# Patient Record
Sex: MALE | Race: WHITE | NOT HISPANIC OR LATINO | Employment: OTHER | URBAN - NONMETROPOLITAN AREA
[De-identification: names, ages, dates, MRNs, and addresses within clinical notes are randomized per-mention and may not be internally consistent; named-entity substitution may affect disease eponyms.]

---

## 2017-03-07 DIAGNOSIS — D72.820 LYMPHOCYTOSIS (SYMPTOMATIC): Primary | ICD-10-CM

## 2017-03-08 ENCOUNTER — OFFICE VISIT (OUTPATIENT)
Dept: ONCOLOGY | Facility: CLINIC | Age: 67
End: 2017-03-08

## 2017-03-08 ENCOUNTER — LAB (OUTPATIENT)
Dept: ONCOLOGY | Facility: HOSPITAL | Age: 67
End: 2017-03-08

## 2017-03-08 VITALS
HEART RATE: 84 BPM | TEMPERATURE: 98.3 F | WEIGHT: 171.6 LBS | HEIGHT: 67 IN | DIASTOLIC BLOOD PRESSURE: 86 MMHG | SYSTOLIC BLOOD PRESSURE: 148 MMHG | RESPIRATION RATE: 18 BRPM | BODY MASS INDEX: 26.93 KG/M2

## 2017-03-08 DIAGNOSIS — D72.820 LYMPHOCYTOSIS (SYMPTOMATIC): ICD-10-CM

## 2017-03-08 DIAGNOSIS — C91.10 CLL (CHRONIC LYMPHOCYTIC LEUKEMIA) (HCC): Primary | ICD-10-CM

## 2017-03-08 LAB
ALBUMIN SERPL-MCNC: 4.6 G/DL (ref 3.4–4.8)
ALBUMIN/GLOB SERPL: 1.5 G/DL (ref 1.1–1.8)
ALP SERPL-CCNC: 71 U/L (ref 38–126)
ALT SERPL W P-5'-P-CCNC: 33 U/L (ref 21–72)
ANION GAP SERPL CALCULATED.3IONS-SCNC: 15 MMOL/L (ref 5–15)
AST SERPL-CCNC: 40 U/L (ref 17–59)
BILIRUB SERPL-MCNC: 0.6 MG/DL (ref 0.2–1.3)
BUN BLD-MCNC: 20 MG/DL (ref 7–21)
BUN/CREAT SERPL: 16 (ref 7–25)
CALCIUM SPEC-SCNC: 9.1 MG/DL (ref 8.4–10.2)
CHLORIDE SERPL-SCNC: 104 MMOL/L (ref 95–110)
CO2 SERPL-SCNC: 21 MMOL/L (ref 22–31)
CREAT BLD-MCNC: 1.25 MG/DL (ref 0.7–1.3)
DEPRECATED RDW RBC AUTO: 41.1 FL (ref 35.1–43.9)
EOSINOPHIL # BLD MANUAL: 0.45 10*3/MM3 (ref 0–0.7)
EOSINOPHIL NFR BLD MANUAL: 2 % (ref 0–7)
ERYTHROCYTE [DISTWIDTH] IN BLOOD BY AUTOMATED COUNT: 13.7 % (ref 11.5–14.5)
GFR SERPL CREATININE-BSD FRML MDRD: 58 ML/MIN/1.73 (ref 49–113)
GLOBULIN UR ELPH-MCNC: 3 GM/DL (ref 2.3–3.5)
GLUCOSE BLD-MCNC: 86 MG/DL (ref 60–100)
HCT VFR BLD AUTO: 42.5 % (ref 39–49)
HGB BLD-MCNC: 14.6 G/DL (ref 13.7–17.3)
LDH SERPL-CCNC: 523 U/L (ref 313–618)
LYMPHOCYTES # BLD MANUAL: 12.48 10*3/MM3 (ref 0.6–4.2)
LYMPHOCYTES NFR BLD MANUAL: 56 % (ref 10–50)
MCH RBC QN AUTO: 28.2 PG (ref 26.5–34)
MCHC RBC AUTO-ENTMCNC: 34.4 G/DL (ref 31.5–36.3)
MCV RBC AUTO: 82 FL (ref 80–98)
NEUTROPHILS # BLD AUTO: 4.46 10*3/MM3 (ref 2–8.6)
NEUTROPHILS NFR BLD MANUAL: 20 % (ref 37–80)
PLAT MORPH BLD: NORMAL
PLATELET # BLD AUTO: 195 10*3/MM3 (ref 150–450)
PMV BLD AUTO: 10 FL (ref 8–12)
POTASSIUM BLD-SCNC: 4.2 MMOL/L (ref 3.5–5.1)
PROT SERPL-MCNC: 7.6 G/DL (ref 6.3–8.6)
RBC # BLD AUTO: 5.18 10*6/MM3 (ref 4.37–5.74)
RBC MORPH BLD: NORMAL
SODIUM BLD-SCNC: 140 MMOL/L (ref 137–145)
VARIANT LYMPHS NFR BLD MANUAL: 22 % (ref 0–5)
WBC MORPH BLD: NORMAL
WBC NRBC COR # BLD: 22.29 10*3/MM3 (ref 3.2–9.8)

## 2017-03-08 PROCEDURE — 85025 COMPLETE CBC W/AUTO DIFF WBC: CPT | Performed by: INTERNAL MEDICINE

## 2017-03-08 PROCEDURE — 83615 LACTATE (LD) (LDH) ENZYME: CPT | Performed by: INTERNAL MEDICINE

## 2017-03-08 PROCEDURE — 85007 BL SMEAR W/DIFF WBC COUNT: CPT | Performed by: INTERNAL MEDICINE

## 2017-03-08 PROCEDURE — 80053 COMPREHEN METABOLIC PANEL: CPT | Performed by: INTERNAL MEDICINE

## 2017-03-08 PROCEDURE — 99213 OFFICE O/P EST LOW 20 MIN: CPT | Performed by: INTERNAL MEDICINE

## 2017-03-08 NOTE — PROGRESS NOTES
DATE OF VISIT: 3/8/2017    REASON FOR VISIT: History of CLL    HISTORY OF PRESENT ILLNESS:    66-year-old male with a past medical history significant for history of CVA and dyslipidemia, he was diagnosed with CLL on peripheral blood flow cytometry in September 2016.  He is here for a 3 monthly follow-up visit today.  Denies any abnormal swollen and anywhere in the body. denies any recent weight loss.  Denies any fever or chills .  Wife in the room states patient is having increasing night sweats.    PAST MEDICAL HISTORY:    Past Medical History   Diagnosis Date   • CLL (chronic lymphocytic leukemia)    • CVA (cerebral vascular accident)    • Dyslipidemia    • Gastritis    • Night sweats        SOCIAL HISTORY:    Social History   Substance Use Topics   • Smoking status: Former Smoker     Types: Cigars     Quit date: 1992   • Smokeless tobacco: None   • Alcohol use No       Surgical History :  Past Surgical History   Procedure Laterality Date   • Appendectomy     • Prostate surgery     • Knee surgery     • Hernia repair     • Leg surgery     • Mandible fracture surgery         ALLERGIES:    No Known Allergies    REVIEW OF SYSTEMS:      CONSTITUTIONAL: Positive for fatigue.  No fever, chills, or night sweats.     HEENT: Positive for night sweats.  No epistaxis, mouth sores, or difficulty swallowing.    RESPIRATORY:  Positive for intermittent shortness of breath.  No cough or hemoptysis.    CARDIOVASCULAR:  No chest pain or palpitations.    GASTROINTESTINAL: Positive for left upper quadrant discomfort.  No  nausea, vomiting, or blood in the stool.    GENITOURINARY:  No dysuria or hematuria.    MUSCULOSKELETAL:  Positive for chronic back pain.    NEUROLOGICAL:  Positive for occasional tingling and numbness in the lower extremities. No new headache or dizziness.     LYMPHATICS:  Denies any abnormal swollen and anywhere in the body.    SKIN:  Denies any new skin rash.    PHYSICAL EXAMINATION:      VITAL SIGNS:    Visit  "Vitals   • /86   • Pulse 84   • Temp 98.3 °F (36.8 °C)   • Resp 18   • Ht 67\" (170.2 cm)   • Wt 171 lb 9.6 oz (77.8 kg)   • BMI 26.88 kg/m2       GENERAL:  Not in any distress.    HEENT:  Normocephalic, Atraumatic.Mild Conjunctival pallor. No icterus. Extraocular Movements Intact. No Fascial Asymmetry noted.    NECK:  No adenopathy. No JVD.    RESPIRATORY:  Fair air entry bilateral. No rhonchi or wheezing.    CARDIOVASCULAR:  S1, S2. Regular rate and rhythm. No murmur or gallop appreciated.    ABDOMEN:  Soft, obese, nontender. Bowel sounds present in all four quadrants.  No organomegaly appreciated.    EXTREMITIES:  No edema.No Calf Tenderness.    NEUROLOGIC:  Alert, awake and oriented ×3.  No  Motor or sensory deficit appreciated. Cranial Nerves 2-12 grossly intact.          DIAGNOSTIC DATA:    GLUCOSE   Date Value Ref Range Status   12/01/2016 78 60 - 100 mg/dl Final     SODIUM   Date Value Ref Range Status   12/01/2016 140 137 - 145 mmol/L Final     POTASSIUM   Date Value Ref Range Status   12/01/2016 4.3 3.5 - 5.1 mmol/L Final     CO2   Date Value Ref Range Status   12/01/2016 24 22 - 31 mmol/L Final     CHLORIDE   Date Value Ref Range Status   12/01/2016 102 95 - 110 mmol/L Final     ANION GAP   Date Value Ref Range Status   12/01/2016 14.0 5.0 - 15.0 mmol/L Final     CREATININE   Date Value Ref Range Status   12/01/2016 1.1 0.7 - 1.3 mg/dl Final     BUN   Date Value Ref Range Status   12/01/2016 17 7 - 21 mg/dl Final     CALCIUM   Date Value Ref Range Status   12/01/2016 9.0 8.4 - 10.2 mg/dl Final     ALKALINE PHOSPHATASE   Date Value Ref Range Status   12/01/2016 68 38 - 126 U/L Final     TOTAL PROTEIN   Date Value Ref Range Status   12/01/2016 7.8 6.3 - 8.6 gm/dl Final     ALT (SGPT)   Date Value Ref Range Status   12/01/2016 31 21 - 72 U/L Final     AST (SGOT)   Date Value Ref Range Status   12/01/2016 21 17 - 59 U/L Final     TOTAL BILIRUBIN   Date Value Ref Range Status   12/01/2016 0.3 0.2 - 1.3 " mg/dl Final     ALBUMIN   Date Value Ref Range Status   12/01/2016 4.5 3.4 - 4.8 gm/dl Final     Lab Results   Component Value Date    WBC 22.29 (H) 03/08/2017    HGB 14.6 03/08/2017    HCT 42.5 03/08/2017    MCV 82.0 03/08/2017     03/08/2017     Lab Results   Component Value Date    NEUTROABS 4.79 12/01/2016     Lab Results   Component Value Date    REFLABREPO SEE NOTE: 10/19/2016   ]    PATHOLOGY:  FISH testing for CLL done on October 19, 2016 shows:  SPECIFIC FISH RESULTS:     CCND1/IGH: NORMAL        nuc samantha 11q13(FVRK0w3),14q32(IGHx2)[100]     THOMAS: NORMAL        nuc samantha 11q22.3(ATMx2)[100]     12cen: NORMAL        nuc samantha 12cen(P80A4j1)[100]     13q: NORMAL        nuc samantha 13q14.3(DLEUx2),13q34(XZAJ2x4)[100]     TP53: NORMAL        nuc samantha 17p13.1(TP53x2)[100]     Peripheral blood flow cytometry done on September 30, 2016 shows:  Interpretation    Peripheral Blood:     CD5+ monotypic B-cell population (40% of leukocytes) compatible with   B-cell chronic lymphocytic leukemia/ small lymphocytic lymphoma (B-CLL/SLL)         RADIOLOGY DATA :  CT of abdomen and pelvis with contrast done on October 5, 2016 showed:  FINDINGS- Comparison study dated July 14, 2014. Axial computer  tomography sequential imaging was performed from the diaphragms  through the symphysis pubis after administration of IV contrast  .Sagittal and coronal reformates was performed.      Imaging through the lung bases reveals stable right lung base lateral  basilar small calcified lung parenchymal granuloma consistent with  stable old granulomatous disease. Otherwise lung bases unremarkable..      The liver is normal. The gallbladder is normal. The pancreas is  normal. The spleen has multiple small calcified granulomas involving  it consistent with old granulomatous disease. Spleen appears upper  limits of normal size but is otherwise unremarkable. Bilateral adrenal  glands are normal. Right kidney and ureter are normal. Left kidney  and  ureter are normal. The bladder is normal. The prostate gland appears  diffusely enlarged with a volume of 45 and is impinging upon the base  of bladder. Questionable defect suspicious for prior TURP procedure.  Recommend clinical correlation. The hollow viscera appears normal. No  lymphadenopathy in the abdomen or pelvis. No acute osseous  abnormality.      IMPRESSION-   1.Enlarged prostate gland may represent changes from benign prostatic  hypertrophy versus prostate malignancy. There also appears to be  evidence of prior probable TURP procedure. Recommend clinical  correlation..  2.Otherwise unremarkable CT abdomen pelvis study..        ASSESSMENT AND PLAN:      1.  CLL, stage II with questionable splenomegaly.  Patient's white blood cell count is 22,000 today with normal hemoglobin and platelets.  In view of having increasing night sweats, it was discussed with patient and his family it could be or could not be related to the CLL.  Recommended to him to start intravenous chemotherapy with bendamustine and rituximab and see if it helps with the night sweats.  At this point is not sure whether he wants to go with chemotherapy.  We will see him back in about 3 months with a repeat CBC, CMP and LDH at that point.  Patient was encouraged to call us if he starts having fevers or drenching night sweats or weight loss or if he starts noticing any swollen glands anywhere in the body.  He was encouraged to call us next week if he wants to start chemotherapy.  In that case he'll need a port placement as well as a staging CT of chest abdomen and pelvis with contrast prior to starting chemotherapy.    2.  History of CVA    3.  Health maintenance: Patient does not smoke.  Had a colonoscopy done in 2013.  He remains full code.        Andres Lubin MD  3/8/2017  2:49 PM

## 2017-03-08 NOTE — PROGRESS NOTES
"SULAIMANW met with patient and his wife privately  after his medical oncology follow up visit.  SW advised by RN of pt responses to risk assessment and history of suicidal ideations.  SW introduced self and explained role and scope of assessment. Pt. presents with flat affect and depressed mood, he is quiet and withdrawn and often looks to wife to respond and was encouraged to share his own thoughts and feelings. He is described as having difficulty in sharing of emotions.  SW explained support and limits of confidentiality and pt evidenced some increase in comfort prior to departure. Pt. Shared feedback that 7 years prior he experienced a time that he had suicidal ideations with a plan. He states he did not act on the plan, did not seek intervention and has never been on antidepressant medication. He described the previous time as a time of situational stress and when resolved his coping improved.  Pt wife is present and serves a collateral historian.  She often answers on behalf of pt and was encouraged to allow pt to share.  Pt. Denies any current suicidal or homicidal ideations, no plan or intent.  He states over the past month he has thought about death and describes it as an acceptance that if he went to sleep and did not wake that he would be accepting of that. He describes the stress and worry associated with his illness as a major stressor, treatment is not indicated at this time and he describes his condition as in a \"hold\" pattern at present. Pt. Fear and worry about future is stressful. He describes symptoms of anhedonia, fatigue, times of feeling helpless, poor concentration and poor sleep. He was provided education related to the symptoms of depression and treatment modalities to include medication and psychotherapy> SW offered pt the ability to speak further in counseling. He is not receptive at this time.   Pt. Denies any prior suicide attempts, no prior psychiatric hospitalizations. He is scheduled to " follow up with his PCP on 3-23 and was encouraged to ask about her evaluation for possible need for antidepressants. Pt. Describes a history of loss and complex grief and stress as he and his wife are raising their grandchildren after the unexpected death of daughter.   SW offered emotional support, validation of feelings, encouragement and education.  Total face to face 30 minutes.   Ongoing support reinforced and pt was given SW contact number to call in the event needs arise or symptoms of depression/ anxiety exacerbate.  He was provided [address] [employer] Number for emergency contact.

## 2017-06-08 ENCOUNTER — LAB (OUTPATIENT)
Dept: ONCOLOGY | Facility: HOSPITAL | Age: 67
End: 2017-06-08

## 2017-06-08 ENCOUNTER — OFFICE VISIT (OUTPATIENT)
Dept: ONCOLOGY | Facility: CLINIC | Age: 67
End: 2017-06-08

## 2017-06-08 VITALS
HEART RATE: 72 BPM | WEIGHT: 177.8 LBS | TEMPERATURE: 97.9 F | SYSTOLIC BLOOD PRESSURE: 168 MMHG | BODY MASS INDEX: 27.85 KG/M2 | DIASTOLIC BLOOD PRESSURE: 88 MMHG

## 2017-06-08 DIAGNOSIS — C91.10 CLL (CHRONIC LYMPHOCYTIC LEUKEMIA) (HCC): Primary | ICD-10-CM

## 2017-06-08 DIAGNOSIS — C91.10 CLL (CHRONIC LYMPHOCYTIC LEUKEMIA) (HCC): ICD-10-CM

## 2017-06-08 LAB
ALBUMIN SERPL-MCNC: 4.5 G/DL (ref 3.4–4.8)
ALBUMIN/GLOB SERPL: 1.3 G/DL (ref 1.1–1.8)
ALP SERPL-CCNC: 93 U/L (ref 38–126)
ALT SERPL W P-5'-P-CCNC: 42 U/L (ref 21–72)
ANION GAP SERPL CALCULATED.3IONS-SCNC: 14 MMOL/L (ref 5–15)
AST SERPL-CCNC: 28 U/L (ref 17–59)
BILIRUB SERPL-MCNC: 0.6 MG/DL (ref 0.2–1.3)
BUN BLD-MCNC: 15 MG/DL (ref 7–21)
BUN/CREAT SERPL: 12.1 (ref 7–25)
CALCIUM SPEC-SCNC: 9.2 MG/DL (ref 8.4–10.2)
CHLORIDE SERPL-SCNC: 102 MMOL/L (ref 95–110)
CO2 SERPL-SCNC: 23 MMOL/L (ref 22–31)
CREAT BLD-MCNC: 1.24 MG/DL (ref 0.7–1.3)
DEPRECATED RDW RBC AUTO: 41.4 FL (ref 35.1–43.9)
EOSINOPHIL # BLD MANUAL: 0.45 10*3/MM3 (ref 0–0.7)
EOSINOPHIL NFR BLD MANUAL: 2 % (ref 0–7)
ERYTHROCYTE [DISTWIDTH] IN BLOOD BY AUTOMATED COUNT: 13.9 % (ref 11.5–14.5)
GFR SERPL CREATININE-BSD FRML MDRD: 58 ML/MIN/1.73 (ref 49–113)
GLOBULIN UR ELPH-MCNC: 3.5 GM/DL (ref 2.3–3.5)
GLUCOSE BLD-MCNC: 89 MG/DL (ref 60–100)
HCT VFR BLD AUTO: 43.4 % (ref 39–49)
HGB BLD-MCNC: 15.2 G/DL (ref 13.7–17.3)
LDH SERPL-CCNC: 517 U/L (ref 313–618)
LYMPHOCYTES # BLD MANUAL: 12.62 10*3/MM3 (ref 0.6–4.2)
LYMPHOCYTES NFR BLD MANUAL: 3 % (ref 0–12)
LYMPHOCYTES NFR BLD MANUAL: 56 % (ref 10–50)
MCH RBC QN AUTO: 28.5 PG (ref 26.5–34)
MCHC RBC AUTO-ENTMCNC: 35 G/DL (ref 31.5–36.3)
MCV RBC AUTO: 81.4 FL (ref 80–98)
MONOCYTES # BLD AUTO: 0.68 10*3/MM3 (ref 0–0.9)
NEUTROPHILS # BLD AUTO: 5.86 10*3/MM3 (ref 2–8.6)
NEUTROPHILS NFR BLD MANUAL: 26 % (ref 37–80)
PLAT MORPH BLD: NORMAL
PLATELET # BLD AUTO: 178 10*3/MM3 (ref 150–450)
PMV BLD AUTO: 10.9 FL (ref 8–12)
POTASSIUM BLD-SCNC: 4.5 MMOL/L (ref 3.5–5.1)
PROT SERPL-MCNC: 8 G/DL (ref 6.3–8.6)
RBC # BLD AUTO: 5.33 10*6/MM3 (ref 4.37–5.74)
RBC MORPH BLD: NORMAL
SODIUM BLD-SCNC: 139 MMOL/L (ref 137–145)
VARIANT LYMPHS NFR BLD MANUAL: 13 % (ref 0–5)
WBC MORPH BLD: NORMAL
WBC NRBC COR # BLD: 22.54 10*3/MM3 (ref 3.2–9.8)

## 2017-06-08 PROCEDURE — G0463 HOSPITAL OUTPT CLINIC VISIT: HCPCS | Performed by: INTERNAL MEDICINE

## 2017-06-08 PROCEDURE — 80053 COMPREHEN METABOLIC PANEL: CPT

## 2017-06-08 PROCEDURE — 85025 COMPLETE CBC W/AUTO DIFF WBC: CPT

## 2017-06-08 PROCEDURE — 36415 COLL VENOUS BLD VENIPUNCTURE: CPT | Performed by: INTERNAL MEDICINE

## 2017-06-08 PROCEDURE — 83615 LACTATE (LD) (LDH) ENZYME: CPT

## 2017-06-08 PROCEDURE — 99213 OFFICE O/P EST LOW 20 MIN: CPT | Performed by: INTERNAL MEDICINE

## 2017-06-08 PROCEDURE — 85007 BL SMEAR W/DIFF WBC COUNT: CPT

## 2017-06-08 RX ORDER — TAMSULOSIN HYDROCHLORIDE 0.4 MG/1
1 CAPSULE ORAL NIGHTLY
COMMUNITY
End: 2019-11-21 | Stop reason: ALTCHOICE

## 2017-06-08 RX ORDER — PAROXETINE HYDROCHLORIDE 20 MG/1
20 TABLET, FILM COATED ORAL NIGHTLY
COMMUNITY
End: 2019-11-21 | Stop reason: ALTCHOICE

## 2017-06-08 NOTE — PROGRESS NOTES
DATE OF VISIT: 6/8/2017    REASON FOR VISIT: History of CLL    HISTORY OF PRESENT ILLNESS:    66-year-old male with a past medical history significant for history of CVA and dyslipidemia, he was diagnosed with CLL on peripheral blood flow cytometry in September 2016.  He is here for a 3 monthly follow-up visit today.  Denies any abnormal swollen and anywhere in the body. denies any recent weight loss.  Denies any fever or chills .  Complains of significant night sweats.  He also complains of continuous pain in the left upper quadrant.    PAST MEDICAL HISTORY:    Past Medical History:   Diagnosis Date   • CLL (chronic lymphocytic leukemia)    • CVA (cerebral vascular accident)    • Dyslipidemia    • Gastritis    • Night sweats        SOCIAL HISTORY:    Social History   Substance Use Topics   • Smoking status: Former Smoker     Types: Cigars     Quit date: 1992   • Smokeless tobacco: None   • Alcohol use No       Surgical History :  Past Surgical History:   Procedure Laterality Date   • APPENDECTOMY     • HERNIA REPAIR     • KNEE SURGERY     • LEG SURGERY     • MANDIBLE FRACTURE SURGERY     • PROSTATE SURGERY         ALLERGIES:    No Known Allergies    REVIEW OF SYSTEMS:      CONSTITUTIONAL: Positive for fatigue.Positive for night sweats.  No fever, chills.    HEENT:   No epistaxis, mouth sores, or difficulty swallowing.    RESPIRATORY:  Positive for intermittent shortness of breath.  No cough or hemoptysis.    CARDIOVASCULAR:  No chest pain or palpitations.    GASTROINTESTINAL: Positive for left upper quadrant discomfort.  No  nausea, vomiting, or blood in the stool.    GENITOURINARY:  No dysuria or hematuria.    MUSCULOSKELETAL:  Positive for chronic back pain.    NEUROLOGICAL:  Positive for occasional tingling and numbness in the lower extremities. No new headache or dizziness.     LYMPHATICS:  Denies any abnormal swollen and anywhere in the body.    SKIN:  Denies any new skin rash.          PHYSICAL EXAMINATION:       VITAL SIGNS:    /88  Pulse 72  Temp 97.9 °F (36.6 °C)  Wt 177 lb 12.8 oz (80.6 kg)  BMI 27.85 kg/m2    GENERAL:  Not in any distress.    HEENT:  Normocephalic, Atraumatic.Mild Conjunctival pallor. No icterus. Extraocular Movements Intact. No Facial Asymmetry noted.    NECK:  No adenopathy. No JVD.    RESPIRATORY:  Fair air entry bilateral. No rhonchi or wheezing.    CARDIOVASCULAR:  S1, S2. Regular rate and rhythm. No murmur or gallop appreciated.    ABDOMEN:  Soft, obese, nontender. Bowel sounds present in all four quadrants.  No organomegaly appreciated.    EXTREMITIES:  No edema.No Calf Tenderness.    NEUROLOGIC:  Alert, awake and oriented ×3.  No  Motor or sensory deficit appreciated. Cranial Nerves 2-12 grossly intact.          DIAGNOSTIC DATA:    Glucose   Date Value Ref Range Status   06/08/2017 89 60 - 100 mg/dL Final     Sodium   Date Value Ref Range Status   06/08/2017 139 137 - 145 mmol/L Final     Potassium   Date Value Ref Range Status   06/08/2017 4.5 3.5 - 5.1 mmol/L Final     CO2   Date Value Ref Range Status   06/08/2017 23.0 22.0 - 31.0 mmol/L Final     Chloride   Date Value Ref Range Status   06/08/2017 102 95 - 110 mmol/L Final     Anion Gap   Date Value Ref Range Status   06/08/2017 14.0 5.0 - 15.0 mmol/L Final     Creatinine   Date Value Ref Range Status   06/08/2017 1.24 0.70 - 1.30 mg/dL Final     BUN   Date Value Ref Range Status   06/08/2017 15 7 - 21 mg/dL Final     BUN/Creatinine Ratio   Date Value Ref Range Status   06/08/2017 12.1 7.0 - 25.0 Final     Calcium   Date Value Ref Range Status   06/08/2017 9.2 8.4 - 10.2 mg/dL Final     eGFR Non  Amer   Date Value Ref Range Status   06/08/2017 58 49 - 113 mL/min/1.73 Final     Alkaline Phosphatase   Date Value Ref Range Status   06/08/2017 93 38 - 126 U/L Final     Total Protein   Date Value Ref Range Status   06/08/2017 8.0 6.3 - 8.6 g/dL Final     ALT (SGPT)   Date Value Ref Range Status   06/08/2017 42 21 - 72 U/L  Final     AST (SGOT)   Date Value Ref Range Status   06/08/2017 28 17 - 59 U/L Final     Total Bilirubin   Date Value Ref Range Status   06/08/2017 0.6 0.2 - 1.3 mg/dL Final     Albumin   Date Value Ref Range Status   06/08/2017 4.50 3.40 - 4.80 g/dL Final     Globulin   Date Value Ref Range Status   06/08/2017 3.5 2.3 - 3.5 gm/dL Final     A/G Ratio   Date Value Ref Range Status   06/08/2017 1.3 1.1 - 1.8 g/dL Final     Lab Results   Component Value Date    WBC 22.54 (H) 06/08/2017    HGB 15.2 06/08/2017    HCT 43.4 06/08/2017    MCV 81.4 06/08/2017     06/08/2017     Lab Results   Component Value Date    NEUTROABS 5.86 06/08/2017     Lab Results   Component Value Date    REFLABREPO SEE NOTE: 10/19/2016   ]    PATHOLOGY:  FISH testing for CLL done on October 19, 2016 shows:  SPECIFIC FISH RESULTS:     CCND1/IGH: NORMAL        nuc samantha 11q13(USTR2n6),14q32(IGHx2)[100]     THOMAS: NORMAL        nuc samantha 11q22.3(ATMx2)[100]     12cen: NORMAL        nuc samantha 12cen(O30L4z9)[100]     13q: NORMAL        nuc samantha 13q14.3(DLEUx2),13q34(MHYF3z1)[100]     TP53: NORMAL        nuc samantha 17p13.1(TP53x2)[100]     Peripheral blood flow cytometry done on September 30, 2016 shows:  Interpretation    Peripheral Blood:     CD5+ monotypic B-cell population (40% of leukocytes) compatible with   B-cell chronic lymphocytic leukemia/ small lymphocytic lymphoma (B-CLL/SLL)         RADIOLOGY DATA :  CT of abdomen and pelvis with contrast done on October 5, 2016 showed:  FINDINGS- Comparison study dated July 14, 2014. Axial computer  tomography sequential imaging was performed from the diaphragms  through the symphysis pubis after administration of IV contrast  .Sagittal and coronal reformates was performed.      Imaging through the lung bases reveals stable right lung base lateral  basilar small calcified lung parenchymal granuloma consistent with  stable old granulomatous disease. Otherwise lung bases unremarkable..      The liver is normal.  The gallbladder is normal. The pancreas is  normal. The spleen has multiple small calcified granulomas involving  it consistent with old granulomatous disease. Spleen appears upper  limits of normal size but is otherwise unremarkable. Bilateral adrenal  glands are normal. Right kidney and ureter are normal. Left kidney and  ureter are normal. The bladder is normal. The prostate gland appears  diffusely enlarged with a volume of 45 and is impinging upon the base  of bladder. Questionable defect suspicious for prior TURP procedure.  Recommend clinical correlation. The hollow viscera appears normal. No  lymphadenopathy in the abdomen or pelvis. No acute osseous  abnormality.      IMPRESSION-   1.Enlarged prostate gland may represent changes from benign prostatic  hypertrophy versus prostate malignancy. There also appears to be  evidence of prior probable TURP procedure. Recommend clinical  correlation..  2.Otherwise unremarkable CT abdomen pelvis study..        ASSESSMENT AND PLAN:      1.  CLL, stage II with questionable splenomegaly.  Patient's white blood cell count is 22,000 today with normal hemoglobin and platelets.  She has persistent night sweats since last clinic visit.  Still reluctant to start chemotherapy.  Again patient and his family were educated about CLL and indications for treatment in CLL.  At this point he wants to think about whether he wants to start chemotherapy or not.  Patient was encouraged to call us if he wants to start chemotherapy was started in near future with bendamustine and rituximab.  We will give him 3 month appointment with repeat CBC on that date.    2.  History of CVA    3.  Health maintenance: Patient does not smoke.  Had a colonoscopy done in 2013.  He remains full code.        Andres Lubin MD  6/8/2017  1:48 PM

## 2017-06-19 ENCOUNTER — TELEPHONE (OUTPATIENT)
Dept: ONCOLOGY | Facility: CLINIC | Age: 67
End: 2017-06-19

## 2017-06-20 NOTE — TELEPHONE ENCOUNTER
Needs to get port placement.Will need to see him next week to explain side effect of chemotherapy prior to starting it.

## 2017-06-20 NOTE — TELEPHONE ENCOUNTER
Called and spoke with pt, pt is to be expecting a call back regarding appt next week to discuss chemo.

## 2017-06-28 ENCOUNTER — PREP FOR SURGERY (OUTPATIENT)
Dept: OTHER | Facility: HOSPITAL | Age: 67
End: 2017-06-28

## 2017-06-28 ENCOUNTER — CONSULT (OUTPATIENT)
Dept: SURGERY | Facility: CLINIC | Age: 67
End: 2017-06-28

## 2017-06-28 ENCOUNTER — OFFICE VISIT (OUTPATIENT)
Dept: ONCOLOGY | Facility: CLINIC | Age: 67
End: 2017-06-28

## 2017-06-28 VITALS
DIASTOLIC BLOOD PRESSURE: 80 MMHG | RESPIRATION RATE: 16 BRPM | WEIGHT: 178.1 LBS | BODY MASS INDEX: 27.89 KG/M2 | SYSTOLIC BLOOD PRESSURE: 147 MMHG | TEMPERATURE: 96.7 F | HEART RATE: 96 BPM

## 2017-06-28 VITALS
WEIGHT: 179 LBS | SYSTOLIC BLOOD PRESSURE: 144 MMHG | DIASTOLIC BLOOD PRESSURE: 76 MMHG | BODY MASS INDEX: 28.77 KG/M2 | HEIGHT: 66 IN

## 2017-06-28 DIAGNOSIS — C91.10 CLL (CHRONIC LYMPHOCYTIC LEUKEMIA) (HCC): Primary | ICD-10-CM

## 2017-06-28 PROCEDURE — G0463 HOSPITAL OUTPT CLINIC VISIT: HCPCS | Performed by: INTERNAL MEDICINE

## 2017-06-28 PROCEDURE — 99214 OFFICE O/P EST MOD 30 MIN: CPT | Performed by: INTERNAL MEDICINE

## 2017-06-28 PROCEDURE — 99204 OFFICE O/P NEW MOD 45 MIN: CPT | Performed by: SURGERY

## 2017-06-28 RX ORDER — ONDANSETRON 4 MG/1
4 TABLET, FILM COATED ORAL 4 TIMES DAILY PRN
Qty: 40 TABLET | Refills: 3 | Status: SHIPPED | OUTPATIENT
Start: 2017-06-28 | End: 2021-11-10 | Stop reason: SDUPTHER

## 2017-06-28 NOTE — PROGRESS NOTES
Subjective   Roe Aniceto Gu is a 66 y.o. male.   referal from [facility] needs mediport  History of Present Illness   Mr. Gu is 66-year-old gentleman who is diagnosed with CLL several months ago.  He is unable complaint is abdominal pain from his flow megaly.  He's been being monitored and he doesn't have any B symptoms are such as night sweats or weight loss or fevers and chills.  They have decided to start chemotherapy for his treatments.    Past medical history of CLL, stroke, dyslipidemic, gastritis    Past surgical history of epinephrine, hernia repair, knee surgery, the mandible surgery, prostate surgery.      Current Outpatient Prescriptions:   •  aspirin 81 MG tablet, Take 81 mg by mouth Daily., Disp: , Rfl:   •  ondansetron (ZOFRAN) 4 MG tablet, Take 1 tablet by mouth 4 (Four) Times a Day As Needed for Nausea or Vomiting., Disp: 40 tablet, Rfl: 3  •  PARoxetine (PAXIL) 20 MG tablet, Take 20 mg by mouth Every Morning., Disp: , Rfl:   •  pravastatin (PRAVACHOL) 20 MG tablet, Take 20 mg by mouth Daily., Disp: , Rfl:   •  raNITIdine (ZANTAC) 300 MG tablet, Take 300 mg by mouth Daily., Disp: , Rfl:   •  tamsulosin (FLOMAX) 0.4 MG capsule 24 hr capsule, Take 1 capsule by mouth Every Night., Disp: , Rfl:     No known drug allergies.    Family history of diabetes, heart disease, kidney disease, cancer.    Social history patient is retired and .  He has 7 children doesn't smoke and doesn't drink.  The following portions of the patient's history were reviewed and updated as appropriate: allergies, current medications, past family history, past medical history, past social history, past surgical history and problem list.    Review of Systems   Constitutional: Positive for appetite change.   HENT: Negative.    Eyes: Negative.    Respiratory: Negative.    Cardiovascular: Negative.    Gastrointestinal: Positive for abdominal pain.   Endocrine: Negative.    Genitourinary: Positive for difficulty urinating.    Musculoskeletal: Positive for arthralgias.   Skin: Negative.    Allergic/Immunologic: Negative.    Neurological: Negative.    Hematological: Negative.    Psychiatric/Behavioral: Positive for dysphoric mood.       Objective   Physical Exam   Constitutional: He is oriented to person, place, and time. He appears well-developed.   HENT:   Head: Normocephalic and atraumatic.   Eyes: Pupils are equal, round, and reactive to light.   Neck: Normal range of motion. Neck supple.   Cardiovascular: Normal rate, regular rhythm, normal heart sounds and intact distal pulses.    Pulmonary/Chest: Effort normal and breath sounds normal.   Abdominal: Soft. Bowel sounds are normal.   Musculoskeletal: Normal range of motion.   Neurological: He is alert and oriented to person, place, and time.   Skin: Skin is warm and dry.   Psychiatric: He has a normal mood and affect. His behavior is normal.       Assessment/Plan   Roe was seen today for leukemia.    Diagnoses and all orders for this visit:    CLL (chronic lymphocytic leukemia)   Mr. Gu is a 66-year-old gentleman with CLL who is restart his chemotherapy.  He is here for Mediport placement.  The procedure and risks, benefits, and alternatives to plan discussed patient and he understands and agrees.  He is scheduled to start chemotherapy on July 10.  Schedule him for port placement on Friday, July 7.  Thank you for the consult.

## 2017-06-28 NOTE — PROGRESS NOTES
DATE OF VISIT: 6/28/2017    REASON FOR VISIT: History of CLL    HISTORY OF PRESENT ILLNESS:    66-year-old male with a past medical history significant for history of CVA and dyslipidemia, he was diagnosed with CLL on peripheral blood flow cytometry in September 2016.  He is here for a 3 monthly follow-up visit today.  Denies any abnormal swollen and anywhere in the body. denies any recent weight loss.  Denies any fever or chills .  Complains of significant night sweats.  He also complains of continuous pain in the left upper quadrant.Patient is here to discuss chemotherapy option for CLL.    PAST MEDICAL HISTORY:    Past Medical History:   Diagnosis Date   • CLL (chronic lymphocytic leukemia)    • CVA (cerebral vascular accident)    • Dyslipidemia    • Gastritis    • Night sweats        SOCIAL HISTORY:    Social History   Substance Use Topics   • Smoking status: Former Smoker     Types: Cigars     Quit date: 1992   • Smokeless tobacco: Not on file   • Alcohol use No       Surgical History :  Past Surgical History:   Procedure Laterality Date   • APPENDECTOMY     • HERNIA REPAIR     • KNEE SURGERY     • LEG SURGERY     • MANDIBLE FRACTURE SURGERY     • PROSTATE SURGERY         ALLERGIES:    No Known Allergies    REVIEW OF SYSTEMS:      CONSTITUTIONAL: Positive for fatigue.Positive for drenching night sweats.  No fever, chills.    HEENT:   No epistaxis, mouth sores, or difficulty swallowing.    RESPIRATORY:  Positive for intermittent shortness of breath.  No cough or hemoptysis.    CARDIOVASCULAR:  No chest pain or palpitations.    GASTROINTESTINAL: Positive for left upper quadrant discomfort.  No  nausea, vomiting, or blood in the stool.    GENITOURINARY:  No dysuria or hematuria.    MUSCULOSKELETAL:  Positive for chronic back pain.    NEUROLOGICAL:  Positive for occasional tingling and numbness in the lower extremities. No new headache or dizziness.     LYMPHATICS:  Denies any abnormal swollen and anywhere in the  body.    SKIN:  Denies any new skin rash.          PHYSICAL EXAMINATION:      VITAL SIGNS:    /80  Pulse 96  Temp 96.7 °F (35.9 °C)  Resp 16  Wt 178 lb 1.6 oz (80.8 kg)  BMI 27.89 kg/m2    GENERAL:  Not in any distress.    HEENT:  Normocephalic, Atraumatic.Mild Conjunctival pallor. No icterus. Extraocular Movements Intact. No Facial Asymmetry noted.    NECK:  No adenopathy. No JVD.    RESPIRATORY:  Fair air entry bilateral. No rhonchi or wheezing.    CARDIOVASCULAR:  S1, S2. Regular rate and rhythm. No murmur or gallop appreciated.    ABDOMEN:  Soft, obese, nontender. Bowel sounds present in all four quadrants.  No organomegaly appreciated.    EXTREMITIES:  No edema.No Calf Tenderness.    NEUROLOGIC:  Alert, awake and oriented ×3.  No  Motor or sensory deficit appreciated. Cranial Nerves 2-12 grossly intact.          DIAGNOSTIC DATA:    Glucose   Date Value Ref Range Status   06/08/2017 89 60 - 100 mg/dL Final     Sodium   Date Value Ref Range Status   06/08/2017 139 137 - 145 mmol/L Final     Potassium   Date Value Ref Range Status   06/08/2017 4.5 3.5 - 5.1 mmol/L Final     CO2   Date Value Ref Range Status   06/08/2017 23.0 22.0 - 31.0 mmol/L Final     Chloride   Date Value Ref Range Status   06/08/2017 102 95 - 110 mmol/L Final     Anion Gap   Date Value Ref Range Status   06/08/2017 14.0 5.0 - 15.0 mmol/L Final     Creatinine   Date Value Ref Range Status   06/08/2017 1.24 0.70 - 1.30 mg/dL Final     BUN   Date Value Ref Range Status   06/08/2017 15 7 - 21 mg/dL Final     BUN/Creatinine Ratio   Date Value Ref Range Status   06/08/2017 12.1 7.0 - 25.0 Final     Calcium   Date Value Ref Range Status   06/08/2017 9.2 8.4 - 10.2 mg/dL Final     eGFR Non  Amer   Date Value Ref Range Status   06/08/2017 58 49 - 113 mL/min/1.73 Final     Alkaline Phosphatase   Date Value Ref Range Status   06/08/2017 93 38 - 126 U/L Final     Total Protein   Date Value Ref Range Status   06/08/2017 8.0 6.3 - 8.6 g/dL  Final     ALT (SGPT)   Date Value Ref Range Status   06/08/2017 42 21 - 72 U/L Final     AST (SGOT)   Date Value Ref Range Status   06/08/2017 28 17 - 59 U/L Final     Total Bilirubin   Date Value Ref Range Status   06/08/2017 0.6 0.2 - 1.3 mg/dL Final     Albumin   Date Value Ref Range Status   06/08/2017 4.50 3.40 - 4.80 g/dL Final     Globulin   Date Value Ref Range Status   06/08/2017 3.5 2.3 - 3.5 gm/dL Final     A/G Ratio   Date Value Ref Range Status   06/08/2017 1.3 1.1 - 1.8 g/dL Final     Lab Results   Component Value Date    WBC 22.54 (H) 06/08/2017    HGB 15.2 06/08/2017    HCT 43.4 06/08/2017    MCV 81.4 06/08/2017     06/08/2017     Lab Results   Component Value Date    NEUTROABS 5.86 06/08/2017     Lab Results   Component Value Date    REFLABREPO SEE NOTE: 10/19/2016   ]    PATHOLOGY:  FISH testing for CLL done on October 19, 2016 shows:  SPECIFIC FISH RESULTS:     CCND1/IGH: NORMAL        nuc samantha 11q13(SUYO0u9),14q32(IGHx2)[100]     THOMAS: NORMAL        nuc samantha 11q22.3(ATMx2)[100]     12cen: NORMAL        nuc samantha 12cen([id number])[100]     13q: NORMAL        nuc samantha 13q14.3(DLEUx2),13q34(GGBH1n7)[100]     TP53: NORMAL        nuc samantha 17p13.1(TP53x2)[100]     Peripheral blood flow cytometry done on September 30, 2016 shows:  Interpretation    Peripheral Blood:     CD5+ monotypic B-cell population (40% of leukocytes) compatible with   B-cell chronic lymphocytic leukemia/ small lymphocytic lymphoma (B-CLL/SLL)         RADIOLOGY DATA :  CT of abdomen and pelvis with contrast done on October 5, 2016 showed:  FINDINGS- Comparison study dated July 14, 2014. Axial computer  tomography sequential imaging was performed from the diaphragms  through the symphysis pubis after administration of IV contrast  .Sagittal and coronal reformates was performed.      Imaging through the lung bases reveals stable right lung base lateral  basilar small calcified lung parenchymal granuloma consistent with  stable old  granulomatous disease. Otherwise lung bases unremarkable..      The liver is normal. The gallbladder is normal. The pancreas is  normal. The spleen has multiple small calcified granulomas involving  it consistent with old granulomatous disease. Spleen appears upper  limits of normal size but is otherwise unremarkable. Bilateral adrenal  glands are normal. Right kidney and ureter are normal. Left kidney and  ureter are normal. The bladder is normal. The prostate gland appears  diffusely enlarged with a volume of 45 and is impinging upon the base  of bladder. Questionable defect suspicious for prior TURP procedure.  Recommend clinical correlation. The hollow viscera appears normal. No  lymphadenopathy in the abdomen or pelvis. No acute osseous  abnormality.      IMPRESSION-   1.Enlarged prostate gland may represent changes from benign prostatic  hypertrophy versus prostate malignancy. There also appears to be  evidence of prior probable TURP procedure. Recommend clinical  correlation..  2.Otherwise unremarkable CT abdomen pelvis study..        ASSESSMENT AND PLAN:      1.  CLL, stage II with questionable splenomegaly.  Patient's white blood cell count is 22,000 today with normal hemoglobin and platelets. In view of worsening persistent  night sweats since last clinic visit patient has decided to try chemotherapy and see if it helps him with night sweats and abdominal pain.  Side effects of chemotherapy with rituximab and bendamustine were discussed with patient and his wife.  He was explained about possible side effect including infusion reaction with rituximab, nausea vomiting, diarrhea, risk of kidney failure, lowering of blood count, risk of infection, need for blood transfusion and possibility of allergic reaction which could be severe and fatal intake case.  We will also send a prescription for Zofran to his pharmacy today.  We'll provide them information to read about bendamustine and rituximab today.  We'll  refer him to the surgery team to get a port placed.  We'll start him tentatively on chemotherapy on week of July 10, 2017.    2.  History of CVA    3.  Health maintenance: Patient does not smoke.  Had a colonoscopy done in 2013.  He remains full code.        Andres Lubin MD  6/28/2017  6:39 PM

## 2017-06-28 NOTE — PATIENT INSTRUCTIONS
Bendamustine Injection  What is this medicine?  BENDAMUSTINE (EDILIA da MUS teen) is a chemotherapy drug. It is used to treat chronic lymphocytic leukemia and non-Hodgkin lymphoma.  This medicine may be used for other purposes; ask your health care provider or pharmacist if you have questions.  COMMON BRAND NAME(S): Moshe DUMONT  What should I tell my health care provider before I take this medicine?  They need to know if you have any of these conditions:  -infection (especially a virus infection such as chickenpox, cold sores, or herpes)  -kidney disease  -liver disease  -an unusual or allergic reaction to bendamustine, mannitol, other medicines, foods, dyes, or preservatives  -pregnant or trying to get pregnant  -breast-feeding  How should I use this medicine?  This medicine is for infusion into a vein. It is given by a health care professional in a hospital or clinic setting.  Talk to your pediatrician regarding the use of this medicine in children. Special care may be needed.  Overdosage: If you think you have taken too much of this medicine contact a poison control center or emergency room at once.  NOTE: This medicine is only for you. Do not share this medicine with others.  What if I miss a dose?  It is important not to miss your dose. Call your doctor or health care professional if you are unable to keep an appointment.  What may interact with this medicine?  Do not take this medicine with any of the following medications:  -clozapine  This medicine may also interact with the following medications:  -atazanavir  -cimetidine  -ciprofloxacin  -enoxacin  -fluvoxamine  -medicines for seizures like carbamazepine and phenobarbital  -mexiletine  -rifampin  -tacrine  -thiabendazole  -zileuton  This list may not describe all possible interactions. Give your health care provider a list of all the medicines, herbs, non-prescription drugs, or dietary supplements you use. Also tell them if you smoke, drink alcohol, or  use illegal drugs. Some items may interact with your medicine.  What should I watch for while using this medicine?  This drug may make you feel generally unwell. This is not uncommon, as chemotherapy can affect healthy cells as well as cancer cells. Report any side effects. Continue your course of treatment even though you feel ill unless your doctor tells you to stop.  You may need blood work done while you are taking this medicine.  Call your doctor or health care professional for advice if you get a fever, chills or sore throat, or other symptoms of a cold or flu. Do not treat yourself. This drug decreases your body's ability to fight infections. Try to avoid being around people who are sick.  This medicine may increase your risk to bruise or bleed. Call your doctor or health care professional if you notice any unusual bleeding.  Talk to your doctor about your risk of cancer. You may be more at risk for certain types of cancers if you take this medicine.  Do not become pregnant while taking this medicine or for 3 months after stopping it. Women should inform their doctor if they wish to become pregnant or think they might be pregnant. Men should not father a child while taking this medicine and for 3 months after stopping it.There is a potential for serious side effects to an unborn child. Talk to your health care professional or pharmacist for more information. Do not breast-feed an infant while taking this medicine.  This medicine may interfere with the ability to have a child. You should talk with your doctor or health care professional if you are concerned about your fertility.  What side effects may I notice from receiving this medicine?  Side effects that you should report to your doctor or health care professional as soon as possible:  -allergic reactions like skin rash, itching or hives, swelling of the face, lips, or tongue  -low blood counts - this medicine may decrease the number of white blood cells,  red blood cells and platelets. You may be at increased risk for infections and bleeding.  -redness, blistering, peeling or loosening of the skin, including inside the mouth  -signs of infection - fever or chills, cough, sore throat, pain or difficulty passing urine  -signs of decreased platelets or bleeding - bruising, pinpoint red spots on the skin, black, tarry stools, blood in the urine  -signs of decreased red blood cells - unusually weak or tired, fainting spells, lightheadedness  -signs and symptoms of kidney injury like trouble passing urine or change in the amount of urine  -signs and symptoms of liver injury like dark yellow or brown urine; general ill feeling or flu-like symptoms; light-colored stools; loss of appetite; nausea; right upper belly pain; unusually weak or tired; yellowing of the eyes or skin  Side effects that usually do not require medical attention (report to your doctor or health care professional if they continue or are bothersome):  -constipation  -decreased appetite  -diarrhea  -headache  -mouth sores  -nausea/vomiting  -tiredness  This list may not describe all possible side effects. Call your doctor for medical advice about side effects. You may report side effects to FDA at 4-139-FDA-5127.  Where should I keep my medicine?  This drug is given in a hospital or clinic and will not be stored at home.  NOTE: This sheet is a summary. It may not cover all possible information. If you have questions about this medicine, talk to your doctor, pharmacist, or health care provider.     © 2017, [employer]. (2016-10-20 08:45:41)  Rituximab injection  What is this medicine?  RITUXIMAB (ri TUX i mab) is a monoclonal antibody. It is used commonly to treat non-Hodgkin lymphoma and other conditions. It is also used to treat rheumatoid arthritis (RA). In RA, this medicine slows the inflammatory process and help reduce joint pain and swelling. This medicine is often used with other cancer or  arthritis medications.  This medicine may be used for other purposes; ask your health care provider or pharmacist if you have questions.  COMMON BRAND NAME(S): Rituxan  What should I tell my health care provider before I take this medicine?  They need to know if you have any of these conditions:  -blood disorders  -heart disease  -history of hepatitis B  -infection (especially a virus infection such as chickenpox, cold sores, or herpes)  -irregular heartbeat  -kidney disease  -lung or breathing disease, like asthma  -lupus  -an unusual or allergic reaction to rituximab, mouse proteins, other medicines, foods, dyes, or preservatives  -pregnant or trying to get pregnant  -breast-feeding  How should I use this medicine?  This medicine is for infusion into a vein. It is administered in a hospital or clinic by a specially trained health care professional.  A special MedGuide will be given to you by the pharmacist with each prescription and refill. Be sure to read this information carefully each time.  Talk to your pediatrician regarding the use of this medicine in children. This medicine is not approved for use in children.  Overdosage: If you think you have taken too much of this medicine contact a poison control center or emergency room at once.  NOTE: This medicine is only for you. Do not share this medicine with others.  What if I miss a dose?  It is important not to miss a dose. Call your doctor or health care professional if you are unable to keep an appointment.  What may interact with this medicine?  -cisplatin  -medicines for blood pressure  -some other medicines for arthritis  -vaccines  This list may not describe all possible interactions. Give your health care provider a list of all the medicines, herbs, non-prescription drugs, or dietary supplements you use. Also tell them if you smoke, drink alcohol, or use illegal drugs. Some items may interact with your medicine.  What should I watch for while using this  medicine?  Report any side effects that you notice during your treatment right away, such as changes in your breathing, fever, chills, dizziness or lightheadedness. These effects are more common with the first dose.  Visit your prescriber or health care professional for checks on your progress. You will need to have regular blood work. Report any other side effects. The side effects of this medicine can continue after you finish your treatment. Continue your course of treatment even though you feel ill unless your doctor tells you to stop.  Call your doctor or health care professional for advice if you get a fever, chills or sore throat, or other symptoms of a cold or flu. Do not treat yourself. This drug decreases your body's ability to fight infections. Try to avoid being around people who are sick.  This medicine may increase your risk to bruise or bleed. Call your doctor or health care professional if you notice any unusual bleeding.  Be careful brushing and flossing your teeth or using a toothpick because you may get an infection or bleed more easily. If you have any dental work done, tell your dentist you are receiving this medicine.  Avoid taking products that contain aspirin, acetaminophen, ibuprofen, naproxen, or ketoprofen unless instructed by your doctor. These medicines may hide a fever.  Do not become pregnant while taking this medicine. Women should inform their doctor if they wish to become pregnant or think they might be pregnant. There is a potential for serious side effects to an unborn child. Talk to your health care professional or pharmacist for more information. Do not breast-feed an infant while taking this medicine.  What side effects may I notice from receiving this medicine?  Side effects that you should report to your doctor or health care professional as soon as possible:  -allergic reactions like skin rash, itching or hives, swelling of the face, lips, or tongue  -low blood counts - this  medicine may decrease the number of white blood cells, red blood cells and platelets. You may be at increased risk for infections and bleeding.  -signs of infection - fever or chills, cough, sore throat, pain or difficulty passing urine  -signs of decreased platelets or bleeding - bruising, pinpoint red spots on the skin, black, tarry stools, blood in the urine  -signs of decreased red blood cells - unusually weak or tired, fainting spells, lightheadedness  -breathing problems  -confused, not responsive  -chest pain  -fast, irregular heartbeat  -feeling faint or lightheaded, falls  -mouth sores  -redness, blistering, peeling or loosening of the skin, including inside the mouth  -stomach pain  -swelling of the ankles, feet, or hands  -trouble passing urine or change in the amount of urine  Side effects that usually do not require medical attention (report to your doctor or health care professional if they continue or are bothersome):  -anxiety  -headache  -loss of appetite  -muscle aches  -nausea  -night sweats  This list may not describe all possible side effects. Call your doctor for medical advice about side effects. You may report side effects to FDA at 3-603-FDA-7923.  Where should I keep my medicine?  This drug is given in a hospital or clinic and will not be stored at home.  NOTE: This sheet is a summary. It may not cover all possible information. If you have questions about this medicine, talk to your doctor, pharmacist, or health care provider.     © 2017, [employer] Standard. (2017-01-19 12:31:08)

## 2017-07-03 ENCOUNTER — APPOINTMENT (OUTPATIENT)
Dept: PREADMISSION TESTING | Facility: HOSPITAL | Age: 67
End: 2017-07-03

## 2017-07-03 VITALS
HEIGHT: 67 IN | RESPIRATION RATE: 18 BRPM | DIASTOLIC BLOOD PRESSURE: 80 MMHG | SYSTOLIC BLOOD PRESSURE: 134 MMHG | BODY MASS INDEX: 27.94 KG/M2 | WEIGHT: 178 LBS | OXYGEN SATURATION: 97 % | HEART RATE: 75 BPM

## 2017-07-03 RX ORDER — SODIUM CHLORIDE, SODIUM GLUCONATE, SODIUM ACETATE, POTASSIUM CHLORIDE, AND MAGNESIUM CHLORIDE 526; 502; 368; 37; 30 MG/100ML; MG/100ML; MG/100ML; MG/100ML; MG/100ML
1000 INJECTION, SOLUTION INTRAVENOUS CONTINUOUS PRN
Status: CANCELLED | OUTPATIENT
Start: 2017-07-03

## 2017-07-06 ENCOUNTER — ANESTHESIA EVENT (OUTPATIENT)
Dept: PERIOP | Facility: HOSPITAL | Age: 67
End: 2017-07-06

## 2017-07-07 ENCOUNTER — HOSPITAL ENCOUNTER (OUTPATIENT)
Facility: HOSPITAL | Age: 67
Setting detail: HOSPITAL OUTPATIENT SURGERY
Discharge: HOME | End: 2017-07-07
Attending: SURGERY | Admitting: SURGERY

## 2017-07-07 ENCOUNTER — APPOINTMENT (OUTPATIENT)
Dept: GENERAL RADIOLOGY | Facility: HOSPITAL | Age: 67
End: 2017-07-07

## 2017-07-07 ENCOUNTER — ANESTHESIA (OUTPATIENT)
Dept: PERIOP | Facility: HOSPITAL | Age: 67
End: 2017-07-07

## 2017-07-07 VITALS
HEIGHT: 67 IN | TEMPERATURE: 97.1 F | RESPIRATION RATE: 18 BRPM | HEART RATE: 74 BPM | DIASTOLIC BLOOD PRESSURE: 79 MMHG | SYSTOLIC BLOOD PRESSURE: 148 MMHG | OXYGEN SATURATION: 95 % | BODY MASS INDEX: 27.72 KG/M2 | WEIGHT: 176.59 LBS

## 2017-07-07 DIAGNOSIS — C91.10 CLL (CHRONIC LYMPHOCYTIC LEUKEMIA) (HCC): ICD-10-CM

## 2017-07-07 PROCEDURE — 25010000002 PROPOFOL 10 MG/ML EMULSION: Performed by: ANESTHESIOLOGY

## 2017-07-07 PROCEDURE — 76000 FLUOROSCOPY <1 HR PHYS/QHP: CPT

## 2017-07-07 PROCEDURE — 36561 INSERT TUNNELED CV CATH: CPT | Performed by: SURGERY

## 2017-07-07 PROCEDURE — 25010000002 MIDAZOLAM PER 1 MG: Performed by: ANESTHESIOLOGY

## 2017-07-07 PROCEDURE — 76937 US GUIDE VASCULAR ACCESS: CPT | Performed by: SURGERY

## 2017-07-07 PROCEDURE — 25010000002 PROPOFOL 1000 MG/ML EMULSION: Performed by: ANESTHESIOLOGY

## 2017-07-07 PROCEDURE — 25010000002 HEPARIN FLUSH (PORCINE) 100 UNIT/ML SOLUTION: Performed by: SURGERY

## 2017-07-07 PROCEDURE — 25010000003 CEFAZOLIN PER 500 MG: Performed by: SURGERY

## 2017-07-07 PROCEDURE — 71010 HC CHEST PA OR AP: CPT

## 2017-07-07 PROCEDURE — 25010000002 FENTANYL CITRATE (PF) 100 MCG/2ML SOLUTION: Performed by: ANESTHESIOLOGY

## 2017-07-07 PROCEDURE — C1788 PORT, INDWELLING, IMP: HCPCS | Performed by: SURGERY

## 2017-07-07 DEVICE — POWERPORT CLEARVUE IMPLANTABLE PORT WITH ATTACHABLE 8F POLYURETHANE OPEN-ENDED SINGLE-LUMEN VENOUS CATHETER INTERMEDIATE KIT
Type: IMPLANTABLE DEVICE | Site: CHEST | Status: FUNCTIONAL
Brand: POWERPORT CLEARVUE

## 2017-07-07 RX ORDER — LABETALOL HYDROCHLORIDE 5 MG/ML
5 INJECTION, SOLUTION INTRAVENOUS
Status: CANCELLED | OUTPATIENT
Start: 2017-07-07

## 2017-07-07 RX ORDER — SODIUM CHLORIDE, SODIUM GLUCONATE, SODIUM ACETATE, POTASSIUM CHLORIDE, AND MAGNESIUM CHLORIDE 526; 502; 368; 37; 30 MG/100ML; MG/100ML; MG/100ML; MG/100ML; MG/100ML
1000 INJECTION, SOLUTION INTRAVENOUS CONTINUOUS PRN
Status: DISCONTINUED | OUTPATIENT
Start: 2017-07-07 | End: 2017-07-07 | Stop reason: HOSPADM

## 2017-07-07 RX ORDER — EPHEDRINE SULFATE 50 MG/ML
5 INJECTION, SOLUTION INTRAVENOUS ONCE AS NEEDED
Status: CANCELLED | OUTPATIENT
Start: 2017-07-07

## 2017-07-07 RX ORDER — NALOXONE HCL 0.4 MG/ML
0.2 VIAL (ML) INJECTION AS NEEDED
Status: CANCELLED | OUTPATIENT
Start: 2017-07-07

## 2017-07-07 RX ORDER — FLUMAZENIL 0.1 MG/ML
0.2 INJECTION INTRAVENOUS AS NEEDED
Status: CANCELLED | OUTPATIENT
Start: 2017-07-07

## 2017-07-07 RX ORDER — ONDANSETRON 2 MG/ML
4 INJECTION INTRAMUSCULAR; INTRAVENOUS ONCE AS NEEDED
Status: CANCELLED | OUTPATIENT
Start: 2017-07-07

## 2017-07-07 RX ORDER — PROPOFOL 10 MG/ML
VIAL (ML) INTRAVENOUS AS NEEDED
Status: DISCONTINUED | OUTPATIENT
Start: 2017-07-07 | End: 2017-07-07 | Stop reason: SURG

## 2017-07-07 RX ORDER — OXYCODONE HYDROCHLORIDE AND ACETAMINOPHEN 5; 325 MG/1; MG/1
1 TABLET ORAL
Status: CANCELLED | OUTPATIENT
Start: 2017-07-07

## 2017-07-07 RX ORDER — ACETAMINOPHEN 650 MG/1
650 SUPPOSITORY RECTAL ONCE AS NEEDED
Status: CANCELLED | OUTPATIENT
Start: 2017-07-07

## 2017-07-07 RX ORDER — MIDAZOLAM HYDROCHLORIDE 1 MG/ML
INJECTION INTRAMUSCULAR; INTRAVENOUS AS NEEDED
Status: DISCONTINUED | OUTPATIENT
Start: 2017-07-07 | End: 2017-07-07 | Stop reason: SURG

## 2017-07-07 RX ORDER — DIPHENHYDRAMINE HYDROCHLORIDE 50 MG/ML
12.5 INJECTION INTRAMUSCULAR; INTRAVENOUS
Status: CANCELLED | OUTPATIENT
Start: 2017-07-07

## 2017-07-07 RX ORDER — FENTANYL CITRATE 50 UG/ML
INJECTION, SOLUTION INTRAMUSCULAR; INTRAVENOUS AS NEEDED
Status: DISCONTINUED | OUTPATIENT
Start: 2017-07-07 | End: 2017-07-07 | Stop reason: SURG

## 2017-07-07 RX ORDER — ACETAMINOPHEN 325 MG/1
650 TABLET ORAL ONCE AS NEEDED
Status: CANCELLED | OUTPATIENT
Start: 2017-07-07

## 2017-07-07 RX ADMIN — FENTANYL CITRATE 100 MCG: 50 INJECTION, SOLUTION INTRAMUSCULAR; INTRAVENOUS at 10:04

## 2017-07-07 RX ADMIN — CEFAZOLIN SODIUM 2 G: 1 INJECTION, POWDER, FOR SOLUTION INTRAMUSCULAR; INTRAVENOUS at 10:07

## 2017-07-07 RX ADMIN — PROPOFOL 35 MCG/KG/MIN: 10 INJECTION, EMULSION INTRAVENOUS at 10:09

## 2017-07-07 RX ADMIN — MIDAZOLAM 2 MG: 1 INJECTION INTRAMUSCULAR; INTRAVENOUS at 10:00

## 2017-07-07 RX ADMIN — PROPOFOL 10 MG: 10 INJECTION, EMULSION INTRAVENOUS at 10:10

## 2017-07-07 RX ADMIN — SODIUM CHLORIDE, SODIUM GLUCONATE, SODIUM ACETATE, POTASSIUM CHLORIDE, AND MAGNESIUM CHLORIDE 1000 ML: 526; 502; 368; 37; 30 INJECTION, SOLUTION INTRAVENOUS at 07:49

## 2017-07-07 NOTE — ANESTHESIA PREPROCEDURE EVALUATION
Anesthesia Evaluation     no history of anesthetic complications:  NPO Solid Status: > 8 hours       Airway   Mallampati: II  no difficulty expected  Dental    (+) edentulous    Pulmonary - normal exam   (-) COPD, asthma, sleep apnea, not a smoker  Cardiovascular - normal exam  Exercise tolerance: good (4-7 METS)    (-) hypertension, past MI, CAD, CAREY      Neuro/Psych  (+) CVA,    GI/Hepatic/Renal/Endo    (+)  GERD well controlled, renal disease (gfr 58) CRI,     Musculoskeletal     Abdominal    Substance History      OB/GYN          Other   (+) blood dyscrasia (CLL)   history of cancer                                  Anesthesia Plan    ASA 3     MAC   (Fire safety precautions  )  Anesthetic plan and risks discussed with patient.

## 2017-07-07 NOTE — OP NOTE
INSERTION VENOUS ACCESS DEVICE  Procedure Note    Roe Gu  7/7/2017    Pre-op Diagnosis:   CLL (chronic lymphocytic leukemia) [C91.10]    Post-op Diagnosis:     Post-Op Diagnosis Codes:     * CLL (chronic lymphocytic leukemia) [C91.10]    Procedure/CPT® Codes:  IL INSJ TUNNELED CTR VAD W/SUBQ PORT AGE 5 YR/> [15433]    Procedure(s):  MEDIPORT PLACEMENT WITH ULTRASOUND GUIDANCE           Surgeon(s):  Lucien Mcneal MD    Anesthesia: Monitor Anesthesia Care    Staff:   Circulator: Eric Iyer RN  Scrub Person: Ann Sadler; Paige Melchor  Assistant: Natalie Carroll CSA    Estimated Blood Loss: *No blood loss documented*    Specimens:                * No specimens in log *      Drains:           Findings: right IJ vein accessed with first stick under US guidance, port flushes and draws well    Complications: none    Op note: After consent was obtained patient was taken to the operating room where Mac anesthesia was induced. Antibiotics were given. The right chest was prepped and draped in normal sterile fashion. Timeout was performed. The right internal jugular vein was accessed with the introducer needle with the aid of ultrasound guidance. Wire was passed. Chest x-ray was used to confirm placement. Ultrasound was also used to confirm placement of wire in the vein. The sheath dilator was introduced and then this was used to introduce the catheter into the vessel. We made a pocket on the right anterior chest underneath the clavicle. Adequate sized pocket was made and the 3 3-0 Vicryl stay sutures placed in the subcutaneous tissue. We then used tunneler to bring the catheter from the stick site to the port site. Fluoroscopy is with skin used to pull the catheter back to appropriate length at the atriocaval junction. The catheter was then cut to length was placed on and that the port was attached to the catheter with the cap. The port was sewn in to the chest wall subcutaneous tissue with 3-0 Vicryl sutures skin  was approximated with 3-0 Vicryl followed by running 4-0 subcuticular Vicryl. Final chest x-ray showed port was in good position without any kinking. The port was accessed well with a Mahmood needle and flushed and bart well and was heparinized. Skin affect was applied and the procedure terminated. Patient tolerated procedure well.    Lucien Mcneal MD     Date: 7/7/2017  Time: 11:11 AM

## 2017-07-07 NOTE — PLAN OF CARE
Problem: Patient Care Overview (Adult)  Goal: Plan of Care Review  Outcome: Outcome(s) achieved Date Met:  07/07/17  Goal: Discharge Needs Assessment  Outcome: Outcome(s) achieved Date Met:  07/07/17    Problem: Perioperative Period (Adult)  Goal: Signs and Symptoms of Listed Potential Problems Will be Absent or Manageable (Perioperative Period)  Outcome: Outcome(s) achieved Date Met:  07/07/17

## 2017-07-07 NOTE — ANESTHESIA POSTPROCEDURE EVALUATION
Patient: Roe Gu    Procedure Summary     Date Anesthesia Start Anesthesia Stop Room / Location    07/07/17 1001 1101 [facility] 09 / [facility]       Procedure Diagnosis Surgeon Provider    MEDIPORT PLACEMENT WITH ULTRASOUND GUIDANCE        (Right Chest) CLL (chronic lymphocytic leukemia)  (CLL (chronic lymphocytic leukemia) [C91.10]) MD Aniceto Yanez MD          Anesthesia Type: MAC  Last vitals  /66 (07/07/17 1107)    Temp 97 °F (36.1 °C) (07/07/17 1107)    Pulse 69 (07/07/17 1107)   Resp 18 (07/07/17 1107)    SpO2 96 % (07/07/17 1107)      Post Anesthesia Care and Evaluation    Patient location during evaluation: bedside  Patient participation: complete - patient participated  Level of consciousness: awake  Pain management: adequate  Airway patency: patent  Anesthetic complications: No anesthetic complications    Cardiovascular status: acceptable  Respiratory status: acceptable  Hydration status: acceptable    Comments:

## 2017-07-07 NOTE — H&P (VIEW-ONLY)
Subjective   Roe Aniceto Gu is a 66 y.o. male.   referal from [facility] needs mediport  History of Present Illness   Mr. Gu is 66-year-old gentleman who is diagnosed with CLL several months ago.  He is unable complaint is abdominal pain from his flow megaly.  He's been being monitored and he doesn't have any B symptoms are such as night sweats or weight loss or fevers and chills.  They have decided to start chemotherapy for his treatments.    Past medical history of CLL, stroke, dyslipidemic, gastritis    Past surgical history of epinephrine, hernia repair, knee surgery, the mandible surgery, prostate surgery.      Current Outpatient Prescriptions:   •  aspirin 81 MG tablet, Take 81 mg by mouth Daily., Disp: , Rfl:   •  ondansetron (ZOFRAN) 4 MG tablet, Take 1 tablet by mouth 4 (Four) Times a Day As Needed for Nausea or Vomiting., Disp: 40 tablet, Rfl: 3  •  PARoxetine (PAXIL) 20 MG tablet, Take 20 mg by mouth Every Morning., Disp: , Rfl:   •  pravastatin (PRAVACHOL) 20 MG tablet, Take 20 mg by mouth Daily., Disp: , Rfl:   •  raNITIdine (ZANTAC) 300 MG tablet, Take 300 mg by mouth Daily., Disp: , Rfl:   •  tamsulosin (FLOMAX) 0.4 MG capsule 24 hr capsule, Take 1 capsule by mouth Every Night., Disp: , Rfl:     No known drug allergies.    Family history of diabetes, heart disease, kidney disease, cancer.    Social history patient is retired and .  He has 7 children doesn't smoke and doesn't drink.  The following portions of the patient's history were reviewed and updated as appropriate: allergies, current medications, past family history, past medical history, past social history, past surgical history and problem list.    Review of Systems   Constitutional: Positive for appetite change.   HENT: Negative.    Eyes: Negative.    Respiratory: Negative.    Cardiovascular: Negative.    Gastrointestinal: Positive for abdominal pain.   Endocrine: Negative.    Genitourinary: Positive for difficulty urinating.    Musculoskeletal: Positive for arthralgias.   Skin: Negative.    Allergic/Immunologic: Negative.    Neurological: Negative.    Hematological: Negative.    Psychiatric/Behavioral: Positive for dysphoric mood.       Objective   Physical Exam   Constitutional: He is oriented to person, place, and time. He appears well-developed.   HENT:   Head: Normocephalic and atraumatic.   Eyes: Pupils are equal, round, and reactive to light.   Neck: Normal range of motion. Neck supple.   Cardiovascular: Normal rate, regular rhythm, normal heart sounds and intact distal pulses.    Pulmonary/Chest: Effort normal and breath sounds normal.   Abdominal: Soft. Bowel sounds are normal.   Musculoskeletal: Normal range of motion.   Neurological: He is alert and oriented to person, place, and time.   Skin: Skin is warm and dry.   Psychiatric: He has a normal mood and affect. His behavior is normal.       Assessment/Plan   Roe was seen today for leukemia.    Diagnoses and all orders for this visit:    CLL (chronic lymphocytic leukemia)   Mr. Gu is a 66-year-old gentleman with CLL who is restart his chemotherapy.  He is here for Mediport placement.  The procedure and risks, benefits, and alternatives to plan discussed patient and he understands and agrees.  He is scheduled to start chemotherapy on July 10.  Schedule him for port placement on Friday, July 7.  Thank you for the consult.

## 2017-07-10 ENCOUNTER — INFUSION (OUTPATIENT)
Dept: ONCOLOGY | Facility: HOSPITAL | Age: 67
End: 2017-07-10

## 2017-07-10 ENCOUNTER — DOCUMENTATION (OUTPATIENT)
Dept: NUTRITION | Facility: HOSPITAL | Age: 67
End: 2017-07-10

## 2017-07-10 VITALS
RESPIRATION RATE: 18 BRPM | DIASTOLIC BLOOD PRESSURE: 70 MMHG | HEART RATE: 102 BPM | SYSTOLIC BLOOD PRESSURE: 115 MMHG | TEMPERATURE: 98.2 F

## 2017-07-10 DIAGNOSIS — Z45.2 ENCOUNTER FOR VENOUS ACCESS DEVICE CARE: ICD-10-CM

## 2017-07-10 DIAGNOSIS — C91.10 CLL (CHRONIC LYMPHOCYTIC LEUKEMIA) (HCC): ICD-10-CM

## 2017-07-10 DIAGNOSIS — C91.10 CLL (CHRONIC LYMPHOCYTIC LEUKEMIA) (HCC): Primary | ICD-10-CM

## 2017-07-10 LAB
ALBUMIN SERPL-MCNC: 4.5 G/DL (ref 3.4–4.8)
ALBUMIN/GLOB SERPL: 1.4 G/DL (ref 1.1–1.8)
ALP SERPL-CCNC: 84 U/L (ref 38–126)
ALT SERPL W P-5'-P-CCNC: 38 U/L (ref 21–72)
ANION GAP SERPL CALCULATED.3IONS-SCNC: 13 MMOL/L (ref 5–15)
AST SERPL-CCNC: 26 U/L (ref 17–59)
BASOPHILS # BLD AUTO: 0.02 10*3/MM3 (ref 0–0.2)
BASOPHILS NFR BLD AUTO: 0.1 % (ref 0–2)
BILIRUB SERPL-MCNC: 0.5 MG/DL (ref 0.2–1.3)
BUN BLD-MCNC: 20 MG/DL (ref 7–21)
BUN/CREAT SERPL: 16.7 (ref 7–25)
CALCIUM SPEC-SCNC: 9 MG/DL (ref 8.4–10.2)
CHLORIDE SERPL-SCNC: 101 MMOL/L (ref 95–110)
CO2 SERPL-SCNC: 24 MMOL/L (ref 22–31)
CREAT BLD-MCNC: 1.2 MG/DL (ref 0.7–1.3)
DEPRECATED RDW RBC AUTO: 41.3 FL (ref 35.1–43.9)
EOSINOPHIL # BLD AUTO: 0.56 10*3/MM3 (ref 0–0.7)
EOSINOPHIL NFR BLD AUTO: 2.8 % (ref 0–7)
ERYTHROCYTE [DISTWIDTH] IN BLOOD BY AUTOMATED COUNT: 13.6 % (ref 11.5–14.5)
GFR SERPL CREATININE-BSD FRML MDRD: 61 ML/MIN/1.73 (ref 49–113)
GLOBULIN UR ELPH-MCNC: 3.2 GM/DL (ref 2.3–3.5)
GLUCOSE BLD-MCNC: 140 MG/DL (ref 60–100)
HCT VFR BLD AUTO: 43.4 % (ref 39–49)
HGB BLD-MCNC: 14.6 G/DL (ref 13.7–17.3)
IMM GRANULOCYTES # BLD: 0.05 10*3/MM3 (ref 0–0.02)
IMM GRANULOCYTES NFR BLD: 0.3 % (ref 0–0.5)
LYMPHOCYTES # BLD AUTO: 14.58 10*3/MM3 (ref 0.6–4.2)
LYMPHOCYTES NFR BLD AUTO: 73.5 % (ref 10–50)
MCH RBC QN AUTO: 28.1 PG (ref 26.5–34)
MCHC RBC AUTO-ENTMCNC: 33.6 G/DL (ref 31.5–36.3)
MCV RBC AUTO: 83.5 FL (ref 80–98)
MONOCYTES # BLD AUTO: 0.9 10*3/MM3 (ref 0–0.9)
MONOCYTES NFR BLD AUTO: 4.5 % (ref 0–12)
NEUTROPHILS # BLD AUTO: 3.72 10*3/MM3 (ref 2–8.6)
NEUTROPHILS NFR BLD AUTO: 18.8 % (ref 37–80)
PLATELET # BLD AUTO: 158 10*3/MM3 (ref 150–450)
PMV BLD AUTO: 10.4 FL (ref 8–12)
POTASSIUM BLD-SCNC: 4 MMOL/L (ref 3.5–5.1)
PROT SERPL-MCNC: 7.7 G/DL (ref 6.3–8.6)
RBC # BLD AUTO: 5.2 10*6/MM3 (ref 4.37–5.74)
SODIUM BLD-SCNC: 138 MMOL/L (ref 137–145)
WBC NRBC COR # BLD: 19.83 10*3/MM3 (ref 3.2–9.8)

## 2017-07-10 PROCEDURE — 25010000002 HEPARIN FLUSH (PORCINE) 100 UNIT/ML SOLUTION

## 2017-07-10 PROCEDURE — 25010000002 RITUXIMAB 100 MG/10ML SOLUTION 10 ML VIAL: Performed by: INTERNAL MEDICINE

## 2017-07-10 PROCEDURE — 25010000002 HEPARIN FLUSH (PORCINE) 100 UNIT/ML SOLUTION: Performed by: INTERNAL MEDICINE

## 2017-07-10 PROCEDURE — 25010000002 RITUXIMAB 500 MG/50ML SOLUTION 50 ML VIAL: Performed by: INTERNAL MEDICINE

## 2017-07-10 PROCEDURE — 25010000002 BENDAMUSTINE HCL 100 MG/4ML SOLUTION 4 ML VIAL: Performed by: INTERNAL MEDICINE

## 2017-07-10 PROCEDURE — 80053 COMPREHEN METABOLIC PANEL: CPT | Performed by: INTERNAL MEDICINE

## 2017-07-10 PROCEDURE — 96415 CHEMO IV INFUSION ADDL HR: CPT | Performed by: INTERNAL MEDICINE

## 2017-07-10 PROCEDURE — 85025 COMPLETE CBC W/AUTO DIFF WBC: CPT | Performed by: INTERNAL MEDICINE

## 2017-07-10 PROCEDURE — 96375 TX/PRO/DX INJ NEW DRUG ADDON: CPT | Performed by: INTERNAL MEDICINE

## 2017-07-10 PROCEDURE — 25010000002 PALONOSETRON PER 25 MCG: Performed by: INTERNAL MEDICINE

## 2017-07-10 PROCEDURE — 25010000002 DIPHENHYDRAMINE PER 50 MG: Performed by: INTERNAL MEDICINE

## 2017-07-10 PROCEDURE — 96411 CHEMO IV PUSH ADDL DRUG: CPT | Performed by: INTERNAL MEDICINE

## 2017-07-10 PROCEDURE — 25010000003 DEXAMETHASONE SODIUM PHOSPHATE 100 MG/10ML SOLUTION 10 ML VIAL: Performed by: INTERNAL MEDICINE

## 2017-07-10 PROCEDURE — 96413 CHEMO IV INFUSION 1 HR: CPT | Performed by: INTERNAL MEDICINE

## 2017-07-10 RX ORDER — SODIUM CHLORIDE 9 MG/ML
250 INJECTION, SOLUTION INTRAVENOUS ONCE
Status: CANCELLED | OUTPATIENT
Start: 2017-07-10

## 2017-07-10 RX ORDER — ACETAMINOPHEN 325 MG/1
650 TABLET ORAL ONCE
Status: COMPLETED | OUTPATIENT
Start: 2017-07-10 | End: 2017-07-10

## 2017-07-10 RX ORDER — MEPERIDINE HYDROCHLORIDE 50 MG/ML
25 INJECTION INTRAMUSCULAR; INTRAVENOUS; SUBCUTANEOUS
Status: CANCELLED | OUTPATIENT
Start: 2017-07-10

## 2017-07-10 RX ORDER — SODIUM CHLORIDE 0.9 % (FLUSH) 0.9 %
SYRINGE (ML) INJECTION
Status: COMPLETED
Start: 2017-07-10 | End: 2017-07-10

## 2017-07-10 RX ORDER — SODIUM CHLORIDE 9 MG/ML
250 INJECTION, SOLUTION INTRAVENOUS ONCE
Status: COMPLETED | OUTPATIENT
Start: 2017-07-10 | End: 2017-07-10

## 2017-07-10 RX ORDER — ACETAMINOPHEN 325 MG/1
650 TABLET ORAL ONCE
Status: CANCELLED | OUTPATIENT
Start: 2017-07-10

## 2017-07-10 RX ORDER — FAMOTIDINE 10 MG/ML
20 INJECTION, SOLUTION INTRAVENOUS AS NEEDED
Status: DISCONTINUED | OUTPATIENT
Start: 2017-07-10 | End: 2017-07-10 | Stop reason: HOSPADM

## 2017-07-10 RX ORDER — SODIUM CHLORIDE 0.9 % (FLUSH) 0.9 %
10 SYRINGE (ML) INJECTION AS NEEDED
Status: DISCONTINUED | OUTPATIENT
Start: 2017-07-10 | End: 2017-07-10 | Stop reason: HOSPADM

## 2017-07-10 RX ORDER — DIPHENHYDRAMINE HYDROCHLORIDE 50 MG/ML
50 INJECTION INTRAMUSCULAR; INTRAVENOUS AS NEEDED
Status: DISCONTINUED | OUTPATIENT
Start: 2017-07-10 | End: 2017-07-10 | Stop reason: HOSPADM

## 2017-07-10 RX ORDER — SODIUM CHLORIDE 0.9 % (FLUSH) 0.9 %
10 SYRINGE (ML) INJECTION AS NEEDED
Status: CANCELLED | OUTPATIENT
Start: 2017-07-10

## 2017-07-10 RX ORDER — DIPHENHYDRAMINE HYDROCHLORIDE 50 MG/ML
50 INJECTION INTRAMUSCULAR; INTRAVENOUS AS NEEDED
Status: CANCELLED | OUTPATIENT
Start: 2017-07-10

## 2017-07-10 RX ORDER — MEPERIDINE HYDROCHLORIDE 50 MG/ML
25 INJECTION INTRAMUSCULAR; INTRAVENOUS; SUBCUTANEOUS
Status: DISCONTINUED | OUTPATIENT
Start: 2017-07-10 | End: 2017-07-10 | Stop reason: HOSPADM

## 2017-07-10 RX ORDER — FAMOTIDINE 10 MG/ML
20 INJECTION, SOLUTION INTRAVENOUS AS NEEDED
Status: CANCELLED | OUTPATIENT
Start: 2017-07-10

## 2017-07-10 RX ORDER — SODIUM CHLORIDE 9 MG/ML
250 INJECTION, SOLUTION INTRAVENOUS ONCE
Status: CANCELLED | OUTPATIENT
Start: 2017-07-11

## 2017-07-10 RX ORDER — PALONOSETRON 0.05 MG/ML
0.25 INJECTION, SOLUTION INTRAVENOUS ONCE
Status: CANCELLED | OUTPATIENT
Start: 2017-07-10

## 2017-07-10 RX ORDER — PALONOSETRON 0.05 MG/ML
0.25 INJECTION, SOLUTION INTRAVENOUS ONCE
Status: COMPLETED | OUTPATIENT
Start: 2017-07-10 | End: 2017-07-10

## 2017-07-10 RX ADMIN — PALONOSETRON HYDROCHLORIDE 0.25 MG: 0.25 INJECTION INTRAVENOUS at 08:52

## 2017-07-10 RX ADMIN — DEXAMETHASONE SODIUM PHOSPHATE 12 MG: 10 INJECTION, SOLUTION INTRAMUSCULAR; INTRAVENOUS at 09:54

## 2017-07-10 RX ADMIN — SODIUM CHLORIDE, PRESERVATIVE FREE 500 UNITS: 5 INJECTION INTRAVENOUS at 14:34

## 2017-07-10 RX ADMIN — Medication 10 ML: at 07:50

## 2017-07-10 RX ADMIN — RITUXIMAB 700 MG: 10 INJECTION, SOLUTION INTRAVENOUS at 10:43

## 2017-07-10 RX ADMIN — SODIUM CHLORIDE 250 ML: 9 INJECTION, SOLUTION INTRAVENOUS at 08:52

## 2017-07-10 RX ADMIN — DIPHENHYDRAMINE HYDROCHLORIDE 50 MG: 50 INJECTION INTRAMUSCULAR; INTRAVENOUS at 09:20

## 2017-07-10 RX ADMIN — SODIUM CHLORIDE, PRESERVATIVE FREE 500 UNITS: 5 INJECTION INTRAVENOUS at 07:50

## 2017-07-10 RX ADMIN — Medication 10 ML: at 14:34

## 2017-07-10 RX ADMIN — BENDAMUSTINE HYDROCHLORIDE 175 MG: 25 INJECTION, SOLUTION INTRAVENOUS at 10:17

## 2017-07-10 RX ADMIN — ACETAMINOPHEN 650 MG: 325 TABLET ORAL at 08:52

## 2017-07-10 NOTE — PROGRESS NOTES
Adult Outpatient Nutrition  Assessment    Patient Name:  Roe Gu  YOB: 1950  MRN: [id number]        Assessment Date:  7/10/2017      CHART REVIEW  Roe Gu   1950  66 y.o.  Wt:176.9 lb  Ht: 67 in  Dx: CLL  Tx: chemo (starting today)    PATIENT/FAMILY COMMENTS  Usual Body Weight: 176 lb  Weight Change: stable  Diet: regular  Food Allergies: nkfa   Number of Feedings Daily: 3  Appetite/Intake: Varies from day to day.  Supplements: none  Meal Preparation: wife  Nutrition Comments:  No problems with chewing/N/V/constipation/diarrhea. Does have a history of acid reflux and  problems swallowing (throat dilation required once).          GOAL(s)  -to optimize nutrition in attempt to prevent loss of LBM          EDUCATION  Discussed  -high protein diet  -importance of staying hydrated  -food safety    To reinforce education, written information with RD's name & number provided.  Commercial supplement samples/recipes/discount coupons provided.  Patient and family receptive to suggestions--agreed to call on dietitian as needed.              Roxy Carroll RD          Electronically signed by:  Roxy Carroll RD  07/10/17 11:05 AM

## 2017-07-11 ENCOUNTER — INFUSION (OUTPATIENT)
Dept: ONCOLOGY | Facility: HOSPITAL | Age: 67
End: 2017-07-11

## 2017-07-11 VITALS
DIASTOLIC BLOOD PRESSURE: 70 MMHG | HEART RATE: 64 BPM | RESPIRATION RATE: 18 BRPM | SYSTOLIC BLOOD PRESSURE: 131 MMHG | TEMPERATURE: 97.7 F

## 2017-07-11 DIAGNOSIS — C91.10 CLL (CHRONIC LYMPHOCYTIC LEUKEMIA) (HCC): Primary | ICD-10-CM

## 2017-07-11 DIAGNOSIS — Z45.2 ENCOUNTER FOR VENOUS ACCESS DEVICE CARE: ICD-10-CM

## 2017-07-11 PROCEDURE — 96375 TX/PRO/DX INJ NEW DRUG ADDON: CPT | Performed by: INTERNAL MEDICINE

## 2017-07-11 PROCEDURE — 96409 CHEMO IV PUSH SNGL DRUG: CPT | Performed by: INTERNAL MEDICINE

## 2017-07-11 PROCEDURE — 25010000002 BENDAMUSTINE HCL 100 MG/4ML SOLUTION 4 ML VIAL: Performed by: INTERNAL MEDICINE

## 2017-07-11 PROCEDURE — 25010000003 DEXAMETHASONE SODIUM PHOSPHATE 100 MG/10ML SOLUTION 10 ML VIAL: Performed by: INTERNAL MEDICINE

## 2017-07-11 RX ORDER — SODIUM CHLORIDE 0.9 % (FLUSH) 0.9 %
10 SYRINGE (ML) INJECTION AS NEEDED
Status: CANCELLED | OUTPATIENT
Start: 2017-07-11

## 2017-07-11 RX ORDER — SODIUM CHLORIDE 9 MG/ML
250 INJECTION, SOLUTION INTRAVENOUS ONCE
Status: COMPLETED | OUTPATIENT
Start: 2017-07-11 | End: 2017-07-11

## 2017-07-11 RX ORDER — SODIUM CHLORIDE 0.9 % (FLUSH) 0.9 %
10 SYRINGE (ML) INJECTION AS NEEDED
Status: DISCONTINUED | OUTPATIENT
Start: 2017-07-11 | End: 2017-07-11 | Stop reason: HOSPADM

## 2017-07-11 RX ADMIN — BENDAMUSTINE HYDROCHLORIDE 175 MG: 25 INJECTION, SOLUTION INTRAVENOUS at 09:47

## 2017-07-11 RX ADMIN — Medication 10 ML: at 10:05

## 2017-07-11 RX ADMIN — SODIUM CHLORIDE 250 ML: 9 INJECTION, SOLUTION INTRAVENOUS at 08:55

## 2017-07-11 RX ADMIN — DEXAMETHASONE SODIUM PHOSPHATE 12 MG: 10 INJECTION, SOLUTION INTRAMUSCULAR; INTRAVENOUS at 09:16

## 2017-08-07 ENCOUNTER — DOCUMENTATION (OUTPATIENT)
Dept: NUTRITION | Facility: HOSPITAL | Age: 67
End: 2017-08-07

## 2017-08-07 ENCOUNTER — INFUSION (OUTPATIENT)
Dept: ONCOLOGY | Facility: HOSPITAL | Age: 67
End: 2017-08-07

## 2017-08-07 ENCOUNTER — OFFICE VISIT (OUTPATIENT)
Dept: ONCOLOGY | Facility: CLINIC | Age: 67
End: 2017-08-07

## 2017-08-07 ENCOUNTER — HOSPITAL ENCOUNTER (OUTPATIENT)
Dept: GENERAL RADIOLOGY | Facility: HOSPITAL | Age: 67
Discharge: HOME | End: 2017-08-07
Admitting: INTERNAL MEDICINE

## 2017-08-07 VITALS
WEIGHT: 176.3 LBS | SYSTOLIC BLOOD PRESSURE: 128 MMHG | BODY MASS INDEX: 27.67 KG/M2 | TEMPERATURE: 98.1 F | HEIGHT: 67 IN | DIASTOLIC BLOOD PRESSURE: 81 MMHG | RESPIRATION RATE: 16 BRPM | HEART RATE: 75 BPM

## 2017-08-07 DIAGNOSIS — Z45.2 ENCOUNTER FOR VENOUS ACCESS DEVICE CARE: ICD-10-CM

## 2017-08-07 DIAGNOSIS — D69.6 THROMBOCYTOPENIA (HCC): Primary | ICD-10-CM

## 2017-08-07 DIAGNOSIS — C91.10 CLL (CHRONIC LYMPHOCYTIC LEUKEMIA) (HCC): ICD-10-CM

## 2017-08-07 DIAGNOSIS — C91.10 CLL (CHRONIC LYMPHOCYTIC LEUKEMIA) (HCC): Primary | ICD-10-CM

## 2017-08-07 LAB
ALBUMIN SERPL-MCNC: 4.3 G/DL (ref 3.4–4.8)
ALBUMIN/GLOB SERPL: 1.5 G/DL (ref 1.1–1.8)
ALP SERPL-CCNC: 74 U/L (ref 38–126)
ALT SERPL W P-5'-P-CCNC: 37 U/L (ref 21–72)
ANION GAP SERPL CALCULATED.3IONS-SCNC: 10 MMOL/L (ref 5–15)
AST SERPL-CCNC: 25 U/L (ref 17–59)
BASOPHILS # BLD AUTO: 0.03 10*3/MM3 (ref 0–0.2)
BASOPHILS NFR BLD AUTO: 0.5 % (ref 0–2)
BILIRUB SERPL-MCNC: 0.5 MG/DL (ref 0.2–1.3)
BUN BLD-MCNC: 17 MG/DL (ref 7–21)
BUN/CREAT SERPL: 13.1 (ref 7–25)
CALCIUM SPEC-SCNC: 9 MG/DL (ref 8.4–10.2)
CHLORIDE SERPL-SCNC: 101 MMOL/L (ref 95–110)
CO2 SERPL-SCNC: 26 MMOL/L (ref 22–31)
CREAT BLD-MCNC: 1.3 MG/DL (ref 0.7–1.3)
DEPRECATED RDW RBC AUTO: 41.6 FL (ref 35.1–43.9)
EOSINOPHIL # BLD AUTO: 0.56 10*3/MM3 (ref 0–0.7)
EOSINOPHIL NFR BLD AUTO: 9.2 % (ref 0–7)
ERYTHROCYTE [DISTWIDTH] IN BLOOD BY AUTOMATED COUNT: 13.9 % (ref 11.5–14.5)
GFR SERPL CREATININE-BSD FRML MDRD: 55 ML/MIN/1.73 (ref 60–113)
GLOBULIN UR ELPH-MCNC: 2.9 GM/DL (ref 2.3–3.5)
GLUCOSE BLD-MCNC: 107 MG/DL (ref 60–100)
HCT VFR BLD AUTO: 41.8 % (ref 39–49)
HGB BLD-MCNC: 14.8 G/DL (ref 13.7–17.3)
IMM GRANULOCYTES # BLD: 0.06 10*3/MM3 (ref 0–0.02)
IMM GRANULOCYTES NFR BLD: 1 % (ref 0–0.5)
LYMPHOCYTES # BLD AUTO: 1.54 10*3/MM3 (ref 0.6–4.2)
LYMPHOCYTES NFR BLD AUTO: 25.2 % (ref 10–50)
MCH RBC QN AUTO: 28.6 PG (ref 26.5–34)
MCHC RBC AUTO-ENTMCNC: 35.4 G/DL (ref 31.5–36.3)
MCV RBC AUTO: 80.9 FL (ref 80–98)
MONOCYTES # BLD AUTO: 0.81 10*3/MM3 (ref 0–0.9)
MONOCYTES NFR BLD AUTO: 13.2 % (ref 0–12)
NEUTROPHILS # BLD AUTO: 3.12 10*3/MM3 (ref 2–8.6)
NEUTROPHILS NFR BLD AUTO: 50.9 % (ref 37–80)
PLATELET # BLD AUTO: 126 10*3/MM3 (ref 150–450)
PMV BLD AUTO: 9.1 FL (ref 8–12)
POTASSIUM BLD-SCNC: 4.2 MMOL/L (ref 3.5–5.1)
PROT SERPL-MCNC: 7.2 G/DL (ref 6.3–8.6)
RBC # BLD AUTO: 5.17 10*6/MM3 (ref 4.37–5.74)
SODIUM BLD-SCNC: 137 MMOL/L (ref 137–145)
WBC NRBC COR # BLD: 6.12 10*3/MM3 (ref 3.2–9.8)

## 2017-08-07 PROCEDURE — 96411 CHEMO IV PUSH ADDL DRUG: CPT | Performed by: INTERNAL MEDICINE

## 2017-08-07 PROCEDURE — 25010000002 RITUXIMAB 500 MG/50ML SOLUTION 50 ML VIAL: Performed by: INTERNAL MEDICINE

## 2017-08-07 PROCEDURE — 36598 INJ W/FLUOR EVAL CV DEVICE: CPT

## 2017-08-07 PROCEDURE — 96375 TX/PRO/DX INJ NEW DRUG ADDON: CPT | Performed by: INTERNAL MEDICINE

## 2017-08-07 PROCEDURE — 25010000002 BENDAMUSTINE HCL 100 MG/4ML SOLUTION 4 ML VIAL: Performed by: INTERNAL MEDICINE

## 2017-08-07 PROCEDURE — 99214 OFFICE O/P EST MOD 30 MIN: CPT | Performed by: INTERNAL MEDICINE

## 2017-08-07 PROCEDURE — 25010000002 PALONOSETRON PER 25 MCG: Performed by: INTERNAL MEDICINE

## 2017-08-07 PROCEDURE — 80053 COMPREHEN METABOLIC PANEL: CPT

## 2017-08-07 PROCEDURE — 36591 DRAW BLOOD OFF VENOUS DEVICE: CPT | Performed by: INTERNAL MEDICINE

## 2017-08-07 PROCEDURE — 85025 COMPLETE CBC W/AUTO DIFF WBC: CPT

## 2017-08-07 PROCEDURE — 0 IOPAMIDOL 61 % SOLUTION: Performed by: RADIOLOGY

## 2017-08-07 PROCEDURE — 96413 CHEMO IV INFUSION 1 HR: CPT | Performed by: INTERNAL MEDICINE

## 2017-08-07 PROCEDURE — 25010000003 DEXAMETHASONE SODIUM PHOSPHATE 100 MG/10ML SOLUTION 10 ML VIAL: Performed by: INTERNAL MEDICINE

## 2017-08-07 PROCEDURE — 25010000002 RITUXIMAB 100 MG/10ML SOLUTION 10 ML VIAL: Performed by: INTERNAL MEDICINE

## 2017-08-07 PROCEDURE — 25010000002 DIPHENHYDRAMINE PER 50 MG: Performed by: INTERNAL MEDICINE

## 2017-08-07 PROCEDURE — 96415 CHEMO IV INFUSION ADDL HR: CPT | Performed by: INTERNAL MEDICINE

## 2017-08-07 PROCEDURE — 25010000002 HEPARIN FLUSH (PORCINE) 100 UNIT/ML SOLUTION: Performed by: INTERNAL MEDICINE

## 2017-08-07 RX ORDER — SODIUM CHLORIDE 0.9 % (FLUSH) 0.9 %
10 SYRINGE (ML) INJECTION AS NEEDED
Status: CANCELLED | OUTPATIENT
Start: 2017-08-07

## 2017-08-07 RX ORDER — PALONOSETRON 0.05 MG/ML
0.25 INJECTION, SOLUTION INTRAVENOUS ONCE
Status: COMPLETED | OUTPATIENT
Start: 2017-08-07 | End: 2017-08-07

## 2017-08-07 RX ORDER — ACETAMINOPHEN 325 MG/1
650 TABLET ORAL ONCE
Status: CANCELLED | OUTPATIENT
Start: 2017-08-07

## 2017-08-07 RX ORDER — FAMOTIDINE 10 MG/ML
20 INJECTION, SOLUTION INTRAVENOUS AS NEEDED
Status: CANCELLED | OUTPATIENT
Start: 2017-08-07

## 2017-08-07 RX ORDER — SODIUM CHLORIDE 9 MG/ML
250 INJECTION, SOLUTION INTRAVENOUS ONCE
Status: CANCELLED | OUTPATIENT
Start: 2017-08-07

## 2017-08-07 RX ORDER — DIPHENHYDRAMINE HYDROCHLORIDE 50 MG/ML
50 INJECTION INTRAMUSCULAR; INTRAVENOUS AS NEEDED
Status: CANCELLED | OUTPATIENT
Start: 2017-08-07

## 2017-08-07 RX ORDER — MEPERIDINE HYDROCHLORIDE 50 MG/ML
25 INJECTION INTRAMUSCULAR; INTRAVENOUS; SUBCUTANEOUS
Status: CANCELLED | OUTPATIENT
Start: 2017-08-07

## 2017-08-07 RX ORDER — ACETAMINOPHEN 325 MG/1
650 TABLET ORAL ONCE
Status: COMPLETED | OUTPATIENT
Start: 2017-08-07 | End: 2017-08-07

## 2017-08-07 RX ORDER — PALONOSETRON 0.05 MG/ML
0.25 INJECTION, SOLUTION INTRAVENOUS ONCE
Status: CANCELLED | OUTPATIENT
Start: 2017-08-07

## 2017-08-07 RX ORDER — SODIUM CHLORIDE 0.9 % (FLUSH) 0.9 %
10 SYRINGE (ML) INJECTION AS NEEDED
Status: DISCONTINUED | OUTPATIENT
Start: 2017-08-07 | End: 2017-08-07 | Stop reason: HOSPADM

## 2017-08-07 RX ORDER — SODIUM CHLORIDE 9 MG/ML
250 INJECTION, SOLUTION INTRAVENOUS ONCE
Status: CANCELLED | OUTPATIENT
Start: 2017-08-08

## 2017-08-07 RX ORDER — SODIUM CHLORIDE 9 MG/ML
250 INJECTION, SOLUTION INTRAVENOUS ONCE
Status: COMPLETED | OUTPATIENT
Start: 2017-08-07 | End: 2017-08-07

## 2017-08-07 RX ADMIN — IOPAMIDOL 15 ML: 612 INJECTION, SOLUTION INTRAVENOUS at 15:15

## 2017-08-07 RX ADMIN — ACETAMINOPHEN 650 MG: 325 TABLET ORAL at 09:13

## 2017-08-07 RX ADMIN — DIPHENHYDRAMINE HYDROCHLORIDE 50 MG: 50 INJECTION INTRAMUSCULAR; INTRAVENOUS at 09:36

## 2017-08-07 RX ADMIN — RITUXIMAB 700 MG: 10 INJECTION, SOLUTION INTRAVENOUS at 11:18

## 2017-08-07 RX ADMIN — Medication 10 ML: at 13:09

## 2017-08-07 RX ADMIN — PALONOSETRON HYDROCHLORIDE 0.25 MG: 0.25 INJECTION INTRAVENOUS at 10:32

## 2017-08-07 RX ADMIN — DEXAMETHASONE SODIUM PHOSPHATE 12 MG: 10 INJECTION, SOLUTION INTRAMUSCULAR; INTRAVENOUS at 09:57

## 2017-08-07 RX ADMIN — SODIUM CHLORIDE, PRESERVATIVE FREE 500 UNITS: 5 INJECTION INTRAVENOUS at 14:42

## 2017-08-07 RX ADMIN — SODIUM CHLORIDE, PRESERVATIVE FREE 500 UNITS: 5 INJECTION INTRAVENOUS at 13:09

## 2017-08-07 RX ADMIN — BENDAMUSTINE HYDROCHLORIDE 175 MG: 25 INJECTION, SOLUTION INTRAVENOUS at 10:54

## 2017-08-07 RX ADMIN — Medication 10 ML: at 08:24

## 2017-08-07 RX ADMIN — SODIUM CHLORIDE 250 ML: 9 INJECTION, SOLUTION INTRAVENOUS at 09:12

## 2017-08-07 NOTE — PROGRESS NOTES
Pt back from radiology from port study.  Pt states that radiology states that port is working fine and could be used for future infusions.  Flushed with heparin 500 units with excellent blood return.  Will re-evaluate in the morning at pt's appt.

## 2017-08-07 NOTE — PROGRESS NOTES
DATE OF VISIT: 8/7/2017    REASON FOR VISIT: History of CLL    HISTORY OF PRESENT ILLNESS:    66-year-old male with a past medical history significant for history of CVA and dyslipidemia, he was diagnosed with CLL on peripheral blood flow cytometry in September 2016.  You have drenching night sweats and fatigue patient was started on chemotherapy with bendamustine and rituximab on July 10, 2017.  Patient is here to get cycle 2 of bendamustine and rituximab today.  States he had some nausea for a few days after last chemotherapy denies any excessive vomiting or diarrhea.  States after chemotherapy is night sweats is significantly improved.  States pain in left upper quadrant is completely resolved.  Denies any abnormal swollen and anywhere in the body.  Denies any blood in the stool or urine      PAST MEDICAL HISTORY:    Past Medical History:   Diagnosis Date   • Arthritis    • CLL (chronic lymphocytic leukemia)    • CVA (cerebral vascular accident) 2011   • Dyslipidemia    • Gastritis    • GERD (gastroesophageal reflux disease)    • Night sweats        SOCIAL HISTORY:    Social History   Substance Use Topics   • Smoking status: Former Smoker     Types: Cigars     Quit date: 1992   • Smokeless tobacco: Never Used      Comment: never smoked cigarettes   • Alcohol use No       Surgical History :  Past Surgical History:   Procedure Laterality Date   • APPENDECTOMY     • HERNIA REPAIR     • KNEE SURGERY     • LEG SURGERY     • MANDIBLE FRACTURE SURGERY     • [place] INSJ TUNNELED CVC W/O SUBQ PORT/ AGE 5 YR/> Right 7/7/2017    Procedure: MEDIPORT PLACEMENT WITH ULTRASOUND GUIDANCE       ;  Surgeon: Lucien Mcneal MD;  Location: [facility];  Service: General   • PROSTATE SURGERY         ALLERGIES:    No Known Allergies    REVIEW OF SYSTEMS:      CONSTITUTIONAL: Positive for fatigue.  States night sweats are improved.  No fever, chills.    HEENT:   No epistaxis, mouth sores, or difficulty swallowing.    RESPIRATORY:  Positive  "for intermittent shortness of breath.  No cough or hemoptysis.    CARDIOVASCULAR:  No chest pain or palpitations.    GASTROINTESTINAL: States pain in left upper quadrant is resolved.  No  nausea, vomiting, or blood in the stool.    GENITOURINARY:  No dysuria or hematuria.    MUSCULOSKELETAL:  Positive for chronic back pain.    NEUROLOGICAL:  Positive for occasional tingling and numbness in the lower extremities. No new headache or dizziness.     LYMPHATICS:  Denies any abnormal swollen and anywhere in the body.    SKIN:  Denies any new skin rash.          PHYSICAL EXAMINATION:      VITAL SIGNS:    /81  Pulse 75  Temp 98.1 °F (36.7 °C) (Temporal Artery )   Resp 16  Ht 67\" (170.2 cm)  Wt 176 lb 4.8 oz (80 kg)  BMI 27.61 kg/m2    GENERAL:  Not in any distress.    HEENT:  Normocephalic, Atraumatic.Mild Conjunctival pallor. No icterus. Extraocular Movements Intact. No Facial Asymmetry noted.    NECK:  No adenopathy. No JVD.    RESPIRATORY:  Fair air entry bilateral. No rhonchi or wheezing.    CARDIOVASCULAR:  S1, S2. Regular rate and rhythm. No murmur or gallop appreciated.    ABDOMEN:  Soft, obese, nontender. Bowel sounds present in all four quadrants.  No organomegaly appreciated.    EXTREMITIES:  No edema.No Calf Tenderness.    NEUROLOGIC:  Alert, awake and oriented ×3.  No  Motor or sensory deficit appreciated. Cranial Nerves 2-12 grossly intact.          DIAGNOSTIC DATA:    Glucose   Date Value Ref Range Status   08/07/2017 107 (H) 60 - 100 mg/dL Final     Sodium   Date Value Ref Range Status   08/07/2017 137 137 - 145 mmol/L Final     Potassium   Date Value Ref Range Status   08/07/2017 4.2 3.5 - 5.1 mmol/L Final     CO2   Date Value Ref Range Status   08/07/2017 26.0 22.0 - 31.0 mmol/L Final     Chloride   Date Value Ref Range Status   08/07/2017 101 95 - 110 mmol/L Final     Anion Gap   Date Value Ref Range Status   08/07/2017 10.0 5.0 - 15.0 mmol/L Final     Creatinine   Date Value Ref Range Status "   08/07/2017 1.30 0.70 - 1.30 mg/dL Final     BUN   Date Value Ref Range Status   08/07/2017 17 7 - 21 mg/dL Final     BUN/Creatinine Ratio   Date Value Ref Range Status   08/07/2017 13.1 7.0 - 25.0 Final     Calcium   Date Value Ref Range Status   08/07/2017 9.0 8.4 - 10.2 mg/dL Final     eGFR Non  Amer   Date Value Ref Range Status   08/07/2017 55 (L) >60 mL/min/1.73 Final     Alkaline Phosphatase   Date Value Ref Range Status   08/07/2017 74 38 - 126 U/L Final     Total Protein   Date Value Ref Range Status   08/07/2017 7.2 6.3 - 8.6 g/dL Final     ALT (SGPT)   Date Value Ref Range Status   08/07/2017 37 21 - 72 U/L Final     AST (SGOT)   Date Value Ref Range Status   08/07/2017 25 17 - 59 U/L Final     Total Bilirubin   Date Value Ref Range Status   08/07/2017 0.5 0.2 - 1.3 mg/dL Final     Albumin   Date Value Ref Range Status   08/07/2017 4.30 3.40 - 4.80 g/dL Final     Globulin   Date Value Ref Range Status   08/07/2017 2.9 2.3 - 3.5 gm/dL Final     A/G Ratio   Date Value Ref Range Status   08/07/2017 1.5 1.1 - 1.8 g/dL Final     Lab Results   Component Value Date    WBC 6.12 08/07/2017    HGB 14.8 08/07/2017    HCT 41.8 08/07/2017    MCV 80.9 08/07/2017     (L) 08/07/2017     Lab Results   Component Value Date    NEUTROABS 3.12 08/07/2017     Lab Results   Component Value Date    REFLABREPO SEE NOTE: 10/19/2016   ]    PATHOLOGY:  FISH testing for CLL done on October 19, 2016 shows:  SPECIFIC FISH RESULTS:     CCND1/IGH: NORMAL        nuc samantha 11q13(KECJ7z5),14q32(IGHx2)[100]     THOMAS: NORMAL        nuc samantha 11q22.3(ATMx2)[100]     12cen: NORMAL        nuc samantha 12cen([id number])[100]     13q: NORMAL        nuc samantha 13q14.3(DLEUx2),13q34(TIMK0y0)[100]     TP53: NORMAL        nuc samantha 17p13.1(TP53x2)[100]     Peripheral blood flow cytometry done on September 30, 2016 shows:  Interpretation    Peripheral Blood:     CD5+ monotypic B-cell population (40% of leukocytes) compatible with   B-cell chronic  lymphocytic leukemia/ small lymphocytic lymphoma (B-CLL/SLL)         RADIOLOGY DATA :  CT of abdomen and pelvis with contrast done on October 5, 2016 showed:  FINDINGS- Comparison study dated July 14, 2014. Axial computer  tomography sequential imaging was performed from the diaphragms  through the symphysis pubis after administration of IV contrast  .Sagittal and coronal reformates was performed.      Imaging through the lung bases reveals stable right lung base lateral  basilar small calcified lung parenchymal granuloma consistent with  stable old granulomatous disease. Otherwise lung bases unremarkable..      The liver is normal. The gallbladder is normal. The pancreas is  normal. The spleen has multiple small calcified granulomas involving  it consistent with old granulomatous disease. Spleen appears upper  limits of normal size but is otherwise unremarkable. Bilateral adrenal  glands are normal. Right kidney and ureter are normal. Left kidney and  ureter are normal. The bladder is normal. The prostate gland appears  diffusely enlarged with a volume of 45 and is impinging upon the base  of bladder. Questionable defect suspicious for prior TURP procedure.  Recommend clinical correlation. The hollow viscera appears normal. No  lymphadenopathy in the abdomen or pelvis. No acute osseous  abnormality.      IMPRESSION-   1.Enlarged prostate gland may represent changes from benign prostatic  hypertrophy versus prostate malignancy. There also appears to be  evidence of prior probable TURP procedure. Recommend clinical  correlation..  2.Otherwise unremarkable CT abdomen pelvis study..        ASSESSMENT AND PLAN:      1.  CLL, stage II with questionable splenomegaly.  In view of persistent drenching night sweats and fatigue patient was started on chemotherapy with bendamustine and rituximab on July 10, 2017.  Patient tolerated first round of chemotherapy well.  States his night sweats but is improved as well as pain in  the left upper quadrant.  We will go head with a cycle 2 of bendamustine and rituximab today.  We will see him back in about 4 weeks with cycle 3.    2.  Thrombocytopenia: Patient's platelet count is 126,000 today.  Most likely secondary to chemotherapy.  We will monitored with CBC for now.  Patient was instructed to come to the emergency room if he starts having any bleeding complication.    3. History of CVA    4.  Health maintenance: Patient does not smoke.  Had a colonoscopy done in 2013.  He remains full code.        Andres Lubin MD  8/7/2017  9:05 AM

## 2017-08-08 ENCOUNTER — DOCUMENTATION (OUTPATIENT)
Dept: ONCOLOGY | Facility: CLINIC | Age: 67
End: 2017-08-08

## 2017-08-08 ENCOUNTER — INFUSION (OUTPATIENT)
Dept: ONCOLOGY | Facility: HOSPITAL | Age: 67
End: 2017-08-08

## 2017-08-08 VITALS
TEMPERATURE: 98.5 F | RESPIRATION RATE: 20 BRPM | SYSTOLIC BLOOD PRESSURE: 131 MMHG | HEART RATE: 83 BPM | DIASTOLIC BLOOD PRESSURE: 74 MMHG

## 2017-08-08 DIAGNOSIS — Z45.2 ENCOUNTER FOR VENOUS ACCESS DEVICE CARE: ICD-10-CM

## 2017-08-08 DIAGNOSIS — C91.10 CLL (CHRONIC LYMPHOCYTIC LEUKEMIA) (HCC): Primary | ICD-10-CM

## 2017-08-08 PROCEDURE — 96523 IRRIG DRUG DELIVERY DEVICE: CPT | Performed by: INTERNAL MEDICINE

## 2017-08-08 PROCEDURE — 25010000002 HEPARIN FLUSH (PORCINE) 100 UNIT/ML SOLUTION

## 2017-08-08 RX ORDER — SODIUM CHLORIDE 9 MG/ML
250 INJECTION, SOLUTION INTRAVENOUS ONCE
Status: COMPLETED | OUTPATIENT
Start: 2017-08-08 | End: 2017-08-08

## 2017-08-08 RX ORDER — SODIUM CHLORIDE 0.9 % (FLUSH) 0.9 %
10 SYRINGE (ML) INJECTION AS NEEDED
Status: CANCELLED | OUTPATIENT
Start: 2017-08-08

## 2017-08-08 RX ORDER — PREDNISONE 10 MG/1
20 TABLET ORAL DAILY
Qty: 10 TABLET | Refills: 0 | Status: SHIPPED | OUTPATIENT
Start: 2017-08-08 | End: 2017-08-13

## 2017-08-08 RX ORDER — SODIUM CHLORIDE 0.9 % (FLUSH) 0.9 %
10 SYRINGE (ML) INJECTION AS NEEDED
Status: DISCONTINUED | OUTPATIENT
Start: 2017-08-08 | End: 2017-08-08 | Stop reason: HOSPADM

## 2017-08-08 RX ADMIN — SODIUM CHLORIDE, PRESERVATIVE FREE 500 UNITS: 5 INJECTION INTRAVENOUS at 09:04

## 2017-08-08 RX ADMIN — SODIUM CHLORIDE 250 ML: 9 INJECTION, SOLUTION INTRAVENOUS at 08:50

## 2017-08-08 RX ADMIN — Medication 10 ML: at 09:03

## 2017-08-08 NOTE — PROGRESS NOTES
After infusion with bendamustine and rituximab patient was found to have flushing of face as well as swelling in the neck.  Patient was sent for flow study to make sure port was in place.  Patient came for day 2 of bendamustine today.  Patient and family noticed a rash on his face neck as well as chest and back.  Her rash is erythematous and macular.  Patient denies any shortness of breath or difficulty swallowing.  Most likely this is a reaction to chemotherapy we will hold day 2 of bendamustine today.  We will send prescription for prednisone 20 mg by mouth daily for next 5 days to his pharmacy.

## 2017-08-08 NOTE — PROGRESS NOTES
Pt here for chemo.  Pt having redness on face, arms, and chest with spots on abdomen.  Dr. Lubin notified and pt assessed at bedside.  Chemo tx held today and oral steroid script to pharmacy.  Pt to keep regular sched. Appt.  Pt gives v/u that if symptoms persist, they are to follow up sooner or report to ER.

## 2017-09-05 ENCOUNTER — INFUSION (OUTPATIENT)
Dept: ONCOLOGY | Facility: HOSPITAL | Age: 67
End: 2017-09-05

## 2017-09-05 ENCOUNTER — OFFICE VISIT (OUTPATIENT)
Dept: ONCOLOGY | Facility: CLINIC | Age: 67
End: 2017-09-05

## 2017-09-05 VITALS
HEART RATE: 70 BPM | DIASTOLIC BLOOD PRESSURE: 83 MMHG | SYSTOLIC BLOOD PRESSURE: 143 MMHG | WEIGHT: 178.8 LBS | RESPIRATION RATE: 18 BRPM | TEMPERATURE: 98.3 F | BODY MASS INDEX: 28 KG/M2

## 2017-09-05 DIAGNOSIS — C91.10 CLL (CHRONIC LYMPHOCYTIC LEUKEMIA) (HCC): Primary | ICD-10-CM

## 2017-09-05 DIAGNOSIS — Z45.2 ENCOUNTER FOR VENOUS ACCESS DEVICE CARE: ICD-10-CM

## 2017-09-05 DIAGNOSIS — D69.6 THROMBOCYTOPENIA (HCC): ICD-10-CM

## 2017-09-05 LAB
ALBUMIN SERPL-MCNC: 4.3 G/DL (ref 3.4–4.8)
ALBUMIN/GLOB SERPL: 1.6 G/DL (ref 1.1–1.8)
ALP SERPL-CCNC: 65 U/L (ref 38–126)
ALT SERPL W P-5'-P-CCNC: 38 U/L (ref 21–72)
ANION GAP SERPL CALCULATED.3IONS-SCNC: 10 MMOL/L (ref 5–15)
AST SERPL-CCNC: 27 U/L (ref 17–59)
BASOPHILS # BLD AUTO: 0.03 10*3/MM3 (ref 0–0.2)
BASOPHILS NFR BLD AUTO: 0.4 % (ref 0–2)
BILIRUB SERPL-MCNC: 0.6 MG/DL (ref 0.2–1.3)
BUN BLD-MCNC: 15 MG/DL (ref 7–21)
BUN/CREAT SERPL: 12.5 (ref 7–25)
CALCIUM SPEC-SCNC: 9.3 MG/DL (ref 8.4–10.2)
CHLORIDE SERPL-SCNC: 101 MMOL/L (ref 95–110)
CO2 SERPL-SCNC: 27 MMOL/L (ref 22–31)
CREAT BLD-MCNC: 1.2 MG/DL (ref 0.7–1.3)
DEPRECATED RDW RBC AUTO: 41.1 FL (ref 35.1–43.9)
EOSINOPHIL # BLD AUTO: 0.42 10*3/MM3 (ref 0–0.7)
EOSINOPHIL NFR BLD AUTO: 5.8 % (ref 0–7)
ERYTHROCYTE [DISTWIDTH] IN BLOOD BY AUTOMATED COUNT: 13.9 % (ref 11.5–14.5)
GFR SERPL CREATININE-BSD FRML MDRD: 61 ML/MIN/1.73 (ref 49–113)
GLOBULIN UR ELPH-MCNC: 2.7 GM/DL (ref 2.3–3.5)
GLUCOSE BLD-MCNC: 94 MG/DL (ref 60–100)
HCT VFR BLD AUTO: 41.3 % (ref 39–49)
HGB BLD-MCNC: 14.2 G/DL (ref 13.7–17.3)
IMM GRANULOCYTES # BLD: 0.05 10*3/MM3 (ref 0–0.02)
IMM GRANULOCYTES NFR BLD: 0.7 % (ref 0–0.5)
LYMPHOCYTES # BLD AUTO: 3.02 10*3/MM3 (ref 0.6–4.2)
LYMPHOCYTES NFR BLD AUTO: 41.5 % (ref 10–50)
MCH RBC QN AUTO: 28 PG (ref 26.5–34)
MCHC RBC AUTO-ENTMCNC: 34.4 G/DL (ref 31.5–36.3)
MCV RBC AUTO: 81.5 FL (ref 80–98)
MONOCYTES # BLD AUTO: 1.15 10*3/MM3 (ref 0–0.9)
MONOCYTES NFR BLD AUTO: 15.8 % (ref 0–12)
NEUTROPHILS # BLD AUTO: 2.6 10*3/MM3 (ref 2–8.6)
NEUTROPHILS NFR BLD AUTO: 35.8 % (ref 37–80)
PLATELET # BLD AUTO: 138 10*3/MM3 (ref 150–450)
PMV BLD AUTO: 9.4 FL (ref 8–12)
POTASSIUM BLD-SCNC: 4 MMOL/L (ref 3.5–5.1)
PROT SERPL-MCNC: 7 G/DL (ref 6.3–8.6)
RBC # BLD AUTO: 5.07 10*6/MM3 (ref 4.37–5.74)
SODIUM BLD-SCNC: 138 MMOL/L (ref 137–145)
WBC NRBC COR # BLD: 7.27 10*3/MM3 (ref 3.2–9.8)

## 2017-09-05 PROCEDURE — G0463 HOSPITAL OUTPT CLINIC VISIT: HCPCS | Performed by: INTERNAL MEDICINE

## 2017-09-05 PROCEDURE — 25010000002 HEPARIN FLUSH (PORCINE) 100 UNIT/ML SOLUTION: Performed by: INTERNAL MEDICINE

## 2017-09-05 PROCEDURE — 36415 COLL VENOUS BLD VENIPUNCTURE: CPT

## 2017-09-05 PROCEDURE — 99214 OFFICE O/P EST MOD 30 MIN: CPT | Performed by: INTERNAL MEDICINE

## 2017-09-05 PROCEDURE — 85025 COMPLETE CBC W/AUTO DIFF WBC: CPT

## 2017-09-05 PROCEDURE — 80053 COMPREHEN METABOLIC PANEL: CPT

## 2017-09-05 RX ORDER — SODIUM CHLORIDE 0.9 % (FLUSH) 0.9 %
10 SYRINGE (ML) INJECTION AS NEEDED
Status: DISCONTINUED | OUTPATIENT
Start: 2017-09-05 | End: 2017-09-05 | Stop reason: HOSPADM

## 2017-09-05 RX ORDER — SODIUM CHLORIDE 0.9 % (FLUSH) 0.9 %
10 SYRINGE (ML) INJECTION AS NEEDED
Status: CANCELLED | OUTPATIENT
Start: 2017-09-05

## 2017-09-05 RX ADMIN — Medication 10 ML: at 08:53

## 2017-09-05 RX ADMIN — SODIUM CHLORIDE, PRESERVATIVE FREE 500 UNITS: 5 INJECTION INTRAVENOUS at 09:41

## 2017-09-05 NOTE — PROGRESS NOTES
DATE OF VISIT: 9/5/2017    REASON FOR VISIT: History of CLL    HISTORY OF PRESENT ILLNESS:    66-year-old male with a past medical history significant for history of CVA and dyslipidemia, he was diagnosed with CLL on peripheral blood flow cytometry in September 2016.  You have drenching night sweats and fatigue patient was started on chemotherapy with bendamustine and rituximab on July 10, 2017.  After finishing day 1 of cycle 2 chemotherapy with bendamustine and rituximab started having swelling in the neck as well as flushing of the face.  When patient came for day 2 of cycle 2 is still erythema of the face and rash.  His chemotherapy was held on that day and  prescription for prednisone was given.  Patient states his rash is improved completely complains of postnasal drainage and cough.  Denies any fever or chills.  Denies any difficulty swallowing.  States his night sweats are improved.    PAST MEDICAL HISTORY:    Past Medical History:   Diagnosis Date   • Arthritis    • CLL (chronic lymphocytic leukemia)    • CVA (cerebral vascular accident) 2011   • Dyslipidemia    • Gastritis    • GERD (gastroesophageal reflux disease)    • Night sweats        SOCIAL HISTORY:    Social History   Substance Use Topics   • Smoking status: Former Smoker   • Smokeless tobacco: Never Used      Comment: never smoked cigarettes   • Alcohol use No       Surgical History :  Past Surgical History:   Procedure Laterality Date   • APPENDECTOMY     • HERNIA REPAIR     • KNEE SURGERY     • LEG SURGERY     • MANDIBLE FRACTURE SURGERY     • [place] INSJ TUNNELED CVC W/O SUBQ PORT/ AGE 5 YR/> Right 7/7/2017    Procedure: MEDIPORT PLACEMENT WITH ULTRASOUND GUIDANCE       ;  Surgeon: Lucien Mcneal MD;  Location: [facility];  Service: General   • PROSTATE SURGERY         ALLERGIES:    No Known Allergies    REVIEW OF SYSTEMS:      CONSTITUTIONAL: Positive for fatigue.  States night sweats are improved.  No fever, chills.    HEENT:   No epistaxis,  mouth sores, or difficulty swallowing.    RESPIRATORY:  Positive for intermittent shortness of breath.  No cough or hemoptysis.    CARDIOVASCULAR:  No chest pain or palpitations.    GASTROINTESTINAL: States pain in left upper quadrant is resolved.  No  nausea, vomiting, or blood in the stool.    GENITOURINARY:  No dysuria or hematuria.    MUSCULOSKELETAL:  Positive for chronic back pain.    NEUROLOGICAL:  Positive for occasional tingling and numbness in the lower extremities. No new headache or dizziness.     LYMPHATICS:  Denies any abnormal swollen and anywhere in the body.    SKIN:  Erythematous rash on the face and neck is completely resolved at this point.          PHYSICAL EXAMINATION:      VITAL SIGNS:    /83  Pulse 70  Temp 98.3 °F (36.8 °C)  Resp 18  Wt 178 lb 12.8 oz (81.1 kg)  BMI 28 kg/m2    GENERAL:  Not in any distress.    HEENT:  Normocephalic, Atraumatic.Mild Conjunctival pallor. No icterus. Extraocular Movements Intact. No Facial Asymmetry noted.    NECK:  No adenopathy. No JVD.    RESPIRATORY:  Fair air entry bilateral. No rhonchi or wheezing.    CARDIOVASCULAR:  S1, S2. Regular rate and rhythm. No murmur or gallop appreciated.    ABDOMEN:  Soft, obese, nontender. Bowel sounds present in all four quadrants.  No organomegaly appreciated.    EXTREMITIES:  No edema.No Calf Tenderness.    NEUROLOGIC:  Alert, awake and oriented ×3.  No  Motor or sensory deficit appreciated. Cranial Nerves 2-12 grossly intact.          DIAGNOSTIC DATA:    Glucose   Date Value Ref Range Status   09/05/2017 94 60 - 100 mg/dL Final     Sodium   Date Value Ref Range Status   09/05/2017 138 137 - 145 mmol/L Final     Potassium   Date Value Ref Range Status   09/05/2017 4.0 3.5 - 5.1 mmol/L Final     CO2   Date Value Ref Range Status   09/05/2017 27.0 22.0 - 31.0 mmol/L Final     Chloride   Date Value Ref Range Status   09/05/2017 101 95 - 110 mmol/L Final     Anion Gap   Date Value Ref Range Status   09/05/2017 10.0  5.0 - 15.0 mmol/L Final     Creatinine   Date Value Ref Range Status   09/05/2017 1.20 0.70 - 1.30 mg/dL Final     BUN   Date Value Ref Range Status   09/05/2017 15 7 - 21 mg/dL Final     BUN/Creatinine Ratio   Date Value Ref Range Status   09/05/2017 12.5 7.0 - 25.0 Final     Calcium   Date Value Ref Range Status   09/05/2017 9.3 8.4 - 10.2 mg/dL Final     eGFR Non  Amer   Date Value Ref Range Status   09/05/2017 61 49 - 113 mL/min/1.73 Final     Alkaline Phosphatase   Date Value Ref Range Status   09/05/2017 65 38 - 126 U/L Final     Total Protein   Date Value Ref Range Status   09/05/2017 7.0 6.3 - 8.6 g/dL Final     ALT (SGPT)   Date Value Ref Range Status   09/05/2017 38 21 - 72 U/L Final     AST (SGOT)   Date Value Ref Range Status   09/05/2017 27 17 - 59 U/L Final     Total Bilirubin   Date Value Ref Range Status   09/05/2017 0.6 0.2 - 1.3 mg/dL Final     Albumin   Date Value Ref Range Status   09/05/2017 4.30 3.40 - 4.80 g/dL Final     Globulin   Date Value Ref Range Status   09/05/2017 2.7 2.3 - 3.5 gm/dL Final     A/G Ratio   Date Value Ref Range Status   09/05/2017 1.6 1.1 - 1.8 g/dL Final     Lab Results   Component Value Date    WBC 7.27 09/05/2017    HGB 14.2 09/05/2017    HCT 41.3 09/05/2017    MCV 81.5 09/05/2017     (L) 09/05/2017     Lab Results   Component Value Date    NEUTROABS 2.60 09/05/2017     Lab Results   Component Value Date    REFLABREPO SEE NOTE: 10/19/2016   ]    PATHOLOGY:  FISH testing for CLL done on October 19, 2016 shows:  SPECIFIC FISH RESULTS:     CCND1/IGH: NORMAL        nuc samantha 11q13(TVMC5n3),14q32(IGHx2)[100]     THOMAS: NORMAL        nuc samantha 11q22.3(ATMx2)[100]     12cen: NORMAL        nuc samantha 12cen(H00B5d3)[100]     13q: NORMAL        nuc samantha 13q14.3(DLEUx2),13q34(PXRH9x7)[100]     TP53: NORMAL        nuc samantha 17p13.1(TP53x2)[100]     Peripheral blood flow cytometry done on September 30, 2016 shows:  Interpretation    Peripheral Blood:     CD5+ monotypic B-cell  population (40% of leukocytes) compatible with   B-cell chronic lymphocytic leukemia/ small lymphocytic lymphoma (B-CLL/SLL)         RADIOLOGY DATA :  CT of abdomen and pelvis with contrast done on October 5, 2016 showed:  FINDINGS- Comparison study dated July 14, 2014. Axial computer  tomography sequential imaging was performed from the diaphragms  through the symphysis pubis after administration of IV contrast  .Sagittal and coronal reformates was performed.      Imaging through the lung bases reveals stable right lung base lateral  basilar small calcified lung parenchymal granuloma consistent with  stable old granulomatous disease. Otherwise lung bases unremarkable..      The liver is normal. The gallbladder is normal. The pancreas is  normal. The spleen has multiple small calcified granulomas involving  it consistent with old granulomatous disease. Spleen appears upper  limits of normal size but is otherwise unremarkable. Bilateral adrenal  glands are normal. Right kidney and ureter are normal. Left kidney and  ureter are normal. The bladder is normal. The prostate gland appears  diffusely enlarged with a volume of 45 and is impinging upon the base  of bladder. Questionable defect suspicious for prior TURP procedure.  Recommend clinical correlation. The hollow viscera appears normal. No  lymphadenopathy in the abdomen or pelvis. No acute osseous  abnormality.      IMPRESSION-   1.Enlarged prostate gland may represent changes from benign prostatic  hypertrophy versus prostate malignancy. There also appears to be  evidence of prior probable TURP procedure. Recommend clinical  correlation..  2.Otherwise unremarkable CT abdomen pelvis study..        ASSESSMENT AND PLAN:      1.  CLL, stage II with questionable splenomegaly.  In view of persistent drenching night sweats and fatigue patient was started on chemotherapy with bendamustine and rituximab on July 10, 2017.  Patient tolerated first round of chemotherapy  well.  On August 7, 2017 when he started day 1 of cycle 2 of bendamustine and rituximab he started having swelling in the neck region along with daily denies and rash in the face.  Port study done on that date showed intact port.  When patient came for day 2 of bendamustine he still had erythema and rash on his face and neck for which chemotherapy was discontinued and patient was given prednisone for 7 days.  At this point his rash is completely resolved.  Most likely patient had a allergic reaction to either rituximab and bendamustine.  Since his night sweats are improved we will not rechallenge him with rituximab or bendamustine at this point.  We will see him back in about 6 weeks with a repeat CBC and CMP on that day.  Patient again starts having any B symptoms like drenching night sweats or weight loss, we will start him on a protein of which was discussed with patient and his family.    2.  Thrombocytopenia: Patient's platelet count is 138,000 today.  Most likely secondary to chemotherapy.  We will monitored with CBC for now.  Patient was instructed to come to the emergency room if he starts having any bleeding complication.    3. History of CVA    4.  Health maintenance: Patient does not smoke.  Had a colonoscopy done in 2013.  He remains full code.        Andres Lubin MD  9/5/2017  6:08 PM

## 2017-10-17 ENCOUNTER — LAB (OUTPATIENT)
Dept: ONCOLOGY | Facility: HOSPITAL | Age: 67
End: 2017-10-17

## 2017-10-17 ENCOUNTER — OFFICE VISIT (OUTPATIENT)
Dept: ONCOLOGY | Facility: CLINIC | Age: 67
End: 2017-10-17

## 2017-10-17 VITALS
HEIGHT: 69 IN | TEMPERATURE: 98.4 F | RESPIRATION RATE: 16 BRPM | BODY MASS INDEX: 26.82 KG/M2 | HEART RATE: 74 BPM | DIASTOLIC BLOOD PRESSURE: 88 MMHG | SYSTOLIC BLOOD PRESSURE: 138 MMHG | WEIGHT: 181.1 LBS

## 2017-10-17 DIAGNOSIS — C91.10 CLL (CHRONIC LYMPHOCYTIC LEUKEMIA) (HCC): Primary | ICD-10-CM

## 2017-10-17 DIAGNOSIS — Z45.2 ENCOUNTER FOR VENOUS ACCESS DEVICE CARE: Primary | ICD-10-CM

## 2017-10-17 DIAGNOSIS — C91.10 CLL (CHRONIC LYMPHOCYTIC LEUKEMIA) (HCC): ICD-10-CM

## 2017-10-17 LAB
ALBUMIN SERPL-MCNC: 4.5 G/DL (ref 3.4–4.8)
ALBUMIN/GLOB SERPL: 1.6 G/DL (ref 1.1–1.8)
ALP SERPL-CCNC: 66 U/L (ref 38–126)
ALT SERPL W P-5'-P-CCNC: 37 U/L (ref 21–72)
ANION GAP SERPL CALCULATED.3IONS-SCNC: 12 MMOL/L (ref 5–15)
AST SERPL-CCNC: 26 U/L (ref 17–59)
BASOPHILS # BLD AUTO: 0.04 10*3/MM3 (ref 0–0.2)
BASOPHILS NFR BLD AUTO: 0.5 % (ref 0–2)
BILIRUB SERPL-MCNC: 0.6 MG/DL (ref 0.2–1.3)
BUN BLD-MCNC: 16 MG/DL (ref 7–21)
BUN/CREAT SERPL: 12.8 (ref 7–25)
CALCIUM SPEC-SCNC: 9 MG/DL (ref 8.4–10.2)
CHLORIDE SERPL-SCNC: 102 MMOL/L (ref 95–110)
CO2 SERPL-SCNC: 25 MMOL/L (ref 22–31)
CREAT BLD-MCNC: 1.25 MG/DL (ref 0.7–1.3)
DEPRECATED RDW RBC AUTO: 41 FL (ref 35.1–43.9)
EOSINOPHIL # BLD AUTO: 0.47 10*3/MM3 (ref 0–0.7)
EOSINOPHIL NFR BLD AUTO: 6.1 % (ref 0–7)
ERYTHROCYTE [DISTWIDTH] IN BLOOD BY AUTOMATED COUNT: 13.8 % (ref 11.5–14.5)
GFR SERPL CREATININE-BSD FRML MDRD: 58 ML/MIN/1.73 (ref 49–113)
GLOBULIN UR ELPH-MCNC: 2.8 GM/DL (ref 2.3–3.5)
GLUCOSE BLD-MCNC: 93 MG/DL (ref 60–100)
HCT VFR BLD AUTO: 42.2 % (ref 39–49)
HGB BLD-MCNC: 14.6 G/DL (ref 13.7–17.3)
IMM GRANULOCYTES # BLD: 0.04 10*3/MM3 (ref 0–0.02)
IMM GRANULOCYTES NFR BLD: 0.5 % (ref 0–0.5)
LDH SERPL-CCNC: 391 U/L (ref 313–618)
LYMPHOCYTES # BLD AUTO: 2.67 10*3/MM3 (ref 0.6–4.2)
LYMPHOCYTES NFR BLD AUTO: 34.7 % (ref 10–50)
MCH RBC QN AUTO: 28.1 PG (ref 26.5–34)
MCHC RBC AUTO-ENTMCNC: 34.6 G/DL (ref 31.5–36.3)
MCV RBC AUTO: 81.3 FL (ref 80–98)
MONOCYTES # BLD AUTO: 0.95 10*3/MM3 (ref 0–0.9)
MONOCYTES NFR BLD AUTO: 12.4 % (ref 0–12)
NEUTROPHILS # BLD AUTO: 3.52 10*3/MM3 (ref 2–8.6)
NEUTROPHILS NFR BLD AUTO: 45.8 % (ref 37–80)
PLATELET # BLD AUTO: 165 10*3/MM3 (ref 150–450)
PMV BLD AUTO: 9.6 FL (ref 8–12)
POTASSIUM BLD-SCNC: 4.3 MMOL/L (ref 3.5–5.1)
PROT SERPL-MCNC: 7.3 G/DL (ref 6.3–8.6)
RBC # BLD AUTO: 5.19 10*6/MM3 (ref 4.37–5.74)
SODIUM BLD-SCNC: 139 MMOL/L (ref 137–145)
WBC NRBC COR # BLD: 7.69 10*3/MM3 (ref 3.2–9.8)

## 2017-10-17 PROCEDURE — 99213 OFFICE O/P EST LOW 20 MIN: CPT | Performed by: INTERNAL MEDICINE

## 2017-10-17 PROCEDURE — 85025 COMPLETE CBC W/AUTO DIFF WBC: CPT

## 2017-10-17 PROCEDURE — 36591 DRAW BLOOD OFF VENOUS DEVICE: CPT | Performed by: INTERNAL MEDICINE

## 2017-10-17 PROCEDURE — 80053 COMPREHEN METABOLIC PANEL: CPT

## 2017-10-17 PROCEDURE — 25010000002 HEPARIN FLUSH (PORCINE) 100 UNIT/ML SOLUTION: Performed by: INTERNAL MEDICINE

## 2017-10-17 PROCEDURE — G0463 HOSPITAL OUTPT CLINIC VISIT: HCPCS | Performed by: INTERNAL MEDICINE

## 2017-10-17 PROCEDURE — 83615 LACTATE (LD) (LDH) ENZYME: CPT

## 2017-10-17 RX ORDER — SODIUM CHLORIDE 0.9 % (FLUSH) 0.9 %
10 SYRINGE (ML) INJECTION AS NEEDED
Status: DISCONTINUED | OUTPATIENT
Start: 2017-10-17 | End: 2017-10-17 | Stop reason: HOSPADM

## 2017-10-17 RX ORDER — SODIUM CHLORIDE 0.9 % (FLUSH) 0.9 %
10 SYRINGE (ML) INJECTION AS NEEDED
Status: CANCELLED | OUTPATIENT
Start: 2017-10-17

## 2017-10-17 RX ADMIN — Medication 10 ML: at 09:43

## 2017-10-17 RX ADMIN — SODIUM CHLORIDE, PRESERVATIVE FREE 500 UNITS: 5 INJECTION INTRAVENOUS at 10:31

## 2017-10-17 NOTE — PROGRESS NOTES
DATE OF VISIT: 10/17/2017    REASON FOR VISIT: History of CLL    HISTORY OF PRESENT ILLNESS:    67-year-old male with a past medical history significant for history of CVA and dyslipidemia, he was diagnosed with CLL on peripheral blood flow cytometry in September 2016.  Patient was having drenching night sweats and fatigue patient was started on chemotherapy with bendamustine and rituximab on July 10, 2017.  After finishing day 1 of cycle 2 chemotherapy with bendamustine and rituximab started having swelling in the neck as well as flushing of the face.  When patient came for day 2 of cycle 2 is still erythema of the face and rash.  His chemotherapy was held on that day and  prescription for prednisone was given.  Patient is here for follow up visit today.  Denies any fever or chills.  Denies any difficulty swallowing.  Still complains of intermittent night sweats.        PAST MEDICAL HISTORY:    Past Medical History:   Diagnosis Date   • Arthritis    • CLL (chronic lymphocytic leukemia)    • CVA (cerebral vascular accident) 2011   • Dyslipidemia    • Gastritis    • GERD (gastroesophageal reflux disease)    • Night sweats        SOCIAL HISTORY:    Social History   Substance Use Topics   • Smoking status: Former Smoker   • Smokeless tobacco: Never Used      Comment: never smoked cigarettes   • Alcohol use No       Surgical History :  Past Surgical History:   Procedure Laterality Date   • APPENDECTOMY     • HERNIA REPAIR     • KNEE SURGERY     • LEG SURGERY     • MANDIBLE FRACTURE SURGERY     • [place] INSJ TUNNELED CVC W/O SUBQ PORT/ AGE 5 YR/> Right 7/7/2017    Procedure: MEDIPORT PLACEMENT WITH ULTRASOUND GUIDANCE       ;  Surgeon: Lucien Mcneal MD;  Location: [facility];  Service: General   • PROSTATE SURGERY         ALLERGIES:    No Known Allergies    REVIEW OF SYSTEMS:      CONSTITUTIONAL: Positive for fatigue.  Still complains of intermittent night sweats.  No fever, chills.    HEENT:   No epistaxis, mouth sores, or  "difficulty swallowing.    RESPIRATORY:  Positive for intermittent shortness of breath.  No cough or hemoptysis.    CARDIOVASCULAR:  No chest pain or palpitations.    GASTROINTESTINAL: States pain in left upper quadrant is resolved.  No  nausea, vomiting, or blood in the stool.    GENITOURINARY:  No dysuria or hematuria.    MUSCULOSKELETAL:  Positive for chronic back pain.    NEUROLOGICAL:  Positive for occasional tingling and numbness in the lower extremities. No new headache or dizziness.     LYMPHATICS:  Denies any abnormal swollen and anywhere in the body.    SKIN:  Erythematous rash on the face and neck is completely resolved at this point.          PHYSICAL EXAMINATION:      VITAL SIGNS:    /88  Pulse 74  Temp 98.4 °F (36.9 °C) (Temporal Artery )   Resp 16  Ht 69\" (175.3 cm)  Wt 181 lb 1.6 oz (82.1 kg)  BMI 26.74 kg/m2    GENERAL:  Not in any distress.    HEENT:  Normocephalic, Atraumatic.Mild Conjunctival pallor. No icterus. Extraocular Movements Intact. No Facial Asymmetry noted.    NECK:  No adenopathy. No JVD.    RESPIRATORY:  Fair air entry bilateral. No rhonchi or wheezing.    CARDIOVASCULAR:  S1, S2. Regular rate and rhythm. No murmur or gallop appreciated.    ABDOMEN:  Soft, obese, nontender. Bowel sounds present in all four quadrants.  No organomegaly appreciated.    EXTREMITIES:  No edema.No Calf Tenderness.    NEUROLOGIC:  Alert, awake and oriented ×3.  No  Motor or sensory deficit appreciated. Cranial Nerves 2-12 grossly intact.          DIAGNOSTIC DATA:    Glucose   Date Value Ref Range Status   10/17/2017 93 60 - 100 mg/dL Final     Sodium   Date Value Ref Range Status   10/17/2017 139 137 - 145 mmol/L Final     Potassium   Date Value Ref Range Status   10/17/2017 4.3 3.5 - 5.1 mmol/L Final     CO2   Date Value Ref Range Status   10/17/2017 25.0 22.0 - 31.0 mmol/L Final     Chloride   Date Value Ref Range Status   10/17/2017 102 95 - 110 mmol/L Final     Anion Gap   Date Value Ref " Range Status   10/17/2017 12.0 5.0 - 15.0 mmol/L Final     Creatinine   Date Value Ref Range Status   10/17/2017 1.25 0.70 - 1.30 mg/dL Final     BUN   Date Value Ref Range Status   10/17/2017 16 7 - 21 mg/dL Final     BUN/Creatinine Ratio   Date Value Ref Range Status   10/17/2017 12.8 7.0 - 25.0 Final     Calcium   Date Value Ref Range Status   10/17/2017 9.0 8.4 - 10.2 mg/dL Final     eGFR Non  Amer   Date Value Ref Range Status   10/17/2017 58 49 - 113 mL/min/1.73 Final     Alkaline Phosphatase   Date Value Ref Range Status   10/17/2017 66 38 - 126 U/L Final     Total Protein   Date Value Ref Range Status   10/17/2017 7.3 6.3 - 8.6 g/dL Final     ALT (SGPT)   Date Value Ref Range Status   10/17/2017 37 21 - 72 U/L Final     AST (SGOT)   Date Value Ref Range Status   10/17/2017 26 17 - 59 U/L Final     Total Bilirubin   Date Value Ref Range Status   10/17/2017 0.6 0.2 - 1.3 mg/dL Final     Albumin   Date Value Ref Range Status   10/17/2017 4.50 3.40 - 4.80 g/dL Final     Globulin   Date Value Ref Range Status   10/17/2017 2.8 2.3 - 3.5 gm/dL Final     A/G Ratio   Date Value Ref Range Status   10/17/2017 1.6 1.1 - 1.8 g/dL Final     Lab Results   Component Value Date    WBC 7.69 10/17/2017    HGB 14.6 10/17/2017    HCT 42.2 10/17/2017    MCV 81.3 10/17/2017     10/17/2017     Lab Results   Component Value Date    NEUTROABS 3.52 10/17/2017     Lab Results   Component Value Date    REFLABREPO SEE NOTE: 10/19/2016       PATHOLOGY:  FISH testing for CLL done on October 19, 2016 shows:  SPECIFIC FISH RESULTS:     CCND1/IGH: NORMAL        nuc samantha 11q13(PDDH1j8),14q32(IGHx2)[100]     THOMAS: NORMAL        nuc samantha 11q22.3(ATMx2)[100]     12cen: NORMAL        nuc samantha 12cen(B88H1v7)[100]     13q: NORMAL        nuc samantha 13q14.3(DLEUx2),13q34(KYHY8m7)[100]     TP53: NORMAL        nuc samantha 17p13.1(TP53x2)[100]     Peripheral blood flow cytometry done on September 30, 2016 shows:  Interpretation    Peripheral Blood:      CD5+ monotypic B-cell population (40% of leukocytes) compatible with   B-cell chronic lymphocytic leukemia/ small lymphocytic lymphoma (B-CLL/SLL)         RADIOLOGY DATA :  CT of abdomen and pelvis with contrast done on October 5, 2016 showed:  FINDINGS- Comparison study dated July 14, 2014. Axial computer  tomography sequential imaging was performed from the diaphragms  through the symphysis pubis after administration of IV contrast  .Sagittal and coronal reformates was performed.      Imaging through the lung bases reveals stable right lung base lateral  basilar small calcified lung parenchymal granuloma consistent with  stable old granulomatous disease. Otherwise lung bases unremarkable..      The liver is normal. The gallbladder is normal. The pancreas is  normal. The spleen has multiple small calcified granulomas involving  it consistent with old granulomatous disease. Spleen appears upper  limits of normal size but is otherwise unremarkable. Bilateral adrenal  glands are normal. Right kidney and ureter are normal. Left kidney and  ureter are normal. The bladder is normal. The prostate gland appears  diffusely enlarged with a volume of 45 and is impinging upon the base  of bladder. Questionable defect suspicious for prior TURP procedure.  Recommend clinical correlation. The hollow viscera appears normal. No  lymphadenopathy in the abdomen or pelvis. No acute osseous  abnormality.      IMPRESSION-   1.Enlarged prostate gland may represent changes from benign prostatic  hypertrophy versus prostate malignancy. There also appears to be  evidence of prior probable TURP procedure. Recommend clinical  correlation..  2.Otherwise unremarkable CT abdomen pelvis study..        ASSESSMENT AND PLAN:      1.  CLL, stage II with questionable splenomegaly.  In view of persistent drenching night sweats and fatigue patient was started on chemotherapy with bendamustine and rituximab on July 10, 2017.  Patient tolerated first  round of chemotherapy well.  On August 7, 2017 when he started day 1 of cycle 2 of bendamustine and rituximab he started having swelling in the neck region along with daily denies and rash in the face.  Port study done on that date showed intact port.  When patient came for day 2 of bendamustine he still had erythema and rash on his face and neck for which chemotherapy was discontinued and patient was given prednisone for 7 days.  At this point his rash is completely resolved.  Most likely patient had a allergic reaction to either rituximab and bendamustine.  Still complains of intermittent night sweats, denies any drenching night sweats.  He has gained 3 pounds since last clinic visit.  His CBC today is within normal limit .  There is no evidence of lymphocytosis and thrombocytopenia.  Result of blood work were reviewed with patient and his family.  We'll see him back in about 4 months with a repeat CBC and CMP and LDH on that day.  Patient was encouraged to call us if he starts having any drenching night sweats or any abnormal swollen lymph node or any major weight loss prior to next clinic visit.  Patient never requires chemotherapy again his options would be either Ibrutinib or Obinutuzumab with chlorambucil.    2.  Thrombocytopenia: Completely resolved at this point. Platelet count is 165,000.    3. History of CVA    4.  Health maintenance: Patient does not smoke.  Had a colonoscopy done in 2013.  He remains full code.        Andres Lubin MD  10/17/2017  12:42 PM

## 2017-11-28 ENCOUNTER — INFUSION (OUTPATIENT)
Dept: ONCOLOGY | Facility: HOSPITAL | Age: 67
End: 2017-11-28

## 2017-11-28 DIAGNOSIS — Z45.2 ENCOUNTER FOR VENOUS ACCESS DEVICE CARE: Primary | ICD-10-CM

## 2017-11-28 PROCEDURE — 25010000002 HEPARIN FLUSH (PORCINE) 100 UNIT/ML SOLUTION: Performed by: INTERNAL MEDICINE

## 2017-11-28 PROCEDURE — 96523 IRRIG DRUG DELIVERY DEVICE: CPT | Performed by: INTERNAL MEDICINE

## 2017-11-28 RX ORDER — SODIUM CHLORIDE 0.9 % (FLUSH) 0.9 %
10 SYRINGE (ML) INJECTION AS NEEDED
Status: CANCELLED | OUTPATIENT
Start: 2017-11-28

## 2017-11-28 RX ORDER — SODIUM CHLORIDE 0.9 % (FLUSH) 0.9 %
10 SYRINGE (ML) INJECTION AS NEEDED
Status: DISCONTINUED | OUTPATIENT
Start: 2017-11-28 | End: 2017-11-28 | Stop reason: HOSPADM

## 2017-11-28 RX ADMIN — SODIUM CHLORIDE, PRESERVATIVE FREE 500 UNITS: 5 INJECTION INTRAVENOUS at 09:49

## 2017-11-28 RX ADMIN — Medication 10 ML: at 09:48

## 2018-01-09 ENCOUNTER — INFUSION (OUTPATIENT)
Dept: ONCOLOGY | Facility: HOSPITAL | Age: 68
End: 2018-01-09

## 2018-01-09 DIAGNOSIS — Z45.2 ENCOUNTER FOR VENOUS ACCESS DEVICE CARE: Primary | ICD-10-CM

## 2018-01-09 PROCEDURE — 96523 IRRIG DRUG DELIVERY DEVICE: CPT | Performed by: INTERNAL MEDICINE

## 2018-01-09 PROCEDURE — 25010000002 HEPARIN FLUSH (PORCINE) 100 UNIT/ML SOLUTION: Performed by: INTERNAL MEDICINE

## 2018-01-09 RX ORDER — SODIUM CHLORIDE 0.9 % (FLUSH) 0.9 %
10 SYRINGE (ML) INJECTION AS NEEDED
Status: DISCONTINUED | OUTPATIENT
Start: 2018-01-09 | End: 2018-01-09 | Stop reason: HOSPADM

## 2018-01-09 RX ORDER — SODIUM CHLORIDE 0.9 % (FLUSH) 0.9 %
10 SYRINGE (ML) INJECTION AS NEEDED
Status: CANCELLED | OUTPATIENT
Start: 2018-02-16

## 2018-01-09 RX ADMIN — Medication 10 ML: at 10:28

## 2018-01-09 RX ADMIN — SODIUM CHLORIDE, PRESERVATIVE FREE 500 UNITS: 5 INJECTION INTRAVENOUS at 10:28

## 2018-02-16 ENCOUNTER — LAB (OUTPATIENT)
Dept: ONCOLOGY | Facility: HOSPITAL | Age: 68
End: 2018-02-16

## 2018-02-16 ENCOUNTER — OFFICE VISIT (OUTPATIENT)
Dept: ONCOLOGY | Facility: CLINIC | Age: 68
End: 2018-02-16

## 2018-02-16 VITALS
SYSTOLIC BLOOD PRESSURE: 143 MMHG | WEIGHT: 181 LBS | RESPIRATION RATE: 16 BRPM | DIASTOLIC BLOOD PRESSURE: 82 MMHG | HEIGHT: 67 IN | TEMPERATURE: 98.8 F | HEART RATE: 85 BPM | BODY MASS INDEX: 28.41 KG/M2

## 2018-02-16 DIAGNOSIS — C91.10 CLL (CHRONIC LYMPHOCYTIC LEUKEMIA) (HCC): Primary | ICD-10-CM

## 2018-02-16 DIAGNOSIS — Z45.2 ENCOUNTER FOR VENOUS ACCESS DEVICE CARE: ICD-10-CM

## 2018-02-16 LAB
ALBUMIN SERPL-MCNC: 4.4 G/DL (ref 3.4–4.8)
ALBUMIN/GLOB SERPL: 1.4 G/DL (ref 1.1–1.8)
ALP SERPL-CCNC: 82 U/L (ref 38–126)
ALT SERPL W P-5'-P-CCNC: 37 U/L (ref 21–72)
ANION GAP SERPL CALCULATED.3IONS-SCNC: 12 MMOL/L (ref 5–15)
AST SERPL-CCNC: 30 U/L (ref 17–59)
BASOPHILS # BLD AUTO: 0.05 10*3/MM3 (ref 0–0.2)
BASOPHILS NFR BLD AUTO: 0.5 % (ref 0–2)
BILIRUB SERPL-MCNC: 0.4 MG/DL (ref 0.2–1.3)
BUN BLD-MCNC: 18 MG/DL (ref 7–21)
BUN/CREAT SERPL: 14.8 (ref 7–25)
CALCIUM SPEC-SCNC: 9.2 MG/DL (ref 8.4–10.2)
CHLORIDE SERPL-SCNC: 102 MMOL/L (ref 95–110)
CO2 SERPL-SCNC: 25 MMOL/L (ref 22–31)
CREAT BLD-MCNC: 1.22 MG/DL (ref 0.7–1.3)
DEPRECATED RDW RBC AUTO: 40.5 FL (ref 35.1–43.9)
EOSINOPHIL # BLD AUTO: 0.44 10*3/MM3 (ref 0–0.7)
EOSINOPHIL NFR BLD AUTO: 4.8 % (ref 0–7)
ERYTHROCYTE [DISTWIDTH] IN BLOOD BY AUTOMATED COUNT: 13.8 % (ref 11.5–14.5)
GFR SERPL CREATININE-BSD FRML MDRD: 59 ML/MIN/1.73 (ref 49–113)
GLOBULIN UR ELPH-MCNC: 3.2 GM/DL (ref 2.3–3.5)
GLUCOSE BLD-MCNC: 78 MG/DL (ref 60–100)
HCT VFR BLD AUTO: 43 % (ref 39–49)
HGB BLD-MCNC: 14.8 G/DL (ref 13.7–17.3)
IMM GRANULOCYTES # BLD: 0.04 10*3/MM3 (ref 0–0.02)
IMM GRANULOCYTES NFR BLD: 0.4 % (ref 0–0.5)
LDH SERPL-CCNC: 438 U/L (ref 313–618)
LYMPHOCYTES # BLD AUTO: 3.49 10*3/MM3 (ref 0.6–4.2)
LYMPHOCYTES NFR BLD AUTO: 38 % (ref 10–50)
MCH RBC QN AUTO: 27.8 PG (ref 26.5–34)
MCHC RBC AUTO-ENTMCNC: 34.4 G/DL (ref 31.5–36.3)
MCV RBC AUTO: 80.7 FL (ref 80–98)
MONOCYTES # BLD AUTO: 1.05 10*3/MM3 (ref 0–0.9)
MONOCYTES NFR BLD AUTO: 11.4 % (ref 0–12)
NEUTROPHILS # BLD AUTO: 4.11 10*3/MM3 (ref 2–8.6)
NEUTROPHILS NFR BLD AUTO: 44.9 % (ref 37–80)
NRBC BLD MANUAL-RTO: 0 /100 WBC (ref 0–0)
PLATELET # BLD AUTO: 182 10*3/MM3 (ref 150–450)
PMV BLD AUTO: 10.1 FL (ref 8–12)
POTASSIUM BLD-SCNC: 4.1 MMOL/L (ref 3.5–5.1)
PROT SERPL-MCNC: 7.6 G/DL (ref 6.3–8.6)
RBC # BLD AUTO: 5.33 10*6/MM3 (ref 4.37–5.74)
SODIUM BLD-SCNC: 139 MMOL/L (ref 137–145)
WBC NRBC COR # BLD: 9.18 10*3/MM3 (ref 3.2–9.8)

## 2018-02-16 PROCEDURE — 83615 LACTATE (LD) (LDH) ENZYME: CPT | Performed by: INTERNAL MEDICINE

## 2018-02-16 PROCEDURE — G0463 HOSPITAL OUTPT CLINIC VISIT: HCPCS | Performed by: INTERNAL MEDICINE

## 2018-02-16 PROCEDURE — 80053 COMPREHEN METABOLIC PANEL: CPT | Performed by: INTERNAL MEDICINE

## 2018-02-16 PROCEDURE — 25010000002 HEPARIN FLUSH (PORCINE) 100 UNIT/ML SOLUTION: Performed by: INTERNAL MEDICINE

## 2018-02-16 PROCEDURE — 99213 OFFICE O/P EST LOW 20 MIN: CPT | Performed by: INTERNAL MEDICINE

## 2018-02-16 PROCEDURE — 85025 COMPLETE CBC W/AUTO DIFF WBC: CPT | Performed by: INTERNAL MEDICINE

## 2018-02-16 PROCEDURE — 36591 DRAW BLOOD OFF VENOUS DEVICE: CPT | Performed by: INTERNAL MEDICINE

## 2018-02-16 RX ORDER — SODIUM CHLORIDE 0.9 % (FLUSH) 0.9 %
10 SYRINGE (ML) INJECTION AS NEEDED
Status: DISCONTINUED | OUTPATIENT
Start: 2018-02-16 | End: 2018-02-16 | Stop reason: HOSPADM

## 2018-02-16 RX ORDER — SODIUM CHLORIDE 0.9 % (FLUSH) 0.9 %
10 SYRINGE (ML) INJECTION AS NEEDED
Status: CANCELLED | OUTPATIENT
Start: 2018-03-30

## 2018-02-16 RX ADMIN — Medication 10 ML: at 09:32

## 2018-02-16 RX ADMIN — SODIUM CHLORIDE, PRESERVATIVE FREE 500 UNITS: 5 INJECTION INTRAVENOUS at 10:20

## 2018-02-16 NOTE — PROGRESS NOTES
DATE OF VISIT: 2/16/2018    REASON FOR VISIT: History of CLL    HISTORY OF PRESENT ILLNESS:    67-year-old male with a past medical history significant for history of CVA and dyslipidemia, he was diagnosed with CLL on peripheral blood flow cytometry in September 2016.  Patient was having drenching night sweats and fatigue patient was started on chemotherapy with bendamustine and rituximab on July 10, 2017.  After finishing day 1 of cycle 2 chemotherapy with bendamustine and rituximab started having swelling in the neck as well as flushing of the face. In view of reaction,chemotherapy was held. Patient is here for follow up visit today.  Denies any fever or chills.  Denies any difficulty swallowing.  Still complains of intermittent night sweats.        PAST MEDICAL HISTORY:    Past Medical History:   Diagnosis Date   • Arthritis    • CLL (chronic lymphocytic leukemia)    • CVA (cerebral vascular accident) 2011   • Dyslipidemia    • Gastritis    • GERD (gastroesophageal reflux disease)    • Night sweats        SOCIAL HISTORY:    Social History   Substance Use Topics   • Smoking status: Former Smoker   • Smokeless tobacco: Never Used      Comment: never smoked cigarettes   • Alcohol use No       Surgical History :  Past Surgical History:   Procedure Laterality Date   • APPENDECTOMY     • HERNIA REPAIR     • KNEE SURGERY     • LEG SURGERY     • MANDIBLE FRACTURE SURGERY     • TX INSJ TUNNELED CVC W/O SUBQ PORT/ AGE 5 YR/> Right 7/7/2017    Procedure: MEDIPORT PLACEMENT WITH ULTRASOUND GUIDANCE       ;  Surgeon: Lucien Mcneal MD;  Location: [facility];  Service: General   • PROSTATE SURGERY         ALLERGIES:    No Known Allergies    REVIEW OF SYSTEMS:      CONSTITUTIONAL: Positive for fatigue.  Still complains of intermittent night sweats.  No fever, chills.    HEENT:   No epistaxis, mouth sores, or difficulty swallowing.    RESPIRATORY:  Positive for intermittent shortness of breath.  No cough or  "hemoptysis.    CARDIOVASCULAR:  No chest pain or palpitations.    GASTROINTESTINAL: Complains of pain in left upper quadrant is resolved.  No  nausea, vomiting, or blood in the stool.    GENITOURINARY:  No dysuria or hematuria.    MUSCULOSKELETAL:  Positive for chronic back pain.    NEUROLOGICAL:  Positive for occasional tingling and numbness in the lower extremities. No new headache or dizziness.     LYMPHATICS:  Denies any abnormal swollen and anywhere in the body.    SKIN:  No new skin lesion.          PHYSICAL EXAMINATION:      VITAL SIGNS:    /82  Pulse 85  Temp 98.8 °F (37.1 °C) (Temporal Artery )   Resp 16  Ht 170.2 cm (67\")  Wt 82.1 kg (181 lb)  BMI 28.35 kg/m2    GENERAL:  Not in any distress.    HEENT:  Normocephalic, Atraumatic.Mild Conjunctival pallor. No icterus. Extraocular Movements Intact. No Facial Asymmetry noted.    NECK:  No adenopathy. No JVD.    RESPIRATORY:  Fair air entry bilateral. No rhonchi or wheezing.    CARDIOVASCULAR:  S1, S2. Regular rate and rhythm. No murmur or gallop appreciated.  Port-A-Cath present on right chest wall.    ABDOMEN:  Soft, obese, nontender. Bowel sounds present in all four quadrants.  No organomegaly appreciated.    EXTREMITIES:  No edema.No Calf Tenderness.    NEUROLOGIC:  Alert, awake and oriented ×3.  No  Motor or sensory deficit appreciated. Cranial Nerves 2-12 grossly intact.          DIAGNOSTIC DATA:    Glucose   Date Value Ref Range Status   02/16/2018 78 60 - 100 mg/dL Final     Sodium   Date Value Ref Range Status   02/16/2018 139 137 - 145 mmol/L Final     Potassium   Date Value Ref Range Status   02/16/2018 4.1 3.5 - 5.1 mmol/L Final     CO2   Date Value Ref Range Status   02/16/2018 25.0 22.0 - 31.0 mmol/L Final     Chloride   Date Value Ref Range Status   02/16/2018 102 95 - 110 mmol/L Final     Anion Gap   Date Value Ref Range Status   02/16/2018 12.0 5.0 - 15.0 mmol/L Final     Creatinine   Date Value Ref Range Status   02/16/2018 1.22 " 0.70 - 1.30 mg/dL Final     BUN   Date Value Ref Range Status   02/16/2018 18 7 - 21 mg/dL Final     BUN/Creatinine Ratio   Date Value Ref Range Status   02/16/2018 14.8 7.0 - 25.0 Final     Calcium   Date Value Ref Range Status   02/16/2018 9.2 8.4 - 10.2 mg/dL Final     eGFR Non  Amer   Date Value Ref Range Status   02/16/2018 59 49 - 113 mL/min/1.73 Final     Alkaline Phosphatase   Date Value Ref Range Status   02/16/2018 82 38 - 126 U/L Final     Total Protein   Date Value Ref Range Status   02/16/2018 7.6 6.3 - 8.6 g/dL Final     ALT (SGPT)   Date Value Ref Range Status   02/16/2018 37 21 - 72 U/L Final     AST (SGOT)   Date Value Ref Range Status   02/16/2018 30 17 - 59 U/L Final     Total Bilirubin   Date Value Ref Range Status   02/16/2018 0.4 0.2 - 1.3 mg/dL Final     Albumin   Date Value Ref Range Status   02/16/2018 4.40 3.40 - 4.80 g/dL Final     Globulin   Date Value Ref Range Status   02/16/2018 3.2 2.3 - 3.5 gm/dL Final     A/G Ratio   Date Value Ref Range Status   02/16/2018 1.4 1.1 - 1.8 g/dL Final     Lab Results   Component Value Date    WBC 9.18 02/16/2018    HGB 14.8 02/16/2018    HCT 43.0 02/16/2018    MCV 80.7 02/16/2018     02/16/2018     Lab Results   Component Value Date    NEUTROABS 4.11 02/16/2018     Lab Results   Component Value Date    REFLABREPO SEE NOTE: 10/19/2016       PATHOLOGY:  FISH testing for CLL done on October 19, 2016 shows:  SPECIFIC FISH RESULTS:     CCND1/IGH: NORMAL        nuc samantha 11q13(ODAE4n8),14q32(IGHx2)[100]     THOMAS: NORMAL        nuc samantha 11q22.3(ATMx2)[100]     12cen: NORMAL        nuc samantha 12cen([id number])[100]     13q: NORMAL        nuc samantha 13q14.3(DLEUx2),13q34(MEFZ1q3)[100]     TP53: NORMAL        nuc samantha 17p13.1(TP53x2)[100]     Peripheral blood flow cytometry done on September 30, 2016 shows:  Interpretation    Peripheral Blood:     CD5+ monotypic B-cell population (40% of leukocytes) compatible with   B-cell chronic lymphocytic leukemia/ small  lymphocytic lymphoma (B-CLL/SLL)         RADIOLOGY DATA :  CT of abdomen and pelvis with contrast done on October 5, 2016 showed:  FINDINGS- Comparison study dated July 14, 2014. Axial computer  tomography sequential imaging was performed from the diaphragms  through the symphysis pubis after administration of IV contrast  .Sagittal and coronal reformates was performed.      Imaging through the lung bases reveals stable right lung base lateral  basilar small calcified lung parenchymal granuloma consistent with  stable old granulomatous disease. Otherwise lung bases unremarkable..      The liver is normal. The gallbladder is normal. The pancreas is  normal. The spleen has multiple small calcified granulomas involving  it consistent with old granulomatous disease. Spleen appears upper  limits of normal size but is otherwise unremarkable. Bilateral adrenal  glands are normal. Right kidney and ureter are normal. Left kidney and  ureter are normal. The bladder is normal. The prostate gland appears  diffusely enlarged with a volume of 45 and is impinging upon the base  of bladder. Questionable defect suspicious for prior TURP procedure.  Recommend clinical correlation. The hollow viscera appears normal. No  lymphadenopathy in the abdomen or pelvis. No acute osseous  abnormality.      IMPRESSION-   1.Enlarged prostate gland may represent changes from benign prostatic  hypertrophy versus prostate malignancy. There also appears to be  evidence of prior probable TURP procedure. Recommend clinical  correlation..  2.Otherwise unremarkable CT abdomen pelvis study..        ASSESSMENT AND PLAN:      1.  CLL, stage II with questionable splenomegaly.  In view of persistent drenching night sweats and fatigue patient was started on chemotherapy with bendamustine and rituximab on July 10, 2017.  Patient tolerated first round of chemotherapy well.  On August 7, 2017 when he started day 1 of cycle 2 of bendamustine and rituximab he  started having swelling in the neck region along with daily denies and rash in the face.  Port study done on that date showed intact port.  When patient came for day 2 of bendamustine he still had erythema and rash on his face and neck for which chemotherapy was discontinued and patient was given prednisone for 7 days.  At this point his rash is completely resolved.  Most likely patient had a allergic reaction to either rituximab and bendamustine.  Still complains of intermittent night sweats, denies any drenching night sweats. Denies any weight loss since last clinic visit.  His CBC today is within normal limit .  There is no evidence of lymphocytosis and thrombocytopenia.  Result of blood work were reviewed with patient and his family.  We'll see him back in about 4 months with a repeat CBC and CMP and LDH on that day.  Patient was encouraged to call us if he starts having any worsening night sweats or any abnormal swollen lymph node or any major weight loss prior to next clinic visit.  Patient ever requires chemotherapy again his options would be either Ibrutinib or Obinutuzumab with chlorambucil.    2.  Thrombocytopenia: Completely resolved at this point. Platelet count is 182,000.    3. History of CVA    4.  Health maintenance: Patient does not smoke.  Had a colonoscopy done in 2013.  He remains full code.        Andres Lubin MD  2/16/2018  12:45 PM

## 2018-03-30 ENCOUNTER — INFUSION (OUTPATIENT)
Dept: ONCOLOGY | Facility: HOSPITAL | Age: 68
End: 2018-03-30

## 2018-03-30 DIAGNOSIS — Z45.2 ENCOUNTER FOR VENOUS ACCESS DEVICE CARE: Primary | ICD-10-CM

## 2018-03-30 PROCEDURE — 25010000002 HEPARIN FLUSH (PORCINE) 100 UNIT/ML SOLUTION: Performed by: INTERNAL MEDICINE

## 2018-03-30 PROCEDURE — 96523 IRRIG DRUG DELIVERY DEVICE: CPT | Performed by: INTERNAL MEDICINE

## 2018-03-30 RX ORDER — SODIUM CHLORIDE 0.9 % (FLUSH) 0.9 %
10 SYRINGE (ML) INJECTION AS NEEDED
Status: DISCONTINUED | OUTPATIENT
Start: 2018-03-30 | End: 2018-03-30 | Stop reason: HOSPADM

## 2018-03-30 RX ORDER — SODIUM CHLORIDE 0.9 % (FLUSH) 0.9 %
10 SYRINGE (ML) INJECTION AS NEEDED
Status: CANCELLED | OUTPATIENT
Start: 2018-05-11

## 2018-03-30 RX ADMIN — SODIUM CHLORIDE, PRESERVATIVE FREE 500 UNITS: 5 INJECTION INTRAVENOUS at 09:43

## 2018-03-30 RX ADMIN — Medication 10 ML: at 09:43

## 2018-05-11 ENCOUNTER — INFUSION (OUTPATIENT)
Dept: ONCOLOGY | Facility: HOSPITAL | Age: 68
End: 2018-05-11

## 2018-05-11 DIAGNOSIS — Z45.2 ENCOUNTER FOR VENOUS ACCESS DEVICE CARE: Primary | ICD-10-CM

## 2018-05-11 PROCEDURE — 96523 IRRIG DRUG DELIVERY DEVICE: CPT | Performed by: INTERNAL MEDICINE

## 2018-05-11 PROCEDURE — 25010000002 HEPARIN FLUSH (PORCINE) 100 UNIT/ML SOLUTION: Performed by: INTERNAL MEDICINE

## 2018-05-11 RX ORDER — SODIUM CHLORIDE 0.9 % (FLUSH) 0.9 %
10 SYRINGE (ML) INJECTION AS NEEDED
Status: DISCONTINUED | OUTPATIENT
Start: 2018-05-11 | End: 2018-05-11 | Stop reason: HOSPADM

## 2018-05-11 RX ORDER — SODIUM CHLORIDE 0.9 % (FLUSH) 0.9 %
10 SYRINGE (ML) INJECTION AS NEEDED
Status: CANCELLED | OUTPATIENT
Start: 2018-06-15

## 2018-05-11 RX ADMIN — SODIUM CHLORIDE, PRESERVATIVE FREE 500 UNITS: 5 INJECTION INTRAVENOUS at 08:24

## 2018-05-11 RX ADMIN — Medication 10 ML: at 08:24

## 2018-06-15 ENCOUNTER — OFFICE VISIT (OUTPATIENT)
Dept: ONCOLOGY | Facility: CLINIC | Age: 68
End: 2018-06-15

## 2018-06-15 ENCOUNTER — INFUSION (OUTPATIENT)
Dept: ONCOLOGY | Facility: HOSPITAL | Age: 68
End: 2018-06-15

## 2018-06-15 VITALS
DIASTOLIC BLOOD PRESSURE: 80 MMHG | SYSTOLIC BLOOD PRESSURE: 128 MMHG | BODY MASS INDEX: 29.1 KG/M2 | RESPIRATION RATE: 18 BRPM | HEART RATE: 62 BPM | WEIGHT: 185.4 LBS | HEIGHT: 67 IN | TEMPERATURE: 98.7 F

## 2018-06-15 DIAGNOSIS — D69.6 THROMBOCYTOPENIA (HCC): ICD-10-CM

## 2018-06-15 DIAGNOSIS — C91.10 CLL (CHRONIC LYMPHOCYTIC LEUKEMIA) (HCC): Primary | ICD-10-CM

## 2018-06-15 DIAGNOSIS — Z45.2 ENCOUNTER FOR VENOUS ACCESS DEVICE CARE: Primary | ICD-10-CM

## 2018-06-15 DIAGNOSIS — C91.10 CLL (CHRONIC LYMPHOCYTIC LEUKEMIA) (HCC): ICD-10-CM

## 2018-06-15 LAB
ALBUMIN SERPL-MCNC: 4.3 G/DL (ref 3.4–4.8)
ALBUMIN/GLOB SERPL: 1.5 G/DL (ref 1.1–1.8)
ALP SERPL-CCNC: 67 U/L (ref 38–126)
ALT SERPL W P-5'-P-CCNC: 43 U/L (ref 21–72)
ANION GAP SERPL CALCULATED.3IONS-SCNC: 9 MMOL/L (ref 5–15)
AST SERPL-CCNC: 32 U/L (ref 17–59)
BASOPHILS # BLD MANUAL: 0.18 10*3/MM3 (ref 0–0.2)
BASOPHILS NFR BLD AUTO: 2 % (ref 0–2)
BILIRUB SERPL-MCNC: 0.3 MG/DL (ref 0.2–1.3)
BUN BLD-MCNC: 17 MG/DL (ref 7–21)
BUN/CREAT SERPL: 15.3 (ref 7–25)
CALCIUM SPEC-SCNC: 8.9 MG/DL (ref 8.4–10.2)
CHLORIDE SERPL-SCNC: 104 MMOL/L (ref 95–110)
CO2 SERPL-SCNC: 26 MMOL/L (ref 22–31)
CREAT BLD-MCNC: 1.11 MG/DL (ref 0.7–1.3)
DEPRECATED RDW RBC AUTO: 41.7 FL (ref 35.1–43.9)
EOSINOPHIL # BLD MANUAL: 0.09 10*3/MM3 (ref 0–0.7)
EOSINOPHIL NFR BLD MANUAL: 1 % (ref 0–7)
ERYTHROCYTE [DISTWIDTH] IN BLOOD BY AUTOMATED COUNT: 13.8 % (ref 11.5–14.5)
GFR SERPL CREATININE-BSD FRML MDRD: 66 ML/MIN/1.73 (ref 49–113)
GLOBULIN UR ELPH-MCNC: 2.8 GM/DL (ref 2.3–3.5)
GLUCOSE BLD-MCNC: 78 MG/DL (ref 60–100)
HCT VFR BLD AUTO: 42.7 % (ref 39–49)
HGB BLD-MCNC: 14.7 G/DL (ref 13.7–17.3)
LDH SERPL-CCNC: 416 U/L (ref 313–618)
LYMPHOCYTES # BLD MANUAL: 4.43 10*3/MM3 (ref 0.6–4.2)
LYMPHOCYTES NFR BLD MANUAL: 10 % (ref 0–12)
LYMPHOCYTES NFR BLD MANUAL: 49 % (ref 10–50)
MCH RBC QN AUTO: 28.3 PG (ref 26.5–34)
MCHC RBC AUTO-ENTMCNC: 34.4 G/DL (ref 31.5–36.3)
MCV RBC AUTO: 82.3 FL (ref 80–98)
MONOCYTES # BLD AUTO: 0.91 10*3/MM3 (ref 0–0.9)
NEUTROPHILS # BLD AUTO: 3.08 10*3/MM3 (ref 2–8.6)
NEUTROPHILS NFR BLD MANUAL: 34 % (ref 37–80)
PLAT MORPH BLD: NORMAL
PLATELET # BLD AUTO: 182 10*3/MM3 (ref 150–450)
PMV BLD AUTO: 10.3 FL (ref 8–12)
POTASSIUM BLD-SCNC: 4.3 MMOL/L (ref 3.5–5.1)
PROT SERPL-MCNC: 7.1 G/DL (ref 6.3–8.6)
RBC # BLD AUTO: 5.19 10*6/MM3 (ref 4.37–5.74)
RBC MORPH BLD: NORMAL
SODIUM BLD-SCNC: 139 MMOL/L (ref 137–145)
VARIANT LYMPHS NFR BLD MANUAL: 4 % (ref 0–5)
WBC MORPH BLD: NORMAL
WBC NRBC COR # BLD: 9.05 10*3/MM3 (ref 3.2–9.8)

## 2018-06-15 PROCEDURE — 83615 LACTATE (LD) (LDH) ENZYME: CPT | Performed by: INTERNAL MEDICINE

## 2018-06-15 PROCEDURE — 99213 OFFICE O/P EST LOW 20 MIN: CPT | Performed by: INTERNAL MEDICINE

## 2018-06-15 PROCEDURE — 80053 COMPREHEN METABOLIC PANEL: CPT | Performed by: INTERNAL MEDICINE

## 2018-06-15 PROCEDURE — 1123F ACP DISCUSS/DSCN MKR DOCD: CPT | Performed by: INTERNAL MEDICINE

## 2018-06-15 PROCEDURE — G0463 HOSPITAL OUTPT CLINIC VISIT: HCPCS | Performed by: INTERNAL MEDICINE

## 2018-06-15 PROCEDURE — 25010000002 HEPARIN FLUSH (PORCINE) 100 UNIT/ML SOLUTION: Performed by: INTERNAL MEDICINE

## 2018-06-15 PROCEDURE — 85007 BL SMEAR W/DIFF WBC COUNT: CPT | Performed by: INTERNAL MEDICINE

## 2018-06-15 PROCEDURE — 36591 DRAW BLOOD OFF VENOUS DEVICE: CPT | Performed by: INTERNAL MEDICINE

## 2018-06-15 PROCEDURE — G8417 CALC BMI ABV UP PARAM F/U: HCPCS | Performed by: INTERNAL MEDICINE

## 2018-06-15 PROCEDURE — 85025 COMPLETE CBC W/AUTO DIFF WBC: CPT | Performed by: INTERNAL MEDICINE

## 2018-06-15 RX ORDER — SODIUM CHLORIDE 0.9 % (FLUSH) 0.9 %
10 SYRINGE (ML) INJECTION AS NEEDED
Status: DISCONTINUED | OUTPATIENT
Start: 2018-06-15 | End: 2018-06-15 | Stop reason: HOSPADM

## 2018-06-15 RX ORDER — SODIUM CHLORIDE 0.9 % (FLUSH) 0.9 %
10 SYRINGE (ML) INJECTION AS NEEDED
Status: CANCELLED | OUTPATIENT
Start: 2018-07-27

## 2018-06-15 RX ADMIN — Medication 10 ML: at 10:43

## 2018-06-15 RX ADMIN — SODIUM CHLORIDE, PRESERVATIVE FREE 500 UNITS: 5 INJECTION INTRAVENOUS at 11:29

## 2018-06-15 NOTE — PROGRESS NOTES
DATE OF VISIT: 6/15/2018    REASON FOR VISIT: History of CLL    HISTORY OF PRESENT ILLNESS:    67-year-old male with a past medical history significant for history of CVA and dyslipidemia, he was diagnosed with CLL on peripheral blood flow cytometry in September 2016.  Patient was having drenching night sweats and fatigue patient was started on chemotherapy with bendamustine and rituximab on July 10, 2017.  After finishing day 1 of cycle 2 chemotherapy with bendamustine and rituximab started having swelling in the neck as well as flushing of the face. In view of reaction,chemotherapy was held. Patient is here for follow up visit today. Complains of fatigue. Denies any fever or chills.  Denies any difficulty swallowing.  Still complains of intermittent night sweats.        PAST MEDICAL HISTORY:    Past Medical History:   Diagnosis Date   • Arthritis    • CLL (chronic lymphocytic leukemia)    • CVA (cerebral vascular accident) 2011   • Dyslipidemia    • Gastritis    • GERD (gastroesophageal reflux disease)    • Night sweats        SOCIAL HISTORY:    Social History   Substance Use Topics   • Smoking status: Former Smoker   • Smokeless tobacco: Never Used      Comment: never smoked cigarettes   • Alcohol use No       Surgical History :  Past Surgical History:   Procedure Laterality Date   • APPENDECTOMY     • HERNIA REPAIR     • KNEE SURGERY     • LEG SURGERY     • MANDIBLE FRACTURE SURGERY     • [place] INSJ TUNNELED CVC W/O SUBQ PORT/ AGE 5 YR/> Right 7/7/2017    Procedure: MEDIPORT PLACEMENT WITH ULTRASOUND GUIDANCE       ;  Surgeon: Lucien Mcneal MD;  Location: [facility];  Service: General   • PROSTATE SURGERY         ALLERGIES:    No Known Allergies    REVIEW OF SYSTEMS:      CONSTITUTIONAL: Positive for fatigue.  Still complains of intermittent night sweats.  Positive for 4 pound weight gain since last clinic visit. No fever, chills.    HEENT:   No epistaxis, mouth sores, or difficulty swallowing.    RESPIRATORY:   "Positive for intermittent shortness of breath.  No cough or hemoptysis.    CARDIOVASCULAR:  No chest pain or palpitations.    GASTROINTESTINAL: Complains of intermittent pain in left upper quadrant .  No  nausea, vomiting, or blood in the stool.    GENITOURINARY:  No dysuria or hematuria.    MUSCULOSKELETAL:  Positive for chronic back pain.    NEUROLOGICAL:  Positive for occasional tingling and numbness in the lower extremities. No new headache or dizziness.     LYMPHATICS:  Denies any abnormal swollen and anywhere in the body.    SKIN:  No new skin lesion.          PHYSICAL EXAMINATION:      VITAL SIGNS:    /80   Pulse 62   Temp 98.7 °F (37.1 °C) (Temporal Artery )   Resp 18   Ht 170.2 cm (67.01\")   Wt 84.1 kg (185 lb 6.4 oz)   BMI 29.03 kg/m²      ECOG performance status: 1    GENERAL:  Not in any distress.    HEENT:  Normocephalic, Atraumatic.Mild Conjunctival pallor. No icterus. Extraocular Movements Intact. No Facial Asymmetry noted.    NECK:  No adenopathy. No JVD.    RESPIRATORY:  Fair air entry bilateral. No rhonchi or wheezing.    CARDIOVASCULAR:  S1, S2. Regular rate and rhythm. No murmur or gallop appreciated.  Port-A-Cath present on right chest wall.    ABDOMEN:  Soft, obese, nontender. Bowel sounds present in all four quadrants.  No organomegaly appreciated.    MUSCULOSKELTAL:  No edema.No Calf Tenderness.Decreased range of motion.    NEUROLOGIC:  Alert, awake and oriented ×3.  No  Motor  deficit appreciated. Cranial Nerves 2-12 grossly intact.    SKIN: No new skin lesions    LYMPHATICS: No new enlarged lymph node in neck or supraclavicular area.          DIAGNOSTIC DATA:    Glucose   Date Value Ref Range Status   06/15/2018 78 60 - 100 mg/dL Final     Sodium   Date Value Ref Range Status   06/15/2018 139 137 - 145 mmol/L Final     Potassium   Date Value Ref Range Status   06/15/2018 4.3 3.5 - 5.1 mmol/L Final     CO2   Date Value Ref Range Status   06/15/2018 26.0 22.0 - 31.0 mmol/L Final "     Chloride   Date Value Ref Range Status   06/15/2018 104 95 - 110 mmol/L Final     Anion Gap   Date Value Ref Range Status   06/15/2018 9.0 5.0 - 15.0 mmol/L Final     Creatinine   Date Value Ref Range Status   06/15/2018 1.11 0.70 - 1.30 mg/dL Final     BUN   Date Value Ref Range Status   06/15/2018 17 7 - 21 mg/dL Final     BUN/Creatinine Ratio   Date Value Ref Range Status   06/15/2018 15.3 7.0 - 25.0 Final     Calcium   Date Value Ref Range Status   06/15/2018 8.9 8.4 - 10.2 mg/dL Final     eGFR Non  Amer   Date Value Ref Range Status   06/15/2018 66 49 - 113 mL/min/1.73 Final     Alkaline Phosphatase   Date Value Ref Range Status   06/15/2018 67 38 - 126 U/L Final     Total Protein   Date Value Ref Range Status   06/15/2018 7.1 6.3 - 8.6 g/dL Final     ALT (SGPT)   Date Value Ref Range Status   06/15/2018 43 21 - 72 U/L Final     AST (SGOT)   Date Value Ref Range Status   06/15/2018 32 17 - 59 U/L Final     Total Bilirubin   Date Value Ref Range Status   06/15/2018 0.3 0.2 - 1.3 mg/dL Final     Albumin   Date Value Ref Range Status   06/15/2018 4.30 3.40 - 4.80 g/dL Final     Globulin   Date Value Ref Range Status   06/15/2018 2.8 2.3 - 3.5 gm/dL Final     A/G Ratio   Date Value Ref Range Status   06/15/2018 1.5 1.1 - 1.8 g/dL Final     Lab Results   Component Value Date    WBC 9.05 06/15/2018    HGB 14.7 06/15/2018    HCT 42.7 06/15/2018    MCV 82.3 06/15/2018     06/15/2018     Lab Results   Component Value Date    NEUTROABS 3.08 06/15/2018     Lab Results   Component Value Date    REFLABREPO SEE NOTE: 10/19/2016       PATHOLOGY:  FISH testing for CLL done on October 19, 2016 shows:  SPECIFIC FISH RESULTS:     CCND1/IGH: NORMAL        nuc samantha 11q13([id number]),14q32(IGHx2)[100]     THOMAS: NORMAL        nuc samantha 11q22.3(ATMx2)[100]     12cen: NORMAL        nuc samantha 12cen([id number])[100]     13q: NORMAL        nuc samantha 13q14.3(DLEUx2),13q34(SOWO2p3)[100]     TP53: NORMAL        nuc samantha  17p13.1(TP53x2)[100]     Peripheral blood flow cytometry done on September 30, 2016 shows:  Interpretation    Peripheral Blood:     CD5+ monotypic B-cell population (40% of leukocytes) compatible with   B-cell chronic lymphocytic leukemia/ small lymphocytic lymphoma (B-CLL/SLL)         RADIOLOGY DATA :  CT of abdomen and pelvis with contrast done on October 5, 2016 showed:  FINDINGS- Comparison study dated July 14, 2014. Axial computer  tomography sequential imaging was performed from the diaphragms  through the symphysis pubis after administration of IV contrast  .Sagittal and coronal reformates was performed.      Imaging through the lung bases reveals stable right lung base lateral  basilar small calcified lung parenchymal granuloma consistent with  stable old granulomatous disease. Otherwise lung bases unremarkable..      The liver is normal. The gallbladder is normal. The pancreas is  normal. The spleen has multiple small calcified granulomas involving  it consistent with old granulomatous disease. Spleen appears upper  limits of normal size but is otherwise unremarkable. Bilateral adrenal  glands are normal. Right kidney and ureter are normal. Left kidney and  ureter are normal. The bladder is normal. The prostate gland appears  diffusely enlarged with a volume of 45 and is impinging upon the base  of bladder. Questionable defect suspicious for prior TURP procedure.  Recommend clinical correlation. The hollow viscera appears normal. No  lymphadenopathy in the abdomen or pelvis. No acute osseous  abnormality.      IMPRESSION-   1.Enlarged prostate gland may represent changes from benign prostatic  hypertrophy versus prostate malignancy. There also appears to be  evidence of prior probable TURP procedure. Recommend clinical  correlation..  2.Otherwise unremarkable CT abdomen pelvis study..        ASSESSMENT AND PLAN:      1.  CLL, stage II with questionable splenomegaly.  In view of persistent drenching night  sweats and fatigue patient was started on chemotherapy with bendamustine and rituximab on July 10, 2017.  Patient tolerated first round of chemotherapy well.  On August 7, 2017 when he started day 1 of cycle 2 of bendamustine and rituximab he started having swelling in the neck region along with daily denies and rash in the face.  Port study done on that date showed intact port.  When patient came for day 2 of bendamustine he still had erythema and rash on his face and neck for which chemotherapy was discontinued and patient was given prednisone for 7 days.  At this point his rash is completely resolved.  Most likely patient had a allergic reaction to either rituximab and bendamustine.  Still complains of intermittent night sweats, denies any drenching night sweats. Denies any weight loss since last clinic visit.  His CBC today is within normal limit .  There is no evidence of lymphocytosis and thrombocytopenia.  Result of blood work were reviewed with patient and his family.  We'll see him back in about 4 months with a repeat CBC and CMP and LDH on that day.  Patient was encouraged to call us if he starts having any worsening night sweats or any abnormal swollen lymph node or any major weight loss prior to next clinic visit.  Patient ever requires chemotherapy again his options would be either Ibrutinib or Obinutuzumab with chlorambucil.    2.  Thrombocytopenia: Completely resolved at this point. Platelet count is 182,000.    3. History of CVA    4.  Health maintenance: Patient does not smoke.  Had a colonoscopy done in 2013.      5. BMI: Patient's Body mass index is 29.03 kg/m². BMI is higher than reference range.  Patient was notified about her elevated BMI and was counseled about diet and exercise for weight loss.    6. Advance Care Planning: For now patient remains full code and is able to make  His decisions.  Patient has health care surrogate mentioned on chart.      Andres Lubin MD  6/15/2018  12:31 PM

## 2018-07-27 ENCOUNTER — INFUSION (OUTPATIENT)
Dept: ONCOLOGY | Facility: HOSPITAL | Age: 68
End: 2018-07-27

## 2018-07-27 DIAGNOSIS — D69.6 THROMBOCYTOPENIA (HCC): Primary | ICD-10-CM

## 2018-07-27 DIAGNOSIS — Z45.2 ENCOUNTER FOR VENOUS ACCESS DEVICE CARE: ICD-10-CM

## 2018-07-27 PROCEDURE — 25010000002 HEPARIN FLUSH (PORCINE) 100 UNIT/ML SOLUTION: Performed by: INTERNAL MEDICINE

## 2018-07-27 PROCEDURE — 96523 IRRIG DRUG DELIVERY DEVICE: CPT | Performed by: INTERNAL MEDICINE

## 2018-07-27 RX ORDER — SODIUM CHLORIDE 0.9 % (FLUSH) 0.9 %
10 SYRINGE (ML) INJECTION AS NEEDED
Status: CANCELLED | OUTPATIENT
Start: 2018-09-07

## 2018-07-27 RX ORDER — SODIUM CHLORIDE 0.9 % (FLUSH) 0.9 %
10 SYRINGE (ML) INJECTION AS NEEDED
Status: DISCONTINUED | OUTPATIENT
Start: 2018-07-27 | End: 2018-07-27 | Stop reason: HOSPADM

## 2018-07-27 RX ADMIN — Medication 10 ML: at 09:52

## 2018-07-27 RX ADMIN — HEPARIN SODIUM (PORCINE) LOCK FLUSH IV SOLN 100 UNIT/ML 500 UNITS: 100 SOLUTION at 09:52

## 2018-09-07 ENCOUNTER — INFUSION (OUTPATIENT)
Dept: ONCOLOGY | Facility: HOSPITAL | Age: 68
End: 2018-09-07

## 2018-09-07 DIAGNOSIS — D69.6 THROMBOCYTOPENIA (HCC): Primary | ICD-10-CM

## 2018-09-07 DIAGNOSIS — Z45.2 ENCOUNTER FOR VENOUS ACCESS DEVICE CARE: ICD-10-CM

## 2018-09-07 PROCEDURE — 96523 IRRIG DRUG DELIVERY DEVICE: CPT | Performed by: INTERNAL MEDICINE

## 2018-09-07 PROCEDURE — 25010000002 HEPARIN FLUSH (PORCINE) 100 UNIT/ML SOLUTION: Performed by: INTERNAL MEDICINE

## 2018-09-07 RX ORDER — SODIUM CHLORIDE 0.9 % (FLUSH) 0.9 %
10 SYRINGE (ML) INJECTION AS NEEDED
Status: CANCELLED | OUTPATIENT
Start: 2018-10-19

## 2018-09-07 RX ORDER — SODIUM CHLORIDE 0.9 % (FLUSH) 0.9 %
10 SYRINGE (ML) INJECTION AS NEEDED
Status: DISCONTINUED | OUTPATIENT
Start: 2018-09-07 | End: 2018-09-07 | Stop reason: HOSPADM

## 2018-09-07 RX ADMIN — Medication 10 ML: at 09:48

## 2018-09-07 RX ADMIN — HEPARIN SODIUM (PORCINE) LOCK FLUSH IV SOLN 100 UNIT/ML 500 UNITS: 100 SOLUTION at 09:48

## 2018-10-19 ENCOUNTER — OFFICE VISIT (OUTPATIENT)
Dept: ONCOLOGY | Facility: CLINIC | Age: 68
End: 2018-10-19

## 2018-10-19 ENCOUNTER — INFUSION (OUTPATIENT)
Dept: ONCOLOGY | Facility: HOSPITAL | Age: 68
End: 2018-10-19

## 2018-10-19 VITALS
BODY MASS INDEX: 27.47 KG/M2 | RESPIRATION RATE: 16 BRPM | OXYGEN SATURATION: 98 % | WEIGHT: 175 LBS | HEIGHT: 67 IN | SYSTOLIC BLOOD PRESSURE: 140 MMHG | TEMPERATURE: 98.6 F | HEART RATE: 64 BPM | DIASTOLIC BLOOD PRESSURE: 73 MMHG

## 2018-10-19 DIAGNOSIS — D69.6 THROMBOCYTOPENIA (HCC): ICD-10-CM

## 2018-10-19 DIAGNOSIS — C91.10 CLL (CHRONIC LYMPHOCYTIC LEUKEMIA) (HCC): Primary | ICD-10-CM

## 2018-10-19 DIAGNOSIS — Z45.2 ENCOUNTER FOR VENOUS ACCESS DEVICE CARE: ICD-10-CM

## 2018-10-19 LAB
ALBUMIN SERPL-MCNC: 4.3 G/DL (ref 3.4–4.8)
ALBUMIN/GLOB SERPL: 1.4 G/DL (ref 1.1–1.8)
ALP SERPL-CCNC: 69 U/L (ref 38–126)
ALT SERPL W P-5'-P-CCNC: 38 U/L (ref 21–72)
ANION GAP SERPL CALCULATED.3IONS-SCNC: 11 MMOL/L (ref 5–15)
ANISOCYTOSIS BLD QL: ABNORMAL
AST SERPL-CCNC: 37 U/L (ref 17–59)
BILIRUB SERPL-MCNC: 0.6 MG/DL (ref 0.2–1.3)
BUN BLD-MCNC: 18 MG/DL (ref 7–21)
BUN/CREAT SERPL: 17 (ref 7–25)
CALCIUM SPEC-SCNC: 8.9 MG/DL (ref 8.4–10.2)
CHLORIDE SERPL-SCNC: 99 MMOL/L (ref 95–110)
CO2 SERPL-SCNC: 27 MMOL/L (ref 22–31)
CREAT BLD-MCNC: 1.06 MG/DL (ref 0.7–1.3)
DEPRECATED RDW RBC AUTO: 42 FL (ref 35.1–43.9)
EOSINOPHIL # BLD MANUAL: 0.08 10*3/MM3 (ref 0–0.7)
EOSINOPHIL NFR BLD MANUAL: 1 % (ref 0–7)
ERYTHROCYTE [DISTWIDTH] IN BLOOD BY AUTOMATED COUNT: 13.8 % (ref 11.5–14.5)
GFR SERPL CREATININE-BSD FRML MDRD: 69 ML/MIN/1.73 (ref 49–113)
GLOBULIN UR ELPH-MCNC: 3 GM/DL (ref 2.3–3.5)
GLUCOSE BLD-MCNC: 111 MG/DL (ref 60–100)
HCT VFR BLD AUTO: 41.2 % (ref 39–49)
HGB BLD-MCNC: 14.2 G/DL (ref 13.7–17.3)
LDH SERPL-CCNC: 444 U/L (ref 313–618)
LYMPHOCYTES # BLD MANUAL: 4.27 10*3/MM3 (ref 0.6–4.2)
LYMPHOCYTES NFR BLD MANUAL: 3 % (ref 0–12)
LYMPHOCYTES NFR BLD MANUAL: 51 % (ref 10–50)
MCH RBC QN AUTO: 28.7 PG (ref 26.5–34)
MCHC RBC AUTO-ENTMCNC: 34.5 G/DL (ref 31.5–36.3)
MCV RBC AUTO: 83.4 FL (ref 80–98)
MONOCYTES # BLD AUTO: 0.25 10*3/MM3 (ref 0–0.9)
NEUTROPHILS # BLD AUTO: 3.77 10*3/MM3 (ref 2–8.6)
NEUTROPHILS NFR BLD MANUAL: 45 % (ref 37–80)
NEUTS VAC BLD QL SMEAR: ABNORMAL
PLATELET # BLD AUTO: 183 10*3/MM3 (ref 150–450)
PMV BLD AUTO: 10.4 FL (ref 8–12)
POTASSIUM BLD-SCNC: 3.8 MMOL/L (ref 3.5–5.1)
PROT SERPL-MCNC: 7.3 G/DL (ref 6.3–8.6)
RBC # BLD AUTO: 4.94 10*6/MM3 (ref 4.37–5.74)
SMALL PLATELETS BLD QL SMEAR: ADEQUATE
SODIUM BLD-SCNC: 137 MMOL/L (ref 137–145)
WBC NRBC COR # BLD: 8.37 10*3/MM3 (ref 3.2–9.8)

## 2018-10-19 PROCEDURE — 83615 LACTATE (LD) (LDH) ENZYME: CPT | Performed by: INTERNAL MEDICINE

## 2018-10-19 PROCEDURE — G8417 CALC BMI ABV UP PARAM F/U: HCPCS | Performed by: INTERNAL MEDICINE

## 2018-10-19 PROCEDURE — 99214 OFFICE O/P EST MOD 30 MIN: CPT | Performed by: INTERNAL MEDICINE

## 2018-10-19 PROCEDURE — 36591 DRAW BLOOD OFF VENOUS DEVICE: CPT | Performed by: INTERNAL MEDICINE

## 2018-10-19 PROCEDURE — 85025 COMPLETE CBC W/AUTO DIFF WBC: CPT | Performed by: INTERNAL MEDICINE

## 2018-10-19 PROCEDURE — G0463 HOSPITAL OUTPT CLINIC VISIT: HCPCS | Performed by: INTERNAL MEDICINE

## 2018-10-19 PROCEDURE — 85007 BL SMEAR W/DIFF WBC COUNT: CPT | Performed by: INTERNAL MEDICINE

## 2018-10-19 PROCEDURE — 80053 COMPREHEN METABOLIC PANEL: CPT | Performed by: INTERNAL MEDICINE

## 2018-10-19 PROCEDURE — 1123F ACP DISCUSS/DSCN MKR DOCD: CPT | Performed by: INTERNAL MEDICINE

## 2018-10-19 RX ORDER — SODIUM CHLORIDE 0.9 % (FLUSH) 0.9 %
10 SYRINGE (ML) INJECTION AS NEEDED
Status: CANCELLED | OUTPATIENT
Start: 2019-03-22

## 2018-10-19 RX ORDER — SODIUM CHLORIDE 0.9 % (FLUSH) 0.9 %
10 SYRINGE (ML) INJECTION AS NEEDED
Status: DISCONTINUED | OUTPATIENT
Start: 2018-10-19 | End: 2018-10-19 | Stop reason: HOSPADM

## 2018-10-19 RX ADMIN — SODIUM CHLORIDE, PRESERVATIVE FREE 500 UNITS: 5 INJECTION INTRAVENOUS at 11:46

## 2018-10-19 RX ADMIN — Medication 10 ML: at 11:01

## 2018-10-19 RX ADMIN — Medication 10 ML: at 11:46

## 2018-10-19 NOTE — PROGRESS NOTES
DATE OF VISIT: 10/19/2018    REASON FOR VISIT: History of CLL    HISTORY OF PRESENT ILLNESS:    68-year-old male with a past medical history significant for history of CVA and dyslipidemia, he was diagnosed with CLL on peripheral blood flow cytometry in September 2016.  Patient was having drenching night sweats and fatigue patient was started on chemotherapy with bendamustine and rituximab on July 10, 2017.  After finishing day 1 of cycle 2 chemotherapy with bendamustine and rituximab started having swelling in the neck as well as flushing of the face. In view of reaction,chemotherapy was held. Patient is here for follow up visit today. Complains of fatigue. Denies any fever or chills.  Denies any difficulty swallowing.  Still complains of intermittent night sweats,denies any recent worsening.        PAST MEDICAL HISTORY:    Past Medical History:   Diagnosis Date   • Arthritis    • CLL (chronic lymphocytic leukemia) (CMS/HCC)    • CVA (cerebral vascular accident) (CMS/HCC) 2011   • Dyslipidemia    • Gastritis    • GERD (gastroesophageal reflux disease)    • Night sweats        SOCIAL HISTORY:    Social History   Substance Use Topics   • Smoking status: Former Smoker   • Smokeless tobacco: Never Used      Comment: never smoked cigarettes   • Alcohol use No       Surgical History :  Past Surgical History:   Procedure Laterality Date   • APPENDECTOMY     • HERNIA REPAIR     • KNEE SURGERY     • LEG SURGERY     • MANDIBLE FRACTURE SURGERY     • SD INSJ TUNNELED CVC W/O SUBQ PORT/ AGE 5 YR/> Right 7/7/2017    Procedure: MEDIPORT PLACEMENT WITH ULTRASOUND GUIDANCE       ;  Surgeon: Lucien Mcneal MD;  Location: [facility];  Service: General   • PROSTATE SURGERY         ALLERGIES:    No Known Allergies    REVIEW OF SYSTEMS:      CONSTITUTIONAL: Positive for fatigue.  Still complains of intermittent night sweats.  Positive for 10 pound  Of unintentional weight loss since last clinic visit despite of good appetite. No fever,  "chills.    HEENT:   No epistaxis, mouth sores, or difficulty swallowing.    RESPIRATORY:  Positive for intermittent shortness of breath.  No cough or hemoptysis.    CARDIOVASCULAR:  No chest pain or palpitations.    GASTROINTESTINAL: Complains of intermittent pain in left upper quadrant .  No  nausea, vomiting, or blood in the stool.    GENITOURINARY:  No dysuria or hematuria.    MUSCULOSKELETAL:  Positive for chronic back pain.    NEUROLOGICAL:  Positive for occasional tingling and numbness in the lower extremities. No new headache or dizziness.     LYMPHATICS:  Denies any abnormal swollen and anywhere in the body.    SKIN:  No new skin lesion.          PHYSICAL EXAMINATION:      VITAL SIGNS:    /73   Pulse 64   Temp 98.6 °F (37 °C) (Temporal Artery )   Resp 16   Ht 170.2 cm (67.01\")   Wt 79.4 kg (175 lb)   SpO2 98%   BMI 27.40 kg/m²      ECOG performance status: 1    GENERAL:  Not in any distress.    HEENT:  Normocephalic, Atraumatic.Mild Conjunctival pallor. No icterus. Extraocular Movements Intact. No Facial Asymmetry noted.    NECK:  No adenopathy. No JVD.    RESPIRATORY:  Fair air entry bilateral. No rhonchi or wheezing.    CARDIOVASCULAR:  S1, S2. Regular rate and rhythm. No murmur or gallop appreciated.  Port-A-Cath present on right chest wall.    ABDOMEN:  Soft, obese, nontender. Bowel sounds present in all four quadrants.  No hepatosplenomegaly appreciated.    MUSCULOSKELTAL:  No edema.No Calf Tenderness.Decreased range of motion.    NEUROLOGIC:  Alert, awake and oriented ×3.  No  Motor  deficit appreciated. Cranial Nerves 2-12 grossly intact.    SKIN: No new skin lesions    LYMPHATICS: No new enlarged lymph node in neck or supraclavicular area.          DIAGNOSTIC DATA:    Glucose   Date Value Ref Range Status   10/19/2018 111 (H) 60 - 100 mg/dL Final     Sodium   Date Value Ref Range Status   10/19/2018 137 137 - 145 mmol/L Final     Potassium   Date Value Ref Range Status   10/19/2018 3.8 " 3.5 - 5.1 mmol/L Final     CO2   Date Value Ref Range Status   10/19/2018 27.0 22.0 - 31.0 mmol/L Final     Chloride   Date Value Ref Range Status   10/19/2018 99 95 - 110 mmol/L Final     Anion Gap   Date Value Ref Range Status   10/19/2018 11.0 5.0 - 15.0 mmol/L Final     Creatinine   Date Value Ref Range Status   10/19/2018 1.06 0.70 - 1.30 mg/dL Final     BUN   Date Value Ref Range Status   10/19/2018 18 7 - 21 mg/dL Final     BUN/Creatinine Ratio   Date Value Ref Range Status   10/19/2018 17.0 7.0 - 25.0 Final     Calcium   Date Value Ref Range Status   10/19/2018 8.9 8.4 - 10.2 mg/dL Final     eGFR Non  Amer   Date Value Ref Range Status   10/19/2018 69 49 - 113 mL/min/1.73 Final     Alkaline Phosphatase   Date Value Ref Range Status   10/19/2018 69 38 - 126 U/L Final     Total Protein   Date Value Ref Range Status   10/19/2018 7.3 6.3 - 8.6 g/dL Final     ALT (SGPT)   Date Value Ref Range Status   10/19/2018 38 21 - 72 U/L Final     AST (SGOT)   Date Value Ref Range Status   10/19/2018 37 17 - 59 U/L Final     Total Bilirubin   Date Value Ref Range Status   10/19/2018 0.6 0.2 - 1.3 mg/dL Final     Albumin   Date Value Ref Range Status   10/19/2018 4.30 3.40 - 4.80 g/dL Final     Globulin   Date Value Ref Range Status   10/19/2018 3.0 2.3 - 3.5 gm/dL Final     Lab Results   Component Value Date    WBC 8.37 10/19/2018    HGB 14.2 10/19/2018    HCT 41.2 10/19/2018    MCV 83.4 10/19/2018     10/19/2018     Lab Results   Component Value Date    NEUTROABS 3.08 06/15/2018     Lab Results   Component Value Date    REFLABREPO SEE NOTE: 10/19/2016       PATHOLOGY:  FISH testing for CLL done on October 19, 2016 shows:  SPECIFIC FISH RESULTS:     CCND1/IGH: NORMAL        nuc samantha 11q13(HEWR9p1),14q32(IGHx2)[100]     THOMAS: NORMAL        nuc samantha 11q22.3(ATMx2)[100]     12cen: NORMAL        nuc samantha 12cen(V79W7g1)[100]     13q: NORMAL        nuc samantha 13q14.3(DLEUx2),13q34(BKFI4q6)[100]     TP53: NORMAL        nuc  samantha 17p13.1(TP53x2)[100]     Peripheral blood flow cytometry done on September 30, 2016 shows:  Interpretation    Peripheral Blood:     CD5+ monotypic B-cell population (40% of leukocytes) compatible with   B-cell chronic lymphocytic leukemia/ small lymphocytic lymphoma (B-CLL/SLL)         RADIOLOGY DATA :  CT of abdomen and pelvis with contrast done on October 5, 2016 showed:  FINDINGS- Comparison study dated July 14, 2014. Axial computer  tomography sequential imaging was performed from the diaphragms  through the symphysis pubis after administration of IV contrast  .Sagittal and coronal reformates was performed.      Imaging through the lung bases reveals stable right lung base lateral  basilar small calcified lung parenchymal granuloma consistent with  stable old granulomatous disease. Otherwise lung bases unremarkable..      The liver is normal. The gallbladder is normal. The pancreas is  normal. The spleen has multiple small calcified granulomas involving  it consistent with old granulomatous disease. Spleen appears upper  limits of normal size but is otherwise unremarkable. Bilateral adrenal  glands are normal. Right kidney and ureter are normal. Left kidney and  ureter are normal. The bladder is normal. The prostate gland appears  diffusely enlarged with a volume of 45 and is impinging upon the base  of bladder. Questionable defect suspicious for prior TURP procedure.  Recommend clinical correlation. The hollow viscera appears normal. No  lymphadenopathy in the abdomen or pelvis. No acute osseous  abnormality.      IMPRESSION-   1.Enlarged prostate gland may represent changes from benign prostatic  hypertrophy versus prostate malignancy. There also appears to be  evidence of prior probable TURP procedure. Recommend clinical  correlation..  2.Otherwise unremarkable CT abdomen pelvis study..        ASSESSMENT AND PLAN:      1.  CLL, stage II with questionable splenomegaly.  In view of persistent drenching  night sweats and fatigue patient was started on chemotherapy with bendamustine and rituximab on July 10, 2017.  Patient tolerated first round of chemotherapy well.  On August 7, 2017 when he started day 1 of cycle 2 of bendamustine and rituximab he started having swelling in the neck region along with daily denies and rash in the face.  Port study done on that date showed intact port.  When patient came for day 2 of bendamustine he still had erythema and rash on his face and neck for which chemotherapy was discontinued and patient was given prednisone for 7 days.  At this point his rash is completely resolved.  Most likely patient had a allergic reaction to either rituximab and bendamustine.  Still complains of intermittent night sweats, denies any drenching night sweats. Denies any weight loss since last clinic visit.  His CBC today is within normal limit .    -In view of recent unintentional weight loss of 10 pounds despite of good appetite, will get restaging Ct of chest,abdomen and pelvis with contrast prior to next clinic visit in 2 mnts.  -  Patient ever requires chemotherapy again his options would be either Ibrutinib or Obinutuzumab with chlorambucil.    2.  Thrombocytopenia: Completely resolved at this point. Platelet count is 183,000.    3. History of CVA    4.  Health maintenance: Patient does not smoke.  Had a colonoscopy done in 2013.      5. BMI: Patient's Body mass index is 27.4 kg/m². BMI is higher than reference range.  Patient was notified about her elevated BMI and was counseled about diet and exercise for weight loss.    6. Advance Care Planning: For now patient remains full code and is able to make  His decisions.  Patient has health care surrogate mentioned on chart.      Andres Lubin MD  10/19/2018  11:39 AM

## 2018-10-19 NOTE — PATIENT INSTRUCTIONS
Patient Instructions for CT Scan    · Your CT scan is being done without any oral contrast.  Your CT scan may be done with IV contrast, if you have an allergy to iodine--please tell your nurse.    · Do not eat or drink 4 hours prior to scan.     · You may take your medications with sips of water, except DO NOT take your diabetic pill the morning of the test.    Arrive at the Outpatient Surgery entrance at [facility] 20 minutes prior to appointment time.    You will receive a phone call with an appointment for your CT scan.  Please call our office, if someone does not contact you with 3 days.    Kenia Barboza RN  October 19, 2018  11:42 AM

## 2018-12-10 ENCOUNTER — HOSPITAL ENCOUNTER (OUTPATIENT)
Dept: CT IMAGING | Facility: HOSPITAL | Age: 68
Discharge: HOME | End: 2018-12-10
Attending: INTERNAL MEDICINE | Admitting: INTERNAL MEDICINE

## 2018-12-10 ENCOUNTER — APPOINTMENT (OUTPATIENT)
Dept: CT IMAGING | Facility: HOSPITAL | Age: 68
End: 2018-12-10
Attending: INTERNAL MEDICINE

## 2018-12-10 DIAGNOSIS — C91.10 CLL (CHRONIC LYMPHOCYTIC LEUKEMIA) (HCC): ICD-10-CM

## 2018-12-10 DIAGNOSIS — D69.6 THROMBOCYTOPENIA (HCC): ICD-10-CM

## 2018-12-10 PROCEDURE — 25010000002 IOPAMIDOL 61 % SOLUTION: Performed by: INTERNAL MEDICINE

## 2018-12-10 PROCEDURE — 74177 CT ABD & PELVIS W/CONTRAST: CPT

## 2018-12-10 PROCEDURE — 71260 CT THORAX DX C+: CPT

## 2018-12-10 RX ORDER — 0.9 % SODIUM CHLORIDE 0.9 %
10 VIAL (ML) INJECTION ONCE
Status: COMPLETED | OUTPATIENT
Start: 2018-12-10 | End: 2018-12-10

## 2018-12-10 RX ADMIN — SODIUM CHLORIDE 10 ML: 9 INJECTION, SOLUTION INTRAMUSCULAR; INTRAVENOUS; SUBCUTANEOUS at 08:02

## 2018-12-10 RX ADMIN — SODIUM CHLORIDE, PRESERVATIVE FREE 500 UNITS: 5 INJECTION INTRAVENOUS at 08:02

## 2018-12-10 RX ADMIN — IOPAMIDOL 90 ML: 612 INJECTION, SOLUTION INTRAVENOUS at 08:07

## 2018-12-11 ENCOUNTER — LAB (OUTPATIENT)
Dept: LAB | Facility: OTHER | Age: 68
End: 2018-12-11

## 2018-12-11 DIAGNOSIS — D69.6 THROMBOCYTOPENIA (HCC): ICD-10-CM

## 2018-12-11 DIAGNOSIS — C91.10 CLL (CHRONIC LYMPHOCYTIC LEUKEMIA) (HCC): ICD-10-CM

## 2018-12-11 LAB
ALBUMIN SERPL-MCNC: 4.9 G/DL (ref 3.5–5)
ALBUMIN/GLOB SERPL: 1.8 G/DL (ref 1.1–1.8)
ALP SERPL-CCNC: 67 U/L (ref 38–126)
ALT SERPL W P-5'-P-CCNC: 24 U/L
ANION GAP SERPL CALCULATED.3IONS-SCNC: 7 MMOL/L (ref 5–15)
AST SERPL-CCNC: 23 U/L (ref 17–59)
BILIRUB SERPL-MCNC: 0.5 MG/DL (ref 0.2–1.3)
BUN BLD-MCNC: 22 MG/DL (ref 9–20)
BUN/CREAT SERPL: 17.5 (ref 7–25)
CALCIUM SPEC-SCNC: 9.3 MG/DL (ref 8.4–10.2)
CHLORIDE SERPL-SCNC: 106 MMOL/L (ref 98–107)
CO2 SERPL-SCNC: 28 MMOL/L (ref 22–30)
CREAT BLD-MCNC: 1.26 MG/DL (ref 0.66–1.25)
DEPRECATED RDW RBC AUTO: 42.3 FL (ref 35.1–43.9)
EOSINOPHIL # BLD MANUAL: 0.23 10*3/MM3 (ref 0–0.7)
EOSINOPHIL NFR BLD MANUAL: 2 % (ref 0–7)
ERYTHROCYTE [DISTWIDTH] IN BLOOD BY AUTOMATED COUNT: 13.8 % (ref 11.5–14.5)
GFR SERPL CREATININE-BSD FRML MDRD: 57 ML/MIN/1.73 (ref 49–113)
GLOBULIN UR ELPH-MCNC: 2.7 GM/DL (ref 2.3–3.5)
GLUCOSE BLD-MCNC: 91 MG/DL (ref 74–99)
HCT VFR BLD AUTO: 44.3 % (ref 39–49)
HGB BLD-MCNC: 14.6 G/DL (ref 13.7–17.3)
LYMPHOCYTES # BLD MANUAL: 6.17 10*3/MM3 (ref 0.6–4.2)
LYMPHOCYTES NFR BLD MANUAL: 53 % (ref 10–50)
LYMPHOCYTES NFR BLD MANUAL: 9 % (ref 0–12)
MCH RBC QN AUTO: 27.7 PG (ref 26.5–34)
MCHC RBC AUTO-ENTMCNC: 33 G/DL (ref 31.5–36.3)
MCV RBC AUTO: 84.1 FL (ref 80–98)
MONOCYTES # BLD AUTO: 1.05 10*3/MM3 (ref 0–0.9)
NEUTROPHILS # BLD AUTO: 4.19 10*3/MM3 (ref 2–8.6)
NEUTROPHILS NFR BLD MANUAL: 36 % (ref 37–80)
PLATELET # BLD AUTO: 209 10*3/MM3 (ref 150–450)
PMV BLD AUTO: 10.3 FL (ref 8–12)
POTASSIUM BLD-SCNC: 4.7 MMOL/L (ref 3.4–5)
PROT SERPL-MCNC: 7.6 G/DL (ref 6.3–8.2)
RBC # BLD AUTO: 5.27 10*6/MM3 (ref 4.37–5.74)
RBC MORPH BLD: NORMAL
SMALL PLATELETS BLD QL SMEAR: ADEQUATE
SODIUM BLD-SCNC: 141 MMOL/L (ref 137–145)
WBC MORPH BLD: NORMAL
WBC NRBC COR # BLD: 11.65 10*3/MM3 (ref 3.2–9.8)

## 2018-12-11 PROCEDURE — 85025 COMPLETE CBC W/AUTO DIFF WBC: CPT | Performed by: INTERNAL MEDICINE

## 2018-12-11 PROCEDURE — 36415 COLL VENOUS BLD VENIPUNCTURE: CPT | Performed by: INTERNAL MEDICINE

## 2018-12-11 PROCEDURE — 80053 COMPREHEN METABOLIC PANEL: CPT | Performed by: INTERNAL MEDICINE

## 2018-12-14 ENCOUNTER — OFFICE VISIT (OUTPATIENT)
Dept: ONCOLOGY | Facility: CLINIC | Age: 68
End: 2018-12-14

## 2018-12-14 VITALS
SYSTOLIC BLOOD PRESSURE: 154 MMHG | HEART RATE: 77 BPM | DIASTOLIC BLOOD PRESSURE: 69 MMHG | OXYGEN SATURATION: 98 % | WEIGHT: 177.4 LBS | TEMPERATURE: 99.2 F | RESPIRATION RATE: 18 BRPM | BODY MASS INDEX: 27.84 KG/M2 | HEIGHT: 67 IN

## 2018-12-14 DIAGNOSIS — D69.6 THROMBOCYTOPENIA (HCC): ICD-10-CM

## 2018-12-14 DIAGNOSIS — C91.10 CLL (CHRONIC LYMPHOCYTIC LEUKEMIA) (HCC): Primary | ICD-10-CM

## 2018-12-14 DIAGNOSIS — N17.9 ACUTE KIDNEY INJURY (HCC): ICD-10-CM

## 2018-12-14 PROCEDURE — 1123F ACP DISCUSS/DSCN MKR DOCD: CPT | Performed by: INTERNAL MEDICINE

## 2018-12-14 PROCEDURE — G8417 CALC BMI ABV UP PARAM F/U: HCPCS | Performed by: INTERNAL MEDICINE

## 2018-12-14 PROCEDURE — 99214 OFFICE O/P EST MOD 30 MIN: CPT | Performed by: INTERNAL MEDICINE

## 2018-12-14 PROCEDURE — G0463 HOSPITAL OUTPT CLINIC VISIT: HCPCS | Performed by: INTERNAL MEDICINE

## 2018-12-14 NOTE — PROGRESS NOTES
DATE OF VISIT: 12/14/2018    REASON FOR VISIT:  CLL    HISTORY OF PRESENT ILLNESS:    68-year-old male with a past medical history significant for history of CVA and dyslipidemia, he was diagnosed with CLL on peripheral blood flow cytometry in September 2016.  Patient was having drenching night sweats and fatigue patient was started on chemotherapy with bendamustine and rituximab on July 10, 2017.  After finishing day 1 of cycle 2 chemotherapy with bendamustine and rituximab started having swelling in the neck as well as flushing of the face. In view of reaction,chemotherapy was held. Patient is here for follow up visit today.  In view of recent weight loss, patient had a CT of chest, abdomen and pelvis with contrast on December 10, 2018.  He is here to discuss result of CT scan and further recommendation.  Complains of fatigue. Denies any fever or chills.  Denies any difficulty swallowing.  Still complains of intermittent night sweats,denies any recent worsening.        PAST MEDICAL HISTORY:    Past Medical History:   Diagnosis Date   • Arthritis    • CLL (chronic lymphocytic leukemia) (CMS/[facility])    • CVA (cerebral vascular accident) (CMS/[facility]) 2011   • Dyslipidemia    • Gastritis    • GERD (gastroesophageal reflux disease)    • Night sweats        SOCIAL HISTORY:    Social History     Tobacco Use   • Smoking status: Former Smoker   • Smokeless tobacco: Never Used   • Tobacco comment: never smoked cigarettes   Substance Use Topics   • Alcohol use: No   • Drug use: No       Surgical History :  Past Surgical History:   Procedure Laterality Date   • APPENDECTOMY     • HERNIA REPAIR     • KNEE SURGERY     • LEG SURGERY     • MANDIBLE FRACTURE SURGERY     • PROSTATE SURGERY         ALLERGIES:    No Known Allergies    REVIEW OF SYSTEMS:      CONSTITUTIONAL: Positive for fatigue.  Still complains of intermittent night sweats.  Has gained 2 pounds since last clinic visit. No fever, chills.    HEENT:   No epistaxis, mouth  "sores, or difficulty swallowing.    RESPIRATORY:  Positive for intermittent shortness of breath.  No cough or hemoptysis.    CARDIOVASCULAR:  No chest pain or palpitations.    GASTROINTESTINAL: Complains of intermittent pain in left upper quadrant .  No  nausea, vomiting, or blood in the stool.    GENITOURINARY:  No dysuria or hematuria.    MUSCULOSKELETAL:  Positive for chronic back pain.    NEUROLOGICAL:  Positive for occasional tingling and numbness in the lower extremities. No new headache or dizziness.     LYMPHATICS:  Denies any abnormal swollen and anywhere in the body.    SKIN:  No new skin lesion.          PHYSICAL EXAMINATION:      VITAL SIGNS:    /69   Pulse 74   Temp 99.2 °F (37.3 °C) (Temporal)   Resp 16   Ht 170.2 cm (67.01\")   Wt 80.5 kg (177 lb 6.4 oz)   SpO2 (!) 74%   BMI 27.78 kg/m²      ECOG performance status: 1    GENERAL:  Not in any distress.    HEENT:  Normocephalic, Atraumatic.Mild Conjunctival pallor. No icterus. Extraocular Movements Intact. No Facial Asymmetry noted.    NECK:  No adenopathy. No JVD.    RESPIRATORY:  Fair air entry bilateral. No rhonchi or wheezing.    CARDIOVASCULAR:  S1, S2. Regular rate and rhythm. No murmur or gallop appreciated.  Port-A-Cath present on right chest wall.    ABDOMEN:  Soft, obese, nontender. Bowel sounds present in all four quadrants.  No hepatosplenomegaly appreciated.    MUSCULOSKELTAL:  No edema.No Calf Tenderness.Decreased range of motion.    NEUROLOGIC:  Alert, awake and oriented ×3.  No  Motor  deficit appreciated. Cranial Nerves 2-12 grossly intact.    SKIN: No new skin lesions    LYMPHATICS: No new enlarged lymph node in neck or supraclavicular area.          DIAGNOSTIC DATA:    Glucose   Date Value Ref Range Status   12/11/2018 91 74 - 99 mg/dL Final     Sodium   Date Value Ref Range Status   12/11/2018 141 137 - 145 mmol/L Final     Potassium   Date Value Ref Range Status   12/11/2018 4.7 3.4 - 5.0 mmol/L Final     CO2   Date " Value Ref Range Status   12/11/2018 28.0 22.0 - 30.0 mmol/L Final     Chloride   Date Value Ref Range Status   12/11/2018 106 98 - 107 mmol/L Final     Anion Gap   Date Value Ref Range Status   12/11/2018 7.0 5.0 - 15.0 mmol/L Final     Creatinine   Date Value Ref Range Status   12/11/2018 1.26 (H) 0.66 - 1.25 mg/dL Final     BUN   Date Value Ref Range Status   12/11/2018 22 (H) 9 - 20 mg/dL Final     BUN/Creatinine Ratio   Date Value Ref Range Status   12/11/2018 17.5 7.0 - 25.0 Final     Calcium   Date Value Ref Range Status   12/11/2018 9.3 8.4 - 10.2 mg/dL Final     eGFR Non  Amer   Date Value Ref Range Status   12/11/2018 57 49 - 113 mL/min/1.73 Final     Alkaline Phosphatase   Date Value Ref Range Status   12/11/2018 67 38 - 126 U/L Final     Total Protein   Date Value Ref Range Status   12/11/2018 7.6 6.3 - 8.2 g/dL Final     ALT (SGPT)   Date Value Ref Range Status   12/11/2018 24 <=50 U/L Final     AST (SGOT)   Date Value Ref Range Status   12/11/2018 23 17 - 59 U/L Final     Total Bilirubin   Date Value Ref Range Status   12/11/2018 0.5 0.2 - 1.3 mg/dL Final     Albumin   Date Value Ref Range Status   12/11/2018 4.90 3.50 - 5.00 g/dL Final     Globulin   Date Value Ref Range Status   12/11/2018 2.7 2.3 - 3.5 gm/dL Final     Lab Results   Component Value Date    WBC 11.65 (H) 12/11/2018    HGB 14.6 12/11/2018    HCT 44.3 12/11/2018    MCV 84.1 12/11/2018     12/11/2018     Lab Results   Component Value Date    NEUTROABS 4.19 12/11/2018     Lab Results   Component Value Date    REFLABREPO SEE NOTE: 10/19/2016       PATHOLOGY:  FISH testing for CLL done on October 19, 2016 shows:  SPECIFIC FISH RESULTS:     CCND1/IGH: NORMAL        nuc samantha 11q13(ORIL4j2),14q32(IGHx2)[100]     THOMAS: NORMAL        nuc samantha 11q22.3(ATMx2)[100]     12cen: NORMAL        nuc samantha 12cen(P45P7i4)[100]     13q: NORMAL        nuc samantha 13q14.3(DLEUx2),13q34(PGGP4k7)[100]     TP53: NORMAL        nuc samantha 17p13.1(TP53x2)[100]      Peripheral blood flow cytometry done on September 30, 2016 shows:  Interpretation    Peripheral Blood:     CD5+ monotypic B-cell population (40% of leukocytes) compatible with   B-cell chronic lymphocytic leukemia/ small lymphocytic lymphoma (B-CLL/SLL)         RADIOLOGY DATA :  CT of chest, abdomen and pelvis with contrast done on December 10, 2018 was reviewed, discussed with patient, it showed:  IMPRESSION:  No acute pulmonary or pleural finding. There is evidence of old  granulomatous disease.     Couple of punctate micronodules as described above likely  representing sequela of postinflammatory change and/or benign  fissural nodes. No mass or suspicious noncalcified pulmonary  nodule identified at present.     There are numerous small axillary and mediastinal lymph nodes,  nonspecific. There is no enlarged, conglomerate or necrotic  adenopathy identified.     No CT evidence of acute abdominal or pelvic finding.     Redemonstration of mild borderline splenic enlargement at 13.3  cm.     Diverticulosis without acute diverticulitis.     Moderate enlargement of the prostate effacing the bladder base.  Please correlate to the clinical exam and the PSA.     Small hiatal hernia.        CT of abdomen and pelvis with contrast done on October 5, 2016 showed:  FINDINGS- Comparison study dated July 14, 2014. Axial computer  tomography sequential imaging was performed from the diaphragms  through the symphysis pubis after administration of IV contrast  .Sagittal and coronal reformates was performed.      Imaging through the lung bases reveals stable right lung base lateral  basilar small calcified lung parenchymal granuloma consistent with  stable old granulomatous disease. Otherwise lung bases unremarkable..      The liver is normal. The gallbladder is normal. The pancreas is  normal. The spleen has multiple small calcified granulomas involving  it consistent with old granulomatous disease. Spleen appears upper  limits  of normal size but is otherwise unremarkable. Bilateral adrenal  glands are normal. Right kidney and ureter are normal. Left kidney and  ureter are normal. The bladder is normal. The prostate gland appears  diffusely enlarged with a volume of 45 and is impinging upon the base  of bladder. Questionable defect suspicious for prior TURP procedure.  Recommend clinical correlation. The hollow viscera appears normal. No  lymphadenopathy in the abdomen or pelvis. No acute osseous  abnormality.      IMPRESSION-   1.Enlarged prostate gland may represent changes from benign prostatic  hypertrophy versus prostate malignancy. There also appears to be  evidence of prior probable TURP procedure. Recommend clinical  correlation..  2.Otherwise unremarkable CT abdomen pelvis study..        ASSESSMENT AND PLAN:      1.  CLL, stage II with questionable splenomegaly.  In view of persistent drenching night sweats and fatigue patient was started on chemotherapy with bendamustine and rituximab on July 10, 2017.  Patient tolerated first round of chemotherapy well.  On August 7, 2017 when he started day 1 of cycle 2 of bendamustine and rituximab he started having swelling in the neck region along with daily denies and rash in the face.  Port study done on that date showed intact port.  When patient came for day 2 of bendamustine he still had erythema and rash on his face and neck for which chemotherapy was discontinued and patient was given prednisone for 7 days.  At this point his rash is completely resolved.  Most likely patient had a allergic reaction to either rituximab and bendamustine.  Still complains of intermittent night sweats, denies any drenching night sweats. Denies any weight loss since last clinic visit.    -CBC done on December 11, 2018 shows mild lymphocytosis with white blood cell count of 11,000.  Hemoglobin and platelets are still normal.  -CT of chest, abdomen and pelvis with contrast done on December 10, 2018 does not  show any evidence of lymphadenopathy spleen size is mildly enlarged at 13.3 cm.  At this point we'll continue with the clinical surveillance.  Will ask patient to return to clinic in 3 months with a repeat CBC to be done on that day  -  Patient ever requires chemotherapy again his options would be either Ibrutinib or Obinutuzumab with chlorambucil.    2.  Thrombocytopenia: Completely resolved at this point. Platelet count is 209,000.    3. History of CVA    4. Acute kidney injury:  -Patient is found to have mild acute kidney injury with a creatinine of 1.26.  Patient was notified about elevated creatinine and was counseled about increasing hydration.    5.  Health maintenance: Patient does not smoke.  Had a colonoscopy done in 2013.      6. BMI: Patient's Body mass index is 27.78 kg/m². BMI is higher than reference range.  Patient was notified about her elevated BMI and was counseled about diet and exercise for weight loss.    7. Advance Care Planning: For now patient remains full code and is able to make  His decisions.  Patient has health care surrogate mentioned on chart.      Andres Lubin MD  12/14/2018  9:40 AM

## 2019-03-22 ENCOUNTER — OFFICE VISIT (OUTPATIENT)
Dept: ONCOLOGY | Facility: CLINIC | Age: 69
End: 2019-03-22

## 2019-03-22 ENCOUNTER — LAB (OUTPATIENT)
Dept: ONCOLOGY | Facility: HOSPITAL | Age: 69
End: 2019-03-22

## 2019-03-22 VITALS
RESPIRATION RATE: 18 BRPM | WEIGHT: 178.6 LBS | SYSTOLIC BLOOD PRESSURE: 152 MMHG | TEMPERATURE: 98.1 F | OXYGEN SATURATION: 96 % | HEIGHT: 67 IN | HEART RATE: 60 BPM | DIASTOLIC BLOOD PRESSURE: 72 MMHG | BODY MASS INDEX: 28.03 KG/M2

## 2019-03-22 DIAGNOSIS — C91.10 CLL (CHRONIC LYMPHOCYTIC LEUKEMIA) (HCC): Primary | ICD-10-CM

## 2019-03-22 DIAGNOSIS — D69.6 THROMBOCYTOPENIA (HCC): ICD-10-CM

## 2019-03-22 DIAGNOSIS — Z45.2 ENCOUNTER FOR VENOUS ACCESS DEVICE CARE: ICD-10-CM

## 2019-03-22 LAB
ALBUMIN SERPL-MCNC: 4.4 G/DL (ref 3.4–4.8)
ALBUMIN/GLOB SERPL: 1.4 G/DL (ref 1.1–1.8)
ALP SERPL-CCNC: 72 U/L (ref 38–126)
ALT SERPL W P-5'-P-CCNC: 24 U/L (ref 21–72)
ANION GAP SERPL CALCULATED.3IONS-SCNC: 8 MMOL/L (ref 5–15)
AST SERPL-CCNC: 69 U/L (ref 17–59)
BILIRUB SERPL-MCNC: 0.5 MG/DL (ref 0.2–1.3)
BUN BLD-MCNC: 18 MG/DL (ref 7–21)
BUN/CREAT SERPL: 15.4 (ref 7–25)
CALCIUM SPEC-SCNC: 9 MG/DL (ref 8.4–10.2)
CHLORIDE SERPL-SCNC: 99 MMOL/L (ref 95–110)
CO2 SERPL-SCNC: 26 MMOL/L (ref 22–31)
CREAT BLD-MCNC: 1.17 MG/DL (ref 0.7–1.3)
DEPRECATED RDW RBC AUTO: 39.9 FL (ref 37–54)
EOSINOPHIL # BLD MANUAL: 0.14 10*3/MM3 (ref 0–0.4)
EOSINOPHIL NFR BLD MANUAL: 1 % (ref 0.3–6.2)
ERYTHROCYTE [DISTWIDTH] IN BLOOD BY AUTOMATED COUNT: 13.6 % (ref 12.3–15.4)
GFR SERPL CREATININE-BSD FRML MDRD: 62 ML/MIN/1.73 (ref 49–113)
GLOBULIN UR ELPH-MCNC: 3.2 GM/DL (ref 2.3–3.5)
GLUCOSE BLD-MCNC: 82 MG/DL (ref 60–100)
HCT VFR BLD AUTO: 43.2 % (ref 37.5–51)
HGB BLD-MCNC: 14.4 G/DL (ref 13–17.7)
LDH SERPL-CCNC: 509 U/L (ref 313–618)
LYMPHOCYTES # BLD MANUAL: 7.69 10*3/MM3 (ref 0.7–3.1)
LYMPHOCYTES NFR BLD MANUAL: 4 % (ref 5–12)
LYMPHOCYTES NFR BLD MANUAL: 55 % (ref 19.6–45.3)
MCH RBC QN AUTO: 27.2 PG (ref 26.6–33)
MCHC RBC AUTO-ENTMCNC: 33.3 G/DL (ref 31.5–35.7)
MCV RBC AUTO: 81.5 FL (ref 79–97)
MONOCYTES # BLD AUTO: 0.56 10*3/MM3 (ref 0.1–0.9)
NEUTROPHILS # BLD AUTO: 4.33 10*3/MM3 (ref 1.4–7)
NEUTROPHILS NFR BLD MANUAL: 31 % (ref 42.7–76)
PLATELET # BLD AUTO: 216 10*3/MM3 (ref 140–450)
PMV BLD AUTO: 9.9 FL (ref 6–12)
POTASSIUM BLD-SCNC: 4.4 MMOL/L (ref 3.5–5.1)
PROT SERPL-MCNC: 7.6 G/DL (ref 6.3–8.6)
RBC # BLD AUTO: 5.3 10*6/MM3 (ref 4.14–5.8)
RBC MORPH BLD: NORMAL
SMALL PLATELETS BLD QL SMEAR: ADEQUATE
SMUDGE CELLS IN BLOOD BY LIGHT MICROSCOPY: 7 /100 WBC
SODIUM BLD-SCNC: 133 MMOL/L (ref 137–145)
VARIANT LYMPHS NFR BLD MANUAL: 9 % (ref 0–5)
WBC MORPH BLD: NORMAL
WBC NRBC COR # BLD: 13.98 10*3/MM3 (ref 3.4–10.8)

## 2019-03-22 PROCEDURE — 85025 COMPLETE CBC W/AUTO DIFF WBC: CPT | Performed by: INTERNAL MEDICINE

## 2019-03-22 PROCEDURE — 99213 OFFICE O/P EST LOW 20 MIN: CPT | Performed by: INTERNAL MEDICINE

## 2019-03-22 PROCEDURE — 36591 DRAW BLOOD OFF VENOUS DEVICE: CPT | Performed by: INTERNAL MEDICINE

## 2019-03-22 PROCEDURE — 80053 COMPREHEN METABOLIC PANEL: CPT | Performed by: INTERNAL MEDICINE

## 2019-03-22 PROCEDURE — 85007 BL SMEAR W/DIFF WBC COUNT: CPT | Performed by: INTERNAL MEDICINE

## 2019-03-22 PROCEDURE — 1123F ACP DISCUSS/DSCN MKR DOCD: CPT | Performed by: INTERNAL MEDICINE

## 2019-03-22 PROCEDURE — 83615 LACTATE (LD) (LDH) ENZYME: CPT | Performed by: INTERNAL MEDICINE

## 2019-03-22 PROCEDURE — G8417 CALC BMI ABV UP PARAM F/U: HCPCS | Performed by: INTERNAL MEDICINE

## 2019-03-22 RX ORDER — SODIUM CHLORIDE 0.9 % (FLUSH) 0.9 %
10 SYRINGE (ML) INJECTION AS NEEDED
Status: CANCELLED | OUTPATIENT
Start: 2019-05-03

## 2019-03-22 RX ORDER — SODIUM CHLORIDE 0.9 % (FLUSH) 0.9 %
10 SYRINGE (ML) INJECTION AS NEEDED
Status: DISCONTINUED | OUTPATIENT
Start: 2019-03-22 | End: 2019-03-22 | Stop reason: HOSPADM

## 2019-03-22 RX ADMIN — SODIUM CHLORIDE, PRESERVATIVE FREE 10 ML: 5 INJECTION INTRAVENOUS at 10:32

## 2019-03-22 RX ADMIN — SODIUM CHLORIDE, PRESERVATIVE FREE 10 ML: 5 INJECTION INTRAVENOUS at 10:55

## 2019-03-22 RX ADMIN — Medication 500 UNITS: at 10:56

## 2019-03-22 NOTE — PROGRESS NOTES
DATE OF VISIT: 3/22/2019    REASON FOR VISIT:  CLL,Lukocytosis    HISTORY OF PRESENT ILLNESS:    68-year-old male with a past medical history significant for history of CVA and dyslipidemia, he was diagnosed with CLL on peripheral blood flow cytometry in September 2016.  Patient was having drenching night sweats and fatigue patient was started on chemotherapy with bendamustine and rituximab on July 10, 2017.  After finishing day 1 of cycle 2 chemotherapy with bendamustine and rituximab started having swelling in the neck as well as flushing of the face. In view of reaction,chemotherapy was held. Patient is here for follow up visit today. Complains of fatigue. Denies any fever or chills.  Denies any difficulty swallowing.  Still complains of intermittent night sweats,denies any recent worsening.        PAST MEDICAL HISTORY:    Past Medical History:   Diagnosis Date   • Arthritis    • CLL (chronic lymphocytic leukemia) (CMS/HCC)    • CVA (cerebral vascular accident) (CMS/formerly [facility]) 2011   • Dyslipidemia    • Gastritis    • GERD (gastroesophageal reflux disease)    • Night sweats        SOCIAL HISTORY:    Social History     Tobacco Use   • Smoking status: Former Smoker   • Smokeless tobacco: Never Used   • Tobacco comment: never smoked cigarettes   Substance Use Topics   • Alcohol use: No   • Drug use: No       Surgical History :  Past Surgical History:   Procedure Laterality Date   • APPENDECTOMY     • HERNIA REPAIR     • KNEE SURGERY     • LEG SURGERY     • MANDIBLE FRACTURE SURGERY     • DC INSJ TUNNELED CVC W/O SUBQ PORT/ AGE 5 YR/> Right 7/7/2017    Procedure: MEDIPORT PLACEMENT WITH ULTRASOUND GUIDANCE       ;  Surgeon: Lucien Mcneal MD;  Location: [facility];  Service: General   • PROSTATE SURGERY         ALLERGIES:    No Known Allergies    REVIEW OF SYSTEMS:      CONSTITUTIONAL: Positive for fatigue.  Still complains of intermittent night sweats.  Has gained 2 pounds since last clinic visit. No fever,  "chills.    HEENT:   No epistaxis, mouth sores, or difficulty swallowing.    RESPIRATORY:  Positive for intermittent shortness of breath.  No cough or hemoptysis.    CARDIOVASCULAR:  No chest pain or palpitations.    GASTROINTESTINAL: Complains of intermittent pain in left upper quadrant .  No  nausea, vomiting, or blood in the stool.    GENITOURINARY:  No dysuria or hematuria.    MUSCULOSKELETAL:  Positive for chronic back pain.    NEUROLOGICAL:  Positive for occasional tingling and numbness in the lower extremities. No new headache or dizziness.     LYMPHATICS:  Denies any abnormal swollen and anywhere in the body.    SKIN:  No new skin lesion.          PHYSICAL EXAMINATION:      VITAL SIGNS:    /72   Pulse 60   Temp 98.1 °F (36.7 °C) (Temporal)   Resp 18   Ht 170.2 cm (67.01\")   Wt 81 kg (178 lb 9.6 oz)   SpO2 96%   BMI 27.97 kg/m²      ECOG performance status: 1    GENERAL:  Not in any distress.    HEENT:  Normocephalic, Atraumatic.Mild Conjunctival pallor. No icterus. Extraocular Movements Intact. No Facial Asymmetry noted.    NECK:  No adenopathy. No JVD.    RESPIRATORY:  Fair air entry bilateral. No rhonchi or wheezing.    CARDIOVASCULAR:  S1, S2. Regular rate and rhythm. No murmur or gallop appreciated.  Port-A-Cath present on right chest wall.    ABDOMEN:  Soft, obese, nontender. Bowel sounds present in all four quadrants.  No hepatosplenomegaly appreciated.    MUSCULOSKELTAL:  No edema.No Calf Tenderness.Decreased range of motion.    NEUROLOGIC:  Alert, awake and oriented ×3.  No  Motor  deficit appreciated. Cranial Nerves 2-12 grossly intact.    SKIN: No new skin lesions    LYMPHATICS: No new enlarged lymph node in neck or supraclavicular area.          DIAGNOSTIC DATA:    Glucose   Date Value Ref Range Status   03/22/2019 82 60 - 100 mg/dL Final     Sodium   Date Value Ref Range Status   03/22/2019 133 (L) 137 - 145 mmol/L Final     Potassium   Date Value Ref Range Status   03/22/2019 4.4 3.5 " - 5.1 mmol/L Final     CO2   Date Value Ref Range Status   03/22/2019 26.0 22.0 - 31.0 mmol/L Final     Chloride   Date Value Ref Range Status   03/22/2019 99 95 - 110 mmol/L Final     Anion Gap   Date Value Ref Range Status   03/22/2019 8.0 5.0 - 15.0 mmol/L Final     Creatinine   Date Value Ref Range Status   03/22/2019 1.17 0.70 - 1.30 mg/dL Final     BUN   Date Value Ref Range Status   03/22/2019 18 7 - 21 mg/dL Final     BUN/Creatinine Ratio   Date Value Ref Range Status   03/22/2019 15.4 7.0 - 25.0 Final     Calcium   Date Value Ref Range Status   03/22/2019 9.0 8.4 - 10.2 mg/dL Final     eGFR Non  Amer   Date Value Ref Range Status   03/22/2019 62 49 - 113 mL/min/1.73 Final     Alkaline Phosphatase   Date Value Ref Range Status   03/22/2019 72 38 - 126 U/L Final     Total Protein   Date Value Ref Range Status   03/22/2019 7.6 6.3 - 8.6 g/dL Final     ALT (SGPT)   Date Value Ref Range Status   03/22/2019 24 21 - 72 U/L Final     AST (SGOT)   Date Value Ref Range Status   03/22/2019 69 (H) 17 - 59 U/L Final     Total Bilirubin   Date Value Ref Range Status   03/22/2019 0.5 0.2 - 1.3 mg/dL Final     Albumin   Date Value Ref Range Status   03/22/2019 4.40 3.40 - 4.80 g/dL Final     Globulin   Date Value Ref Range Status   03/22/2019 3.2 2.3 - 3.5 gm/dL Final     Lab Results   Component Value Date    WBC 13.98 (H) 03/22/2019    HGB 14.4 03/22/2019    HCT 43.2 03/22/2019    MCV 81.5 03/22/2019     03/22/2019     Lab Results   Component Value Date    NEUTROABS 4.33 03/22/2019     Lab Results   Component Value Date    REFLABREPO SEE NOTE: 10/19/2016       PATHOLOGY:  FISH testing for CLL done on October 19, 2016 shows:  SPECIFIC FISH RESULTS:     CCND1/IGH: NORMAL        nuc samantha 11q13(RDDQ8p5),14q32(IGHx2)[100]     THOMAS: NORMAL        nuc samantha 11q22.3(ATMx2)[100]     12cen: NORMAL        nuc samantha 12cen(N04S7v3)[100]     13q: NORMAL        nuc samantha 13q14.3(DLEUx2),13q34(SUFR8a0)[100]     TP53: NORMAL         nuc samantha 17p13.1(TP53x2)[100]     Peripheral blood flow cytometry done on September 30, 2016 shows:  Interpretation    Peripheral Blood:     CD5+ monotypic B-cell population (40% of leukocytes) compatible with   B-cell chronic lymphocytic leukemia/ small lymphocytic lymphoma (B-CLL/SLL)         RADIOLOGY DATA :  CT of chest, abdomen and pelvis with contrast done on December 10, 2018 was reviewed, discussed with patient, it showed:  IMPRESSION:  No acute pulmonary or pleural finding. There is evidence of old  granulomatous disease.     Couple of punctate micronodules as described above likely  representing sequela of postinflammatory change and/or benign  fissural nodes. No mass or suspicious noncalcified pulmonary  nodule identified at present.     There are numerous small axillary and mediastinal lymph nodes,  nonspecific. There is no enlarged, conglomerate or necrotic  adenopathy identified.     No CT evidence of acute abdominal or pelvic finding.     Redemonstration of mild borderline splenic enlargement at 13.3  cm.     Diverticulosis without acute diverticulitis.     Moderate enlargement of the prostate effacing the bladder base.  Please correlate to the clinical exam and the PSA.     Small hiatal hernia.        CT of abdomen and pelvis with contrast done on October 5, 2016 showed:  FINDINGS- Comparison study dated July 14, 2014. Axial computer  tomography sequential imaging was performed from the diaphragms  through the symphysis pubis after administration of IV contrast  .Sagittal and coronal reformates was performed.      Imaging through the lung bases reveals stable right lung base lateral  basilar small calcified lung parenchymal granuloma consistent with  stable old granulomatous disease. Otherwise lung bases unremarkable..      The liver is normal. The gallbladder is normal. The pancreas is  normal. The spleen has multiple small calcified granulomas involving  it consistent with old granulomatous  disease. Spleen appears upper  limits of normal size but is otherwise unremarkable. Bilateral adrenal  glands are normal. Right kidney and ureter are normal. Left kidney and  ureter are normal. The bladder is normal. The prostate gland appears  diffusely enlarged with a volume of 45 and is impinging upon the base  of bladder. Questionable defect suspicious for prior TURP procedure.  Recommend clinical correlation. The hollow viscera appears normal. No  lymphadenopathy in the abdomen or pelvis. No acute osseous  abnormality.      IMPRESSION-   1.Enlarged prostate gland may represent changes from benign prostatic  hypertrophy versus prostate malignancy. There also appears to be  evidence of prior probable TURP procedure. Recommend clinical  correlation..  2.Otherwise unremarkable CT abdomen pelvis study..        ASSESSMENT AND PLAN:      1.  CLL, stage II with questionable splenomegaly.  In view of persistent drenching night sweats and fatigue patient was started on chemotherapy with bendamustine and rituximab on July 10, 2017.  Patient tolerated first round of chemotherapy well.  On August 7, 2017 when he started day 1 of cycle 2 of bendamustine and rituximab he started having swelling in the neck region along with daily denies and rash in the face.  Port study done on that date showed intact port.  When patient came for day 2 of bendamustine he still had erythema and rash on his face and neck for which chemotherapy was discontinued and patient was given prednisone for 7 days.  At this point his rash is completely resolved.  Most likely patient had a allergic reaction to either rituximab and bendamustine.  Still complains of intermittent night sweats, denies any drenching night sweats. Denies any weight loss since last clinic visit.    -CBC done on March 22, 2019 shows mild lymphocytosis with white blood cell count of 13,000.  Hemoglobin and platelets are still normal.  -CT of chest, abdomen and pelvis with contrast  done on December 10, 2018 does not show any evidence of lymphadenopathy spleen size is mildly enlarged at 13.3 cm.  At this point we'll continue with the clinical surveillance.  Will ask patient to return to clinic in 3 months with a repeat CBC to be done on that day  -  Patient ever requires chemotherapy again his options would be either Ibrutinib or Obinutuzumab with chlorambucil.    2.  Thrombocytopenia: Completely resolved at this point. Platelet count is 216,000.    3. History of CVA    4. Acute kidney injury:  -Resolved.  Creatinine is normal at 1.14    5.  Health maintenance: Patient does not smoke.  Had a colonoscopy done in 2013.      6. BMI: Patient's Body mass index is 27.97 kg/m². BMI is higher than reference range.  Patient was notified about her elevated BMI and was counseled about diet and exercise for weight loss.    7. Advance Care Planning: For now patient remains full code and is able to make  His decisions.  Patient has health care surrogate mentioned on chart.      Andres Lubin MD  3/22/2019  2:06 PM

## 2019-05-03 ENCOUNTER — INFUSION (OUTPATIENT)
Dept: ONCOLOGY | Facility: HOSPITAL | Age: 69
End: 2019-05-03

## 2019-05-03 DIAGNOSIS — Z45.2 ENCOUNTER FOR VENOUS ACCESS DEVICE CARE: ICD-10-CM

## 2019-05-03 DIAGNOSIS — D69.6 THROMBOCYTOPENIA (HCC): Primary | ICD-10-CM

## 2019-05-03 PROCEDURE — 96523 IRRIG DRUG DELIVERY DEVICE: CPT | Performed by: INTERNAL MEDICINE

## 2019-05-03 RX ORDER — SODIUM CHLORIDE 0.9 % (FLUSH) 0.9 %
10 SYRINGE (ML) INJECTION AS NEEDED
Status: DISCONTINUED | OUTPATIENT
Start: 2019-05-03 | End: 2019-05-03 | Stop reason: HOSPADM

## 2019-05-03 RX ORDER — SODIUM CHLORIDE 0.9 % (FLUSH) 0.9 %
10 SYRINGE (ML) INJECTION AS NEEDED
Status: CANCELLED | OUTPATIENT
Start: 2019-06-21

## 2019-05-03 RX ADMIN — Medication 500 UNITS: at 10:22

## 2019-05-03 RX ADMIN — SODIUM CHLORIDE, PRESERVATIVE FREE 20 ML: 5 INJECTION INTRAVENOUS at 10:22

## 2019-06-21 ENCOUNTER — OFFICE VISIT (OUTPATIENT)
Dept: ONCOLOGY | Facility: CLINIC | Age: 69
End: 2019-06-21

## 2019-06-21 ENCOUNTER — INFUSION (OUTPATIENT)
Dept: ONCOLOGY | Facility: HOSPITAL | Age: 69
End: 2019-06-21

## 2019-06-21 VITALS
OXYGEN SATURATION: 96 % | WEIGHT: 184.3 LBS | HEART RATE: 73 BPM | RESPIRATION RATE: 18 BRPM | HEIGHT: 67 IN | DIASTOLIC BLOOD PRESSURE: 73 MMHG | TEMPERATURE: 98.9 F | BODY MASS INDEX: 28.93 KG/M2 | SYSTOLIC BLOOD PRESSURE: 154 MMHG

## 2019-06-21 DIAGNOSIS — D72.820 LYMPHOCYTOSIS: ICD-10-CM

## 2019-06-21 DIAGNOSIS — C91.10 CLL (CHRONIC LYMPHOCYTIC LEUKEMIA) (HCC): Primary | ICD-10-CM

## 2019-06-21 DIAGNOSIS — N17.9 ACUTE KIDNEY INJURY (HCC): ICD-10-CM

## 2019-06-21 DIAGNOSIS — N40.0 ENLARGED PROSTATE: ICD-10-CM

## 2019-06-21 DIAGNOSIS — Z45.2 ENCOUNTER FOR VENOUS ACCESS DEVICE CARE: ICD-10-CM

## 2019-06-21 DIAGNOSIS — D69.6 THROMBOCYTOPENIA (HCC): ICD-10-CM

## 2019-06-21 LAB
ALBUMIN SERPL-MCNC: 4.3 G/DL (ref 3.5–5.2)
ALBUMIN/GLOB SERPL: 1.6 G/DL
ALP SERPL-CCNC: 78 U/L (ref 39–117)
ALT SERPL W P-5'-P-CCNC: 13 U/L (ref 1–41)
ANION GAP SERPL CALCULATED.3IONS-SCNC: 10 MMOL/L
AST SERPL-CCNC: 13 U/L (ref 1–40)
BILIRUB SERPL-MCNC: 0.4 MG/DL (ref 0.2–1.2)
BUN BLD-MCNC: 15 MG/DL (ref 8–23)
BUN/CREAT SERPL: 11.5 (ref 7–25)
CALCIUM SPEC-SCNC: 9 MG/DL (ref 8.6–10.5)
CHLORIDE SERPL-SCNC: 102 MMOL/L (ref 98–107)
CO2 SERPL-SCNC: 27 MMOL/L (ref 22–29)
CREAT BLD-MCNC: 1.3 MG/DL (ref 0.76–1.27)
DEPRECATED RDW RBC AUTO: 41.6 FL (ref 37–54)
EOSINOPHIL # BLD MANUAL: 0.47 10*3/MM3 (ref 0–0.4)
EOSINOPHIL NFR BLD MANUAL: 3 % (ref 0.3–6.2)
ERYTHROCYTE [DISTWIDTH] IN BLOOD BY AUTOMATED COUNT: 14.2 % (ref 12.3–15.4)
GFR SERPL CREATININE-BSD FRML MDRD: 55 ML/MIN/1.73
GLOBULIN UR ELPH-MCNC: 2.7 GM/DL
GLUCOSE BLD-MCNC: 130 MG/DL (ref 65–99)
HCT VFR BLD AUTO: 40.7 % (ref 37.5–51)
HGB BLD-MCNC: 13.7 G/DL (ref 13–17.7)
LDH SERPL-CCNC: 169 U/L (ref 135–225)
LYMPHOCYTES # BLD MANUAL: 7.93 10*3/MM3 (ref 0.7–3.1)
LYMPHOCYTES NFR BLD MANUAL: 4 % (ref 5–12)
LYMPHOCYTES NFR BLD MANUAL: 51 % (ref 19.6–45.3)
MCH RBC QN AUTO: 27.7 PG (ref 26.6–33)
MCHC RBC AUTO-ENTMCNC: 33.7 G/DL (ref 31.5–35.7)
MCV RBC AUTO: 82.4 FL (ref 79–97)
MONOCYTES # BLD AUTO: 0.62 10*3/MM3 (ref 0.1–0.9)
NEUTROPHILS # BLD AUTO: 5.91 10*3/MM3 (ref 1.7–7)
NEUTROPHILS NFR BLD MANUAL: 38 % (ref 42.7–76)
PLATELET # BLD AUTO: 191 10*3/MM3 (ref 140–450)
PMV BLD AUTO: 10.3 FL (ref 6–12)
POTASSIUM BLD-SCNC: 4 MMOL/L (ref 3.5–5.2)
PROT SERPL-MCNC: 7 G/DL (ref 6–8.5)
RBC # BLD AUTO: 4.94 10*6/MM3 (ref 4.14–5.8)
RBC MORPH BLD: NORMAL
SMALL PLATELETS BLD QL SMEAR: ADEQUATE
SODIUM BLD-SCNC: 139 MMOL/L (ref 136–145)
VARIANT LYMPHS NFR BLD MANUAL: 4 % (ref 0–5)
WBC MORPH BLD: NORMAL
WBC NRBC COR # BLD: 15.55 10*3/MM3 (ref 3.4–10.8)

## 2019-06-21 PROCEDURE — 85007 BL SMEAR W/DIFF WBC COUNT: CPT | Performed by: INTERNAL MEDICINE

## 2019-06-21 PROCEDURE — G8417 CALC BMI ABV UP PARAM F/U: HCPCS | Performed by: INTERNAL MEDICINE

## 2019-06-21 PROCEDURE — 99214 OFFICE O/P EST MOD 30 MIN: CPT | Performed by: INTERNAL MEDICINE

## 2019-06-21 PROCEDURE — 1123F ACP DISCUSS/DSCN MKR DOCD: CPT | Performed by: INTERNAL MEDICINE

## 2019-06-21 PROCEDURE — 80053 COMPREHEN METABOLIC PANEL: CPT | Performed by: INTERNAL MEDICINE

## 2019-06-21 PROCEDURE — 85025 COMPLETE CBC W/AUTO DIFF WBC: CPT | Performed by: INTERNAL MEDICINE

## 2019-06-21 PROCEDURE — 36591 DRAW BLOOD OFF VENOUS DEVICE: CPT | Performed by: INTERNAL MEDICINE

## 2019-06-21 PROCEDURE — 83615 LACTATE (LD) (LDH) ENZYME: CPT | Performed by: INTERNAL MEDICINE

## 2019-06-21 PROCEDURE — G9903 PT SCRN TBCO ID AS NON USER: HCPCS | Performed by: INTERNAL MEDICINE

## 2019-06-21 RX ORDER — SODIUM CHLORIDE 0.9 % (FLUSH) 0.9 %
10 SYRINGE (ML) INJECTION AS NEEDED
Status: CANCELLED | OUTPATIENT
Start: 2019-06-21

## 2019-06-21 RX ORDER — SODIUM CHLORIDE 0.9 % (FLUSH) 0.9 %
10 SYRINGE (ML) INJECTION AS NEEDED
Status: DISCONTINUED | OUTPATIENT
Start: 2019-06-21 | End: 2019-06-21 | Stop reason: HOSPADM

## 2019-06-21 RX ORDER — ROSUVASTATIN CALCIUM 10 MG/1
40 TABLET, COATED ORAL NIGHTLY
Refills: 1 | COMMUNITY
Start: 2019-05-08

## 2019-06-21 RX ADMIN — SODIUM CHLORIDE, PRESERVATIVE FREE 10 ML: 5 INJECTION INTRAVENOUS at 11:41

## 2019-06-21 RX ADMIN — Medication 500 UNITS: at 11:41

## 2019-06-21 RX ADMIN — SODIUM CHLORIDE, PRESERVATIVE FREE 10 ML: 5 INJECTION INTRAVENOUS at 10:53

## 2019-06-21 NOTE — PROGRESS NOTES
DATE OF VISIT: 6/21/2019      REASON FOR VISIT:  CLL,Leukocytosis, acute kidney injury      HISTORY OF PRESENT ILLNESS:    68-year-old male with a past medical history significant for history of CVA and dyslipidemia, he was diagnosed with CLL on peripheral blood flow cytometry in September 2016.  Patient was having drenching night sweats and fatigue patient was started on chemotherapy with bendamustine and rituximab on July 10, 2017.  After finishing day 1 of cycle 2 chemotherapy with bendamustine and rituximab started having swelling in the neck as well as flushing of the face. In view of reaction,chemotherapy was held. Patient is here for follow up visit today. Complains of fatigue.  States he was having problem with urination with frequency and incomplete evacuation of bladder.  He is evaluated by Dr. Krueger with [facility] and is scheduled to follow-up with Dr. Krueger on July 2, 2019 for further recommendation.  Denies any fever or chills.  Denies any difficulty swallowing.  Still complains of intermittent night sweats,denies any recent worsening.        PAST MEDICAL HISTORY:    Past Medical History:   Diagnosis Date   • Arthritis    • CLL (chronic lymphocytic leukemia) (CMS/HCC)    • CVA (cerebral vascular accident) (CMS/HCC) 2011   • Dyslipidemia    • Gastritis    • GERD (gastroesophageal reflux disease)    • Night sweats        SOCIAL HISTORY:    Social History     Tobacco Use   • Smoking status: Former Smoker   • Smokeless tobacco: Never Used   • Tobacco comment: never smoked cigarettes   Substance Use Topics   • Alcohol use: No   • Drug use: No       Surgical History :  Past Surgical History:   Procedure Laterality Date   • APPENDECTOMY     • HERNIA REPAIR     • KNEE SURGERY     • LEG SURGERY     • MANDIBLE FRACTURE SURGERY     • TX INSJ TUNNELED CVC W/O SUBQ PORT/ AGE 5 YR/> Right 7/7/2017    Procedure: MEDIPORT PLACEMENT WITH ULTRASOUND GUIDANCE       ;  Surgeon: Lucien Mcneal MD;  Location: [facility]  "OR;  Service: General   • PROSTATE SURGERY         ALLERGIES:    No Known Allergies    REVIEW OF SYSTEMS:      CONSTITUTIONAL: Positive for fatigue.  Still complains of intermittent night sweats.  Has gained 6 pounds since last clinic visit. No fever, chills.    HEENT:   No epistaxis, mouth sores, or difficulty swallowing.    RESPIRATORY:  Positive for intermittent shortness of breath.  No cough or hemoptysis.    CARDIOVASCULAR:  No chest pain or palpitations.    GASTROINTESTINAL: Complains of intermittent pain in left upper quadrant .  No  nausea, vomiting, or blood in the stool.    GENITOURINARY: Complains of frequency of urination.  Complains of sense of incomplete evacuation of bladder.    MUSCULOSKELETAL:  Positive for chronic back pain.    NEUROLOGICAL:  Positive for occasional tingling and numbness in the lower extremities. No new headache or dizziness.     LYMPHATICS:  Denies any abnormal swollen and anywhere in the body.    SKIN:  No new skin lesion.          PHYSICAL EXAMINATION:      VITAL SIGNS:    /73   Pulse 73   Temp 98.9 °F (37.2 °C) (Temporal)   Resp 18   Ht 170.2 cm (67.01\")   Wt 83.6 kg (184 lb 4.8 oz)   SpO2 96%   BMI 28.86 kg/m²      ECOG performance status: 1    GENERAL:  Not in any distress.    HEENT:  Normocephalic, Atraumatic.Mild Conjunctival pallor. No icterus. Extraocular Movements Intact. No Facial Asymmetry noted.    NECK:  No adenopathy. No JVD.  Trachea in midline.    RESPIRATORY:  Fair air entry bilateral. No rhonchi or wheezing.    CARDIOVASCULAR:  S1, S2. Regular rate and rhythm. No murmur or gallop appreciated.  Port-A-Cath present on right chest wall.    ABDOMEN:  Soft, obese, nontender. Bowel sounds present in all four quadrants.  No hepatosplenomegaly appreciated.    MUSCULOSKELTAL:  No edema.No Calf Tenderness.Decreased range of motion.    NEUROLOGIC:  Alert, awake and oriented ×3.  No  Motor  deficit appreciated. Cranial Nerves 2-12 grossly intact.    SKIN: No " new skin lesions.  Skin is warm and moist.    LYMPHATICS: No new enlarged lymph node in neck or supraclavicular area.          DIAGNOSTIC DATA:    Glucose   Date Value Ref Range Status   06/21/2019 130 (H) 65 - 99 mg/dL Final     Sodium   Date Value Ref Range Status   06/21/2019 139 136 - 145 mmol/L Final     Potassium   Date Value Ref Range Status   06/21/2019 4.0 3.5 - 5.2 mmol/L Final     CO2   Date Value Ref Range Status   06/21/2019 27.0 22.0 - 29.0 mmol/L Final     Chloride   Date Value Ref Range Status   06/21/2019 102 98 - 107 mmol/L Final     Anion Gap   Date Value Ref Range Status   06/21/2019 10.0 mmol/L Final     Creatinine   Date Value Ref Range Status   06/21/2019 1.30 (H) 0.76 - 1.27 mg/dL Final     BUN   Date Value Ref Range Status   06/21/2019 15 8 - 23 mg/dL Final     BUN/Creatinine Ratio   Date Value Ref Range Status   06/21/2019 11.5 7.0 - 25.0 Final     Calcium   Date Value Ref Range Status   06/21/2019 9.0 8.6 - 10.5 mg/dL Final     eGFR Non  Amer   Date Value Ref Range Status   06/21/2019 55 (L) >60 mL/min/1.73 Final     Alkaline Phosphatase   Date Value Ref Range Status   06/21/2019 78 39 - 117 U/L Final     Total Protein   Date Value Ref Range Status   06/21/2019 7.0 6.0 - 8.5 g/dL Final     ALT (SGPT)   Date Value Ref Range Status   06/21/2019 13 1 - 41 U/L Final     AST (SGOT)   Date Value Ref Range Status   06/21/2019 13 1 - 40 U/L Final     Total Bilirubin   Date Value Ref Range Status   06/21/2019 0.4 0.2 - 1.2 mg/dL Final     Albumin   Date Value Ref Range Status   06/21/2019 4.30 3.50 - 5.20 g/dL Final     Globulin   Date Value Ref Range Status   06/21/2019 2.7 gm/dL Final     Lab Results   Component Value Date    WBC 15.55 (H) 06/21/2019    HGB 13.7 06/21/2019    HCT 40.7 06/21/2019    MCV 82.4 06/21/2019     06/21/2019     Lab Results   Component Value Date    NEUTROABS 4.33 03/22/2019     Lab Results   Component Value Date    REFLABREPO SEE NOTE: 10/19/2016        PATHOLOGY:  FISH testing for CLL done on October 19, 2016 shows:  SPECIFIC FISH RESULTS:     CCND1/IGH: NORMAL        nuc samantha 11q13(GBUA9v2),14q32(IGHx2)[100]     THOMAS: NORMAL        nuc samantha 11q22.3(ATMx2)[100]     12cen: NORMAL        nuc samantha 12cen(V35X7y3)[100]     13q: NORMAL        nuc samantha 13q14.3(DLEUx2),13q34(IQAM2t4)[100]     TP53: NORMAL        nuc samantha 17p13.1(TP53x2)[100]     Peripheral blood flow cytometry done on September 30, 2016 shows:  Interpretation    Peripheral Blood:     CD5+ monotypic B-cell population (40% of leukocytes) compatible with   B-cell chronic lymphocytic leukemia/ small lymphocytic lymphoma (B-CLL/SLL)         RADIOLOGY DATA :  CT of chest, abdomen and pelvis with contrast done on December 10, 2018 was reviewed, discussed with patient, it showed:  IMPRESSION:  No acute pulmonary or pleural finding. There is evidence of old  granulomatous disease.     Couple of punctate micronodules as described above likely  representing sequela of postinflammatory change and/or benign  fissural nodes. No mass or suspicious noncalcified pulmonary  nodule identified at present.     There are numerous small axillary and mediastinal lymph nodes,  nonspecific. There is no enlarged, conglomerate or necrotic  adenopathy identified.     No CT evidence of acute abdominal or pelvic finding.     Redemonstration of mild borderline splenic enlargement at 13.3  cm.     Diverticulosis without acute diverticulitis.     Moderate enlargement of the prostate effacing the bladder base.  Please correlate to the clinical exam and the PSA.     Small hiatal hernia.        CT of abdomen and pelvis with contrast done on October 5, 2016 showed:  FINDINGS- Comparison study dated July 14, 2014. Axial computer  tomography sequential imaging was performed from the diaphragms  through the symphysis pubis after administration of IV contrast  .Sagittal and coronal reformates was performed.      Imaging through the lung bases  reveals stable right lung base lateral  basilar small calcified lung parenchymal granuloma consistent with  stable old granulomatous disease. Otherwise lung bases unremarkable..      The liver is normal. The gallbladder is normal. The pancreas is  normal. The spleen has multiple small calcified granulomas involving  it consistent with old granulomatous disease. Spleen appears upper  limits of normal size but is otherwise unremarkable. Bilateral adrenal  glands are normal. Right kidney and ureter are normal. Left kidney and  ureter are normal. The bladder is normal. The prostate gland appears  diffusely enlarged with a volume of 45 and is impinging upon the base  of bladder. Questionable defect suspicious for prior TURP procedure.  Recommend clinical correlation. The hollow viscera appears normal. No  lymphadenopathy in the abdomen or pelvis. No acute osseous  abnormality.      IMPRESSION-   1.Enlarged prostate gland may represent changes from benign prostatic  hypertrophy versus prostate malignancy. There also appears to be  evidence of prior probable TURP procedure. Recommend clinical  correlation..  2.Otherwise unremarkable CT abdomen pelvis study..          ASSESSMENT AND PLAN:      1.  CLL, stage II with questionable splenomegaly.  In view of persistent drenching night sweats and fatigue patient was started on chemotherapy with bendamustine and rituximab on July 10, 2017.  Patient tolerated first round of chemotherapy well.  On August 7, 2017 when he started day 1 of cycle 2 of bendamustine and rituximab he started having swelling in the neck region along with daily denies and rash in the face.  Port study done on that date showed intact port.  When patient came for day 2 of bendamustine he still had erythema and rash on his face and neck for which chemotherapy was discontinued and patient was given prednisone for 7 days.  At this point his rash is completely resolved.  Most likely patient had a allergic  reaction to either rituximab and bendamustine.  Still complains of intermittent night sweats, denies any drenching night sweats. Denies any weight loss since last clinic visit.    -CBC done on June 21, 2019 shows mild lymphocytosis with white blood cell count of 15,000.  Hemoglobin and platelets are still normal.  -CT of chest, abdomen and pelvis with contrast done on December 10, 2018 does not show any evidence of lymphadenopathy spleen size is mildly enlarged at 13.3 cm.  At this point we'll continue with the clinical surveillance.  Will ask patient to return to clinic in 3 months with a repeat CBC to be done on that day  -  Patient ever requires chemotherapy again his options would be either Ibrutinib or Obinutuzumab with chlorambucil.    2.  Thrombocytopenia: Completely resolved at this point. Platelet count is 191,000.    3. History of CVA    4. Acute kidney injury:  -Creatinine is elevated at 1.30.  Patient was notified about elevated creatinine and was counseled about increasing hydration.    5.  Enlarged prostate:  Patient is being evaluated by Dr. Krueger with [facility].  Recommend following up with Dr. Krueger for further recommendation and management.-    6.  Health maintenance: Patient does not smoke.  Had a colonoscopy done in 2013.      7. BMI: Patient's Body mass index is 28.86 kg/m². BMI is higher than reference range.  Patient was notified about her elevated BMI and was counseled about diet and exercise for weight loss.    8. Advance Care Planning: For now patient remains full code and is able to make  His decisions.  Patient has health care surrogate mentioned on chart.    9. Pain Assessment:  -Patient denies any pain today.      Andres Lubin MD  6/21/2019  11:23 AM

## 2019-07-16 ENCOUNTER — TELEPHONE (OUTPATIENT)
Dept: ONCOLOGY | Facility: HOSPITAL | Age: 69
End: 2019-07-16

## 2019-07-16 ENCOUNTER — APPOINTMENT (OUTPATIENT)
Dept: CT IMAGING | Facility: HOSPITAL | Age: 69
End: 2019-07-16

## 2019-07-16 ENCOUNTER — HOSPITAL ENCOUNTER (EMERGENCY)
Facility: HOSPITAL | Age: 69
Discharge: HOME | End: 2019-07-16
Attending: EMERGENCY MEDICINE | Admitting: EMERGENCY MEDICINE

## 2019-07-16 VITALS
RESPIRATION RATE: 18 BRPM | DIASTOLIC BLOOD PRESSURE: 86 MMHG | HEART RATE: 66 BPM | WEIGHT: 184.8 LBS | OXYGEN SATURATION: 96 % | SYSTOLIC BLOOD PRESSURE: 148 MMHG | BODY MASS INDEX: 29 KG/M2 | HEIGHT: 67 IN | TEMPERATURE: 97.9 F

## 2019-07-16 DIAGNOSIS — R60.0 FACIAL EDEMA: Primary | ICD-10-CM

## 2019-07-16 LAB
ALBUMIN SERPL-MCNC: 4.4 G/DL (ref 3.5–5.2)
ALBUMIN/GLOB SERPL: 1.6 G/DL
ALP SERPL-CCNC: 78 U/L (ref 39–117)
ALT SERPL W P-5'-P-CCNC: 18 U/L (ref 1–41)
ANION GAP SERPL CALCULATED.3IONS-SCNC: 11 MMOL/L (ref 5–15)
APTT PPP: 25.7 SECONDS (ref 20–40.3)
AST SERPL-CCNC: 17 U/L (ref 1–40)
BACTERIA UR QL AUTO: ABNORMAL /HPF
BILIRUB SERPL-MCNC: 0.4 MG/DL (ref 0.2–1.2)
BILIRUB UR QL STRIP: NEGATIVE
BUN BLD-MCNC: 14 MG/DL (ref 8–23)
BUN/CREAT SERPL: 11.9 (ref 7–25)
CALCIUM SPEC-SCNC: 9.1 MG/DL (ref 8.6–10.5)
CHLORIDE SERPL-SCNC: 103 MMOL/L (ref 98–107)
CLARITY UR: CLEAR
CO2 SERPL-SCNC: 27 MMOL/L (ref 22–29)
COLOR UR: YELLOW
CREAT BLD-MCNC: 1.18 MG/DL (ref 0.76–1.27)
DEPRECATED RDW RBC AUTO: 42.3 FL (ref 37–54)
EOSINOPHIL # BLD MANUAL: 0.35 10*3/MM3 (ref 0–0.4)
EOSINOPHIL NFR BLD MANUAL: 2 % (ref 0.3–6.2)
ERYTHROCYTE [DISTWIDTH] IN BLOOD BY AUTOMATED COUNT: 14.3 % (ref 12.3–15.4)
GFR SERPL CREATININE-BSD FRML MDRD: 61 ML/MIN/1.73
GLOBULIN UR ELPH-MCNC: 2.7 GM/DL
GLUCOSE BLD-MCNC: 83 MG/DL (ref 65–99)
GLUCOSE UR STRIP-MCNC: NEGATIVE MG/DL
HCT VFR BLD AUTO: 41.7 % (ref 37.5–51)
HGB BLD-MCNC: 14.1 G/DL (ref 13–17.7)
HGB UR QL STRIP.AUTO: ABNORMAL
HOLD SPECIMEN: NORMAL
HYALINE CASTS UR QL AUTO: ABNORMAL /LPF
INR PPP: 0.93 (ref 0.8–1.2)
KETONES UR QL STRIP: NEGATIVE
LEUKOCYTE ESTERASE UR QL STRIP.AUTO: NEGATIVE
LYMPHOCYTES # BLD MANUAL: 11.12 10*3/MM3 (ref 0.7–3.1)
LYMPHOCYTES NFR BLD MANUAL: 6 % (ref 5–12)
LYMPHOCYTES NFR BLD MANUAL: 63 % (ref 19.6–45.3)
MCH RBC QN AUTO: 28 PG (ref 26.6–33)
MCHC RBC AUTO-ENTMCNC: 33.8 G/DL (ref 31.5–35.7)
MCV RBC AUTO: 82.9 FL (ref 79–97)
MONOCYTES # BLD AUTO: 1.06 10*3/MM3 (ref 0.1–0.9)
NEUTROPHILS # BLD AUTO: 3.88 10*3/MM3 (ref 1.7–7)
NEUTROPHILS NFR BLD MANUAL: 22 % (ref 42.7–76)
NITRITE UR QL STRIP: NEGATIVE
PH UR STRIP.AUTO: 6.5 [PH] (ref 5–9)
PLATELET # BLD AUTO: 187 10*3/MM3 (ref 140–450)
PMV BLD AUTO: 10.2 FL (ref 6–12)
POTASSIUM BLD-SCNC: 4.2 MMOL/L (ref 3.5–5.2)
PROT SERPL-MCNC: 7.1 G/DL (ref 6–8.5)
PROT UR QL STRIP: NEGATIVE
PROTHROMBIN TIME: 12.3 SECONDS (ref 11.1–15.3)
RBC # BLD AUTO: 5.03 10*6/MM3 (ref 4.14–5.8)
RBC # UR: ABNORMAL /HPF
RBC MORPH BLD: NORMAL
REF LAB TEST METHOD: ABNORMAL
SMALL PLATELETS BLD QL SMEAR: ADEQUATE
SODIUM BLD-SCNC: 141 MMOL/L (ref 136–145)
SP GR UR STRIP: 1.01 (ref 1–1.03)
SQUAMOUS #/AREA URNS HPF: ABNORMAL /HPF
UROBILINOGEN UR QL STRIP: ABNORMAL
VARIANT LYMPHS NFR BLD MANUAL: 7 % (ref 0–5)
WBC MORPH BLD: NORMAL
WBC NRBC COR # BLD: 17.65 10*3/MM3 (ref 3.4–10.8)
WBC UR QL AUTO: ABNORMAL /HPF
WHOLE BLOOD HOLD SPECIMEN: NORMAL
WHOLE BLOOD HOLD SPECIMEN: NORMAL

## 2019-07-16 PROCEDURE — 25010000002 METHYLPREDNISOLONE PER 125 MG: Performed by: EMERGENCY MEDICINE

## 2019-07-16 PROCEDURE — 71260 CT THORAX DX C+: CPT

## 2019-07-16 PROCEDURE — 25010000002 IOPAMIDOL 61 % SOLUTION: Performed by: EMERGENCY MEDICINE

## 2019-07-16 PROCEDURE — 63710000001 PREDNISONE PER 5 MG: Performed by: EMERGENCY MEDICINE

## 2019-07-16 PROCEDURE — 85610 PROTHROMBIN TIME: CPT | Performed by: EMERGENCY MEDICINE

## 2019-07-16 PROCEDURE — 63710000001 PREDNISONE PER 1 MG: Performed by: EMERGENCY MEDICINE

## 2019-07-16 PROCEDURE — 96374 THER/PROPH/DIAG INJ IV PUSH: CPT

## 2019-07-16 PROCEDURE — 81001 URINALYSIS AUTO W/SCOPE: CPT | Performed by: EMERGENCY MEDICINE

## 2019-07-16 PROCEDURE — 85007 BL SMEAR W/DIFF WBC COUNT: CPT | Performed by: EMERGENCY MEDICINE

## 2019-07-16 PROCEDURE — 85025 COMPLETE CBC W/AUTO DIFF WBC: CPT | Performed by: EMERGENCY MEDICINE

## 2019-07-16 PROCEDURE — 80053 COMPREHEN METABOLIC PANEL: CPT | Performed by: EMERGENCY MEDICINE

## 2019-07-16 PROCEDURE — 85730 THROMBOPLASTIN TIME PARTIAL: CPT | Performed by: EMERGENCY MEDICINE

## 2019-07-16 PROCEDURE — 99284 EMERGENCY DEPT VISIT MOD MDM: CPT

## 2019-07-16 RX ORDER — METHYLPREDNISOLONE 4 MG/1
TABLET ORAL
Qty: 21 EACH | Refills: 0 | Status: SHIPPED | OUTPATIENT
Start: 2019-07-16 | End: 2019-11-21

## 2019-07-16 RX ORDER — METHYLPREDNISOLONE SODIUM SUCCINATE 125 MG/2ML
125 INJECTION, POWDER, LYOPHILIZED, FOR SOLUTION INTRAMUSCULAR; INTRAVENOUS ONCE
Status: DISCONTINUED | OUTPATIENT
Start: 2019-07-16 | End: 2019-07-16

## 2019-07-16 RX ORDER — METHYLPREDNISOLONE SODIUM SUCCINATE 125 MG/2ML
125 INJECTION, POWDER, LYOPHILIZED, FOR SOLUTION INTRAMUSCULAR; INTRAVENOUS ONCE
Status: COMPLETED | OUTPATIENT
Start: 2019-07-16 | End: 2019-07-16

## 2019-07-16 RX ORDER — SODIUM CHLORIDE 0.9 % (FLUSH) 0.9 %
10 SYRINGE (ML) INJECTION AS NEEDED
Status: DISCONTINUED | OUTPATIENT
Start: 2019-07-16 | End: 2019-07-16 | Stop reason: HOSPADM

## 2019-07-16 RX ORDER — CHOLECALCIFEROL (VITAMIN D3) 125 MCG
500 CAPSULE ORAL DAILY
COMMUNITY

## 2019-07-16 RX ORDER — DIPHENHYDRAMINE HCL 25 MG
25 TABLET ORAL EVERY 6 HOURS PRN
Qty: 30 TABLET | Refills: 0 | Status: SHIPPED | OUTPATIENT
Start: 2019-07-16 | End: 2019-11-21

## 2019-07-16 RX ADMIN — SODIUM CHLORIDE 500 ML: 9 INJECTION, SOLUTION INTRAVENOUS at 11:47

## 2019-07-16 RX ADMIN — IOPAMIDOL 80 ML: 612 INJECTION, SOLUTION INTRAVENOUS at 12:32

## 2019-07-16 RX ADMIN — METHYLPREDNISOLONE SODIUM SUCCINATE 125 MG: 125 INJECTION, POWDER, FOR SOLUTION INTRAMUSCULAR; INTRAVENOUS at 10:30

## 2019-07-16 RX ADMIN — PREDNISONE 50 MG: 20 TABLET ORAL at 14:03

## 2019-07-16 NOTE — TELEPHONE ENCOUNTER
Spoke with Dr. Lubin and informed. He states patient needs to get seen in the ER. Called patient's wife and informed that patient should go to the ER. She states understanding.

## 2019-07-16 NOTE — TELEPHONE ENCOUNTER
----- Message from Delaney Kimbrough sent at 7/16/2019  8:41 AM CDT -----  Contact: patient's wife  Left side of face swelling, started in eye area. Started yesterday and has worsened today, says his head feels funny. Wants to be seen by Dr Lubin if possible, please call. [phone]

## 2019-07-30 RX ORDER — SODIUM CHLORIDE 0.9 % (FLUSH) 0.9 %
10 SYRINGE (ML) INJECTION AS NEEDED
Status: CANCELLED | OUTPATIENT
Start: 2019-08-02

## 2019-08-02 ENCOUNTER — INFUSION (OUTPATIENT)
Dept: ONCOLOGY | Facility: HOSPITAL | Age: 69
End: 2019-08-02

## 2019-08-02 DIAGNOSIS — Z45.2 ENCOUNTER FOR VENOUS ACCESS DEVICE CARE: Primary | ICD-10-CM

## 2019-08-02 PROCEDURE — 96523 IRRIG DRUG DELIVERY DEVICE: CPT | Performed by: INTERNAL MEDICINE

## 2019-08-02 RX ORDER — SODIUM CHLORIDE 0.9 % (FLUSH) 0.9 %
10 SYRINGE (ML) INJECTION AS NEEDED
Status: CANCELLED | OUTPATIENT
Start: 2019-09-20

## 2019-08-02 RX ORDER — SODIUM CHLORIDE 0.9 % (FLUSH) 0.9 %
10 SYRINGE (ML) INJECTION AS NEEDED
Status: DISCONTINUED | OUTPATIENT
Start: 2019-08-02 | End: 2019-08-02 | Stop reason: HOSPADM

## 2019-08-02 RX ADMIN — Medication 500 UNITS: at 10:04

## 2019-08-02 RX ADMIN — SODIUM CHLORIDE, PRESERVATIVE FREE 10 ML: 5 INJECTION INTRAVENOUS at 10:04

## 2019-09-20 ENCOUNTER — OFFICE VISIT (OUTPATIENT)
Dept: ONCOLOGY | Facility: CLINIC | Age: 69
End: 2019-09-20

## 2019-09-20 ENCOUNTER — INFUSION (OUTPATIENT)
Dept: ONCOLOGY | Facility: HOSPITAL | Age: 69
End: 2019-09-20

## 2019-09-20 VITALS
DIASTOLIC BLOOD PRESSURE: 64 MMHG | HEART RATE: 84 BPM | BODY MASS INDEX: 28.82 KG/M2 | RESPIRATION RATE: 18 BRPM | HEIGHT: 67 IN | SYSTOLIC BLOOD PRESSURE: 112 MMHG | TEMPERATURE: 98.7 F | WEIGHT: 183.6 LBS

## 2019-09-20 DIAGNOSIS — N17.9 ACUTE KIDNEY INJURY (HCC): ICD-10-CM

## 2019-09-20 DIAGNOSIS — C91.10 CLL (CHRONIC LYMPHOCYTIC LEUKEMIA) (HCC): ICD-10-CM

## 2019-09-20 DIAGNOSIS — Z45.2 ENCOUNTER FOR VENOUS ACCESS DEVICE CARE: Primary | ICD-10-CM

## 2019-09-20 DIAGNOSIS — D72.820 LYMPHOCYTOSIS: ICD-10-CM

## 2019-09-20 DIAGNOSIS — C91.10 CLL (CHRONIC LYMPHOCYTIC LEUKEMIA) (HCC): Primary | ICD-10-CM

## 2019-09-20 LAB
ALBUMIN SERPL-MCNC: 4.4 G/DL (ref 3.5–5.2)
ALBUMIN/GLOB SERPL: 1.6 G/DL
ALP SERPL-CCNC: 82 U/L (ref 39–117)
ALT SERPL W P-5'-P-CCNC: 12 U/L (ref 1–41)
ANION GAP SERPL CALCULATED.3IONS-SCNC: 10 MMOL/L (ref 5–15)
AST SERPL-CCNC: 14 U/L (ref 1–40)
BASOPHILS # BLD MANUAL: 0.31 10*3/MM3 (ref 0–0.2)
BASOPHILS NFR BLD AUTO: 2 % (ref 0–1.5)
BILIRUB SERPL-MCNC: 0.4 MG/DL (ref 0.2–1.2)
BUN BLD-MCNC: 17 MG/DL (ref 8–23)
BUN/CREAT SERPL: 12.9 (ref 7–25)
CALCIUM SPEC-SCNC: 9.1 MG/DL (ref 8.6–10.5)
CHLORIDE SERPL-SCNC: 103 MMOL/L (ref 98–107)
CO2 SERPL-SCNC: 27 MMOL/L (ref 22–29)
CREAT BLD-MCNC: 1.32 MG/DL (ref 0.76–1.27)
DEPRECATED RDW RBC AUTO: 40.5 FL (ref 37–54)
EOSINOPHIL # BLD MANUAL: 0.16 10*3/MM3 (ref 0–0.4)
EOSINOPHIL NFR BLD MANUAL: 1 % (ref 0.3–6.2)
ERYTHROCYTE [DISTWIDTH] IN BLOOD BY AUTOMATED COUNT: 13.8 % (ref 12.3–15.4)
GFR SERPL CREATININE-BSD FRML MDRD: 54 ML/MIN/1.73
GLOBULIN UR ELPH-MCNC: 2.8 GM/DL
GLUCOSE BLD-MCNC: 117 MG/DL (ref 65–99)
HCT VFR BLD AUTO: 40.1 % (ref 37.5–51)
HGB BLD-MCNC: 13.5 G/DL (ref 13–17.7)
LYMPHOCYTES # BLD MANUAL: 9.74 10*3/MM3 (ref 0.7–3.1)
LYMPHOCYTES NFR BLD MANUAL: 6 % (ref 5–12)
LYMPHOCYTES NFR BLD MANUAL: 62 % (ref 19.6–45.3)
MCH RBC QN AUTO: 27.6 PG (ref 26.6–33)
MCHC RBC AUTO-ENTMCNC: 33.7 G/DL (ref 31.5–35.7)
MCV RBC AUTO: 81.8 FL (ref 79–97)
MONOCYTES # BLD AUTO: 0.94 10*3/MM3 (ref 0.1–0.9)
NEUTROPHILS # BLD AUTO: 3.46 10*3/MM3 (ref 1.7–7)
NEUTROPHILS NFR BLD MANUAL: 22 % (ref 42.7–76)
PLATELET # BLD AUTO: 187 10*3/MM3 (ref 140–450)
PMV BLD AUTO: 10.2 FL (ref 6–12)
POTASSIUM BLD-SCNC: 4 MMOL/L (ref 3.5–5.2)
PROT SERPL-MCNC: 7.2 G/DL (ref 6–8.5)
RBC # BLD AUTO: 4.9 10*6/MM3 (ref 4.14–5.8)
RBC MORPH BLD: NORMAL
SMALL PLATELETS BLD QL SMEAR: ADEQUATE
SODIUM BLD-SCNC: 140 MMOL/L (ref 136–145)
VARIANT LYMPHS NFR BLD MANUAL: 7 % (ref 0–5)
WBC MORPH BLD: NORMAL
WBC NRBC COR # BLD: 15.71 10*3/MM3 (ref 3.4–10.8)

## 2019-09-20 PROCEDURE — 36591 DRAW BLOOD OFF VENOUS DEVICE: CPT | Performed by: INTERNAL MEDICINE

## 2019-09-20 PROCEDURE — 99214 OFFICE O/P EST MOD 30 MIN: CPT | Performed by: INTERNAL MEDICINE

## 2019-09-20 PROCEDURE — 85025 COMPLETE CBC W/AUTO DIFF WBC: CPT | Performed by: INTERNAL MEDICINE

## 2019-09-20 PROCEDURE — 1123F ACP DISCUSS/DSCN MKR DOCD: CPT | Performed by: INTERNAL MEDICINE

## 2019-09-20 PROCEDURE — 85007 BL SMEAR W/DIFF WBC COUNT: CPT | Performed by: INTERNAL MEDICINE

## 2019-09-20 PROCEDURE — G8731 PAIN NEG NO PLAN: HCPCS | Performed by: INTERNAL MEDICINE

## 2019-09-20 PROCEDURE — 80053 COMPREHEN METABOLIC PANEL: CPT | Performed by: INTERNAL MEDICINE

## 2019-09-20 PROCEDURE — G9903 PT SCRN TBCO ID AS NON USER: HCPCS | Performed by: INTERNAL MEDICINE

## 2019-09-20 RX ORDER — SODIUM CHLORIDE 0.9 % (FLUSH) 0.9 %
10 SYRINGE (ML) INJECTION AS NEEDED
Status: DISCONTINUED | OUTPATIENT
Start: 2019-09-20 | End: 2019-09-20 | Stop reason: HOSPADM

## 2019-09-20 RX ORDER — SODIUM CHLORIDE 0.9 % (FLUSH) 0.9 %
10 SYRINGE (ML) INJECTION AS NEEDED
Status: CANCELLED | OUTPATIENT
Start: 2019-09-20

## 2019-09-20 RX ADMIN — SODIUM CHLORIDE, PRESERVATIVE FREE 10 ML: 5 INJECTION INTRAVENOUS at 10:56

## 2019-09-20 RX ADMIN — SODIUM CHLORIDE, PRESERVATIVE FREE 10 ML: 5 INJECTION INTRAVENOUS at 11:40

## 2019-09-20 RX ADMIN — Medication 500 UNITS: at 11:40

## 2019-09-20 NOTE — PROGRESS NOTES
DATE OF VISIT: 9/20/2019      REASON FOR VISIT:  CLL,Leukocytosis, acute kidney injury      HISTORY OF PRESENT ILLNESS:    68-year-old male with a past medical history significant for history of CVA and dyslipidemia, he was diagnosed with CLL on peripheral blood flow cytometry in September 2016.  Patient was having drenching night sweats and fatigue patient was started on chemotherapy with bendamustine and rituximab on July 10, 2017.  After finishing day 1 of cycle 2 chemotherapy with bendamustine and rituximab started having swelling in the neck as well as flushing of the face. In view of reaction,chemotherapy was held. Patient is here for follow up visit today. Complains of fatigue.    Denies any fever or chills.  Denies any difficulty swallowing.  Still complains of intermittent night sweats,denies any recent worsening.        PAST MEDICAL HISTORY:    Past Medical History:   Diagnosis Date   • Arthritis    • CLL (chronic lymphocytic leukemia) (CMS/HCC)    • CVA (cerebral vascular accident) (CMS/[facility]) 2011   • Dyslipidemia    • Gastritis    • GERD (gastroesophageal reflux disease)    • Night sweats        SOCIAL HISTORY:    Social History     Tobacco Use   • Smoking status: Never Smoker   • Smokeless tobacco: Never Used   • Tobacco comment: never smoked cigarettes   Substance Use Topics   • Alcohol use: No   • Drug use: No       Surgical History :  Past Surgical History:   Procedure Laterality Date   • APPENDECTOMY     • HERNIA REPAIR     • KNEE SURGERY     • LEG SURGERY     • MANDIBLE FRACTURE SURGERY     • VA INSJ TUNNELED CVC W/O SUBQ PORT/ AGE 5 YR/> Right 7/7/2017    Procedure: MEDIPORT PLACEMENT WITH ULTRASOUND GUIDANCE       ;  Surgeon: Lucien Mcneal MD;  Location: [facility];  Service: General   • PROSTATE SURGERY         ALLERGIES:    No Known Allergies    REVIEW OF SYSTEMS:      CONSTITUTIONAL: Positive for fatigue.  Still complains of intermittent night sweats. No weight change since last clinic visit.  "No fever, chills.    HEENT:   No epistaxis, mouth sores, or difficulty swallowing.    RESPIRATORY:  Positive for intermittent shortness of breath.  No cough or hemoptysis.    CARDIOVASCULAR:  No chest pain or palpitations.    GASTROINTESTINAL: Complains of intermittent pain in left upper quadrant .  No  nausea, vomiting, or blood in the stool.    GENITOURINARY: Complains of frequency of urination.  Complains of sense of incomplete evacuation of bladder.    MUSCULOSKELETAL:  Positive for chronic back pain.    NEUROLOGICAL:  Positive for occasional tingling and numbness in the lower extremities. No new headache or dizziness.     LYMPHATICS:  Denies any abnormal swollen and anywhere in the body.    SKIN:  No new skin lesion.          PHYSICAL EXAMINATION:      VITAL SIGNS:    /64   Pulse 84   Temp 98.7 °F (37.1 °C) (Temporal)   Resp 18   Ht 170.2 cm (67.01\")   Wt 83.3 kg (183 lb 9.6 oz)   BMI 28.75 kg/m²      ECOG performance status: 1    GENERAL:  Not in any distress.    HEENT:  Normocephalic, Atraumatic.Mild Conjunctival pallor. No icterus. Extraocular Movements Intact. No Facial Asymmetry noted.    NECK:  No adenopathy. No JVD.  Trachea in midline.    RESPIRATORY:  Fair air entry bilateral. No rhonchi or wheezing.    CARDIOVASCULAR:  S1, S2. Regular rate and rhythm. No murmur or gallop appreciated.  Port-A-Cath present on right chest wall.    ABDOMEN:  Soft, obese, nontender. Bowel sounds present in all four quadrants.  No hepatosplenomegaly appreciated.    MUSCULOSKELTAL:  No edema.No Calf Tenderness.Decreased range of motion.    NEUROLOGIC:  Alert, awake and oriented ×3.  No  Motor  deficit appreciated. Cranial Nerves 2-12 grossly intact.    SKIN: No new skin lesions.  Skin is warm and moist.    LYMPHATICS: No new enlarged lymph node in neck or supraclavicular area.          DIAGNOSTIC DATA:    Glucose   Date Value Ref Range Status   09/20/2019 117 (H) 65 - 99 mg/dL Final     Sodium   Date Value Ref " Range Status   09/20/2019 140 136 - 145 mmol/L Final     Potassium   Date Value Ref Range Status   09/20/2019 4.0 3.5 - 5.2 mmol/L Final     CO2   Date Value Ref Range Status   09/20/2019 27.0 22.0 - 29.0 mmol/L Final     Chloride   Date Value Ref Range Status   09/20/2019 103 98 - 107 mmol/L Final     Anion Gap   Date Value Ref Range Status   09/20/2019 10.0 5.0 - 15.0 mmol/L Final     Creatinine   Date Value Ref Range Status   09/20/2019 1.32 (H) 0.76 - 1.27 mg/dL Final     BUN   Date Value Ref Range Status   09/20/2019 17 8 - 23 mg/dL Final     BUN/Creatinine Ratio   Date Value Ref Range Status   09/20/2019 12.9 7.0 - 25.0 Final     Calcium   Date Value Ref Range Status   09/20/2019 9.1 8.6 - 10.5 mg/dL Final     eGFR Non  Amer   Date Value Ref Range Status   09/20/2019 54 (L) >60 mL/min/1.73 Final     Alkaline Phosphatase   Date Value Ref Range Status   09/20/2019 82 39 - 117 U/L Final     Total Protein   Date Value Ref Range Status   09/20/2019 7.2 6.0 - 8.5 g/dL Final     ALT (SGPT)   Date Value Ref Range Status   09/20/2019 12 1 - 41 U/L Final     AST (SGOT)   Date Value Ref Range Status   09/20/2019 14 1 - 40 U/L Final     Total Bilirubin   Date Value Ref Range Status   09/20/2019 0.4 0.2 - 1.2 mg/dL Final     Albumin   Date Value Ref Range Status   09/20/2019 4.40 3.50 - 5.20 g/dL Final     Globulin   Date Value Ref Range Status   09/20/2019 2.8 gm/dL Final     Lab Results   Component Value Date    WBC 15.71 (H) 09/20/2019    HGB 13.5 09/20/2019    HCT 40.1 09/20/2019    MCV 81.8 09/20/2019     09/20/2019     Lab Results   Component Value Date    NEUTROABS 3.46 09/20/2019     Lab Results   Component Value Date    REFLABREPO SEE NOTE: 10/19/2016       PATHOLOGY:  FISH testing for CLL done on October 19, 2016 shows:  SPECIFIC FISH RESULTS:     CCND1/IGH: NORMAL        nuc samantha 11q13(UPKK9p6),14q32(IGHx2)[100]     THOMAS: NORMAL        nuc samantha 11q22.3(ATMx2)[100]     12cen: NORMAL        nuc samantha  12cen([id number])[100]     13q: NORMAL        nuc samantha 13q14.3(DLEUx2),13q34(ZPFP6o7)[100]     TP53: NORMAL        nuc samantha 17p13.1(TP53x2)[100]     Peripheral blood flow cytometry done on September 30, 2016 shows:  Interpretation    Peripheral Blood:     CD5+ monotypic B-cell population (40% of leukocytes) compatible with   B-cell chronic lymphocytic leukemia/ small lymphocytic lymphoma (B-CLL/SLL)         RADIOLOGY DATA :  CT of chest, abdomen and pelvis with contrast done on December 10, 2018 was reviewed, discussed with patient, it showed:  IMPRESSION:  No acute pulmonary or pleural finding. There is evidence of old  granulomatous disease.     Couple of punctate micronodules as described above likely  representing sequela of postinflammatory change and/or benign  fissural nodes. No mass or suspicious noncalcified pulmonary  nodule identified at present.     There are numerous small axillary and mediastinal lymph nodes,  nonspecific. There is no enlarged, conglomerate or necrotic  adenopathy identified.     No CT evidence of acute abdominal or pelvic finding.     Redemonstration of mild borderline splenic enlargement at 13.3  cm.     Diverticulosis without acute diverticulitis.     Moderate enlargement of the prostate effacing the bladder base.  Please correlate to the clinical exam and the PSA.     Small hiatal hernia.        CT of abdomen and pelvis with contrast done on October 5, 2016 showed:  FINDINGS- Comparison study dated July 14, 2014. Axial computer  tomography sequential imaging was performed from the diaphragms  through the symphysis pubis after administration of IV contrast  .Sagittal and coronal reformates was performed.      Imaging through the lung bases reveals stable right lung base lateral  basilar small calcified lung parenchymal granuloma consistent with  stable old granulomatous disease. Otherwise lung bases unremarkable..      The liver is normal. The gallbladder is normal. The pancreas  is  normal. The spleen has multiple small calcified granulomas involving  it consistent with old granulomatous disease. Spleen appears upper  limits of normal size but is otherwise unremarkable. Bilateral adrenal  glands are normal. Right kidney and ureter are normal. Left kidney and  ureter are normal. The bladder is normal. The prostate gland appears  diffusely enlarged with a volume of 45 and is impinging upon the base  of bladder. Questionable defect suspicious for prior TURP procedure.  Recommend clinical correlation. The hollow viscera appears normal. No  lymphadenopathy in the abdomen or pelvis. No acute osseous  abnormality.      IMPRESSION-   1.Enlarged prostate gland may represent changes from benign prostatic  hypertrophy versus prostate malignancy. There also appears to be  evidence of prior probable TURP procedure. Recommend clinical  correlation..  2.Otherwise unremarkable CT abdomen pelvis study..          ASSESSMENT AND PLAN:      1.  CLL, stage II with questionable splenomegaly.  In view of persistent drenching night sweats and fatigue patient was started on chemotherapy with bendamustine and rituximab on July 10, 2017.  Patient tolerated first round of chemotherapy well.  On August 7, 2017 when he started day 1 of cycle 2 of bendamustine and rituximab he started having swelling in the neck region along with daily denies and rash in the face.  Port study done on that date showed intact port.  When patient came for day 2 of bendamustine he still had erythema and rash on his face and neck for which chemotherapy was discontinued and patient was given prednisone for 7 days.  At this point his rash is completely resolved.  Most likely patient had a allergic reaction to either rituximab and bendamustine.  Still complains of intermittent night sweats, denies any drenching night sweats. Denies any weight loss since last clinic visit.    -CBC done on June 21, 2019 shows mild lymphocytosis with white blood  cell count of 15,000.  Hemoglobin and platelets are still normal.  -CT of chest, abdomen and pelvis with contrast done on December 10, 2018 does not show any evidence of lymphadenopathy spleen size is mildly enlarged at 13.3 cm.  At this point we'll continue with the clinical surveillance.  Will ask patient to return to clinic in 4 months with a repeat CBC,cmp to be done on that day  -  Patient ever requires chemotherapy again his options would be either Ibrutinib or Obinutuzumab with chlorambucil.  -Since patient does not need chemotherapy at present we will refer him to surgery team for port removal.    2.  Thrombocytopenia: Completely resolved at this point. Platelet count is 187,000.    3. History of CVA    4. Acute kidney injury:  -Creatinine is elevated at 1.32.  Patient was notified about elevated creatinine and was counseled about increasing hydration.    5.  Enlarged prostate:  Patient is being evaluated by Dr. Kreuger with [facility].  Recommend following up with Dr. Krueger for further recommendation and management.-    6.  Health maintenance: Patient does not smoke.  Had a colonoscopy done in 2013.      7. BMI: Patient's Body mass index is 28.75 kg/m². BMI is higher than reference range.  Patient was notified about her elevated BMI and was counseled about diet and exercise for weight loss.    8. Advance Care Planning: For now patient remains full code and is able to make  His decisions.  Patient has health care surrogate mentioned on chart.    9. Pain Assessment:  -Patient denies any pain today.      Andres Lubin MD  9/20/2019  6:02 PM

## 2019-09-23 ENCOUNTER — TELEPHONE (OUTPATIENT)
Dept: ONCOLOGY | Facility: HOSPITAL | Age: 69
End: 2019-09-23

## 2019-09-23 NOTE — TELEPHONE ENCOUNTER
----- Message from Andres Lubin MD sent at 9/20/2019  9:58 PM CDT -----  Please let patient know, his kidney function is slightly high at 1.32. He  Needs to increase hydration. Thanks

## 2019-09-24 ENCOUNTER — PROCEDURE VISIT (OUTPATIENT)
Dept: SURGERY | Facility: CLINIC | Age: 69
End: 2019-09-24

## 2019-09-24 VITALS
WEIGHT: 183.4 LBS | BODY MASS INDEX: 28.79 KG/M2 | TEMPERATURE: 97.9 F | HEART RATE: 77 BPM | DIASTOLIC BLOOD PRESSURE: 80 MMHG | HEIGHT: 67 IN | SYSTOLIC BLOOD PRESSURE: 152 MMHG

## 2019-09-24 DIAGNOSIS — Z45.2 ENCOUNTER FOR VENOUS ACCESS DEVICE CARE: Primary | ICD-10-CM

## 2019-09-24 PROCEDURE — 99212 OFFICE O/P EST SF 10 MIN: CPT | Performed by: SURGERY

## 2019-09-24 RX ORDER — PAROXETINE HYDROCHLORIDE 40 MG/1
1 TABLET, FILM COATED ORAL DAILY
COMMUNITY
Start: 2019-08-05

## 2019-09-24 NOTE — PATIENT INSTRUCTIONS

## 2019-09-24 NOTE — PROGRESS NOTES
68-year-old gentleman who is now 2 years status post placement of a right IJ Mediport by Dr. Mcneal.  This was done for treatment for CLL.  Patient has completed his treatment for CLL per Dr. Lubin from medical oncology.  The patient and Dr. Lubin request that his port be removed.  Patient does not really have any problem other than occasional mild discomfort associated with the port.  The port is functioning well.  No neck or arm swelling.  Patient not on any anticoagulation other than aspirin.  Discussed removing this in the office under local versus in the operating room he wishes to do it in the office under local.  We will set him up to have that done his convenience.

## 2019-09-25 ENCOUNTER — PROCEDURE VISIT (OUTPATIENT)
Dept: SURGERY | Facility: CLINIC | Age: 69
End: 2019-09-25

## 2019-09-25 VITALS
WEIGHT: 183 LBS | DIASTOLIC BLOOD PRESSURE: 70 MMHG | TEMPERATURE: 97.8 F | HEIGHT: 67 IN | BODY MASS INDEX: 28.72 KG/M2 | HEART RATE: 85 BPM | SYSTOLIC BLOOD PRESSURE: 122 MMHG

## 2019-09-25 DIAGNOSIS — Z45.2 ENCOUNTER FOR VENOUS ACCESS DEVICE CARE: Primary | ICD-10-CM

## 2019-09-25 PROCEDURE — 36590 REMOVAL TUNNELED CV CATH: CPT | Performed by: SURGERY

## 2019-09-25 NOTE — PATIENT INSTRUCTIONS

## 2019-09-25 NOTE — PROGRESS NOTES
68-year-old gentleman who presents to have his right IJ Mediport removed.  Port was placed 2 years ago by Dr. Mcneal.  Reviewed his op note and patient wished to have it done here in the office under local.  Patient is completed his chemotherapy for CLL.  Not on any anticoagulation.  Again I went over with him what is involved with doing this under local here in the office and he clearly understands and wishes to proceed    Patient was placed prone in the skin overlying the port was prepped and draped in normal sterile fashion.  We infiltrated local and a good block was obtained.  We made our skin incision through the previous scar sharply with scalpel and followed it down to the obvious port connected to the catheter.  Dissected out the catheter and then slowly pulled the catheter back and it came out the without any real difficulty.  Please note it did not slide out easily but it did come back with gentle slow pressure and came out as one piece.  I then dissected back and dissected the port out and it came out as one piece connected to the catheter.  We closed the subcu tunnel with a 4-0 Monocryl figure-of-eight stitch.  Good hemostasis was noted.  We then closed the skin with a running 4-0 Monocryl subcuticular stitch.  Patient tolerated entire procedure well.  Steri-Strips were placed patient was instructed local wound care.  He will follow-up with us on apparent basis

## 2019-11-21 ENCOUNTER — OFFICE VISIT (OUTPATIENT)
Dept: GASTROENTEROLOGY | Facility: CLINIC | Age: 69
End: 2019-11-21

## 2019-11-21 VITALS
HEART RATE: 85 BPM | HEIGHT: 67 IN | WEIGHT: 181.2 LBS | BODY MASS INDEX: 28.44 KG/M2 | DIASTOLIC BLOOD PRESSURE: 80 MMHG | SYSTOLIC BLOOD PRESSURE: 142 MMHG

## 2019-11-21 DIAGNOSIS — Z80.0 FAMILY HISTORY OF COLON CANCER: ICD-10-CM

## 2019-11-21 DIAGNOSIS — K21.00 GASTROESOPHAGEAL REFLUX DISEASE WITH ESOPHAGITIS: ICD-10-CM

## 2019-11-21 DIAGNOSIS — R13.10 DYSPHAGIA, UNSPECIFIED TYPE: Primary | ICD-10-CM

## 2019-11-21 PROCEDURE — 99203 OFFICE O/P NEW LOW 30 MIN: CPT | Performed by: PHYSICIAN ASSISTANT

## 2019-11-21 RX ORDER — FAMOTIDINE 20 MG/1
20 TABLET, FILM COATED ORAL 2 TIMES DAILY
Refills: 2 | COMMUNITY
Start: 2019-11-15 | End: 2020-02-17

## 2019-11-21 RX ORDER — DEXTROSE AND SODIUM CHLORIDE 5; .45 G/100ML; G/100ML
30 INJECTION, SOLUTION INTRAVENOUS CONTINUOUS PRN
Status: CANCELLED | OUTPATIENT
Start: 2019-12-16

## 2019-11-21 NOTE — PROGRESS NOTES
Chief Complaint   Patient presents with   • Difficulty Swallowing     REf. HINA BROTHERS       ENDO PROCEDURE ORDERED: EGDw/dilation dysphagia///COLON +FH    Subjective    Roe Gu is a 69 y.o. male. he is being seen for consultation today at the request of DENEEN Gracia.    History of Present Illness    This 69-year-old retired male was sent for consultation for dysphagia by Yanira Leslie who saw the patient with hypertension on 11/15/2019.  The patient had last been seen for abdominal pain and weight loss on 12/15/2015.  He had undergone EGD/colonoscopy on 03/26/2014 showing esophageal stricture and gastritis as well as hemorrhoids.  Patient presents today with complaints of 2/10 abdominal pain.  He has been taking Pepcid 20 mg b.i.d.  He denies nausea or vomiting.  He has been getting choked with increasing dysphagia, primarily to solids.  He states his bowel movements are variable, sometimes diarrhea, sometimes constipation.  He does have family history of colon cancer.  He describes being given Cologuard by Yanira, but I told him with family history, it would not be recommended to have this done.  It would be recommended to have colonoscopy.  His weight is up 10 pounds since last visit, but he has episodes where he does not want to eat.  He was diagnosed with CLL and is being followed by Dr. Lubin for that.  He did receive chemotherapy in 2017.    Imaging on 07/16/2019:  CT scan of chest, abdomen and pelvis showed stable prominent lymph nodes, small hiatal hernia, granulomas of the spleen with borderline enlargement, enlarged prostate.  CMP on 09/20/2019 when he saw Dr. Lubin showed glucose 117, creatinine 1.32, GFR 54, otherwise normal.  CBC showed white count of 15.7, otherwise fairly normal.    Laboratories from [facility] on 11/11/2019:  CBC showed a white count of 23.6, cholesterol panel, CMP, TSH were okay.  Urinalysis showed some blood.    Patient currently denies tobacco, alcohol, illicit  substance use.  He has had a history of CVA, CLL, kidney disease, appendectomy, hernia repair.  Family history of diabetes, heart disease, unknown cancer, hypertension, colon problems, kidney disease.  Father and mother , father at age 87, mother at age 83.  Spouse still living and  since .  Two brothers living, one has had seizures and one has had cancer.  Five brothers , three were born dead.  Two sisters were born dead.  Six children in fair to poor health.  One child  as an adult from an unknown cause.    ASSESSMENT/PLAN:  Patient is mildly diffusely tender with increasing dysphagia, at risk for Resendez's with longstanding GERD.  Suspect recurrent peptic stricture.  Recommend EGD with dilatation.  Given his family history, I recommended screening colonoscopy.  We will plan followup after the above, further pending clinical course and the results of the above.    Thank you very much, Yanira, for this consultation, and for allowing us to participate in the care of your patient.      The following portions of the patient's history were reviewed and updated as appropriate:   Past Medical History:   Diagnosis Date   • Arthritis    • CLL (chronic lymphocytic leukemia) (CMS/HCC)    • CVA (cerebral vascular accident) (CMS/[facility])    • Dyslipidemia    • Gastritis    • GERD (gastroesophageal reflux disease)    • Night sweats      Past Surgical History:   Procedure Laterality Date   • APPENDECTOMY     • COLONOSCOPY  2014   • HERNIA REPAIR     • KNEE SURGERY     • LEG SURGERY     • MANDIBLE FRACTURE SURGERY     • NV INSJ TUNNELED CVC W/O SUBQ PORT/ AGE 5 YR/> Right 2017    Procedure: MEDIPORT PLACEMENT WITH ULTRASOUND GUIDANCE       ;  Surgeon: Lucien Mcneal MD;  Location: [facility];  Service: General   • PROSTATE SURGERY     • UPPER GASTROINTESTINAL ENDOSCOPY  2014     Family History   Problem Relation Age of Onset   • Prostate cancer Father    • Stomach cancer Father    •  Throat cancer Father    • Leukemia Brother    • Stomach cancer Brother    • Breast cancer Paternal Aunt    • Colon cancer Paternal Uncle    • Kidney cancer Brother    • Prostate cancer Paternal Uncle        No Known Allergies  Social History     Socioeconomic History   • Marital status:      Spouse name: Not on file   • Number of children: Not on file   • Years of education: Not on file   • Highest education level: Not on file   Tobacco Use   • Smoking status: Never Smoker   • Smokeless tobacco: Never Used   • Tobacco comment: never smoked cigarettes   Substance and Sexual Activity   • Alcohol use: No   • Drug use: No   • Sexual activity: Defer     Current Medications:  Prior to Admission medications    Medication Sig Start Date End Date Taking? Authorizing Provider   aspirin 81 MG tablet Take 81 mg by mouth Every Night.   Yes Reyes Sherwood MD   Cholecalciferol (VITAMIN D3) 5000 units capsule capsule Take 5,000 Units by mouth Daily.   Yes Reyes Sherwood MD   famotidine (PEPCID) 20 MG tablet Take 20 mg by mouth 2 (Two) Times a Day. 11/15/19  Yes Reyes Sherwood MD   ondansetron (ZOFRAN) 4 MG tablet Take 1 tablet by mouth 4 (Four) Times a Day As Needed for Nausea or Vomiting.  Patient taking differently: Take 4 mg by mouth As Needed for Nausea or Vomiting. 6/28/17  Yes Andres Lubin MD   PARoxetine (PAXIL) 40 MG tablet Take 1 tablet by mouth Daily. 8/5/19  Yes Reyes Sherwood MD   rosuvastatin (CRESTOR) 10 MG tablet Take 10 mg by mouth Every Night. 5/8/19  Yes Reyes Sherwood MD   vitamin B-12 (CYANOCOBALAMIN) 500 MCG tablet Take 500 mcg by mouth Daily.   Yes Reyes Sherwood MD   diphenhydrAMINE (BENADRYL) 25 MG tablet Take 1 tablet by mouth Every 6 (Six) Hours As Needed for Itching. 7/16/19 11/21/19  Landen Barraza MD   methylPREDNISolone (MEDROL, PARUL,) 4 MG tablet Take as directed on package instructions. 7/16/19 11/21/19  Landen Barraza MD   PARoxetine (PAXIL) 20  "MG tablet Take 20 mg by mouth Every Night.  11/21/19  Provider, MD Reyes   raNITIdine (ZANTAC) 300 MG tablet Take 300 mg by mouth Every Night.  11/21/19  Reyes Sherwood MD   tamsulosin (FLOMAX) 0.4 MG capsule 24 hr capsule Take 1 capsule by mouth Every Night.  11/21/19  ProviderReyes MD     Review of Systems  Review of Systems   Constitutional: Positive for fatigue. Negative for unexpected weight change.   HENT: Positive for trouble swallowing.    Respiratory: Positive for choking.    Gastrointestinal: Positive for abdominal pain, constipation and diarrhea. Negative for abdominal distention, anal bleeding, blood in stool, nausea, rectal pain and vomiting.   All other systems reviewed and are negative.         Objective    /80 (BP Location: Left arm)   Pulse 85   Ht 170.2 cm (67\")   Wt 82.2 kg (181 lb 3.2 oz)   BMI 28.38 kg/m²   Physical Exam   Constitutional: He is oriented to person, place, and time. He appears well-developed and well-nourished. No distress.   Mild ill appearing   HENT:   Head: Normocephalic and atraumatic.   Eyes: EOM are normal. Pupils are equal, round, and reactive to light.   Neck: Normal range of motion.   Cardiovascular: Normal rate, regular rhythm and normal heart sounds.   Pulmonary/Chest: Effort normal and breath sounds normal.   Abdominal: Soft. Bowel sounds are normal. He exhibits no shifting dullness, no distension, no abdominal bruit, no ascites and no mass. There is no hepatosplenomegaly. There is tenderness. There is no rigidity, no rebound, no guarding and no CVA tenderness. No hernia. Hernia confirmed negative in the ventral area.   Musculoskeletal: Normal range of motion.   Neurological: He is alert and oriented to person, place, and time.   Skin: Skin is warm and dry.   Psychiatric: He has a normal mood and affect. His behavior is normal. Judgment and thought content normal.   Nursing note and vitals reviewed.    Assessment/Plan      1. Dysphagia, " unspecified type    2. Gastroesophageal reflux disease with esophagitis    3. Family history of colon cancer    .   Roe was seen today for difficulty swallowing.    Diagnoses and all orders for this visit:    Dysphagia, unspecified type  -     Case Request; Standing  -     dextrose 5 % and sodium chloride 0.45 % infusion  -     Case Request    Gastroesophageal reflux disease with esophagitis  -     Case Request; Standing  -     dextrose 5 % and sodium chloride 0.45 % infusion  -     Case Request    Family history of colon cancer  -     Case Request; Standing  -     dextrose 5 % and sodium chloride 0.45 % infusion  -     Case Request    Other orders  -     Follow Anesthesia Guidelines / Standing Orders; Future  -     Obtain Informed Consent; Future  -     Obtain Informed Consent; Standing  -     POC Glucose Once; Standing  -     PEG-KCl-NaCl-NaSulf-Na Asc-C (PLENVU) 140 g reconstituted solution solution; Take 140 g by mouth 1 (One) Time for 1 dose.        Orders placed during this encounter include:  Orders Placed This Encounter   Procedures   • Follow Anesthesia Guidelines / Standing Orders     Standing Status:   Future   • Obtain Informed Consent     Standing Status:   Future     Order Specific Question:   Informed Consent Given For     Answer:   ESOPHAGOGASTRODUODENOSCOPY and colonoscopy       Medications prescribed:  New Medications Ordered This Visit   Medications   • PEG-KCl-NaCl-NaSulf-Na Asc-C (PLENVU) 140 g reconstituted solution solution     Sig: Take 140 g by mouth 1 (One) Time for 1 dose.     Dispense:  1 each     Refill:  0     Discontinued Medications       Reason for Discontinue    raNITIdine (ZANTAC) 300 MG tablet Discontinued by another clinician    PARoxetine (PAXIL) 20 MG tablet Discontinued by another clinician    methylPREDNISolone (MEDROL, PARUL,) 4 MG tablet *Therapy completed    diphenhydrAMINE (BENADRYL) 25 MG tablet *Therapy completed    tamsulosin (FLOMAX) 0.4 MG capsule 24 hr capsule  Discontinued by another clinician        Requested Prescriptions     Signed Prescriptions Disp Refills   • PEG-KCl-NaCl-NaSulf-Na Asc-C (PLENVU) 140 g reconstituted solution solution 1 each 0     Sig: Take 140 g by mouth 1 (One) Time for 1 dose.       Review and/or summary of lab tests, radiology, procedures, medications. Review and summary of old records and obtaining of history. The risks and benefits of my recommendations, as well as other treatment options were discussed with the patient today. Questions were answered.    Follow-up: Return if symptoms worsen or fail to improve, for After the above.     ESOPHAGOGASTRODUODENOSCOPY--with dilation (N/A), COLONOSCOPY (N/A)      This document has been electronically signed by Jackson Choudhary PA-C on November 25, 2019 6:22 PM      Results for orders placed or performed in visit on 09/20/19   CBC Auto Differential   Result Value Ref Range    WBC 15.71 (H) 3.40 - 10.80 10*3/mm3    RBC 4.90 4.14 - 5.80 10*6/mm3    Hemoglobin 13.5 13.0 - 17.7 g/dL    Hematocrit 40.1 37.5 - 51.0 %    MCV 81.8 79.0 - 97.0 fL    MCH 27.6 26.6 - 33.0 pg    MCHC 33.7 31.5 - 35.7 g/dL    RDW 13.8 12.3 - 15.4 %    RDW-SD 40.5 37.0 - 54.0 fl    MPV 10.2 6.0 - 12.0 fL    Platelets 187 140 - 450 10*3/mm3   Manual Differential   Result Value Ref Range    Neutrophil % 22.0 (L) 42.7 - 76.0 %    Lymphocyte % 62.0 (H) 19.6 - 45.3 %    Monocyte % 6.0 5.0 - 12.0 %    Eosinophil % 1.0 0.3 - 6.2 %    Basophil % 2.0 (H) 0.0 - 1.5 %    Atypical Lymphocyte % 7.0 (H) 0.0 - 5.0 %    Neutrophils Absolute 3.46 1.70 - 7.00 10*3/mm3    Lymphocytes Absolute 9.74 (H) 0.70 - 3.10 10*3/mm3    Monocytes Absolute 0.94 (H) 0.10 - 0.90 10*3/mm3    Eosinophils Absolute 0.16 0.00 - 0.40 10*3/mm3    Basophils Absolute 0.31 (H) 0.00 - 0.20 10*3/mm3    RBC Morphology Normal Normal    WBC Morphology Normal Normal    Platelet Estimate Adequate Normal   Comprehensive Metabolic Panel   Result Value Ref Range    Glucose 117 (H) 65 -  99 mg/dL    BUN 17 8 - 23 mg/dL    Creatinine 1.32 (H) 0.76 - 1.27 mg/dL    Sodium 140 136 - 145 mmol/L    Potassium 4.0 3.5 - 5.2 mmol/L    Chloride 103 98 - 107 mmol/L    CO2 27.0 22.0 - 29.0 mmol/L    Calcium 9.1 8.6 - 10.5 mg/dL    Total Protein 7.2 6.0 - 8.5 g/dL    Albumin 4.40 3.50 - 5.20 g/dL    ALT (SGPT) 12 1 - 41 U/L    AST (SGOT) 14 1 - 40 U/L    Alkaline Phosphatase 82 39 - 117 U/L    Total Bilirubin 0.4 0.2 - 1.2 mg/dL    eGFR Non African Amer 54 (L) >60 mL/min/1.73    Globulin 2.8 gm/dL    A/G Ratio 1.6 g/dL    BUN/Creatinine Ratio 12.9 7.0 - 25.0    Anion Gap 10.0 5.0 - 15.0 mmol/L   Results for orders placed or performed during the hospital encounter of 07/16/19   Gray Top   Result Value Ref Range    Extra Tube Hold for add-ons.    Gold Top - SST   Result Value Ref Range    Extra Tube Hold for add-ons.    Gold Top - SST   Result Value Ref Range    Extra Tube Hold for add-ons.    Urinalysis, Microscopic Only - Urine, Clean Catch   Result Value Ref Range    RBC, UA 3-5 (A) None Seen /HPF    WBC, UA None Seen None Seen, 0-2, 3-5 /HPF    Bacteria, UA None Seen None Seen /HPF    Squamous Epithelial Cells, UA None Seen None Seen, 0-2 /HPF    Hyaline Casts, UA None Seen None Seen /LPF    Methodology Automated Microscopy    Urinalysis With Microscopic If Indicated (No Culture) - Urine, Clean Catch   Result Value Ref Range    Color, UA Yellow Yellow, Straw, Dark Yellow, Lakesha    Appearance, UA Clear Clear    pH, UA 6.5 5.0 - 9.0    Specific Gravity, UA 1.006 1.003 - 1.030    Glucose, UA Negative Negative    Ketones, UA Negative Negative    Bilirubin, UA Negative Negative    Blood, UA Small (1+) (A) Negative    Protein, UA Negative Negative    Leuk Esterase, UA Negative Negative    Nitrite, UA Negative Negative    Urobilinogen, UA 0.2 E.U./dL 0.2 - 1.0 E.U./dL   CBC Auto Differential   Result Value Ref Range    WBC 17.65 (H) 3.40 - 10.80 10*3/mm3    RBC 5.03 4.14 - 5.80 10*6/mm3    Hemoglobin 14.1 13.0 - 17.7  g/dL    Hematocrit 41.7 37.5 - 51.0 %    MCV 82.9 79.0 - 97.0 fL    MCH 28.0 26.6 - 33.0 pg    MCHC 33.8 31.5 - 35.7 g/dL    RDW 14.3 12.3 - 15.4 %    RDW-SD 42.3 37.0 - 54.0 fl    MPV 10.2 6.0 - 12.0 fL    Platelets 187 140 - 450 10*3/mm3   Lavender Top   Result Value Ref Range    Extra Tube hold for add-on    Light Blue Top   Result Value Ref Range    Extra Tube hold for add-on    Manual Differential   Result Value Ref Range    Neutrophil % 22.0 (L) 42.7 - 76.0 %    Lymphocyte % 63.0 (H) 19.6 - 45.3 %    Monocyte % 6.0 5.0 - 12.0 %    Eosinophil % 2.0 0.3 - 6.2 %    Atypical Lymphocyte % 7.0 (H) 0.0 - 5.0 %    Neutrophils Absolute 3.88 1.70 - 7.00 10*3/mm3    Lymphocytes Absolute 11.12 (H) 0.70 - 3.10 10*3/mm3    Monocytes Absolute 1.06 (H) 0.10 - 0.90 10*3/mm3    Eosinophils Absolute 0.35 0.00 - 0.40 10*3/mm3    RBC Morphology Normal Normal    WBC Morphology Normal Normal    Platelet Estimate Adequate Normal   aPTT   Result Value Ref Range    PTT 25.7 20.0 - 40.3 seconds   Protime-INR   Result Value Ref Range    Protime 12.3 11.1 - 15.3 Seconds    INR 0.93 0.80 - 1.20   Comprehensive Metabolic Panel   Result Value Ref Range    Glucose 83 65 - 99 mg/dL    BUN 14 8 - 23 mg/dL    Creatinine 1.18 0.76 - 1.27 mg/dL    Sodium 141 136 - 145 mmol/L    Potassium 4.2 3.5 - 5.2 mmol/L    Chloride 103 98 - 107 mmol/L    CO2 27.0 22.0 - 29.0 mmol/L    Calcium 9.1 8.6 - 10.5 mg/dL    Total Protein 7.1 6.0 - 8.5 g/dL    Albumin 4.40 3.50 - 5.20 g/dL    ALT (SGPT) 18 1 - 41 U/L    AST (SGOT) 17 1 - 40 U/L    Alkaline Phosphatase 78 39 - 117 U/L    Total Bilirubin 0.4 0.2 - 1.2 mg/dL    eGFR Non African Amer 61 >60 mL/min/1.73    Globulin 2.7 gm/dL    A/G Ratio 1.6 g/dL    BUN/Creatinine Ratio 11.9 7.0 - 25.0    Anion Gap 11.0 5.0 - 15.0 mmol/L     *Note: Due to a large number of results and/or encounters for the requested time period, some results have not been displayed. A complete set of results can be found in Results Review.        Some portions of this note have been dictated using voice recognition software and may contain errors and/or omissions.

## 2019-11-21 NOTE — PATIENT INSTRUCTIONS

## 2019-12-13 ENCOUNTER — TELEPHONE (OUTPATIENT)
Dept: GASTROENTEROLOGY | Facility: CLINIC | Age: 69
End: 2019-12-13

## 2019-12-13 NOTE — TELEPHONE ENCOUNTER
Patients wife has been contacted and made aware that a new Rx for Golytely has been sent to [facility]. A copy of his instruction sheet has also been faxed to the pharmacy. Understanding has been voiced.

## 2019-12-13 NOTE — TELEPHONE ENCOUNTER
----- Message from Sissy Yi sent at 12/13/2019  9:18 AM CST -----  Contact: [phone]  Patient states that plenvu is not covered by insurance and is needing different prep called into pharmacy. Patient pharmacy is [facility] in [place].

## 2019-12-16 ENCOUNTER — ANESTHESIA (OUTPATIENT)
Dept: GASTROENTEROLOGY | Facility: HOSPITAL | Age: 69
End: 2019-12-16

## 2019-12-16 ENCOUNTER — HOSPITAL ENCOUNTER (OUTPATIENT)
Facility: HOSPITAL | Age: 69
Setting detail: HOSPITAL OUTPATIENT SURGERY
Discharge: HOME | End: 2019-12-16
Attending: INTERNAL MEDICINE | Admitting: INTERNAL MEDICINE

## 2019-12-16 ENCOUNTER — ANESTHESIA EVENT (OUTPATIENT)
Dept: GASTROENTEROLOGY | Facility: HOSPITAL | Age: 69
End: 2019-12-16

## 2019-12-16 VITALS
BODY MASS INDEX: 28.56 KG/M2 | SYSTOLIC BLOOD PRESSURE: 92 MMHG | OXYGEN SATURATION: 94 % | WEIGHT: 177.69 LBS | TEMPERATURE: 97.2 F | HEART RATE: 78 BPM | HEIGHT: 66 IN | RESPIRATION RATE: 18 BRPM | DIASTOLIC BLOOD PRESSURE: 69 MMHG

## 2019-12-16 DIAGNOSIS — Z80.0 FAMILY HISTORY OF COLON CANCER: ICD-10-CM

## 2019-12-16 DIAGNOSIS — K21.00 GASTROESOPHAGEAL REFLUX DISEASE WITH ESOPHAGITIS: ICD-10-CM

## 2019-12-16 DIAGNOSIS — R13.10 DYSPHAGIA, UNSPECIFIED TYPE: ICD-10-CM

## 2019-12-16 PROCEDURE — 88305 TISSUE EXAM BY PATHOLOGIST: CPT | Performed by: PATHOLOGY

## 2019-12-16 PROCEDURE — 88305 TISSUE EXAM BY PATHOLOGIST: CPT | Performed by: INTERNAL MEDICINE

## 2019-12-16 PROCEDURE — 88342 IMHCHEM/IMCYTCHM 1ST ANTB: CPT | Performed by: PATHOLOGY

## 2019-12-16 PROCEDURE — 25010000002 PROPOFOL 10 MG/ML EMULSION: Performed by: NURSE ANESTHETIST, CERTIFIED REGISTERED

## 2019-12-16 PROCEDURE — 43239 EGD BIOPSY SINGLE/MULTIPLE: CPT | Performed by: INTERNAL MEDICINE

## 2019-12-16 PROCEDURE — 43248 EGD GUIDE WIRE INSERTION: CPT | Performed by: INTERNAL MEDICINE

## 2019-12-16 PROCEDURE — G0105 COLORECTAL SCRN; HI RISK IND: HCPCS | Performed by: INTERNAL MEDICINE

## 2019-12-16 PROCEDURE — 88342 IMHCHEM/IMCYTCHM 1ST ANTB: CPT | Performed by: INTERNAL MEDICINE

## 2019-12-16 RX ORDER — LIDOCAINE HYDROCHLORIDE 20 MG/ML
INJECTION, SOLUTION INTRAVENOUS AS NEEDED
Status: DISCONTINUED | OUTPATIENT
Start: 2019-12-16 | End: 2019-12-16 | Stop reason: SURG

## 2019-12-16 RX ORDER — PROPOFOL 10 MG/ML
VIAL (ML) INTRAVENOUS AS NEEDED
Status: DISCONTINUED | OUTPATIENT
Start: 2019-12-16 | End: 2019-12-16 | Stop reason: SURG

## 2019-12-16 RX ORDER — DEXTROSE AND SODIUM CHLORIDE 5; .45 G/100ML; G/100ML
30 INJECTION, SOLUTION INTRAVENOUS CONTINUOUS PRN
Status: DISCONTINUED | OUTPATIENT
Start: 2019-12-16 | End: 2019-12-16 | Stop reason: HOSPADM

## 2019-12-16 RX ADMIN — LIDOCAINE HYDROCHLORIDE 60 MG: 20 INJECTION, SOLUTION INTRAVENOUS at 11:50

## 2019-12-16 RX ADMIN — DEXTROSE AND SODIUM CHLORIDE 30 ML/HR: 5; 450 INJECTION, SOLUTION INTRAVENOUS at 10:31

## 2019-12-16 RX ADMIN — PROPOFOL 20 MG: 10 INJECTION, EMULSION INTRAVENOUS at 11:51

## 2019-12-16 RX ADMIN — PROPOFOL 20 MG: 10 INJECTION, EMULSION INTRAVENOUS at 12:01

## 2019-12-16 RX ADMIN — DEXTROSE AND SODIUM CHLORIDE: 5; 450 INJECTION, SOLUTION INTRAVENOUS at 11:24

## 2019-12-16 RX ADMIN — PROPOFOL 70 MG: 10 INJECTION, EMULSION INTRAVENOUS at 11:50

## 2019-12-16 RX ADMIN — PROPOFOL 20 MG: 10 INJECTION, EMULSION INTRAVENOUS at 11:57

## 2019-12-16 RX ADMIN — PROPOFOL 20 MG: 10 INJECTION, EMULSION INTRAVENOUS at 11:54

## 2019-12-16 NOTE — ANESTHESIA PREPROCEDURE EVALUATION
Anesthesia Evaluation     Patient summary reviewed and Nursing notes reviewed   no history of anesthetic complications:  NPO Solid Status: > 8 hours  NPO Liquid Status: > 2 hours           Airway   Mallampati: II  TM distance: >3 FB  Neck ROM: full  no difficulty expected  Dental    (+) edentulous    Pulmonary - normal exam   (-) COPD, asthma, sleep apnea, not a smoker  Cardiovascular - normal exam  Exercise tolerance: good (4-7 METS)    (-) hypertension, past MI, CAD, CAREY      Neuro/Psych  (+) CVA,     GI/Hepatic/Renal/Endo    (+)  GERD well controlled,  renal disease (gfr 58) CRI,     Musculoskeletal     Abdominal    Substance History      OB/GYN          Other   arthritis, blood dyscrasia (CLL),   history of cancer                      Anesthesia Plan    ASA 3     MAC   (Fire safety precautions  )  intravenous induction     Anesthetic plan, all risks, benefits, and alternatives have been provided, discussed and informed consent has been obtained with: patient.

## 2019-12-16 NOTE — ANESTHESIA POSTPROCEDURE EVALUATION
Patient: Roe Gu    Procedure Summary     Date:  12/16/19 Room / Location:  [facility] ENDOSCOPY 1 / [facility] ENDOSCOPY    Anesthesia Start:  1149 Anesthesia Stop:  1218    Procedures:       ESOPHAGOGASTRODUODENOSCOPY--with dilation (N/A )      COLONOSCOPY (N/A ) Diagnosis:       Dysphagia, unspecified type      Gastroesophageal reflux disease with esophagitis      Family history of colon cancer      (Dysphagia, unspecified type [R13.10])      (Gastroesophageal reflux disease with esophagitis [K21.0])      (Family history of colon cancer [Z80.0])    Surgeon:  Gregg Martinez MD Provider:  Nazario Montenegro CRNA    Anesthesia Type:  MAC ASA Status:  3          Anesthesia Type: MAC    Vitals  No vitals data found for the desired time range.          Post Anesthesia Care and Evaluation    Patient location during evaluation: bedside  Patient participation: complete - patient participated  Level of consciousness: sleepy but conscious  Pain score: 0  Pain management: adequate  Airway patency: patent  Anesthetic complications: No anesthetic complications  PONV Status: none  Cardiovascular status: acceptable and stable  Respiratory status: acceptable  Hydration status: stable

## 2019-12-17 LAB
LAB AP CASE REPORT: NORMAL
LAB AP SPECIAL STAINS: NORMAL
PATH REPORT.FINAL DX SPEC: NORMAL
PATH REPORT.GROSS SPEC: NORMAL

## 2019-12-31 ENCOUNTER — OFFICE VISIT (OUTPATIENT)
Dept: GASTROENTEROLOGY | Facility: CLINIC | Age: 69
End: 2019-12-31

## 2019-12-31 VITALS
BODY MASS INDEX: 28.63 KG/M2 | DIASTOLIC BLOOD PRESSURE: 64 MMHG | HEART RATE: 96 BPM | WEIGHT: 182.4 LBS | SYSTOLIC BLOOD PRESSURE: 118 MMHG | HEIGHT: 67 IN

## 2019-12-31 DIAGNOSIS — B96.81 HELICOBACTER PYLORI GASTRITIS: ICD-10-CM

## 2019-12-31 DIAGNOSIS — K21.00 GASTROESOPHAGEAL REFLUX DISEASE WITH ESOPHAGITIS: Primary | ICD-10-CM

## 2019-12-31 DIAGNOSIS — R10.10 PAIN OF UPPER ABDOMEN: ICD-10-CM

## 2019-12-31 DIAGNOSIS — K20.0 ESOPHAGITIS, EOSINOPHILIC: ICD-10-CM

## 2019-12-31 DIAGNOSIS — K29.70 HELICOBACTER PYLORI GASTRITIS: ICD-10-CM

## 2019-12-31 PROCEDURE — 99214 OFFICE O/P EST MOD 30 MIN: CPT | Performed by: PHYSICIAN ASSISTANT

## 2019-12-31 RX ORDER — CLARITHROMYCIN 500 MG/1
500 TABLET, COATED ORAL 2 TIMES DAILY
Qty: 28 TABLET | Refills: 0 | Status: SHIPPED | OUTPATIENT
Start: 2019-12-31 | End: 2020-02-17

## 2019-12-31 RX ORDER — OMEPRAZOLE 20 MG/1
20 CAPSULE, DELAYED RELEASE ORAL 2 TIMES DAILY
Qty: 30 CAPSULE | Refills: 1 | Status: SHIPPED | OUTPATIENT
Start: 2019-12-31 | End: 2021-06-03

## 2019-12-31 RX ORDER — METRONIDAZOLE 500 MG/1
500 TABLET ORAL 2 TIMES DAILY
Qty: 28 TABLET | Refills: 0 | Status: SHIPPED | OUTPATIENT
Start: 2019-12-31 | End: 2020-02-17

## 2020-01-17 ENCOUNTER — OFFICE VISIT (OUTPATIENT)
Dept: ONCOLOGY | Facility: CLINIC | Age: 70
End: 2020-01-17

## 2020-01-17 ENCOUNTER — LAB (OUTPATIENT)
Dept: ONCOLOGY | Facility: HOSPITAL | Age: 70
End: 2020-01-17

## 2020-01-17 ENCOUNTER — OFFICE VISIT (OUTPATIENT)
Dept: GASTROENTEROLOGY | Facility: CLINIC | Age: 70
End: 2020-01-17

## 2020-01-17 VITALS
TEMPERATURE: 98.7 F | RESPIRATION RATE: 18 BRPM | DIASTOLIC BLOOD PRESSURE: 70 MMHG | SYSTOLIC BLOOD PRESSURE: 149 MMHG | HEART RATE: 77 BPM | BODY MASS INDEX: 28.38 KG/M2 | HEIGHT: 67 IN | WEIGHT: 180.8 LBS

## 2020-01-17 VITALS
BODY MASS INDEX: 28.25 KG/M2 | HEIGHT: 67 IN | HEART RATE: 77 BPM | DIASTOLIC BLOOD PRESSURE: 70 MMHG | WEIGHT: 180 LBS | SYSTOLIC BLOOD PRESSURE: 149 MMHG

## 2020-01-17 DIAGNOSIS — K20.0 ESOPHAGITIS, EOSINOPHILIC: ICD-10-CM

## 2020-01-17 DIAGNOSIS — K59.09 OTHER CONSTIPATION: ICD-10-CM

## 2020-01-17 DIAGNOSIS — C91.10 CLL (CHRONIC LYMPHOCYTIC LEUKEMIA) (HCC): ICD-10-CM

## 2020-01-17 DIAGNOSIS — R10.10 PAIN OF UPPER ABDOMEN: ICD-10-CM

## 2020-01-17 DIAGNOSIS — K21.00 GASTROESOPHAGEAL REFLUX DISEASE WITH ESOPHAGITIS: Primary | ICD-10-CM

## 2020-01-17 LAB
ALBUMIN SERPL-MCNC: 4.7 G/DL (ref 3.5–5.2)
ALBUMIN/GLOB SERPL: 1.8 G/DL
ALP SERPL-CCNC: 84 U/L (ref 39–117)
ALT SERPL W P-5'-P-CCNC: 20 U/L (ref 1–41)
ANION GAP SERPL CALCULATED.3IONS-SCNC: 10 MMOL/L (ref 5–15)
AST SERPL-CCNC: 24 U/L (ref 1–40)
BILIRUB SERPL-MCNC: 0.4 MG/DL (ref 0.2–1.2)
BUN BLD-MCNC: 15 MG/DL (ref 8–23)
BUN/CREAT SERPL: 12.1 (ref 7–25)
CALCIUM SPEC-SCNC: 9.1 MG/DL (ref 8.6–10.5)
CHLORIDE SERPL-SCNC: 101 MMOL/L (ref 98–107)
CO2 SERPL-SCNC: 25 MMOL/L (ref 22–29)
CREAT BLD-MCNC: 1.24 MG/DL (ref 0.76–1.27)
DEPRECATED RDW RBC AUTO: 40.8 FL (ref 37–54)
EOSINOPHIL # BLD MANUAL: 0.3 10*3/MM3 (ref 0–0.4)
EOSINOPHIL NFR BLD MANUAL: 1 % (ref 0.3–6.2)
ERYTHROCYTE [DISTWIDTH] IN BLOOD BY AUTOMATED COUNT: 13.9 % (ref 12.3–15.4)
GFR SERPL CREATININE-BSD FRML MDRD: 58 ML/MIN/1.73
GLOBULIN UR ELPH-MCNC: 2.6 GM/DL
GLUCOSE BLD-MCNC: 89 MG/DL (ref 65–99)
HCT VFR BLD AUTO: 43.9 % (ref 37.5–51)
HGB BLD-MCNC: 14.5 G/DL (ref 13–17.7)
LYMPHOCYTES # BLD MANUAL: 18.36 10*3/MM3 (ref 0.7–3.1)
LYMPHOCYTES NFR BLD MANUAL: 2 % (ref 5–12)
LYMPHOCYTES NFR BLD MANUAL: 61 % (ref 19.6–45.3)
MCH RBC QN AUTO: 27.1 PG (ref 26.6–33)
MCHC RBC AUTO-ENTMCNC: 33 G/DL (ref 31.5–35.7)
MCV RBC AUTO: 81.9 FL (ref 79–97)
MONOCYTES # BLD AUTO: 0.6 10*3/MM3 (ref 0.1–0.9)
NEUTROPHILS # BLD AUTO: 4.82 10*3/MM3 (ref 1.7–7)
NEUTROPHILS NFR BLD MANUAL: 16 % (ref 42.7–76)
PLATELET # BLD AUTO: 197 10*3/MM3 (ref 140–450)
PMV BLD AUTO: 10.2 FL (ref 6–12)
POTASSIUM BLD-SCNC: 4.5 MMOL/L (ref 3.5–5.2)
PROT SERPL-MCNC: 7.3 G/DL (ref 6–8.5)
RBC # BLD AUTO: 5.36 10*6/MM3 (ref 4.14–5.8)
RBC MORPH BLD: NORMAL
SMALL PLATELETS BLD QL SMEAR: ADEQUATE
SMUDGE CELLS IN BLOOD BY LIGHT MICROSCOPY: 7 /100 WBC
SODIUM BLD-SCNC: 136 MMOL/L (ref 136–145)
VARIANT LYMPHS NFR BLD MANUAL: 20 % (ref 0–5)
WBC MORPH BLD: NORMAL
WBC NRBC COR # BLD: 30.1 10*3/MM3 (ref 3.4–10.8)

## 2020-01-17 PROCEDURE — 99213 OFFICE O/P EST LOW 20 MIN: CPT | Performed by: PHYSICIAN ASSISTANT

## 2020-01-17 PROCEDURE — 80053 COMPREHEN METABOLIC PANEL: CPT | Performed by: INTERNAL MEDICINE

## 2020-01-17 PROCEDURE — G0463 HOSPITAL OUTPT CLINIC VISIT: HCPCS | Performed by: NURSE PRACTITIONER

## 2020-01-17 PROCEDURE — 85007 BL SMEAR W/DIFF WBC COUNT: CPT | Performed by: INTERNAL MEDICINE

## 2020-01-17 PROCEDURE — 99213 OFFICE O/P EST LOW 20 MIN: CPT | Performed by: NURSE PRACTITIONER

## 2020-01-17 PROCEDURE — 85025 COMPLETE CBC W/AUTO DIFF WBC: CPT | Performed by: INTERNAL MEDICINE

## 2020-01-17 NOTE — PATIENT INSTRUCTIONS

## 2020-01-17 NOTE — PROGRESS NOTES
Chief Complaint   Patient presents with   • Heartburn   • Esophagitis   • H-Pylori Gastritis       ENDO PROCEDURE ORDERED:    Subjective    Roe Gu is a 69 y.o. male. he is here today for follow-up.    History of Present Illness    Patient seen on a recheck of his abdominal pain, H. pylori positive gastritis, and GERD.  Last seen 12/31/2019.  He was given medications for the H. pylori.  He had missed a few days and still has 3 or 4 days left on the antibiotics.  He is still having some nausea and abdominal pain in the mid abdomen.  He states it hurts to have bowel movements and he has to strain.  He states that his stools are not hard, but they are difficult to pass.  Weight is down 2.6 pounds since last visit.  Last EGD/colonoscopy 12/16/2019 showed esophageal stricture dilated to a 54-Welsh, increased eosinophils, H. pylori positive gastritis, and hemorrhoids with a recommended repeat at 5 years.      Patient did see DENEEN Zhao, in the [facility] today.  They are considering a CT scan and further potential treatment.  She was to talk to Dr. Lubin, according to the patient and his wife.  Laboratories today:  CMP showed a creatinine of 1.24, GFR 58, this is slightly improved from previously, and is otherwise normal.  CBC showed a white count of 30.1, hemoglobin 14.5, hematocrit 43.5, platelets 197,000.      ASSESSMENT AND PLAN:  Patient with continued nausea, abdominal pain, and increased constipation.  I agree with CT imaging, but would agree difficulty given his renal function limited contrast would be off less value.  I encouraged him to increase the fiber in his diet.  He does have some MiraLax at home and I suggested he try that for the constipation.  Will complete his treatment for H. pylori.  We will plan followup in a month, sooner if needed.  Further pending clinical course and the results of the above.  Patient and his wife are agreeable.         The following portions of the patient's  history were reviewed and updated as appropriate:   Past Medical History:   Diagnosis Date   • Arthritis    • CLL (chronic lymphocytic leukemia) (CMS/HCC)    • CVA (cerebral vascular accident) (CMS/HCC) 2011   • Dyslipidemia    • Gastritis    • GERD (gastroesophageal reflux disease)    • Night sweats      Past Surgical History:   Procedure Laterality Date   • APPENDECTOMY     • COLONOSCOPY  03/26/2014   • COLONOSCOPY N/A 12/16/2019    Procedure: COLONOSCOPY;  Surgeon: Gregg Martinez MD;  Location: [facility] ENDOSCOPY;  Service: Gastroenterology   • ENDOSCOPY N/A 12/16/2019    Procedure: ESOPHAGOGASTRODUODENOSCOPY--with dilation;  Surgeon: Gregg Martinez MD;  Location: [facility] ENDOSCOPY;  Service: Gastroenterology   • HERNIA REPAIR     • KNEE SURGERY     • LEG SURGERY     • MANDIBLE FRACTURE SURGERY     • NE INSJ TUNNELED CVC W/O SUBQ PORT/ AGE 5 YR/> Right 7/7/2017    Procedure: MEDIPORT PLACEMENT WITH ULTRASOUND GUIDANCE       ;  Surgeon: Lucien Mcneal MD;  Location: [facility] OR;  Service: General   • PROSTATE SURGERY     • UPPER GASTROINTESTINAL ENDOSCOPY  03/26/2014   • UPPER GASTROINTESTINAL ENDOSCOPY  12/16/2019     Family History   Problem Relation Age of Onset   • Prostate cancer Father    • Stomach cancer Father    • Throat cancer Father    • Leukemia Brother    • Stomach cancer Brother    • Breast cancer Paternal Aunt    • Colon cancer Paternal Uncle    • Kidney cancer Brother    • Prostate cancer Paternal Uncle        No Known Allergies  Social History     Socioeconomic History   • Marital status:      Spouse name: Not on file   • Number of children: Not on file   • Years of education: Not on file   • Highest education level: Not on file   Tobacco Use   • Smoking status: Never Smoker   • Smokeless tobacco: Never Used   • Tobacco comment: never smoked cigarettes   Substance and Sexual Activity   • Alcohol use: No   • Drug use: No   • Sexual activity: Defer     Current Medications:  Prior to Admission  "medications    Medication Sig Start Date End Date Taking? Authorizing Provider   aspirin 81 MG tablet Take 81 mg by mouth Every Night.   Yes Reyes Sherwood MD   Cholecalciferol (VITAMIN D3) 5000 units capsule capsule Take 5,000 Units by mouth Daily.   Yes Reyes Sherwood MD   clarithromycin (BIAXIN) 500 MG tablet Take 1 tablet by mouth 2 (Two) Times a Day. 12/31/19  Yes Jackson Choudhary PA-C   famotidine (PEPCID) 20 MG tablet Take 20 mg by mouth 2 (Two) Times a Day. 11/15/19  Yes Reyes Sherwood MD   metroNIDAZOLE (FLAGYL) 500 MG tablet Take 1 tablet by mouth 2 (Two) Times a Day. 12/31/19  Yes Jackson Choudhary PA-C   omeprazole (priLOSEC) 20 MG capsule Take 1 capsule by mouth 2 (Two) Times a Day. 12/31/19  Yes Jackson Choudhary PA-C   ondansetron (ZOFRAN) 4 MG tablet Take 1 tablet by mouth 4 (Four) Times a Day As Needed for Nausea or Vomiting.  Patient taking differently: Take 4 mg by mouth As Needed for Nausea or Vomiting. 6/28/17  Yes Andres Lubin MD   PARoxetine (PAXIL) 40 MG tablet Take 1 tablet by mouth Daily. 8/5/19  Yes Reyes Sherwood MD   rosuvastatin (CRESTOR) 10 MG tablet Take 10 mg by mouth Every Night. 5/8/19  Yes Reyes Sherwood MD   vitamin B-12 (CYANOCOBALAMIN) 500 MCG tablet Take 500 mcg by mouth Daily.   Yes Reyes Sherwood MD     Review of Systems  Review of Systems       Objective    /70 (BP Location: Left arm)   Pulse 77   Ht 170.2 cm (67\")   Wt 81.6 kg (180 lb)   BMI 28.19 kg/m²   Physical Exam   Constitutional: He is oriented to person, place, and time. He appears well-developed and well-nourished. No distress.   Mild ill appearing   HENT:   Head: Normocephalic and atraumatic.   Eyes: Pupils are equal, round, and reactive to light. EOM are normal.   Neck: Normal range of motion.   Cardiovascular: Normal rate, regular rhythm and normal heart sounds.   Pulmonary/Chest: Effort normal and breath sounds normal.   Abdominal: Soft. Bowel sounds are " normal. He exhibits no shifting dullness, no distension, no abdominal bruit, no ascites and no mass. There is no hepatosplenomegaly. There is tenderness. There is no rigidity, no rebound, no guarding and no CVA tenderness. No hernia. Hernia confirmed negative in the ventral area.   Musculoskeletal: Normal range of motion.   Neurological: He is alert and oriented to person, place, and time.   Skin: Skin is warm and dry.   Psychiatric: He has a normal mood and affect. His behavior is normal. Judgment and thought content normal.   Nursing note and vitals reviewed.    Assessment/Plan      1. Gastroesophageal reflux disease with esophagitis    2. Esophagitis, eosinophilic    3. Pain of upper abdomen    4. Other constipation    .   Roe was seen today for heartburn, esophagitis and h-pylori gastritis.    Diagnoses and all orders for this visit:    Gastroesophageal reflux disease with esophagitis    Esophagitis, eosinophilic    Pain of upper abdomen    Other constipation        Orders placed during this encounter include:  No orders of the defined types were placed in this encounter.      Medications prescribed:  No orders of the defined types were placed in this encounter.      Requested Prescriptions      No prescriptions requested or ordered in this encounter       Review and/or summary of lab tests, radiology, procedures, medications. Review and summary of old records and obtaining of history. The risks and benefits of my recommendations, as well as other treatment options were discussed with the patient today. Questions were answered.    Follow-up: Return in about 1 month (around 2/17/2020), or if symptoms worsen or fail to improve.     * Surgery not found *      This document has been electronically signed by Jackson Choudhary PA-C on January 17, 2020 3:44 PM      Results for orders placed or performed in visit on 01/17/20   CBC Auto Differential   Result Value Ref Range    WBC 30.10 (C) 3.40 - 10.80 10*3/mm3    RBC  5.36 4.14 - 5.80 10*6/mm3    Hemoglobin 14.5 13.0 - 17.7 g/dL    Hematocrit 43.9 37.5 - 51.0 %    MCV 81.9 79.0 - 97.0 fL    MCH 27.1 26.6 - 33.0 pg    MCHC 33.0 31.5 - 35.7 g/dL    RDW 13.9 12.3 - 15.4 %    RDW-SD 40.8 37.0 - 54.0 fl    MPV 10.2 6.0 - 12.0 fL    Platelets 197 140 - 450 10*3/mm3   Manual Differential   Result Value Ref Range    Neutrophil % 16.0 (L) 42.7 - 76.0 %    Lymphocyte % 61.0 (H) 19.6 - 45.3 %    Monocyte % 2.0 (L) 5.0 - 12.0 %    Eosinophil % 1.0 0.3 - 6.2 %    Atypical Lymphocyte % 20.0 (H) 0.0 - 5.0 %    Neutrophils Absolute 4.82 1.70 - 7.00 10*3/mm3    Lymphocytes Absolute 18.36 (H) 0.70 - 3.10 10*3/mm3    Monocytes Absolute 0.60 0.10 - 0.90 10*3/mm3    Eosinophils Absolute 0.30 0.00 - 0.40 10*3/mm3    Smudge Cells 7.0 /100 WBC    RBC Morphology Normal Normal    WBC Morphology Normal Normal    Platelet Estimate Adequate Normal   Comprehensive Metabolic Panel   Result Value Ref Range    Glucose 89 65 - 99 mg/dL    BUN 15 8 - 23 mg/dL    Creatinine 1.24 0.76 - 1.27 mg/dL    Sodium 136 136 - 145 mmol/L    Potassium 4.5 3.5 - 5.2 mmol/L    Chloride 101 98 - 107 mmol/L    CO2 25.0 22.0 - 29.0 mmol/L    Calcium 9.1 8.6 - 10.5 mg/dL    Total Protein 7.3 6.0 - 8.5 g/dL    Albumin 4.70 3.50 - 5.20 g/dL    ALT (SGPT) 20 1 - 41 U/L    AST (SGOT) 24 1 - 40 U/L    Alkaline Phosphatase 84 39 - 117 U/L    Total Bilirubin 0.4 0.2 - 1.2 mg/dL    eGFR Non African Amer 58 (L) >60 mL/min/1.73    Globulin 2.6 gm/dL    A/G Ratio 1.8 g/dL    BUN/Creatinine Ratio 12.1 7.0 - 25.0    Anion Gap 10.0 5.0 - 15.0 mmol/L   Results for orders placed or performed during the hospital encounter of 12/16/19   Tissue Pathology Exam   Result Value Ref Range    Case Report       Surgical Pathology Report                         Case: [id number]                                  Authorizing Provider:  Gregg Martinez MD        Collected:           12/16/2019 11:58 AM          Ordering Location:     [facility]              "Received:            12/16/2019 02:06 PM                                 [facility]                                                     Pathologist:           Juan Manuel Alicea MD                                                         Specimens:   1) - Gastric, Antrum                                                                                2) - Esophagus, Distal                                                                     Final Diagnosis       1.  MUCOSA, ANTRUM OF STOMACH:  CHRONIC GASTRITIS.  POSITIVE FOR HELICOBACTER PYLORI (HP IMMUNOSTAIN).    2.  MUCOSA, DISTAL ESOPHAGUS:  CHRONIC ESOPHAGITIS WITH LYMPHOCYTES AND EXCESS EOSINOPHILS (10-15 EOSINOPHILS PER HIGH POWER MICROSCOPIC FIELD) INVOLVING SQUAMOUS MUCOSA.      Gross Description       1.  The first container is labeled \"antrum of stomach\" and has nodular bits of white soft tissue measuring 0.4 cc in aggregate.  The entire specimen is embedded as 1A.    2.  The second container is labeled \"distal esophagus\" and has a nodular fragment of white soft tissue measuring 0.4 cm in greatest dimension.  It is entirely embedded as 2A.      Special Stains       Helicobacter pylori (HP) immunostain is performed (Block 1) because an appropriate inflammatory milieu is present and organisms are not seen on H & E stained slides.    HP immunostain was developed and its performance characteristics determined by [facility].  It has not been cleared or approved by the U.S. Food and Drug Administration.  The FDA has determined that such clearance or approval is not necessary.  This test is used for clinical purposes.  It should not be regarded as investigational or for research.  This laboratory is certified under the Clinical Laboratory Improvement Amendments of 1988 (CLIA-88) as qualified to perform high complexity clinical laboratory testing.    All controls show appropriate reactivity.         Results for " orders placed or performed in visit on 09/20/19   CBC Auto Differential   Result Value Ref Range    WBC 15.71 (H) 3.40 - 10.80 10*3/mm3    RBC 4.90 4.14 - 5.80 10*6/mm3    Hemoglobin 13.5 13.0 - 17.7 g/dL    Hematocrit 40.1 37.5 - 51.0 %    MCV 81.8 79.0 - 97.0 fL    MCH 27.6 26.6 - 33.0 pg    MCHC 33.7 31.5 - 35.7 g/dL    RDW 13.8 12.3 - 15.4 %    RDW-SD 40.5 37.0 - 54.0 fl    MPV 10.2 6.0 - 12.0 fL    Platelets 187 140 - 450 10*3/mm3   Manual Differential   Result Value Ref Range    Neutrophil % 22.0 (L) 42.7 - 76.0 %    Lymphocyte % 62.0 (H) 19.6 - 45.3 %    Monocyte % 6.0 5.0 - 12.0 %    Eosinophil % 1.0 0.3 - 6.2 %    Basophil % 2.0 (H) 0.0 - 1.5 %    Atypical Lymphocyte % 7.0 (H) 0.0 - 5.0 %    Neutrophils Absolute 3.46 1.70 - 7.00 10*3/mm3    Lymphocytes Absolute 9.74 (H) 0.70 - 3.10 10*3/mm3    Monocytes Absolute 0.94 (H) 0.10 - 0.90 10*3/mm3    Eosinophils Absolute 0.16 0.00 - 0.40 10*3/mm3    Basophils Absolute 0.31 (H) 0.00 - 0.20 10*3/mm3    RBC Morphology Normal Normal    WBC Morphology Normal Normal    Platelet Estimate Adequate Normal   Comprehensive Metabolic Panel   Result Value Ref Range    Glucose 117 (H) 65 - 99 mg/dL    BUN 17 8 - 23 mg/dL    Creatinine 1.32 (H) 0.76 - 1.27 mg/dL    Sodium 140 136 - 145 mmol/L    Potassium 4.0 3.5 - 5.2 mmol/L    Chloride 103 98 - 107 mmol/L    CO2 27.0 22.0 - 29.0 mmol/L    Calcium 9.1 8.6 - 10.5 mg/dL    Total Protein 7.2 6.0 - 8.5 g/dL    Albumin 4.40 3.50 - 5.20 g/dL    ALT (SGPT) 12 1 - 41 U/L    AST (SGOT) 14 1 - 40 U/L    Alkaline Phosphatase 82 39 - 117 U/L    Total Bilirubin 0.4 0.2 - 1.2 mg/dL    eGFR Non African Amer 54 (L) >60 mL/min/1.73    Globulin 2.8 gm/dL    A/G Ratio 1.6 g/dL    BUN/Creatinine Ratio 12.9 7.0 - 25.0    Anion Gap 10.0 5.0 - 15.0 mmol/L   Results for orders placed or performed during the hospital encounter of 07/16/19   Gray Top   Result Value Ref Range    Extra Tube Hold for add-ons.    Gold Top - SST   Result Value Ref Range     Extra Tube Hold for add-ons.    Gold Top - SST   Result Value Ref Range    Extra Tube Hold for add-ons.    Urinalysis, Microscopic Only - Urine, Clean Catch   Result Value Ref Range    RBC, UA 3-5 (A) None Seen /HPF    WBC, UA None Seen None Seen, 0-2, 3-5 /HPF    Bacteria, UA None Seen None Seen /HPF    Squamous Epithelial Cells, UA None Seen None Seen, 0-2 /HPF    Hyaline Casts, UA None Seen None Seen /LPF    Methodology Automated Microscopy      *Note: Due to a large number of results and/or encounters for the requested time period, some results have not been displayed. A complete set of results can be found in Results Review.       Some portions of this note have been dictated using voice recognition software and may contain errors and/or omissions.

## 2020-01-17 NOTE — PROGRESS NOTES
DATE OF VISIT: 1/17/2020    REASON FOR VISIT:  Follow-up for CLL    HISTORY OF PRESENT ILLNESS: 68-year-old male with a past medical history significant for history of CVA and dyslipidemia, he was diagnosed with CLL on peripheral blood flow cytometry in September 2016.  Patient was having drenching night sweats and fatigue patient was started on chemotherapy with bendamustine and rituximab on July 10, 2017.  After finishing day 1 of cycle 2 chemotherapy with bendamustine and rituximab started having swelling in the neck as well as flushing of the face. In view of reaction,chemotherapy was held. Patient is here for follow up visit today. Complains of fatigue.    Denies any fever or chills.  Denies any difficulty swallowing.  Still complains of intermittent night sweats,denies any recent worsening.  States he is finishing up treatment for H.Pylori; seeing GI clinic today.    PAST MEDICAL HISTORY:    Past Medical History:   Diagnosis Date   • Arthritis    • CLL (chronic lymphocytic leukemia) (CMS/HCC)    • CVA (cerebral vascular accident) (CMS/[facility], located [facility]) 2011   • Dyslipidemia    • Gastritis    • GERD (gastroesophageal reflux disease)    • Night sweats        SOCIAL HISTORY:    Social History     Tobacco Use   • Smoking status: Never Smoker   • Smokeless tobacco: Never Used   • Tobacco comment: never smoked cigarettes   Substance Use Topics   • Alcohol use: No   • Drug use: No       Surgical History :  Past Surgical History:   Procedure Laterality Date   • APPENDECTOMY     • COLONOSCOPY  03/26/2014   • COLONOSCOPY N/A 12/16/2019    Procedure: COLONOSCOPY;  Surgeon: Gregg Martinez MD;  Location: [facility] ENDOSCOPY;  Service: Gastroenterology   • ENDOSCOPY N/A 12/16/2019    Procedure: ESOPHAGOGASTRODUODENOSCOPY--with dilation;  Surgeon: Gregg Martinez MD;  Location: [facility] ENDOSCOPY;  Service: Gastroenterology   • HERNIA REPAIR     • KNEE SURGERY     • LEG SURGERY     • MANDIBLE FRACTURE SURGERY     • MS INSJ TUNNELED CVC W/O  "SUBQ PORT/ AGE 5 YR/> Right 7/7/2017    Procedure: MEDIPORT PLACEMENT WITH ULTRASOUND GUIDANCE       ;  Surgeon: Lucien Mcneal MD;  Location: [facility];  Service: General   • PROSTATE SURGERY     • UPPER GASTROINTESTINAL ENDOSCOPY  03/26/2014   • UPPER GASTROINTESTINAL ENDOSCOPY  12/16/2019       ALLERGIES:    No Known Allergies    REVIEW OF SYSTEMS:      CONSTITUTIONAL:  No fever or chills. +faigue and night sweats.    HEENT:  No epistaxis, mouth sores, or difficulty swallowing.    RESPIRATORY:  No new shortness of breath or cough at present.    CARDIOVASCULAR:  No chest pain or palpitations.    GASTROINTESTINAL:  No abdominal pain, nausea, vomiting, or blood in the stool.    GENITOURINARY:  No dysuria or hematuria.    MUSCULOSKELETAL:  No any new back pain or arthralgias.     NEUROLOGICAL:  No tingling or numbness. No new headache or dizziness.     LYMPHATICS:  Denies any abnormal swollen and anywhere in the body.    SKIN:  Denies any new skin rash.    PHYSICAL EXAMINATION:      VITAL SIGNS:  /70   Pulse 77   Temp 98.7 °F (37.1 °C) (Temporal)   Resp 18   Ht 170.2 cm (67.01\")   Wt 82 kg (180 lb 12.8 oz)   BMI 28.31 kg/m²     GENERAL:  Not in any distress.    HEENT:  Normocephalic, Atraumatic.Mild Conjunctival pallor. No icterus.  No Facial Asymmetry noted.    NECK:  No adenopathy. No JVD.    RESPIRATORY:  Fair air entry bilateral. No rhonchi or wheezing.    CARDIOVASCULAR:  S1, S2. Regular rate and rhythm. No murmur or gallop appreciated.    ABDOMEN:  Soft, obese, nontender. Bowel sounds present in all four quadrants.  No organomegaly appreciated.    EXTREMITIES:  No edema.No Calf Tenderness.    NEUROLOGIC:  Alert, awake and oriented ×3.   SKIN : No new skin lesion identified  DIAGNOSTIC DATA:    Glucose   Date Value Ref Range Status   01/17/2020 89 65 - 99 mg/dL Final     Sodium   Date Value Ref Range Status   01/17/2020 136 136 - 145 mmol/L Final     Potassium   Date Value Ref Range Status "   01/17/2020 4.5 3.5 - 5.2 mmol/L Final     CO2   Date Value Ref Range Status   01/17/2020 25.0 22.0 - 29.0 mmol/L Final     Chloride   Date Value Ref Range Status   01/17/2020 101 98 - 107 mmol/L Final     Anion Gap   Date Value Ref Range Status   01/17/2020 10.0 5.0 - 15.0 mmol/L Final     Creatinine   Date Value Ref Range Status   01/17/2020 1.24 0.76 - 1.27 mg/dL Final     BUN   Date Value Ref Range Status   01/17/2020 15 8 - 23 mg/dL Final     BUN/Creatinine Ratio   Date Value Ref Range Status   01/17/2020 12.1 7.0 - 25.0 Final     Calcium   Date Value Ref Range Status   01/17/2020 9.1 8.6 - 10.5 mg/dL Final     eGFR Non  Amer   Date Value Ref Range Status   01/17/2020 58 (L) >60 mL/min/1.73 Final     Alkaline Phosphatase   Date Value Ref Range Status   01/17/2020 84 39 - 117 U/L Final     Total Protein   Date Value Ref Range Status   01/17/2020 7.3 6.0 - 8.5 g/dL Final     ALT (SGPT)   Date Value Ref Range Status   01/17/2020 20 1 - 41 U/L Final     AST (SGOT)   Date Value Ref Range Status   01/17/2020 24 1 - 40 U/L Final     Total Bilirubin   Date Value Ref Range Status   01/17/2020 0.4 0.2 - 1.2 mg/dL Final     Albumin   Date Value Ref Range Status   01/17/2020 4.70 3.50 - 5.20 g/dL Final     Globulin   Date Value Ref Range Status   01/17/2020 2.6 gm/dL Final     Lab Results   Component Value Date    WBC 30.10 (C) 01/17/2020    HGB 14.5 01/17/2020    HCT 43.9 01/17/2020    MCV 81.9 01/17/2020     01/17/2020     Lab Results   Component Value Date    NEUTROABS 4.82 01/17/2020     Lab Results   Component Value Date    REFLABREPO SEE NOTE: 10/19/2016   ]  FISH testing for CLL done on October 19, 2016 shows:  SPECIFIC FISH RESULTS:     CCND1/IGH: NORMAL        nuc samantha 11q13(TXSW9e9),14q32(IGHx2)[100]     THOMAS: NORMAL        nuc samantha 11q22.3(ATMx2)[100]     12cen: NORMAL        nuc samantha 12cen([id number])[100]     13q: NORMAL        nuc samantha 13q14.3(DLEUx2),13q34(QSZE1p2)[100]     TP53: NORMAL        nuc samantha  17p13.1(TP53x2)[100]      Peripheral blood flow cytometry done on September 30, 2016 shows:  Interpretation    Peripheral Blood:     CD5+ monotypic B-cell population (40% of leukocytes) compatible with   B-cell chronic lymphocytic leukemia/ small lymphocytic lymphoma (B-CLL/SLL)            RADIOLOGY DATA :  CT of chest, abdomen and pelvis with contrast done on December 10, 2018 was reviewed, discussed with patient, it showed:  IMPRESSION:  No acute pulmonary or pleural finding. There is evidence of old  granulomatous disease.     Couple of punctate micronodules as described above likely  representing sequela of postinflammatory change and/or benign  fissural nodes. No mass or suspicious noncalcified pulmonary  nodule identified at present.     There are numerous small axillary and mediastinal lymph nodes,  nonspecific. There is no enlarged, conglomerate or necrotic  adenopathy identified.     No CT evidence of acute abdominal or pelvic finding.     Redemonstration of mild borderline splenic enlargement at 13.3  cm.     Diverticulosis without acute diverticulitis.     Moderate enlargement of the prostate effacing the bladder base.  Please correlate to the clinical exam and the PSA.     Small hiatal hernia.           CT of abdomen and pelvis with contrast done on October 5, 2016 showed:  FINDINGS- Comparison study dated July 14, 2014. Axial computer  tomography sequential imaging was performed from the diaphragms  through the symphysis pubis after administration of IV contrast  .Sagittal and coronal reformates was performed.      Imaging through the lung bases reveals stable right lung base lateral  basilar small calcified lung parenchymal granuloma consistent with  stable old granulomatous disease. Otherwise lung bases unremarkable..      The liver is normal. The gallbladder is normal. The pancreas is  normal. The spleen has multiple small calcified granulomas involving  it consistent with old granulomatous disease.  Spleen appears upper  limits of normal size but is otherwise unremarkable. Bilateral adrenal  glands are normal. Right kidney and ureter are normal. Left kidney and  ureter are normal. The bladder is normal. The prostate gland appears  diffusely enlarged with a volume of 45 and is impinging upon the base  of bladder. Questionable defect suspicious for prior TURP procedure.  Recommend clinical correlation. The hollow viscera appears normal. No  lymphadenopathy in the abdomen or pelvis. No acute osseous  abnormality.      IMPRESSION-   1.Enlarged prostate gland may represent changes from benign prostatic  hypertrophy versus prostate malignancy. There also appears to be  evidence of prior probable TURP procedure. Recommend clinical  correlation..  2.Otherwise unremarkable CT abdomen pelvis study..              ASSESSMENT AND PLAN:       1.  CLL, stage II with questionable splenomegaly.  In view of persistent drenching night sweats and fatigue patient was started on chemotherapy with bendamustine and rituximab on July 10, 2017.  Patient tolerated first round of chemotherapy well.  On August 7, 2017 when he started day 1 of cycle 2 of bendamustine and rituximab he started having swelling in the neck region along with daily denies and rash in the face.  Port study done on that date showed intact port.  When patient came for day 2 of bendamustine he still had erythema and rash on his face and neck for which chemotherapy was discontinued and patient was given prednisone for 7 days.  At this point his rash is completely resolved.  Most likely patient had a allergic reaction to either rituximab and bendamustine.  Still complains of intermittent night sweats, denies any drenching night sweats. Denies any weight loss since last clinic visit.    -CBC done today shows WBC of 30,000l hgb and platelets are normal..  -CT of chest, abdomen and pelvis with contrast done on December 10, 2018 does not show any evidence of  lymphadenopathy spleen size is mildly enlarged at 13.3 cm.   -Pt will have CT C/A/P and RTC in 2 mos to see Dr. Lubin due to his WBC count rising and persistent night sweats. Orders to be placed today.     2.  Thrombocytopenia: Completely resolved at this point. Platelet count is 197,000.     3.  Enlarged prostate:  Patient is being evaluated by Dr. Krueger with [facility].  Recommend following up with Dr. Krueger for further recommendation and management.-     4.  Health maintenance: Patient does not smoke.  Had a colonoscopy done in 2013.             This document has been signed by DENEEN Hu on January 17, 2020 1:58 PM

## 2020-02-17 ENCOUNTER — OFFICE VISIT (OUTPATIENT)
Dept: GASTROENTEROLOGY | Facility: CLINIC | Age: 70
End: 2020-02-17

## 2020-02-17 VITALS
WEIGHT: 178.4 LBS | SYSTOLIC BLOOD PRESSURE: 128 MMHG | BODY MASS INDEX: 28 KG/M2 | DIASTOLIC BLOOD PRESSURE: 82 MMHG | HEIGHT: 67 IN | HEART RATE: 85 BPM

## 2020-02-17 DIAGNOSIS — K21.00 GASTROESOPHAGEAL REFLUX DISEASE WITH ESOPHAGITIS: Primary | ICD-10-CM

## 2020-02-17 DIAGNOSIS — R10.12 LEFT UPPER QUADRANT PAIN: ICD-10-CM

## 2020-02-17 DIAGNOSIS — R10.10 PAIN OF UPPER ABDOMEN: ICD-10-CM

## 2020-02-17 DIAGNOSIS — K20.0 ESOPHAGITIS, EOSINOPHILIC: ICD-10-CM

## 2020-02-17 PROCEDURE — 99213 OFFICE O/P EST LOW 20 MIN: CPT | Performed by: PHYSICIAN ASSISTANT

## 2020-02-17 RX ORDER — DICYCLOMINE HYDROCHLORIDE 10 MG/1
10 CAPSULE ORAL
Qty: 90 CAPSULE | Refills: 3 | Status: SHIPPED | OUTPATIENT
Start: 2020-02-17 | End: 2021-06-15

## 2020-02-17 RX ORDER — POLYETHYLENE GLYCOL 3350 17 G/17G
17 POWDER, FOR SOLUTION ORAL AS NEEDED
COMMUNITY

## 2020-02-17 NOTE — PATIENT INSTRUCTIONS

## 2020-02-17 NOTE — PROGRESS NOTES
Chief Complaint   Patient presents with   • Heartburn   • Esophagitis   • Constipation       ENDO PROCEDURE ORDERED:    Duc Gu Sr. is a 69 y.o. male. he is here today for follow-up.    History of Present Illness    Patient is seen on a recheck of his GERD, constipation, abdominal pain. Last seen 01/17/2020. He did complete treatment for the H. pylori. CMP showed a GFR of 58; otherwise, normal. CBC showed a white count of 30.1, hemoglobin 14.5, hematocrit 43.9, platelets 197,000. Patient's wife claimed he is scheduled for a PET scan on 03/10/2020. I told the patient according to [facility] note and his orders, he is scheduled for a CT chest, abdomen and pelvis, not a PET scan. She insisted and I encouraged her to contact their office. Nevertheless, the patient states that he feels raw in his abdomen and it is hard for him to swallow. He complains of 7 out of 10 left-sided abdominal pain despite taking Prilosec 20 mg b.i.d. Bowels are moving with MiraLAX. He denies blood or mucus in his stool. Weight is down 1.6 pounds since last visit. Last EGD/colonoscopy 12/16/2019 showed esophageal stricture with increased eosinophils, hemorrhoids.     ASSESSMENT/PLAN: Patient with persistent left-sided abdominal pain. Could be related to his leukemia. Would consider pancreatic abnormality. I did recommend amylase, lipase, serum trypsin. I suggested a trial on Bentyl 10 mg before meals to see if this helps with the discomfort. I cautioned him on the potential for constipation. We will continue on the Prilosec 20 mg b.i.d. We will see him in followup after the above. Further pending clinical course and the results of the above.       The following portions of the patient's history were reviewed and updated as appropriate:   Past Medical History:   Diagnosis Date   • Arthritis    • CLL (chronic lymphocytic leukemia) (CMS/HCC)    • CVA (cerebral vascular accident) (CMS/[facility]) 2011   • Dyslipidemia    •  Gastritis    • GERD (gastroesophageal reflux disease)    • Night sweats      Past Surgical History:   Procedure Laterality Date   • APPENDECTOMY     • COLONOSCOPY  03/26/2014   • COLONOSCOPY N/A 12/16/2019    Procedure: COLONOSCOPY;  Surgeon: Gregg Martinez MD;  Location: [facility] ENDOSCOPY;  Service: Gastroenterology   • ENDOSCOPY N/A 12/16/2019    Procedure: ESOPHAGOGASTRODUODENOSCOPY--with dilation;  Surgeon: Gregg Martinez MD;  Location: [facility] ENDOSCOPY;  Service: Gastroenterology   • HERNIA REPAIR     • KNEE SURGERY     • LEG SURGERY     • MANDIBLE FRACTURE SURGERY     • CT INSJ TUNNELED CVC W/O SUBQ PORT/ AGE 5 YR/> Right 7/7/2017    Procedure: MEDIPORT PLACEMENT WITH ULTRASOUND GUIDANCE       ;  Surgeon: Lucien Mcneal MD;  Location: [facility] OR;  Service: General   • PROSTATE SURGERY     • UPPER GASTROINTESTINAL ENDOSCOPY  03/26/2014   • UPPER GASTROINTESTINAL ENDOSCOPY  12/16/2019     Family History   Problem Relation Age of Onset   • Prostate cancer Father    • Stomach cancer Father    • Throat cancer Father    • Leukemia Brother    • Stomach cancer Brother    • Breast cancer Paternal Aunt    • Colon cancer Paternal Uncle    • Kidney cancer Brother    • Prostate cancer Paternal Uncle        No Known Allergies  Social History     Socioeconomic History   • Marital status:      Spouse name: Not on file   • Number of children: Not on file   • Years of education: Not on file   • Highest education level: Not on file   Tobacco Use   • Smoking status: Never Smoker   • Smokeless tobacco: Never Used   • Tobacco comment: never smoked cigarettes   Substance and Sexual Activity   • Alcohol use: No   • Drug use: No   • Sexual activity: Defer     Current Medications:  Prior to Admission medications    Medication Sig Start Date End Date Taking? Authorizing Provider   aspirin 81 MG tablet Take 81 mg by mouth Every Night.   Yes Provider, MD Reyes   Cholecalciferol (VITAMIN D3) 5000 units capsule capsule Take  "5,000 Units by mouth Daily.   Yes ProviderReyes MD   omeprazole (priLOSEC) 20 MG capsule Take 1 capsule by mouth 2 (Two) Times a Day. 12/31/19  Yes Jackson Choudhary PA-C   ondansetron (ZOFRAN) 4 MG tablet Take 1 tablet by mouth 4 (Four) Times a Day As Needed for Nausea or Vomiting.  Patient taking differently: Take 4 mg by mouth As Needed for Nausea or Vomiting. 6/28/17  Yes Andres Lubin MD   PARoxetine (PAXIL) 40 MG tablet Take 1 tablet by mouth Daily. 8/5/19  Yes Reyes Sherwood MD   polyethylene glycol (MIRALAX) powder Take 17 g by mouth As Needed.   Yes ProviderReyes MD   rosuvastatin (CRESTOR) 10 MG tablet Take 10 mg by mouth Every Night. 5/8/19  Yes Reyes Sherwood MD   vitamin B-12 (CYANOCOBALAMIN) 500 MCG tablet Take 500 mcg by mouth Daily.   Yes ProviderRyees MD   clarithromycin (BIAXIN) 500 MG tablet Take 1 tablet by mouth 2 (Two) Times a Day. 12/31/19 2/17/20 Yes Jackson Choudhary PA-C   famotidine (PEPCID) 20 MG tablet Take 20 mg by mouth 2 (Two) Times a Day. 11/15/19 2/17/20 Yes Reyes Sherwood MD   metroNIDAZOLE (FLAGYL) 500 MG tablet Take 1 tablet by mouth 2 (Two) Times a Day. 12/31/19 2/17/20 Yes Jackson Choudhary PA-C     Review of Systems  Review of Systems       Objective    /82 (BP Location: Left arm)   Pulse 85   Ht 170.2 cm (67\")   Wt 80.9 kg (178 lb 6.4 oz)   BMI 27.94 kg/m²   Physical Exam   Constitutional: He is oriented to person, place, and time. He appears well-developed and well-nourished. No distress.   Mild ill appearing   HENT:   Head: Normocephalic and atraumatic.   Eyes: Pupils are equal, round, and reactive to light. EOM are normal.   Neck: Normal range of motion.   Cardiovascular: Normal rate, regular rhythm and normal heart sounds.   Pulmonary/Chest: Effort normal and breath sounds normal.   Abdominal: Soft. Bowel sounds are normal. He exhibits no shifting dullness, no distension, no abdominal bruit, no ascites and no mass. " There is no hepatosplenomegaly. There is tenderness. There is no rigidity, no rebound, no guarding and no CVA tenderness. No hernia. Hernia confirmed negative in the ventral area.   Musculoskeletal: Normal range of motion.   Neurological: He is alert and oriented to person, place, and time.   Skin: Skin is warm and dry.   Psychiatric: He has a normal mood and affect. His behavior is normal. Judgment and thought content normal.   Nursing note and vitals reviewed.    Assessment/Plan      1. Gastroesophageal reflux disease with esophagitis    2. Esophagitis, eosinophilic    3. Pain of upper abdomen    4. Left upper quadrant pain    .   Roe was seen today for heartburn, esophagitis and constipation.    Diagnoses and all orders for this visit:    Gastroesophageal reflux disease with esophagitis    Esophagitis, eosinophilic    Pain of upper abdomen    Left upper quadrant pain  -     Amylase  -     Lipase  -     Trypsin    Other orders  -     dicyclomine (BENTYL) 10 MG capsule; Take 1 capsule by mouth 3 (Three) Times a Day Before Meals.        Orders placed during this encounter include:  Orders Placed This Encounter   Procedures   • Amylase   • Lipase   • Trypsin       Medications prescribed:  New Medications Ordered This Visit   Medications   • dicyclomine (BENTYL) 10 MG capsule     Sig: Take 1 capsule by mouth 3 (Three) Times a Day Before Meals.     Dispense:  90 capsule     Refill:  3     Discontinued Medications       Reason for Discontinue     clarithromycin (BIAXIN) 500 MG tablet    *Therapy completed     metroNIDAZOLE (FLAGYL) 500 MG tablet    *Therapy completed     famotidine (PEPCID) 20 MG tablet    *Therapy completed        Requested Prescriptions     Signed Prescriptions Disp Refills   • dicyclomine (BENTYL) 10 MG capsule 90 capsule 3     Sig: Take 1 capsule by mouth 3 (Three) Times a Day Before Meals.       Review and/or summary of lab tests, radiology, procedures, medications. Review and summary of old  records and obtaining of history. The risks and benefits of my recommendations, as well as other treatment options were discussed with the patient today. Questions were answered.    Follow-up: Return in about 1 month (around 3/17/2020), or if symptoms worsen or fail to improve.     * Surgery not found *      This document has been electronically signed by Jackson Choudhary PA-C on February 19, 2020 6:40 PM      Results for orders placed or performed in visit on 01/17/20   CBC Auto Differential   Result Value Ref Range    WBC 30.10 (C) 3.40 - 10.80 10*3/mm3    RBC 5.36 4.14 - 5.80 10*6/mm3    Hemoglobin 14.5 13.0 - 17.7 g/dL    Hematocrit 43.9 37.5 - 51.0 %    MCV 81.9 79.0 - 97.0 fL    MCH 27.1 26.6 - 33.0 pg    MCHC 33.0 31.5 - 35.7 g/dL    RDW 13.9 12.3 - 15.4 %    RDW-SD 40.8 37.0 - 54.0 fl    MPV 10.2 6.0 - 12.0 fL    Platelets 197 140 - 450 10*3/mm3   Manual Differential   Result Value Ref Range    Neutrophil % 16.0 (L) 42.7 - 76.0 %    Lymphocyte % 61.0 (H) 19.6 - 45.3 %    Monocyte % 2.0 (L) 5.0 - 12.0 %    Eosinophil % 1.0 0.3 - 6.2 %    Atypical Lymphocyte % 20.0 (H) 0.0 - 5.0 %    Neutrophils Absolute 4.82 1.70 - 7.00 10*3/mm3    Lymphocytes Absolute 18.36 (H) 0.70 - 3.10 10*3/mm3    Monocytes Absolute 0.60 0.10 - 0.90 10*3/mm3    Eosinophils Absolute 0.30 0.00 - 0.40 10*3/mm3    Smudge Cells 7.0 /100 WBC    RBC Morphology Normal Normal    WBC Morphology Normal Normal    Platelet Estimate Adequate Normal   Comprehensive Metabolic Panel   Result Value Ref Range    Glucose 89 65 - 99 mg/dL    BUN 15 8 - 23 mg/dL    Creatinine 1.24 0.76 - 1.27 mg/dL    Sodium 136 136 - 145 mmol/L    Potassium 4.5 3.5 - 5.2 mmol/L    Chloride 101 98 - 107 mmol/L    CO2 25.0 22.0 - 29.0 mmol/L    Calcium 9.1 8.6 - 10.5 mg/dL    Total Protein 7.3 6.0 - 8.5 g/dL    Albumin 4.70 3.50 - 5.20 g/dL    ALT (SGPT) 20 1 - 41 U/L    AST (SGOT) 24 1 - 40 U/L    Alkaline Phosphatase 84 39 - 117 U/L    Total Bilirubin 0.4 0.2 - 1.2 mg/dL     "eGFR Non African Amer 58 (L) >60 mL/min/1.73    Globulin 2.6 gm/dL    A/G Ratio 1.8 g/dL    BUN/Creatinine Ratio 12.1 7.0 - 25.0    Anion Gap 10.0 5.0 - 15.0 mmol/L   Results for orders placed or performed during the hospital encounter of 12/16/19   Tissue Pathology Exam   Result Value Ref Range    Case Report       Surgical Pathology Report                         Case: [id number]                                  Authorizing Provider:  Gregg Martinez MD        Collected:           12/16/2019 11:58 AM          Ordering Location:     [facility]             Received:            12/16/2019 02:06 PM                                 [facility]                                                     Pathologist:           Juan Manuel Alicea MD                                                         Specimens:   1) - Gastric, Antrum                                                                                2) - Esophagus, Distal                                                                     Final Diagnosis       1.  MUCOSA, ANTRUM OF STOMACH:  CHRONIC GASTRITIS.  POSITIVE FOR HELICOBACTER PYLORI (HP IMMUNOSTAIN).    2.  MUCOSA, DISTAL ESOPHAGUS:  CHRONIC ESOPHAGITIS WITH LYMPHOCYTES AND EXCESS EOSINOPHILS (10-15 EOSINOPHILS PER HIGH POWER MICROSCOPIC FIELD) INVOLVING SQUAMOUS MUCOSA.      Gross Description       1.  The first container is labeled \"antrum of stomach\" and has nodular bits of white soft tissue measuring 0.4 cc in aggregate.  The entire specimen is embedded as 1A.    2.  The second container is labeled \"distal esophagus\" and has a nodular fragment of white soft tissue measuring 0.4 cm in greatest dimension.  It is entirely embedded as 2A.      Special Stains       Helicobacter pylori (HP) immunostain is performed (Block 1) because an appropriate inflammatory milieu is present and organisms are not seen on H & E stained slides.    HP immunostain was developed and its performance " characteristics determined by [facility].  It has not been cleared or approved by the U.S. Food and Drug Administration.  The FDA has determined that such clearance or approval is not necessary.  This test is used for clinical purposes.  It should not be regarded as investigational or for research.  This laboratory is certified under the Clinical Laboratory Improvement Amendments of 1988 (CLIA-88) as qualified to perform high complexity clinical laboratory testing.    All controls show appropriate reactivity.         Results for orders placed or performed in visit on 09/20/19   CBC Auto Differential   Result Value Ref Range    WBC 15.71 (H) 3.40 - 10.80 10*3/mm3    RBC 4.90 4.14 - 5.80 10*6/mm3    Hemoglobin 13.5 13.0 - 17.7 g/dL    Hematocrit 40.1 37.5 - 51.0 %    MCV 81.8 79.0 - 97.0 fL    MCH 27.6 26.6 - 33.0 pg    MCHC 33.7 31.5 - 35.7 g/dL    RDW 13.8 12.3 - 15.4 %    RDW-SD 40.5 37.0 - 54.0 fl    MPV 10.2 6.0 - 12.0 fL    Platelets 187 140 - 450 10*3/mm3   Manual Differential   Result Value Ref Range    Neutrophil % 22.0 (L) 42.7 - 76.0 %    Lymphocyte % 62.0 (H) 19.6 - 45.3 %    Monocyte % 6.0 5.0 - 12.0 %    Eosinophil % 1.0 0.3 - 6.2 %    Basophil % 2.0 (H) 0.0 - 1.5 %    Atypical Lymphocyte % 7.0 (H) 0.0 - 5.0 %    Neutrophils Absolute 3.46 1.70 - 7.00 10*3/mm3    Lymphocytes Absolute 9.74 (H) 0.70 - 3.10 10*3/mm3    Monocytes Absolute 0.94 (H) 0.10 - 0.90 10*3/mm3    Eosinophils Absolute 0.16 0.00 - 0.40 10*3/mm3    Basophils Absolute 0.31 (H) 0.00 - 0.20 10*3/mm3    RBC Morphology Normal Normal    WBC Morphology Normal Normal    Platelet Estimate Adequate Normal   Comprehensive Metabolic Panel   Result Value Ref Range    Glucose 117 (H) 65 - 99 mg/dL    BUN 17 8 - 23 mg/dL    Creatinine 1.32 (H) 0.76 - 1.27 mg/dL    Sodium 140 136 - 145 mmol/L    Potassium 4.0 3.5 - 5.2 mmol/L    Chloride 103 98 - 107 mmol/L    CO2 27.0 22.0 - 29.0 mmol/L    Calcium 9.1 8.6 - 10.5  mg/dL    Total Protein 7.2 6.0 - 8.5 g/dL    Albumin 4.40 3.50 - 5.20 g/dL    ALT (SGPT) 12 1 - 41 U/L    AST (SGOT) 14 1 - 40 U/L    Alkaline Phosphatase 82 39 - 117 U/L    Total Bilirubin 0.4 0.2 - 1.2 mg/dL    eGFR Non African Amer 54 (L) >60 mL/min/1.73    Globulin 2.8 gm/dL    A/G Ratio 1.6 g/dL    BUN/Creatinine Ratio 12.9 7.0 - 25.0    Anion Gap 10.0 5.0 - 15.0 mmol/L   Results for orders placed or performed during the hospital encounter of 07/16/19   Gray Top   Result Value Ref Range    Extra Tube Hold for add-ons.    Gold Top - SST   Result Value Ref Range    Extra Tube Hold for add-ons.    Gold Top - SST   Result Value Ref Range    Extra Tube Hold for add-ons.    Urinalysis, Microscopic Only - Urine, Clean Catch   Result Value Ref Range    RBC, UA 3-5 (A) None Seen /HPF    WBC, UA None Seen None Seen, 0-2, 3-5 /HPF    Bacteria, UA None Seen None Seen /HPF    Squamous Epithelial Cells, UA None Seen None Seen, 0-2 /HPF    Hyaline Casts, UA None Seen None Seen /LPF    Methodology Automated Microscopy      *Note: Due to a large number of results and/or encounters for the requested time period, some results have not been displayed. A complete set of results can be found in Results Review.       Some portions of this note have been dictated using voice recognition software and may contain errors and/or omissions.

## 2020-03-10 ENCOUNTER — LAB (OUTPATIENT)
Dept: LAB | Facility: HOSPITAL | Age: 70
End: 2020-03-10

## 2020-03-10 ENCOUNTER — HOSPITAL ENCOUNTER (OUTPATIENT)
Dept: CT IMAGING | Facility: HOSPITAL | Age: 70
Discharge: HOME | End: 2020-03-10
Admitting: NURSE PRACTITIONER

## 2020-03-10 DIAGNOSIS — C91.10 CLL (CHRONIC LYMPHOCYTIC LEUKEMIA) (HCC): ICD-10-CM

## 2020-03-10 LAB
ALBUMIN SERPL-MCNC: 4.7 G/DL (ref 3.5–5.2)
ALBUMIN/GLOB SERPL: 1.6 G/DL
ALP SERPL-CCNC: 87 U/L (ref 39–117)
ALT SERPL W P-5'-P-CCNC: 18 U/L (ref 1–41)
ANION GAP SERPL CALCULATED.3IONS-SCNC: 13 MMOL/L (ref 5–15)
ANISOCYTOSIS BLD QL: ABNORMAL
AST SERPL-CCNC: 22 U/L (ref 1–40)
BILIRUB SERPL-MCNC: 0.4 MG/DL (ref 0.2–1.2)
BUN BLD-MCNC: 19 MG/DL (ref 8–23)
BUN/CREAT SERPL: 13.6 (ref 7–25)
CALCIUM SPEC-SCNC: 9.5 MG/DL (ref 8.6–10.5)
CHLORIDE SERPL-SCNC: 97 MMOL/L (ref 98–107)
CO2 SERPL-SCNC: 24 MMOL/L (ref 22–29)
CREAT BLD-MCNC: 1.4 MG/DL (ref 0.76–1.27)
DEPRECATED RDW RBC AUTO: 42.1 FL (ref 37–54)
EOSINOPHIL # BLD MANUAL: 0.32 10*3/MM3 (ref 0–0.4)
EOSINOPHIL NFR BLD MANUAL: 1 % (ref 0.3–6.2)
ERYTHROCYTE [DISTWIDTH] IN BLOOD BY AUTOMATED COUNT: 14.2 % (ref 12.3–15.4)
GFR SERPL CREATININE-BSD FRML MDRD: 50 ML/MIN/1.73
GLOBULIN UR ELPH-MCNC: 3 GM/DL
GLUCOSE BLD-MCNC: 98 MG/DL (ref 65–99)
HCT VFR BLD AUTO: 45.2 % (ref 37.5–51)
HGB BLD-MCNC: 15.1 G/DL (ref 13–17.7)
LYMPHOCYTES # BLD MANUAL: 28.56 10*3/MM3 (ref 0.7–3.1)
LYMPHOCYTES NFR BLD MANUAL: 1 % (ref 5–12)
LYMPHOCYTES NFR BLD MANUAL: 89 % (ref 19.6–45.3)
MCH RBC QN AUTO: 27.7 PG (ref 26.6–33)
MCHC RBC AUTO-ENTMCNC: 33.4 G/DL (ref 31.5–35.7)
MCV RBC AUTO: 82.9 FL (ref 79–97)
MONOCYTES # BLD AUTO: 0.32 10*3/MM3 (ref 0.1–0.9)
NEUTROPHILS # BLD AUTO: 2.89 10*3/MM3 (ref 1.7–7)
NEUTROPHILS NFR BLD MANUAL: 9 % (ref 42.7–76)
PLATELET # BLD AUTO: 188 10*3/MM3 (ref 140–450)
PMV BLD AUTO: 10.1 FL (ref 6–12)
POTASSIUM BLD-SCNC: 4.7 MMOL/L (ref 3.5–5.2)
PROT SERPL-MCNC: 7.7 G/DL (ref 6–8.5)
RBC # BLD AUTO: 5.45 10*6/MM3 (ref 4.14–5.8)
SMALL PLATELETS BLD QL SMEAR: ADEQUATE
SODIUM BLD-SCNC: 134 MMOL/L (ref 136–145)
WBC MORPH BLD: NORMAL
WBC NRBC COR # BLD: 32.09 10*3/MM3 (ref 3.4–10.8)

## 2020-03-10 PROCEDURE — 36415 COLL VENOUS BLD VENIPUNCTURE: CPT

## 2020-03-10 PROCEDURE — 74177 CT ABD & PELVIS W/CONTRAST: CPT

## 2020-03-10 PROCEDURE — 71260 CT THORAX DX C+: CPT

## 2020-03-10 PROCEDURE — 85025 COMPLETE CBC W/AUTO DIFF WBC: CPT

## 2020-03-10 PROCEDURE — 25010000002 IOPAMIDOL 61 % SOLUTION: Performed by: NURSE PRACTITIONER

## 2020-03-10 PROCEDURE — 80053 COMPREHEN METABOLIC PANEL: CPT

## 2020-03-10 PROCEDURE — 85007 BL SMEAR W/DIFF WBC COUNT: CPT

## 2020-03-10 RX ADMIN — IOPAMIDOL 90 ML: 612 INJECTION, SOLUTION INTRAVENOUS at 09:13

## 2020-03-17 ENCOUNTER — APPOINTMENT (OUTPATIENT)
Dept: ONCOLOGY | Facility: HOSPITAL | Age: 70
End: 2020-03-17

## 2020-03-17 ENCOUNTER — OFFICE VISIT (OUTPATIENT)
Dept: ONCOLOGY | Facility: CLINIC | Age: 70
End: 2020-03-17

## 2020-03-17 VITALS
WEIGHT: 182.9 LBS | RESPIRATION RATE: 18 BRPM | TEMPERATURE: 98.7 F | HEIGHT: 67 IN | BODY MASS INDEX: 28.71 KG/M2 | DIASTOLIC BLOOD PRESSURE: 72 MMHG | HEART RATE: 79 BPM | SYSTOLIC BLOOD PRESSURE: 143 MMHG

## 2020-03-17 DIAGNOSIS — D72.820 LYMPHOCYTOSIS: ICD-10-CM

## 2020-03-17 DIAGNOSIS — C91.10 CLL (CHRONIC LYMPHOCYTIC LEUKEMIA) (HCC): Primary | ICD-10-CM

## 2020-03-17 DIAGNOSIS — N17.9 ACUTE KIDNEY INJURY (HCC): ICD-10-CM

## 2020-03-17 PROCEDURE — 99214 OFFICE O/P EST MOD 30 MIN: CPT | Performed by: INTERNAL MEDICINE

## 2020-03-17 PROCEDURE — 1123F ACP DISCUSS/DSCN MKR DOCD: CPT | Performed by: INTERNAL MEDICINE

## 2020-03-17 PROCEDURE — G0463 HOSPITAL OUTPT CLINIC VISIT: HCPCS | Performed by: INTERNAL MEDICINE

## 2020-03-17 PROCEDURE — G8731 PAIN NEG NO PLAN: HCPCS | Performed by: INTERNAL MEDICINE

## 2020-03-17 PROCEDURE — G9903 PT SCRN TBCO ID AS NON USER: HCPCS | Performed by: INTERNAL MEDICINE

## 2020-03-17 NOTE — PROGRESS NOTES
DATE OF VISIT: 3/17/2020      REASON FOR VISIT:  CLL,Leukocytosis, acute kidney injury      HISTORY OF PRESENT ILLNESS:    69-year-old male with a past medical history significant for history of CVA and dyslipidemia, he was diagnosed with CLL on peripheral blood flow cytometry in September 2016.  Patient was having drenching night sweats and fatigue patient was started on chemotherapy with bendamustine and rituximab on July 10, 2017.  After finishing day 1 of cycle 2 chemotherapy with bendamustine and rituximab started having swelling in the neck as well as flushing of the face. In view of reaction,chemotherapy was held. Patient is here for follow up visit today.  View of rising white blood cell count and lymphocytosis, patient had a restaging CT of chest abdomen and pelvis with contrast done on March 10, 2020, patient is here to discuss the results and further recommendation.  Complains of fatigue.    Denies any fever or chills.  Denies any difficulty swallowing.  Still complains of intermittent night sweats,denies any recent worsening.        PAST MEDICAL HISTORY:    Past Medical History:   Diagnosis Date   • Arthritis    • CLL (chronic lymphocytic leukemia) (CMS/HCC)    • CVA (cerebral vascular accident) (CMS/HCC) 2011   • Dyslipidemia    • Gastritis    • GERD (gastroesophageal reflux disease)    • Night sweats        SOCIAL HISTORY:    Social History     Tobacco Use   • Smoking status: Never Smoker   • Smokeless tobacco: Never Used   • Tobacco comment: never smoked cigarettes   Substance Use Topics   • Alcohol use: No   • Drug use: No       Surgical History :  Past Surgical History:   Procedure Laterality Date   • APPENDECTOMY     • COLONOSCOPY  03/26/2014   • COLONOSCOPY N/A 12/16/2019    Procedure: COLONOSCOPY;  Surgeon: Gregg Martinez MD;  Location: [facility] ENDOSCOPY;  Service: Gastroenterology   • ENDOSCOPY N/A 12/16/2019    Procedure: ESOPHAGOGASTRODUODENOSCOPY--with dilation;  Surgeon: Gregg Martinez  "MD;  Location: [facility] ENDOSCOPY;  Service: Gastroenterology   • HERNIA REPAIR     • KNEE SURGERY     • LEG SURGERY     • MANDIBLE FRACTURE SURGERY     • WA INSJ TUNNELED CVC W/O SUBQ PORT/ AGE 5 YR/> Right 7/7/2017    Procedure: MEDIPORT PLACEMENT WITH ULTRASOUND GUIDANCE       ;  Surgeon: Lucien Mcneal MD;  Location: [facility] OR;  Service: General   • PROSTATE SURGERY     • UPPER GASTROINTESTINAL ENDOSCOPY  03/26/2014   • UPPER GASTROINTESTINAL ENDOSCOPY  12/16/2019       ALLERGIES:    No Known Allergies    REVIEW OF SYSTEMS:      CONSTITUTIONAL: Positive for fatigue.  Still complains of intermittent night sweats.Has gained 2 pounds since last clinic visit. No fever, chills.    HEENT:   No epistaxis, mouth sores, or difficulty swallowing.    RESPIRATORY:  Positive for intermittent shortness of breath.  No cough or hemoptysis.    CARDIOVASCULAR:  No chest pain or palpitations.    GASTROINTESTINAL: Complains of intermittent pain in left upper quadrant .  No  nausea, vomiting, or blood in the stool.    GENITOURINARY: Complains of frequency of urination.  Complains of sense of incomplete evacuation of bladder.    MUSCULOSKELETAL:  Positive for chronic back pain.    NEUROLOGICAL:  Positive for occasional tingling and numbness in the lower extremities. No new headache or dizziness.     LYMPHATICS:  Denies any abnormal swollen and anywhere in the body.    SKIN:  No new skin lesion.          PHYSICAL EXAMINATION:      VITAL SIGNS:    /72   Pulse 79   Temp 98.7 °F (37.1 °C) (Temporal)   Resp 18   Ht 170.2 cm (67.01\")   Wt 83 kg (182 lb 14.4 oz)   BMI 28.64 kg/m²      ECOG performance status: 1    GENERAL:  Not in any distress.    HEENT:  Normocephalic, Atraumatic.Mild Conjunctival pallor. No icterus. Extraocular Movements Intact. No Facial Asymmetry noted.    NECK:  No adenopathy. No JVD.  Trachea in midline.    RESPIRATORY:  Fair air entry bilateral. No rhonchi or wheezing.    CARDIOVASCULAR:  S1, S2. Regular " rate and rhythm. No murmur or gallop appreciated.  Port-A-Cath present on right chest wall.    ABDOMEN:  Soft, obese, nontender. Bowel sounds present in all four quadrants.  No hepatosplenomegaly appreciated.    MUSCULOSKELTAL:  No edema.No Calf Tenderness.Decreased range of motion.    NEUROLOGIC:  Alert, awake and oriented ×3.  No  Motor  deficit appreciated. Cranial Nerves 2-12 grossly intact.    SKIN: No new skin lesions.  Skin is warm and moist.    LYMPHATICS: No new enlarged lymph node in neck or supraclavicular area.          DIAGNOSTIC DATA:    Glucose   Date Value Ref Range Status   03/10/2020 98 65 - 99 mg/dL Final     Sodium   Date Value Ref Range Status   03/10/2020 134 (L) 136 - 145 mmol/L Final     Potassium   Date Value Ref Range Status   03/10/2020 4.7 3.5 - 5.2 mmol/L Final     CO2   Date Value Ref Range Status   03/10/2020 24.0 22.0 - 29.0 mmol/L Final     Chloride   Date Value Ref Range Status   03/10/2020 97 (L) 98 - 107 mmol/L Final     Anion Gap   Date Value Ref Range Status   03/10/2020 13.0 5.0 - 15.0 mmol/L Final     Creatinine   Date Value Ref Range Status   03/10/2020 1.40 (H) 0.76 - 1.27 mg/dL Final     BUN   Date Value Ref Range Status   03/10/2020 19 8 - 23 mg/dL Final     BUN/Creatinine Ratio   Date Value Ref Range Status   03/10/2020 13.6 7.0 - 25.0 Final     Calcium   Date Value Ref Range Status   03/10/2020 9.5 8.6 - 10.5 mg/dL Final     eGFR Non  Amer   Date Value Ref Range Status   03/10/2020 50 (L) >60 mL/min/1.73 Final     Alkaline Phosphatase   Date Value Ref Range Status   03/10/2020 87 39 - 117 U/L Final     Total Protein   Date Value Ref Range Status   03/10/2020 7.7 6.0 - 8.5 g/dL Final     ALT (SGPT)   Date Value Ref Range Status   03/10/2020 18 1 - 41 U/L Final     AST (SGOT)   Date Value Ref Range Status   03/10/2020 22 1 - 40 U/L Final     Total Bilirubin   Date Value Ref Range Status   03/10/2020 0.4 0.2 - 1.2 mg/dL Final     Albumin   Date Value Ref Range Status    03/10/2020 4.70 3.50 - 5.20 g/dL Final     Globulin   Date Value Ref Range Status   03/10/2020 3.0 gm/dL Final     Lab Results   Component Value Date    WBC 32.09 (C) 03/10/2020    HGB 15.1 03/10/2020    HCT 45.2 03/10/2020    MCV 82.9 03/10/2020     03/10/2020     Lab Results   Component Value Date    NEUTROABS 2.89 03/10/2020     Lab Results   Component Value Date    REFLABREPO SEE NOTE: 10/19/2016       PATHOLOGY:  FISH testing for CLL done on October 19, 2016 shows:  SPECIFIC FISH RESULTS:     CCND1/IGH: NORMAL        nuc samantha 11q13(BXDM2k4),14q32(IGHx2)[100]     THOMAS: NORMAL        nuc samantha 11q22.3(ATMx2)[100]     12cen: NORMAL        nuc samantha 12cen(S87P5f0)[100]     13q: NORMAL        nuc samantha 13q14.3(DLEUx2),13q34(CZJG0x1)[100]     TP53: NORMAL        nuc samantha 17p13.1(TP53x2)[100]     Peripheral blood flow cytometry done on September 30, 2016 shows:  Interpretation    Peripheral Blood:     CD5+ monotypic B-cell population (40% of leukocytes) compatible with   B-cell chronic lymphocytic leukemia/ small lymphocytic lymphoma (B-CLL/SLL)         RADIOLOGY DATA :  CT of chest abdomen and pelvis with contrast done on March 10, 2020 was reviewed, discussed with patient and his family, it showed:    CHEST CT FINDINGS: There are numerous slightly enlarged  right-sided axillary lymph nodes some which are slightly larger  than prior examination July 16, 2019. One lymph node 2.45 cm in  size series 5 image 42. There is a 1.25 cm.     Heart size normal. No evidence of pathologically hilar or  mediastinal enlarged nodes. Lung fields are clear.        ABDOMEN CT FINDINGS: Liver, gallbladder, spleen, pancreas,  adrenal glands and kidneys are normal in appearance. Bowel  grossly unremarkable. There are a few slightly enlarged  periaortic retroperitoneal lymph nodes. Series 5 image 43. One  lymph node 2 cm in diameter series. This demonstrates a change  since prior exams.     No evidence of  free fluid or free air. Mild  multilevel  degenerative changes thoracic and lumbar spine.            PELVIS CT FINDINGS: The prostate gland is slightly enlarged. The  pelvis is otherwise unremarkable. There are no pelvic masses. No  evidence of pathologically enlarged nodes, free fluid, or free  air.  The bones are unremarkable.     IMPRESSION:  Several slightly enlarged right-sided axillary lymph  nodes some which are slightly larger than on prior exam. Several  slightly enlarged retroperitoneal lymph nodes largest being 2 cm  in diameter. This demonstrates a change since CT of December 2018.     Prostate gland enlargement.     CT chest, abdomen, pelvis with contrast is otherwise  Unremarkable.        CT of chest, abdomen and pelvis with contrast done on December 10, 2018 was reviewed, discussed with patient, it showed:  IMPRESSION:  No acute pulmonary or pleural finding. There is evidence of old  granulomatous disease.     Couple of punctate micronodules as described above likely  representing sequela of postinflammatory change and/or benign  fissural nodes. No mass or suspicious noncalcified pulmonary  nodule identified at present.     There are numerous small axillary and mediastinal lymph nodes,  nonspecific. There is no enlarged, conglomerate or necrotic  adenopathy identified.     No CT evidence of acute abdominal or pelvic finding.     Redemonstration of mild borderline splenic enlargement at 13.3  cm.     Diverticulosis without acute diverticulitis.     Moderate enlargement of the prostate effacing the bladder base.  Please correlate to the clinical exam and the PSA.     Small hiatal hernia.        CT of abdomen and pelvis with contrast done on October 5, 2016 showed:  FINDINGS- Comparison study dated July 14, 2014. Axial computer  tomography sequential imaging was performed from the diaphragms  through the symphysis pubis after administration of IV contrast  .Sagittal and coronal reformates was performed.      Imaging through the lung  bases reveals stable right lung base lateral  basilar small calcified lung parenchymal granuloma consistent with  stable old granulomatous disease. Otherwise lung bases unremarkable..      The liver is normal. The gallbladder is normal. The pancreas is  normal. The spleen has multiple small calcified granulomas involving  it consistent with old granulomatous disease. Spleen appears upper  limits of normal size but is otherwise unremarkable. Bilateral adrenal  glands are normal. Right kidney and ureter are normal. Left kidney and  ureter are normal. The bladder is normal. The prostate gland appears  diffusely enlarged with a volume of 45 and is impinging upon the base  of bladder. Questionable defect suspicious for prior TURP procedure.  Recommend clinical correlation. The hollow viscera appears normal. No  lymphadenopathy in the abdomen or pelvis. No acute osseous  abnormality.      IMPRESSION-   1.Enlarged prostate gland may represent changes from benign prostatic  hypertrophy versus prostate malignancy. There also appears to be  evidence of prior probable TURP procedure. Recommend clinical  correlation..  2.Otherwise unremarkable CT abdomen pelvis study..          ASSESSMENT AND PLAN:      1.  CLL, stage II with questionable splenomegaly.  In view of persistent drenching night sweats and fatigue patient was started on chemotherapy with bendamustine and rituximab on July 10, 2017.  Patient tolerated first round of chemotherapy well.  On August 7, 2017 when he started day 1 of cycle 2 of bendamustine and rituximab he started having swelling in the neck region along with daily denies and rash in the face.  Port study done on that date showed intact port.  When patient came for day 2 of bendamustine he still had erythema and rash on his face and neck for which chemotherapy was discontinued and patient was given prednisone for 7 days.  At this point his rash is completely resolved.  Most likely patient had a allergic  reaction to either rituximab and bendamustine.  Still complains of intermittent night sweats, denies any drenching night sweats. Denies any weight loss since last clinic visit.    -CBC done on March 10, 2020 shows white blood cell count is worsened to 32,000.  Hemoglobin and platelets are still normal.  -CT of chest, abdomen and pelvis with contrast done on March 10, 2020 shows minimal enlargement of axillary lymph node as well as retroperitoneal lymph node.  Largest lymph node is measuring about 2 cm in size.  Recommend clinical observation for now.  --  Patient ever requires chemotherapy again his options would be either Ibrutinib or Obinutuzumab with chlorambucil.  -We will ask patient to return to clinic in 4 months with repeat CBC, CMP on that day.  -We will do CT of chest abdomen and pelvis with contrast around November 2020 which was discussed with patient and his family.    2.  Thrombocytopenia: Completely resolved at this point. Platelet count is 188,000.    3. History of CVA    4. Acute kidney injury:  -Creatinine is elevated at 1.40.  Patient was notified about elevated creatinine and was counseled about increasing hydration.    5.  Enlarged prostate:  Patient is being evaluated by Dr. Krueger with [facility].  Recommend following up with Dr. Krueger for further recommendation and management.-    6.  Health maintenance: Patient does not smoke.  Had a colonoscopy done in 2013.      7. BMI: Patient's Body mass index is 28.64 kg/m². BMI is higher than reference range.  Patient was notified about her elevated BMI and was counseled about diet and exercise for weight loss.    8. Advance Care Planning: For now patient remains full code and is able to make  His decisions.  Patient has health care surrogate mentioned on chart.    9. Roe Gu Sr. reports a pain score of 0.  Given his pain assessment as noted, treatment options were discussed and the following options were decided upon as a follow-up  plan to address the patient's pain: No intervention required.    10. PHQ-9 Total Score: 0   -No acute intervention required.      Andres Lubin MD  3/17/2020  18:48

## 2020-05-11 ENCOUNTER — OFFICE VISIT (OUTPATIENT)
Dept: GASTROENTEROLOGY | Facility: CLINIC | Age: 70
End: 2020-05-11

## 2020-05-11 VITALS
WEIGHT: 179.8 LBS | HEIGHT: 67 IN | HEART RATE: 74 BPM | DIASTOLIC BLOOD PRESSURE: 80 MMHG | BODY MASS INDEX: 28.22 KG/M2 | SYSTOLIC BLOOD PRESSURE: 150 MMHG | OXYGEN SATURATION: 96 %

## 2020-05-11 DIAGNOSIS — R10.10 PAIN OF UPPER ABDOMEN: ICD-10-CM

## 2020-05-11 DIAGNOSIS — K20.0 ESOPHAGITIS, EOSINOPHILIC: ICD-10-CM

## 2020-05-11 DIAGNOSIS — K21.00 GASTROESOPHAGEAL REFLUX DISEASE WITH ESOPHAGITIS: Primary | ICD-10-CM

## 2020-05-11 PROCEDURE — 99213 OFFICE O/P EST LOW 20 MIN: CPT | Performed by: PHYSICIAN ASSISTANT

## 2020-05-11 NOTE — PROGRESS NOTES
Chief Complaint   Patient presents with   • Heartburn   • Esophagitis   • Constipation       ENDO PROCEDURE ORDERED:    Subjective    Roe Gu Sr. is a 69 y.o. male. he is here today for follow-up.    History of Present Illness    Patient was seen on a recheck of his GERD, abdominal pain, constipation.  Last seen 02/17/2020.  He was given a trial on Bentyl.  He states that has helped.  He has also increased the fiber in his diet.  Weight is up 1 pound since last visit.  GERD is doing fairly well on the Prilosec.  He has had occasional dysphagia, but states he has been doing well.  Last EGD/colonoscopy 12/16/2019 showed esophageal stricture with increased eosinophils, hemorrhoids.    Patient had a previous CT scan of abdomen and pelvis with enlarging lymph nodes from his leukemia.  He does apparently have a follow up with Dr. Lubin in July.  Last laboratories 03/10/2020:  CBC showed white count of 32.09.  CMP showed creatinine 1.4, sodium 134, chloride 97, GFR 50, otherwise normal.    ASSESSMENT/PLAN:  Patient with chronic GERD, constipation, with abdominal pain.  Increased adenopathy.  Encouraged to follow up with Dr. Lubin.  We will plan follow up in 3 months.  We will plan repeat laboratories at that time.  Further pending clinical course and the results of the above.      The following portions of the patient's history were reviewed and updated as appropriate:   Past Medical History:   Diagnosis Date   • Arthritis    • CLL (chronic lymphocytic leukemia) (CMS/HCC)    • CVA (cerebral vascular accident) (CMS/HCC) 2011   • Dyslipidemia    • Gastritis    • GERD (gastroesophageal reflux disease)    • Night sweats      Past Surgical History:   Procedure Laterality Date   • APPENDECTOMY     • COLONOSCOPY  03/26/2014   • COLONOSCOPY N/A 12/16/2019    Procedure: COLONOSCOPY;  Surgeon: Gregg Matrinez MD;  Location: [facility] ENDOSCOPY;  Service: Gastroenterology   • ENDOSCOPY N/A 12/16/2019    Procedure:  ESOPHAGOGASTRODUODENOSCOPY--with dilation;  Surgeon: Gregg Martinez MD;  Location: [facility] ENDOSCOPY;  Service: Gastroenterology   • HERNIA REPAIR     • KNEE SURGERY     • LEG SURGERY     • MANDIBLE FRACTURE SURGERY     • AZ INSJ TUNNELED CVC W/O SUBQ PORT/ AGE 5 YR/> Right 7/7/2017    Procedure: MEDIPORT PLACEMENT WITH ULTRASOUND GUIDANCE       ;  Surgeon: Lucien Mcneal MD;  Location: [facility] OR;  Service: General   • PROSTATE SURGERY     • UPPER GASTROINTESTINAL ENDOSCOPY  03/26/2014   • UPPER GASTROINTESTINAL ENDOSCOPY  12/16/2019     Family History   Problem Relation Age of Onset   • Prostate cancer Father    • Stomach cancer Father    • Throat cancer Father    • Leukemia Brother    • Stomach cancer Brother    • Breast cancer Paternal Aunt    • Colon cancer Paternal Uncle    • Kidney cancer Brother    • Prostate cancer Paternal Uncle        No Known Allergies  Social History     Socioeconomic History   • Marital status:      Spouse name: Not on file   • Number of children: Not on file   • Years of education: Not on file   • Highest education level: Not on file   Tobacco Use   • Smoking status: Never Smoker   • Smokeless tobacco: Never Used   • Tobacco comment: never smoked cigarettes   Substance and Sexual Activity   • Alcohol use: No   • Drug use: No   • Sexual activity: Defer     Current Medications:  Prior to Admission medications    Medication Sig Start Date End Date Taking? Authorizing Provider   aspirin 81 MG tablet Take 81 mg by mouth Every Night.   Yes ProviderReyes MD   Cholecalciferol (VITAMIN D3) 5000 units capsule capsule Take 5,000 Units by mouth Daily.   Yes ProviderReyes MD   dicyclomine (BENTYL) 10 MG capsule Take 1 capsule by mouth 3 (Three) Times a Day Before Meals. 2/17/20  Yes Jackson Choudhary PA-C   omeprazole (priLOSEC) 20 MG capsule Take 1 capsule by mouth 2 (Two) Times a Day. 12/31/19  Yes Jackson Choudhary PA-C   ondansetron (ZOFRAN) 4 MG tablet Take 1 tablet by  "mouth 4 (Four) Times a Day As Needed for Nausea or Vomiting.  Patient taking differently: Take 4 mg by mouth As Needed for Nausea or Vomiting. 6/28/17  Yes Andres Lubin MD   PARoxetine (PAXIL) 40 MG tablet Take 1 tablet by mouth Daily. 8/5/19  Yes Reyes Sherwood MD   polyethylene glycol (MIRALAX) powder Take 17 g by mouth As Needed.   Yes Reyes Sherwood MD   rosuvastatin (CRESTOR) 10 MG tablet Take 10 mg by mouth Every Night. 5/8/19  Yes Reyes Sherwood MD   vitamin B-12 (CYANOCOBALAMIN) 500 MCG tablet Take 500 mcg by mouth Daily.   Yes Reyes Sherwood MD     Review of Systems  Review of Systems       Objective    /80   Pulse 74   Ht 170.2 cm (67.01\")   Wt 81.6 kg (179 lb 12.8 oz)   SpO2 96%   BMI 28.15 kg/m²   Physical Exam   Constitutional: He is oriented to person, place, and time. He appears well-developed and well-nourished. No distress.   Mild ill appearing   HENT:   Head: Normocephalic and atraumatic.   Eyes: Pupils are equal, round, and reactive to light. EOM are normal.   Neck: Normal range of motion.   Cardiovascular: Normal rate, regular rhythm and normal heart sounds.   Pulmonary/Chest: Effort normal and breath sounds normal.   Abdominal: Soft. Bowel sounds are normal. He exhibits no shifting dullness, no distension, no abdominal bruit, no ascites and no mass. There is no hepatosplenomegaly. There is tenderness. There is no rigidity, no rebound, no guarding and no CVA tenderness. No hernia. Hernia confirmed negative in the ventral area.   Musculoskeletal: Normal range of motion.   Neurological: He is alert and oriented to person, place, and time.   Skin: Skin is warm and dry.   Psychiatric: He has a normal mood and affect. His behavior is normal. Judgment and thought content normal.   Nursing note and vitals reviewed.    Assessment/Plan      1. Gastroesophageal reflux disease with esophagitis    2. Esophagitis, eosinophilic    3. Pain of upper abdomen    .   Roe " was seen today for heartburn, esophagitis and constipation.    Diagnoses and all orders for this visit:    Gastroesophageal reflux disease with esophagitis    Esophagitis, eosinophilic    Pain of upper abdomen        Orders placed during this encounter include:  No orders of the defined types were placed in this encounter.      Medications prescribed:  No orders of the defined types were placed in this encounter.      Requested Prescriptions      No prescriptions requested or ordered in this encounter       Review and/or summary of lab tests, radiology, procedures, medications. Review and summary of old records and obtaining of history. The risks and benefits of my recommendations, as well as other treatment options were discussed with the patient today. Questions were answered.    Follow-up: Return in about 3 months (around 8/11/2020), or if symptoms worsen or fail to improve, for lab prior.     * Surgery not found *      This document has been electronically signed by Jackson Choudhary PA-C on May 13, 2020 15:12      Results for orders placed or performed in visit on 03/10/20   CBC Auto Differential   Result Value Ref Range    WBC 32.09 (C) 3.40 - 10.80 10*3/mm3    RBC 5.45 4.14 - 5.80 10*6/mm3    Hemoglobin 15.1 13.0 - 17.7 g/dL    Hematocrit 45.2 37.5 - 51.0 %    MCV 82.9 79.0 - 97.0 fL    MCH 27.7 26.6 - 33.0 pg    MCHC 33.4 31.5 - 35.7 g/dL    RDW 14.2 12.3 - 15.4 %    RDW-SD 42.1 37.0 - 54.0 fl    MPV 10.1 6.0 - 12.0 fL    Platelets 188 140 - 450 10*3/mm3   Manual Differential   Result Value Ref Range    Neutrophil % 9.0 (L) 42.7 - 76.0 %    Lymphocyte % 89.0 (H) 19.6 - 45.3 %    Monocyte % 1.0 (L) 5.0 - 12.0 %    Eosinophil % 1.0 0.3 - 6.2 %    Neutrophils Absolute 2.89 1.70 - 7.00 10*3/mm3    Lymphocytes Absolute 28.56 (H) 0.70 - 3.10 10*3/mm3    Monocytes Absolute 0.32 0.10 - 0.90 10*3/mm3    Eosinophils Absolute 0.32 0.00 - 0.40 10*3/mm3    Anisocytosis Slight/1+ None Seen    WBC Morphology Normal Normal     Platelet Estimate Adequate Normal   Comprehensive Metabolic Panel   Result Value Ref Range    Glucose 98 65 - 99 mg/dL    BUN 19 8 - 23 mg/dL    Creatinine 1.40 (H) 0.76 - 1.27 mg/dL    Sodium 134 (L) 136 - 145 mmol/L    Potassium 4.7 3.5 - 5.2 mmol/L    Chloride 97 (L) 98 - 107 mmol/L    CO2 24.0 22.0 - 29.0 mmol/L    Calcium 9.5 8.6 - 10.5 mg/dL    Total Protein 7.7 6.0 - 8.5 g/dL    Albumin 4.70 3.50 - 5.20 g/dL    ALT (SGPT) 18 1 - 41 U/L    AST (SGOT) 22 1 - 40 U/L    Alkaline Phosphatase 87 39 - 117 U/L    Total Bilirubin 0.4 0.2 - 1.2 mg/dL    eGFR Non African Amer 50 (L) >60 mL/min/1.73    Globulin 3.0 gm/dL    A/G Ratio 1.6 g/dL    BUN/Creatinine Ratio 13.6 7.0 - 25.0    Anion Gap 13.0 5.0 - 15.0 mmol/L   Results for orders placed or performed in visit on 01/17/20   CBC Auto Differential   Result Value Ref Range    WBC 30.10 (C) 3.40 - 10.80 10*3/mm3    RBC 5.36 4.14 - 5.80 10*6/mm3    Hemoglobin 14.5 13.0 - 17.7 g/dL    Hematocrit 43.9 37.5 - 51.0 %    MCV 81.9 79.0 - 97.0 fL    MCH 27.1 26.6 - 33.0 pg    MCHC 33.0 31.5 - 35.7 g/dL    RDW 13.9 12.3 - 15.4 %    RDW-SD 40.8 37.0 - 54.0 fl    MPV 10.2 6.0 - 12.0 fL    Platelets 197 140 - 450 10*3/mm3   Manual Differential   Result Value Ref Range    Neutrophil % 16.0 (L) 42.7 - 76.0 %    Lymphocyte % 61.0 (H) 19.6 - 45.3 %    Monocyte % 2.0 (L) 5.0 - 12.0 %    Eosinophil % 1.0 0.3 - 6.2 %    Atypical Lymphocyte % 20.0 (H) 0.0 - 5.0 %    Neutrophils Absolute 4.82 1.70 - 7.00 10*3/mm3    Lymphocytes Absolute 18.36 (H) 0.70 - 3.10 10*3/mm3    Monocytes Absolute 0.60 0.10 - 0.90 10*3/mm3    Eosinophils Absolute 0.30 0.00 - 0.40 10*3/mm3    Smudge Cells 7.0 /100 WBC    RBC Morphology Normal Normal    WBC Morphology Normal Normal    Platelet Estimate Adequate Normal   Comprehensive Metabolic Panel   Result Value Ref Range    Glucose 89 65 - 99 mg/dL    BUN 15 8 - 23 mg/dL    Creatinine 1.24 0.76 - 1.27 mg/dL    Sodium 136 136 - 145 mmol/L    Potassium 4.5 3.5 -  "5.2 mmol/L    Chloride 101 98 - 107 mmol/L    CO2 25.0 22.0 - 29.0 mmol/L    Calcium 9.1 8.6 - 10.5 mg/dL    Total Protein 7.3 6.0 - 8.5 g/dL    Albumin 4.70 3.50 - 5.20 g/dL    ALT (SGPT) 20 1 - 41 U/L    AST (SGOT) 24 1 - 40 U/L    Alkaline Phosphatase 84 39 - 117 U/L    Total Bilirubin 0.4 0.2 - 1.2 mg/dL    eGFR Non African Amer 58 (L) >60 mL/min/1.73    Globulin 2.6 gm/dL    A/G Ratio 1.8 g/dL    BUN/Creatinine Ratio 12.1 7.0 - 25.0    Anion Gap 10.0 5.0 - 15.0 mmol/L   Results for orders placed or performed during the hospital encounter of 12/16/19   Tissue Pathology Exam   Result Value Ref Range    Case Report       Surgical Pathology Report                         Case: [id number]                                  Authorizing Provider:  Gregg Martinez MD        Collected:           12/16/2019 11:58 AM          Ordering Location:     [facility]             Received:            12/16/2019 02:06 PM                                 [place] [facility]                                                     Pathologist:           Juan Manuel Alicea MD                                                         Specimens:   1) - Gastric, Antrum                                                                                2) - Esophagus, Distal                                                                     Final Diagnosis       1.  MUCOSA, ANTRUM OF STOMACH:  CHRONIC GASTRITIS.  POSITIVE FOR HELICOBACTER PYLORI (HP IMMUNOSTAIN).    2.  MUCOSA, DISTAL ESOPHAGUS:  CHRONIC ESOPHAGITIS WITH LYMPHOCYTES AND EXCESS EOSINOPHILS (10-15 EOSINOPHILS PER HIGH POWER MICROSCOPIC FIELD) INVOLVING SQUAMOUS MUCOSA.      Gross Description       1.  The first container is labeled \"antrum of stomach\" and has nodular bits of white soft tissue measuring 0.4 cc in aggregate.  The entire specimen is embedded as 1A.    2.  The second container is labeled \"distal esophagus\" and has a nodular fragment of white soft tissue measuring " 0.4 cm in greatest dimension.  It is entirely embedded as 2A.      Special Stains       Helicobacter pylori (HP) immunostain is performed (Block 1) because an appropriate inflammatory milieu is present and organisms are not seen on H & E stained slides.    HP immunostain was developed and its performance characteristics determined by [facility].  It has not been cleared or approved by the U.S. Food and Drug Administration.  The FDA has determined that such clearance or approval is not necessary.  This test is used for clinical purposes.  It should not be regarded as investigational or for research.  This laboratory is certified under the Clinical Laboratory Improvement Amendments of 1988 (CLIA-88) as qualified to perform high complexity clinical laboratory testing.    All controls show appropriate reactivity.         Results for orders placed or performed in visit on 09/20/19   CBC Auto Differential   Result Value Ref Range    WBC 15.71 (H) 3.40 - 10.80 10*3/mm3    RBC 4.90 4.14 - 5.80 10*6/mm3    Hemoglobin 13.5 13.0 - 17.7 g/dL    Hematocrit 40.1 37.5 - 51.0 %    MCV 81.8 79.0 - 97.0 fL    MCH 27.6 26.6 - 33.0 pg    MCHC 33.7 31.5 - 35.7 g/dL    RDW 13.8 12.3 - 15.4 %    RDW-SD 40.5 37.0 - 54.0 fl    MPV 10.2 6.0 - 12.0 fL    Platelets 187 140 - 450 10*3/mm3   Manual Differential   Result Value Ref Range    Neutrophil % 22.0 (L) 42.7 - 76.0 %    Lymphocyte % 62.0 (H) 19.6 - 45.3 %    Monocyte % 6.0 5.0 - 12.0 %    Eosinophil % 1.0 0.3 - 6.2 %    Basophil % 2.0 (H) 0.0 - 1.5 %    Atypical Lymphocyte % 7.0 (H) 0.0 - 5.0 %    Neutrophils Absolute 3.46 1.70 - 7.00 10*3/mm3    Lymphocytes Absolute 9.74 (H) 0.70 - 3.10 10*3/mm3    Monocytes Absolute 0.94 (H) 0.10 - 0.90 10*3/mm3    Eosinophils Absolute 0.16 0.00 - 0.40 10*3/mm3    Basophils Absolute 0.31 (H) 0.00 - 0.20 10*3/mm3    RBC Morphology Normal Normal    WBC Morphology Normal Normal    Platelet Estimate Adequate Normal      *Note: Due to a large number of results and/or encounters for the requested time period, some results have not been displayed. A complete set of results can be found in Results Review.       Some portions of this note have been dictated using voice recognition software and may contain errors and/or omissions.

## 2020-05-11 NOTE — PATIENT INSTRUCTIONS
"  BMI for Adults    Body mass index (BMI) is a number that is calculated from a person's weight and height. BMI may help to estimate how much of a person's weight is composed of fat. BMI can help identify those who may be at higher risk for certain medical problems.  How is BMI used with adults?  BMI is used as a screening tool to identify possible weight problems. It is used to check whether a person is obese, overweight, healthy weight, or underweight.  How is BMI calculated?  BMI measures your weight and compares it to your height. This can be done either in English ([place] or metric measurements. Note that charts are available to help you find your BMI quickly and easily without having to do these calculations yourself.  To calculate your BMI in English ([place] measurements, your health care provider will:  1. Measure your weight in pounds (lb).  2. Multiply the number of pounds by 703.  ? For example, for a person who weighs 180 lb, multiply that number by 703, which equals 126,540.  3. Measure your height in inches (in). Then multiply that number by itself to get a measurement called \"inches squared.\"  ? For example, for a person who is 70 in tall, the \"inches squared\" measurement is 70 in x 70 in, which equals 4900 inches squared.  4. Divide the total from Step 2 (number of lb x 703) by the total from Step 3 (inches squared): 126,540 ÷ 4900 = 25.8. This is your BMI.  To calculate your BMI in metric measurements, your health care provider will:  1. Measure your weight in kilograms (kg).  2. Measure your height in meters (m). Then multiply that number by itself to get a measurement called \"meters squared.\"  ? For example, for a person who is 1.75 m tall, the \"meters squared\" measurement is 1.75 m x 1.75 m, which is equal to 3.1 meters squared.  3. Divide the number of kilograms (your weight) by the meters squared number. In this example: 70 ÷ 3.1 = 22.6. This is your BMI.  How is BMI interpreted?  To interpret " your results, your health care provider will use BMI charts to identify whether you are underweight, normal weight, overweight, or obese. The following guidelines will be used:  · Underweight: BMI less than 18.5.  · Normal weight: BMI between 18.5 and 24.9.  · Overweight: BMI between 25 and 29.9.  · Obese: BMI of 30 and above.  Please note:  · Weight includes both fat and muscle, so someone with a muscular build, such as an athlete, may have a BMI that is higher than 24.9. In cases like these, BMI is not an accurate measure of body fat.  · To determine if excess body fat is the cause of a BMI of 25 or higher, further assessments may need to be done by a health care provider.  · BMI is usually interpreted in the same way for men and women.  Why is BMI a useful tool?  BMI is useful in two ways:  · Identifying a weight problem that may be related to a medical condition, or that may increase the risk for medical problems.  · Promoting lifestyle and diet changes in order to reach a healthy weight.  Summary  · Body mass index (BMI) is a number that is calculated from a person's weight and height.  · BMI may help to estimate how much of a person's weight is composed of fat. BMI can help identify those who may be at higher risk for certain medical problems.  · BMI can be measured using English measurements or metric measurements.  · To interpret your results, your health care provider will use BMI charts to identify whether you are underweight, normal weight, overweight, or obese.  This information is not intended to replace advice given to you by your health care provider. Make sure you discuss any questions you have with your health care provider.  Document Released: 08/29/2005 Document Revised: 10/31/2018 Document Reviewed: 10/31/2018  [employer] Interactive Patient Education © 2020 [employer]

## 2020-07-14 ENCOUNTER — OFFICE VISIT (OUTPATIENT)
Dept: ONCOLOGY | Facility: CLINIC | Age: 70
End: 2020-07-14

## 2020-07-14 ENCOUNTER — LAB (OUTPATIENT)
Dept: ONCOLOGY | Facility: HOSPITAL | Age: 70
End: 2020-07-14

## 2020-07-14 VITALS
BODY MASS INDEX: 27.95 KG/M2 | HEIGHT: 67 IN | RESPIRATION RATE: 18 BRPM | DIASTOLIC BLOOD PRESSURE: 79 MMHG | HEART RATE: 74 BPM | WEIGHT: 178.1 LBS | SYSTOLIC BLOOD PRESSURE: 151 MMHG | TEMPERATURE: 98.9 F

## 2020-07-14 DIAGNOSIS — D72.820 LYMPHOCYTOSIS: ICD-10-CM

## 2020-07-14 DIAGNOSIS — N17.9 ACUTE KIDNEY INJURY (HCC): ICD-10-CM

## 2020-07-14 DIAGNOSIS — C91.10 CLL (CHRONIC LYMPHOCYTIC LEUKEMIA) (HCC): Primary | ICD-10-CM

## 2020-07-14 DIAGNOSIS — C91.10 CLL (CHRONIC LYMPHOCYTIC LEUKEMIA) (HCC): ICD-10-CM

## 2020-07-14 DIAGNOSIS — E87.1 HYPONATREMIA: ICD-10-CM

## 2020-07-14 LAB
ALBUMIN SERPL-MCNC: 4.8 G/DL (ref 3.5–5.2)
ALBUMIN/GLOB SERPL: 1.9 G/DL
ALP SERPL-CCNC: 95 U/L (ref 39–117)
ALT SERPL W P-5'-P-CCNC: 14 U/L (ref 1–41)
ANION GAP SERPL CALCULATED.3IONS-SCNC: 10 MMOL/L (ref 5–15)
AST SERPL-CCNC: 19 U/L (ref 1–40)
BILIRUB SERPL-MCNC: 0.5 MG/DL (ref 0–1.2)
BUN SERPL-MCNC: 17 MG/DL (ref 8–23)
BUN/CREAT SERPL: 13.9 (ref 7–25)
CALCIUM SPEC-SCNC: 9.3 MG/DL (ref 8.6–10.5)
CHLORIDE SERPL-SCNC: 94 MMOL/L (ref 98–107)
CO2 SERPL-SCNC: 24 MMOL/L (ref 22–29)
CREAT SERPL-MCNC: 1.22 MG/DL (ref 0.76–1.27)
DEPRECATED RDW RBC AUTO: 41.1 FL (ref 37–54)
ERYTHROCYTE [DISTWIDTH] IN BLOOD BY AUTOMATED COUNT: 14 % (ref 12.3–15.4)
GFR SERPL CREATININE-BSD FRML MDRD: 59 ML/MIN/1.73
GLOBULIN UR ELPH-MCNC: 2.5 GM/DL
GLUCOSE SERPL-MCNC: 85 MG/DL (ref 65–99)
HCT VFR BLD AUTO: 45.5 % (ref 37.5–51)
HGB BLD-MCNC: 15.2 G/DL (ref 13–17.7)
LYMPHOCYTES # BLD MANUAL: 33.44 10*3/MM3 (ref 0.7–3.1)
LYMPHOCYTES NFR BLD MANUAL: 3 % (ref 5–12)
LYMPHOCYTES NFR BLD MANUAL: 87 % (ref 19.6–45.3)
MCH RBC QN AUTO: 27.4 PG (ref 26.6–33)
MCHC RBC AUTO-ENTMCNC: 33.4 G/DL (ref 31.5–35.7)
MCV RBC AUTO: 82 FL (ref 79–97)
MONOCYTES # BLD AUTO: 1.15 10*3/MM3 (ref 0.1–0.9)
NEUTROPHILS # BLD AUTO: 2.69 10*3/MM3 (ref 1.7–7)
NEUTROPHILS NFR BLD MANUAL: 7 % (ref 42.7–76)
OSMOLALITY SERPL: 286 MOSM/KG (ref 280–290)
OSMOLALITY UR: 394 MOSM/KG (ref 38–1400)
PLATELET # BLD AUTO: 204 10*3/MM3 (ref 140–450)
PMV BLD AUTO: 9.8 FL (ref 6–12)
POTASSIUM SERPL-SCNC: 4.4 MMOL/L (ref 3.5–5.2)
PROT SERPL-MCNC: 7.3 G/DL (ref 6–8.5)
RBC # BLD AUTO: 5.55 10*6/MM3 (ref 4.14–5.8)
RBC MORPH BLD: NORMAL
SMALL PLATELETS BLD QL SMEAR: ADEQUATE
SODIUM SERPL-SCNC: 128 MMOL/L (ref 136–145)
SODIUM UR-SCNC: 35 MMOL/L
URATE SERPL-MCNC: 6.1 MG/DL (ref 3.4–7)
VARIANT LYMPHS NFR BLD MANUAL: 3 % (ref 0–5)
WBC # BLD AUTO: 38.44 10*3/MM3 (ref 3.4–10.8)
WBC MORPH BLD: NORMAL

## 2020-07-14 PROCEDURE — 99214 OFFICE O/P EST MOD 30 MIN: CPT | Performed by: INTERNAL MEDICINE

## 2020-07-14 PROCEDURE — 83935 ASSAY OF URINE OSMOLALITY: CPT | Performed by: INTERNAL MEDICINE

## 2020-07-14 PROCEDURE — 84550 ASSAY OF BLOOD/URIC ACID: CPT | Performed by: INTERNAL MEDICINE

## 2020-07-14 PROCEDURE — 80053 COMPREHEN METABOLIC PANEL: CPT | Performed by: INTERNAL MEDICINE

## 2020-07-14 PROCEDURE — 84300 ASSAY OF URINE SODIUM: CPT | Performed by: INTERNAL MEDICINE

## 2020-07-14 PROCEDURE — 85025 COMPLETE CBC W/AUTO DIFF WBC: CPT | Performed by: INTERNAL MEDICINE

## 2020-07-14 PROCEDURE — G0463 HOSPITAL OUTPT CLINIC VISIT: HCPCS | Performed by: INTERNAL MEDICINE

## 2020-07-14 PROCEDURE — 85007 BL SMEAR W/DIFF WBC COUNT: CPT | Performed by: INTERNAL MEDICINE

## 2020-07-14 PROCEDURE — 83930 ASSAY OF BLOOD OSMOLALITY: CPT | Performed by: INTERNAL MEDICINE

## 2020-07-14 RX ORDER — SODIUM CHLORIDE 1000 MG
1 TABLET, SOLUBLE MISCELLANEOUS 3 TIMES DAILY
Qty: 90 TABLET | Refills: 0 | Status: SHIPPED | OUTPATIENT
Start: 2020-07-14 | End: 2021-06-03

## 2020-07-14 NOTE — PROGRESS NOTES
DATE OF VISIT: 7/14/2020      REASON FOR VISIT: Chronic lymphocytic leukemia, lymphocytosis, hyponatremia      HISTORY OF PRESENT ILLNESS:    69-year-old male with medical problems significant for CVA, dyslipidemia, chronic lymphocytic leukemia that was diagnosed on peripheral blood flow cytometry in September 2016.  Patient is currently on surveillance without any chemotherapy is here for 4-month follow-up appointment today.  Denies any new lymph node enlargement.  States his night sweats has been stable.  Denies any fevers.  Denies any recent infection.          PAST MEDICAL HISTORY:    Past Medical History:   Diagnosis Date   • Arthritis    • CLL (chronic lymphocytic leukemia) (CMS/HCC)    • CVA (cerebral vascular accident) (CMS/HCC) 2011   • Dyslipidemia    • Gastritis    • GERD (gastroesophageal reflux disease)    • Night sweats        SOCIAL HISTORY:    Social History     Tobacco Use   • Smoking status: Never Smoker   • Smokeless tobacco: Never Used   • Tobacco comment: never smoked cigarettes   Substance Use Topics   • Alcohol use: No   • Drug use: No       Surgical History :  Past Surgical History:   Procedure Laterality Date   • APPENDECTOMY     • COLONOSCOPY  03/26/2014   • COLONOSCOPY N/A 12/16/2019    Procedure: COLONOSCOPY;  Surgeon: Gregg Martinez MD;  Location: [facility] ENDOSCOPY;  Service: Gastroenterology   • ENDOSCOPY N/A 12/16/2019    Procedure: ESOPHAGOGASTRODUODENOSCOPY--with dilation;  Surgeon: Gregg Martinez MD;  Location: [facility] ENDOSCOPY;  Service: Gastroenterology   • HERNIA REPAIR     • KNEE SURGERY     • LEG SURGERY     • MANDIBLE FRACTURE SURGERY     • OH INSJ TUNNELED CVC W/O SUBQ PORT/ AGE 5 YR/> Right 7/7/2017    Procedure: MEDIPORT PLACEMENT WITH ULTRASOUND GUIDANCE       ;  Surgeon: Lucien Mcneal MD;  Location: [facility] OR;  Service: General   • PROSTATE SURGERY     • UPPER GASTROINTESTINAL ENDOSCOPY  03/26/2014   • UPPER GASTROINTESTINAL ENDOSCOPY  12/16/2019       ALLERGIES:   "  No Known Allergies      FAMILY HISTORY:  Family History   Problem Relation Age of Onset   • Prostate cancer Father    • Stomach cancer Father    • Throat cancer Father    • Leukemia Brother    • Stomach cancer Brother    • Breast cancer Paternal Aunt    • Colon cancer Paternal Uncle    • Kidney cancer Brother    • Prostate cancer Paternal Uncle            REVIEW OF SYSTEMS:      CONSTITUTIONAL:  Complains of fatigue.  Has lost 4 pounds since last clinic visit despite of good appetite.  Complains of chronic night sweats, denies any recent worsening.  Denies any fever, chills.     EYES: No visual disturbances. No discharge. No new lesions    ENMT:  No epistaxis, mouth sores or difficulty swallowing.    RESPIRATORY: Positive for chronic intermittent shortness of breath. No new cough or hemoptysis.    CARDIOVASCULAR:  No chest pain or palpitations.    GASTROINTESTINAL: Complains of intermittent discomfort in left upper quadrant.  No  nausea, vomiting or blood in the stool.    GENITOURINARY: No Dysuria or Hematuria.    MUSCULOSKELETAL: Positive for chronic back pain.    LYMPHATICS:  Denies any abnormal swollen glands anywhere in the body.    NEUROLOGICAL : No tingling or numbness. No headache or dizziness. No seizures or balance problems.    SKIN: No new skin lesions.    ENDOCRINE : No new hot or cold intolerance. No new polyuria . No polydipsia.        PHYSICAL EXAMINATION:      VITAL SIGNS:  /79   Pulse 74   Temp 98.9 °F (37.2 °C) (Temporal)   Resp 18   Ht 170.2 cm (67.01\")   Wt 80.8 kg (178 lb 1.6 oz)   BMI 27.89 kg/m²       07/14/20  1115   Weight: 80.8 kg (178 lb 1.6 oz)         ECOG performance status: 2    CONSTITUTIONAL:  Not in any distress.    EYES: Mild conjunctival Pallor. No Icterus. No Pterygium. Extraocular Movements intact.No ptosis.    ENMT:  Normocephalic, Atraumatic.No Facial Asymmetry noted.    NECK:  No adenopathy.Trachea midline. NO JVD.    RESPIRATORY:  Fair air entry bilateral. No " rhonchi or wheezing.Fair respiratory effort.    CARDIOVASCULAR:  S1, S2. Regular rate and rhythm. No murmur or gallop appreciated.    ABDOMEN:  Soft, obese, nontender. Bowel sounds present in all four quadrants.  No Hepatosplenomegaly appreciated.    MUSCULOSKELETAL:  No edema.No Calf Tenderness.decreased range of motion.    NEUROLOGIC:    No  Motor  deficit appreciated. Cranial Nerves 2-12 grossly intact.    SKIN : No new skin lesion identified. Skin is warm and moist to touch.    LYMPHATICS: No new enlarged lymph nodes in neck or supraclavicular area.    PSYCHIATRY: Alert, awake and oriented ×3.Normal affect.  Normal judgment.  Makes good eye contact.        DIAGNOSTIC DATA:    Glucose   Date Value Ref Range Status   07/14/2020 85 65 - 99 mg/dL Final     Sodium   Date Value Ref Range Status   07/14/2020 128 (L) 136 - 145 mmol/L Final     Potassium   Date Value Ref Range Status   07/14/2020 4.4 3.5 - 5.2 mmol/L Final     CO2   Date Value Ref Range Status   07/14/2020 24.0 22.0 - 29.0 mmol/L Final     Chloride   Date Value Ref Range Status   07/14/2020 94 (L) 98 - 107 mmol/L Final     Anion Gap   Date Value Ref Range Status   07/14/2020 10.0 5.0 - 15.0 mmol/L Final     Creatinine   Date Value Ref Range Status   07/14/2020 1.22 0.76 - 1.27 mg/dL Final     BUN   Date Value Ref Range Status   07/14/2020 17 8 - 23 mg/dL Final     BUN/Creatinine Ratio   Date Value Ref Range Status   07/14/2020 13.9 7.0 - 25.0 Final     Calcium   Date Value Ref Range Status   07/14/2020 9.3 8.6 - 10.5 mg/dL Final     eGFR Non  Amer   Date Value Ref Range Status   07/14/2020 59 (L) >60 mL/min/1.73 Final     Alkaline Phosphatase   Date Value Ref Range Status   07/14/2020 95 39 - 117 U/L Final     Total Protein   Date Value Ref Range Status   07/14/2020 7.3 6.0 - 8.5 g/dL Final     ALT (SGPT)   Date Value Ref Range Status   07/14/2020 14 1 - 41 U/L Final     AST (SGOT)   Date Value Ref Range Status   07/14/2020 19 1 - 40 U/L Final      Total Bilirubin   Date Value Ref Range Status   07/14/2020 0.5 0.0 - 1.2 mg/dL Final     Albumin   Date Value Ref Range Status   07/14/2020 4.80 3.50 - 5.20 g/dL Final     Globulin   Date Value Ref Range Status   07/14/2020 2.5 gm/dL Final     Lab Results   Component Value Date    WBC 38.44 (C) 07/14/2020    HGB 15.2 07/14/2020    HCT 45.5 07/14/2020    MCV 82.0 07/14/2020     07/14/2020     Lab Results   Component Value Date    NEUTROABS 2.89 03/10/2020     Lab Results   Component Value Date    REFLABREPO SEE NOTE: 10/19/2016       Uric Acid   Specimen Information: Blood        Component  Ref Range & Units 11:35   Uric Acid  3.4 - 7.0 mg/dL 6.1    Resulting Agency [facility]         Specimen Collected: 07/14/20 11:35   Last Resulted: 07/14/20 11:53                 Sodium, Urine, Random - Urine, Clean Catch   Specimen Information: Urine, Clean Catch        Component  Ref Range & Units 11:35   Sodium, Urine  mmol/L 35    Resulting Agency [facility]      Narrative   Performed by: [facility]   Reference intervals for random urine have not been established.  Clinical usage is dependent upon physician's interpretation in combination with other laboratory tests.       Specimen Collected: 07/14/20 11:35   Last Resulted: 07/14/20 11:50                 Osmolality, Urine - Urine, Clean Catch   Specimen Information: Urine, Clean Catch        Component  Ref Range & Units 11:35   Osmolality, Urine  38-1,400 mOsm/kg 394    Resulting [facility]         Specimen Collected: 07/14/20 11:35   Last Resulted: 07/14/20 11:58                 Osmolality, Serum   Specimen Information: Blood        Component  Ref Range & Units 11:35   Osmolality  280 - 290 mOsm/kg 286    Resulting [facility]         Specimen Collected: 07/14/20 11:35   Last Resulted: 07/14/20 12:03                     ASSESSMENT AND PLAN:         1.  Chronic lymphocytic leukemia:  - Patient was initially diagnosed in September 2016.  - Patient  received 2 cycles of chemotherapy with bendamustine and rituximab between July 10, 2017 and August 7, 2017.  - Patient had allergic reaction on day or 2 of chemotherapy with flushing and facial swelling for which chemotherapy was discontinued.  -Patient has been on surveillance since then.  -CBC done earlier today shows white blood cell count is elevated at 38,000.  Hemoglobin and platelet is stable.  - At this point will continue with clinical surveillance.  Will ask patient to return to clinic in 4 months with repeat CBC, CMP, CT of chest abdomen and pelvis with contrast to be done prior to next clinic visit in 4 months.    2.  Leukocytosis:  -Secondary to CLL.    3.  Hyponatremia:( problem is new to me)  -Patient is found to have hyponatremia with sodium of 128 today.  - Work-up is consistent with combination of poor p.o. intake plus or minus decreased solute intake.  -We will highly encourage patient to drink about 1.5 to 2 L of water every day and we will also start him on salt tablet 1 g thrice daily.  - Referred him to nephrology clinic for further evaluation and treatment of hyponatremia.  Recommendation were discussed with patient and his family.    4.  Health maintenance: Patient does not smoke.  Had a colonoscopy in 2013.    5. Advance Care Planning: For now patient remains full code and is able to make decisions.  Patient has health care surrogate mentioned on chart.      PHQ-9 Total Score: 0   -Patient is not homicidal or suicidal.  No acute intervention required.    Roe Gu Sr. reports a pain score of 0.  Given his pain assessment as noted, treatment options were discussed and the following options were decided upon as a follow-up plan to address the patient's pain: No acute intervention required.         Andres Lubin MD  7/14/2020  11:22        Part of this note may be an electronic transcription/translation of spoken language to printed text using the Dragon Dictation System.

## 2020-07-15 ENCOUNTER — TELEPHONE (OUTPATIENT)
Dept: ONCOLOGY | Facility: CLINIC | Age: 70
End: 2020-07-15

## 2020-07-15 NOTE — TELEPHONE ENCOUNTER
Called and spoke with pt's wife regarding lab results and salt tablets and fluid restrictions.  V/u obtained.

## 2020-07-15 NOTE — TELEPHONE ENCOUNTER
----- Message from Andres Lubin MD sent at 7/14/2020  6:41 PM CDT -----  Please let patient know that he needs to drink about 1.5 to 2 liters of water every day.  I also want him to start taking salt tablet 1 g 3 times a day until his clinic appointment with nephrology.  Thank you

## 2020-08-12 ENCOUNTER — OFFICE VISIT (OUTPATIENT)
Dept: GASTROENTEROLOGY | Facility: CLINIC | Age: 70
End: 2020-08-12

## 2020-08-12 ENCOUNTER — LAB (OUTPATIENT)
Dept: LAB | Facility: OTHER | Age: 70
End: 2020-08-12

## 2020-08-12 ENCOUNTER — TRANSCRIBE ORDERS (OUTPATIENT)
Dept: GENERAL RADIOLOGY | Facility: CLINIC | Age: 70
End: 2020-08-12

## 2020-08-12 VITALS
SYSTOLIC BLOOD PRESSURE: 128 MMHG | DIASTOLIC BLOOD PRESSURE: 82 MMHG | HEIGHT: 67 IN | BODY MASS INDEX: 27.84 KG/M2 | HEART RATE: 70 BPM | WEIGHT: 177.4 LBS

## 2020-08-12 DIAGNOSIS — N18.30 CHRONIC KIDNEY DISEASE, STAGE III (MODERATE) (HCC): Primary | ICD-10-CM

## 2020-08-12 DIAGNOSIS — R10.10 PAIN OF UPPER ABDOMEN: ICD-10-CM

## 2020-08-12 DIAGNOSIS — K59.09 OTHER CONSTIPATION: ICD-10-CM

## 2020-08-12 DIAGNOSIS — I63.9: ICD-10-CM

## 2020-08-12 DIAGNOSIS — E87.1 HYPONATREMIA: ICD-10-CM

## 2020-08-12 DIAGNOSIS — K21.00 GASTROESOPHAGEAL REFLUX DISEASE WITH ESOPHAGITIS: Primary | ICD-10-CM

## 2020-08-12 DIAGNOSIS — C91.10: ICD-10-CM

## 2020-08-12 DIAGNOSIS — N18.30 CHRONIC KIDNEY DISEASE, STAGE III (MODERATE) (HCC): ICD-10-CM

## 2020-08-12 LAB
ALBUMIN SERPL-MCNC: 4.8 G/DL (ref 3.5–5)
ALBUMIN/GLOB SERPL: 1.7 G/DL (ref 1.1–1.8)
ALP SERPL-CCNC: 94 U/L (ref 38–126)
ALT SERPL W P-5'-P-CCNC: 18 U/L
ANION GAP SERPL CALCULATED.3IONS-SCNC: 10 MMOL/L (ref 5–15)
AST SERPL-CCNC: 31 U/L (ref 17–59)
BACTERIA UR QL AUTO: ABNORMAL /HPF
BILIRUB SERPL-MCNC: 0.6 MG/DL (ref 0.2–1.3)
BILIRUB UR QL STRIP: NEGATIVE
BUN SERPL-MCNC: 18 MG/DL (ref 7–23)
BUN/CREAT SERPL: 15.1 (ref 7–25)
CALCIUM SPEC-SCNC: 9.3 MG/DL (ref 8.4–10.2)
CHLORIDE SERPL-SCNC: 101 MMOL/L (ref 101–112)
CLARITY UR: CLEAR
CO2 SERPL-SCNC: 28 MMOL/L (ref 22–30)
COLOR UR: YELLOW
CREAT SERPL-MCNC: 1.19 MG/DL (ref 0.7–1.3)
GFR SERPL CREATININE-BSD FRML MDRD: 61 ML/MIN/1.73 (ref 49–113)
GLOBULIN UR ELPH-MCNC: 2.8 GM/DL (ref 2.3–3.5)
GLUCOSE SERPL-MCNC: 105 MG/DL (ref 70–99)
GLUCOSE UR STRIP-MCNC: NEGATIVE MG/DL
HGB UR QL STRIP.AUTO: ABNORMAL
HYALINE CASTS UR QL AUTO: ABNORMAL /LPF
KETONES UR QL STRIP: NEGATIVE
LEUKOCYTE ESTERASE UR QL STRIP.AUTO: NEGATIVE
MAGNESIUM SERPL-MCNC: 2.4 MG/DL (ref 1.6–2.3)
NITRITE UR QL STRIP: NEGATIVE
PH UR STRIP.AUTO: 6 [PH] (ref 5.5–8)
PHOSPHATE SERPL-MCNC: 3 MG/DL (ref 2.5–4.5)
POTASSIUM SERPL-SCNC: 4.3 MMOL/L (ref 3.4–5)
PROT SERPL-MCNC: 7.6 G/DL (ref 6.3–8.6)
PROT UR QL STRIP: NEGATIVE
RBC # UR: ABNORMAL /HPF
REF LAB TEST METHOD: ABNORMAL
SODIUM SERPL-SCNC: 139 MMOL/L (ref 137–145)
SP GR UR STRIP: 1.02 (ref 1–1.03)
SQUAMOUS #/AREA URNS HPF: ABNORMAL /HPF
UROBILINOGEN UR QL STRIP: ABNORMAL
WBC UR QL AUTO: ABNORMAL /HPF

## 2020-08-12 PROCEDURE — 86225 DNA ANTIBODY NATIVE: CPT | Performed by: NURSE PRACTITIONER

## 2020-08-12 PROCEDURE — 83735 ASSAY OF MAGNESIUM: CPT | Performed by: INTERNAL MEDICINE

## 2020-08-12 PROCEDURE — 81001 URINALYSIS AUTO W/SCOPE: CPT | Performed by: INTERNAL MEDICINE

## 2020-08-12 PROCEDURE — 83970 ASSAY OF PARATHORMONE: CPT | Performed by: UROLOGY

## 2020-08-12 PROCEDURE — 80053 COMPREHEN METABOLIC PANEL: CPT | Performed by: INTERNAL MEDICINE

## 2020-08-12 PROCEDURE — 82570 ASSAY OF URINE CREATININE: CPT | Performed by: NURSE PRACTITIONER

## 2020-08-12 PROCEDURE — 83935 ASSAY OF URINE OSMOLALITY: CPT | Performed by: NURSE PRACTITIONER

## 2020-08-12 PROCEDURE — 99213 OFFICE O/P EST LOW 20 MIN: CPT | Performed by: PHYSICIAN ASSISTANT

## 2020-08-12 PROCEDURE — 84100 ASSAY OF PHOSPHORUS: CPT | Performed by: INTERNAL MEDICINE

## 2020-08-12 PROCEDURE — G0432 EIA HIV-1/HIV-2 SCREEN: HCPCS | Performed by: UROLOGY

## 2020-08-12 PROCEDURE — 86803 HEPATITIS C AB TEST: CPT | Performed by: UROLOGY

## 2020-08-12 PROCEDURE — 84153 ASSAY OF PSA TOTAL: CPT | Performed by: UROLOGY

## 2020-08-12 PROCEDURE — 82306 VITAMIN D 25 HYDROXY: CPT | Performed by: UROLOGY

## 2020-08-12 PROCEDURE — 86038 ANTINUCLEAR ANTIBODIES: CPT | Performed by: NURSE PRACTITIONER

## 2020-08-12 PROCEDURE — 84300 ASSAY OF URINE SODIUM: CPT | Performed by: NURSE PRACTITIONER

## 2020-08-12 PROCEDURE — 87340 HEPATITIS B SURFACE AG IA: CPT | Performed by: NURSE PRACTITIONER

## 2020-08-12 PROCEDURE — 36415 COLL VENOUS BLD VENIPUNCTURE: CPT | Performed by: INTERNAL MEDICINE

## 2020-08-12 NOTE — PROGRESS NOTES
Chief Complaint   Patient presents with   • Heartburn   • Esophagitis   • Abdominal Pain       ENDO PROCEDURE ORDERED:    Subjective    Roe Gu Sr. is a 69 y.o. male. he is here today for follow-up.    History of Present Illness    Patient is seen on a recheck of his GERD, abdominal pain. Last seen 05/11/2020. The patient states from a GI standpoint, he is still doing well. He feels the Prilosec 20 mg b.i.d. is doing well for his heartburn. He denied nausea, vomiting or dysphagia. He does take the Bentyl and MiraLAX as needed for bowels without blood or mucus. Weight is down 2.5 pounds since last visit. Last EGD/colonoscopy 12/16/2019 showed esophageal stricture dilated and hemorrhoids.     Patient had laboratory 07/14/2020 that showed normal uric acid. Urine sodium and osmolality are noted. CBC showed a white count of 38.44. He does see Dr. Lubin and saw him on that date. CMP showed sodium 128, chloride 94, GFR 59; otherwise normal. He has seen Dr. Krueger for blood in his urine. Had urinalysis showing blood on 08/12/2020. Magnesium was high at 2.4. CMP showed glucose 105; otherwise normal. He also sees Dr. Cook.     ASSESSMENT/PLAN:  Patient with GERD, abdominal pain, variable bowel habits, doing well on current regimen. As long as he is doing well, we will continue current regimen. He was strongly encouraged to keep his followup with his other care providers. We will plan followup in 6 months; sooner if need. Further pending clinical course and the results of the above.       The following portions of the patient's history were reviewed and updated as appropriate:   Past Medical History:   Diagnosis Date   • Arthritis    • CLL (chronic lymphocytic leukemia) (CMS/HCC)    • CVA (cerebral vascular accident) (CMS/[facility]) 2011   • Dyslipidemia    • Gastritis    • GERD (gastroesophageal reflux disease)    • Night sweats      Past Surgical History:   Procedure Laterality Date   • APPENDECTOMY     • COLONOSCOPY   03/26/2014   • COLONOSCOPY N/A 12/16/2019    Procedure: COLONOSCOPY;  Surgeon: Gregg Martinez MD;  Location: [facility] ENDOSCOPY;  Service: Gastroenterology   • ENDOSCOPY N/A 12/16/2019    Procedure: ESOPHAGOGASTRODUODENOSCOPY--with dilation;  Surgeon: Gregg Martinez MD;  Location: [facility] ENDOSCOPY;  Service: Gastroenterology   • HERNIA REPAIR     • KNEE SURGERY     • LEG SURGERY     • MANDIBLE FRACTURE SURGERY     • [place] INSJ TUNNELED CVC W/O SUBQ PORT/ AGE 5 YR/> Right 7/7/2017    Procedure: MEDIPORT PLACEMENT WITH ULTRASOUND GUIDANCE       ;  Surgeon: Lucien Mcneal MD;  Location: [facility] OR;  Service: General   • PROSTATE SURGERY     • UPPER GASTROINTESTINAL ENDOSCOPY  03/26/2014   • UPPER GASTROINTESTINAL ENDOSCOPY  12/16/2019     Family History   Problem Relation Age of Onset   • Prostate cancer Father    • Stomach cancer Father    • Throat cancer Father    • Leukemia Brother    • Stomach cancer Brother    • Breast cancer Paternal Aunt    • Colon cancer Paternal Uncle    • Kidney cancer Brother    • Prostate cancer Paternal Uncle        No Known Allergies  Social History     Socioeconomic History   • Marital status:      Spouse name: Not on file   • Number of children: Not on file   • Years of education: Not on file   • Highest education level: Not on file   Tobacco Use   • Smoking status: Never Smoker   • Smokeless tobacco: Never Used   • Tobacco comment: never smoked cigarettes   Substance and Sexual Activity   • Alcohol use: No   • Drug use: No   • Sexual activity: Defer     Current Medications:  Prior to Admission medications    Medication Sig Start Date End Date Taking? Authorizing Provider   aspirin 81 MG tablet Take 81 mg by mouth Every Night.   Yes ProviderReyes MD   Cholecalciferol (VITAMIN D3) 5000 units capsule capsule Take 5,000 Units by mouth Daily.   Yes ProviderReyes MD   dicyclomine (BENTYL) 10 MG capsule Take 1 capsule by mouth 3 (Three) Times a Day Before Meals. 2/17/20   "Yes Jackson Choudhary PA-C   omeprazole (priLOSEC) 20 MG capsule Take 1 capsule by mouth 2 (Two) Times a Day. 12/31/19  Yes Jackson Choudhary PA-C   ondansetron (ZOFRAN) 4 MG tablet Take 1 tablet by mouth 4 (Four) Times a Day As Needed for Nausea or Vomiting.  Patient taking differently: Take 4 mg by mouth As Needed for Nausea or Vomiting. 6/28/17  Yes Andres Lubin MD   PARoxetine (PAXIL) 40 MG tablet Take 1 tablet by mouth Daily. 8/5/19  Yes Reyes Sherwood MD   polyethylene glycol (MIRALAX) powder Take 17 g by mouth As Needed.   Yes Reyes Sherwood MD   rosuvastatin (CRESTOR) 10 MG tablet Take 10 mg by mouth Every Night. 5/8/19  Yes Reyes Sherwood MD   sodium chloride 1 g tablet Take 1 tablet by mouth 3 (Three) Times a Day. 7/14/20  Yes Andres Lubin MD   vitamin B-12 (CYANOCOBALAMIN) 500 MCG tablet Take 500 mcg by mouth Daily.   Yes ProviderReyes MD     Review of Systems  Review of Systems       Objective    /82   Pulse 70   Ht 170.2 cm (67\")   Wt 80.5 kg (177 lb 6.4 oz)   BMI 27.78 kg/m²   Physical Exam   Constitutional: He is oriented to person, place, and time. He appears well-developed and well-nourished. No distress.   Mild ill appearing   HENT:   Head: Normocephalic and atraumatic.   Eyes: Pupils are equal, round, and reactive to light. EOM are normal.   Neck: Normal range of motion.   Cardiovascular: Normal rate, regular rhythm and normal heart sounds.   Pulmonary/Chest: Effort normal and breath sounds normal.   Abdominal: Soft. Bowel sounds are normal. He exhibits no shifting dullness, no distension, no abdominal bruit, no ascites and no mass. There is no hepatosplenomegaly. There is tenderness. There is no rigidity, no rebound, no guarding and no CVA tenderness. No hernia. Hernia confirmed negative in the ventral area.   Musculoskeletal: Normal range of motion.   Neurological: He is alert and oriented to person, place, and time.   Skin: Skin is warm and dry. "   Psychiatric: He has a normal mood and affect. His behavior is normal. Judgment and thought content normal.   Nursing note and vitals reviewed.    Assessment/Plan      1. Gastroesophageal reflux disease with esophagitis    2. Pain of upper abdomen    3. Other constipation    .   Roe was seen today for heartburn, esophagitis and abdominal pain.    Diagnoses and all orders for this visit:    Gastroesophageal reflux disease with esophagitis    Pain of upper abdomen    Other constipation        Orders placed during this encounter include:  No orders of the defined types were placed in this encounter.      Medications prescribed:  No orders of the defined types were placed in this encounter.      Requested Prescriptions      No prescriptions requested or ordered in this encounter       Review and/or summary of lab tests, radiology, procedures, medications. Review and summary of old records and obtaining of history. The risks and benefits of my recommendations, as well as other treatment options were discussed with the patient today. Questions were answered.    Follow-up: Return in about 6 months (around 2/12/2021), or if symptoms worsen or fail to improve.     * Surgery not found *      This document has been electronically signed by Jackson Choudhary PA-C on August 14, 2020 17:58      Results for orders placed or performed in visit on 08/12/20   REJI Direct Reflex to 11 Biomarker   Result Value Ref Range    REJI Direct Negative Negative   Hep B Confirmation Tube   Result Value Ref Range    Extra Tube Hold for add-ons.    Hepatitis B Surface Antigen   Result Value Ref Range    Hepatitis B Surface Ag Non-Reactive Non-Reactive   Urinalysis, Microscopic Only - Urine, Clean Catch   Result Value Ref Range    RBC, UA 6-12 (A) None Seen /HPF    WBC, UA None Seen None Seen /HPF    Bacteria, UA Trace (A) None Seen /HPF    Squamous Epithelial Cells, UA None Seen None Seen, 0-2 /HPF    Hyaline Casts, UA None Seen None Seen /LPF     Methodology Manual Light Microscopy    Urinalysis With Microscopic If Indicated (No Culture) - Urine, Clean Catch   Result Value Ref Range    Color, UA Yellow Yellow, Straw    Appearance, UA Clear Clear    pH, UA 6.0 5.5 - 8.0    Specific Gravity, UA 1.020 1.005 - 1.030    Glucose, UA Negative Negative    Ketones, UA Negative Negative    Bilirubin, UA Negative Negative    Blood, UA Moderate (2+) (A) Negative    Protein, UA Negative Negative    Leuk Esterase, UA Negative Negative    Nitrite, UA Negative Negative    Urobilinogen, UA 0.2 E.U./dL 0.2 - 1.0 E.U./dL   HIV-1 / O / 2 Ag / Antibody 4th Generation   Result Value Ref Range    HIV-1/ HIV-2 Non-Reactive Non-Reactive   Hepatitis C Antibody   Result Value Ref Range    Hepatitis C Ab Non-Reactive Non-Reactive   Anti-DNA Antibody, Double-stranded   Result Value Ref Range    Anti-DNA (DS) Ab Qn 1 0 - 9 IU/mL   Sodium, Urine, Random - Urine, Clean Catch   Result Value Ref Range    Sodium, Urine 68 mmol/L   Osmolality, Urine - Urine, Clean Catch   Result Value Ref Range    Osmolality, Urine 649 mOsm/kg   Creatinine, Urine, Random - Urine, Clean Catch   Result Value Ref Range    Creatinine, Urine 191.0 mg/dL   Vitamin D 25 Hydroxy   Result Value Ref Range    25 Hydroxy, Vitamin D 50.7 30.0 - 100.0 ng/ml   PSA DIAGNOSTIC   Result Value Ref Range    PSA 1.820 0.000 - 4.000 ng/mL   Phosphorus   Result Value Ref Range    Phosphorus 3.0 2.5 - 4.5 mg/dL   PTH, Intact   Result Value Ref Range    PTH, Intact 31.6 15.0 - 65.0 pg/mL   Magnesium   Result Value Ref Range    Magnesium 2.4 (H) 1.6 - 2.3 mg/dL   Comprehensive Metabolic Panel   Result Value Ref Range    Glucose 105 (H) 70 - 99 mg/dL    BUN 18 7 - 23 mg/dL    Creatinine 1.19 0.70 - 1.30 mg/dL    Sodium 139 137 - 145 mmol/L    Potassium 4.3 3.4 - 5.0 mmol/L    Chloride 101 101 - 112 mmol/L    CO2 28.0 22.0 - 30.0 mmol/L    Calcium 9.3 8.4 - 10.2 mg/dL    Total Protein 7.6 6.3 - 8.6 g/dL    Albumin 4.80 3.50 - 5.00 g/dL     ALT (SGPT) 18 <=50 U/L    AST (SGOT) 31 17 - 59 U/L    Alkaline Phosphatase 94 38 - 126 U/L    Total Bilirubin 0.6 0.2 - 1.3 mg/dL    eGFR Non  Amer 61 49 - 113 mL/min/1.73    Globulin 2.8 2.3 - 3.5 gm/dL    A/G Ratio 1.7 1.1 - 1.8 g/dL    BUN/Creatinine Ratio 15.1 7.0 - 25.0    Anion Gap 10.0 5.0 - 15.0 mmol/L   Results for orders placed or performed in visit on 07/14/20   Sodium, Urine, Random - Urine, Clean Catch   Result Value Ref Range    Sodium, Urine 35 mmol/L   Osmolality, Urine - Urine, Clean Catch   Result Value Ref Range    Osmolality, Urine 394 38-1,400 mOsm/kg   Uric Acid   Result Value Ref Range    Uric Acid 6.1 3.4 - 7.0 mg/dL   Osmolality, Serum   Result Value Ref Range    Osmolality 286 280 - 290 mOsm/kg   Results for orders placed or performed in visit on 07/14/20   CBC Auto Differential   Result Value Ref Range    WBC 38.44 (C) 3.40 - 10.80 10*3/mm3    RBC 5.55 4.14 - 5.80 10*6/mm3    Hemoglobin 15.2 13.0 - 17.7 g/dL    Hematocrit 45.5 37.5 - 51.0 %    MCV 82.0 79.0 - 97.0 fL    MCH 27.4 26.6 - 33.0 pg    MCHC 33.4 31.5 - 35.7 g/dL    RDW 14.0 12.3 - 15.4 %    RDW-SD 41.1 37.0 - 54.0 fl    MPV 9.8 6.0 - 12.0 fL    Platelets 204 140 - 450 10*3/mm3   Manual Differential   Result Value Ref Range    Neutrophil % 7.0 (L) 42.7 - 76.0 %    Lymphocyte % 87.0 (H) 19.6 - 45.3 %    Monocyte % 3.0 (L) 5.0 - 12.0 %    Atypical Lymphocyte % 3.0 0.0 - 5.0 %    Neutrophils Absolute 2.69 1.70 - 7.00 10*3/mm3    Lymphocytes Absolute 33.44 (H) 0.70 - 3.10 10*3/mm3    Monocytes Absolute 1.15 (H) 0.10 - 0.90 10*3/mm3    RBC Morphology Normal Normal    WBC Morphology Normal Normal    Platelet Estimate Adequate Normal   Comprehensive Metabolic Panel   Result Value Ref Range    Glucose 85 65 - 99 mg/dL    BUN 17 8 - 23 mg/dL    Creatinine 1.22 0.76 - 1.27 mg/dL    Sodium 128 (L) 136 - 145 mmol/L    Potassium 4.4 3.5 - 5.2 mmol/L    Chloride 94 (L) 98 - 107 mmol/L    CO2 24.0 22.0 - 29.0 mmol/L    Calcium 9.3  8.6 - 10.5 mg/dL    Total Protein 7.3 6.0 - 8.5 g/dL    Albumin 4.80 3.50 - 5.20 g/dL    ALT (SGPT) 14 1 - 41 U/L    AST (SGOT) 19 1 - 40 U/L    Alkaline Phosphatase 95 39 - 117 U/L    Total Bilirubin 0.5 0.0 - 1.2 mg/dL    eGFR Non African Amer 59 (L) >60 mL/min/1.73    Globulin 2.5 gm/dL    A/G Ratio 1.9 g/dL    BUN/Creatinine Ratio 13.9 7.0 - 25.0    Anion Gap 10.0 5.0 - 15.0 mmol/L   Results for orders placed or performed in visit on 03/10/20   CBC Auto Differential   Result Value Ref Range    WBC 32.09 (C) 3.40 - 10.80 10*3/mm3    RBC 5.45 4.14 - 5.80 10*6/mm3    Hemoglobin 15.1 13.0 - 17.7 g/dL    Hematocrit 45.2 37.5 - 51.0 %    MCV 82.9 79.0 - 97.0 fL    MCH 27.7 26.6 - 33.0 pg    MCHC 33.4 31.5 - 35.7 g/dL    RDW 14.2 12.3 - 15.4 %    RDW-SD 42.1 37.0 - 54.0 fl    MPV 10.1 6.0 - 12.0 fL    Platelets 188 140 - 450 10*3/mm3     *Note: Due to a large number of results and/or encounters for the requested time period, some results have not been displayed. A complete set of results can be found in Results Review.       Some portions of this note have been dictated using voice recognition software and may contain errors and/or omissions.

## 2020-08-12 NOTE — PATIENT INSTRUCTIONS

## 2020-08-13 LAB
25(OH)D3 SERPL-MCNC: 50.7 NG/ML (ref 30–100)
CREAT UR-MCNC: 191 MG/DL
HBV SURFACE AG SERPL QL IA: NORMAL
HCV AB SER DONR QL: NORMAL
HIV1+2 AB SER QL: NORMAL
HOLD SPECIMEN: NORMAL
OSMOLALITY UR: 649 MOSM/KG
PSA SERPL-MCNC: 1.82 NG/ML (ref 0–4)
PTH-INTACT SERPL-MCNC: 31.6 PG/ML (ref 15–65)
SODIUM UR-SCNC: 68 MMOL/L

## 2020-08-14 LAB
ANA SER QL: NEGATIVE
DSDNA AB SER-ACNC: 1 IU/ML (ref 0–9)

## 2020-08-28 ENCOUNTER — LAB (OUTPATIENT)
Dept: LAB | Facility: OTHER | Age: 70
End: 2020-08-28

## 2020-08-28 ENCOUNTER — TRANSCRIBE ORDERS (OUTPATIENT)
Dept: LAB | Facility: OTHER | Age: 70
End: 2020-08-28

## 2020-08-28 DIAGNOSIS — C91.10: ICD-10-CM

## 2020-08-28 DIAGNOSIS — E87.1 HYPONATREMIA: ICD-10-CM

## 2020-08-28 DIAGNOSIS — I63.9: ICD-10-CM

## 2020-08-28 DIAGNOSIS — N18.30 CHRONIC KIDNEY DISEASE, STAGE III (MODERATE) (HCC): Primary | ICD-10-CM

## 2020-08-28 DIAGNOSIS — N18.30 CHRONIC KIDNEY DISEASE, STAGE III (MODERATE) (HCC): ICD-10-CM

## 2020-08-28 LAB
BACTERIA UR QL AUTO: ABNORMAL /HPF
BILIRUB UR QL STRIP: NEGATIVE
CLARITY UR: CLEAR
COLOR UR: YELLOW
GLUCOSE UR STRIP-MCNC: NEGATIVE MG/DL
HGB UR QL STRIP.AUTO: ABNORMAL
HYALINE CASTS UR QL AUTO: ABNORMAL /LPF
KETONES UR QL STRIP: NEGATIVE
LEUKOCYTE ESTERASE UR QL STRIP.AUTO: NEGATIVE
NITRITE UR QL STRIP: NEGATIVE
PH UR STRIP.AUTO: 7 [PH] (ref 5.5–8)
PROT UR QL STRIP: NEGATIVE
RBC # UR: ABNORMAL /HPF
REF LAB TEST METHOD: ABNORMAL
SP GR UR STRIP: 1.01 (ref 1–1.03)
SQUAMOUS #/AREA URNS HPF: ABNORMAL /HPF
UROBILINOGEN UR QL STRIP: ABNORMAL
WBC UR QL AUTO: ABNORMAL /HPF

## 2020-08-28 PROCEDURE — 87086 URINE CULTURE/COLONY COUNT: CPT | Performed by: NURSE PRACTITIONER

## 2020-08-28 PROCEDURE — 81001 URINALYSIS AUTO W/SCOPE: CPT | Performed by: INTERNAL MEDICINE

## 2020-08-29 LAB — BACTERIA SPEC AEROBE CULT: NO GROWTH

## 2020-11-13 ENCOUNTER — HOSPITAL ENCOUNTER (OUTPATIENT)
Dept: CT IMAGING | Facility: HOSPITAL | Age: 70
Discharge: HOME | End: 2020-11-13

## 2020-11-13 ENCOUNTER — LAB (OUTPATIENT)
Dept: LAB | Facility: HOSPITAL | Age: 70
End: 2020-11-13

## 2020-11-13 DIAGNOSIS — C91.10 CLL (CHRONIC LYMPHOCYTIC LEUKEMIA) (HCC): ICD-10-CM

## 2020-11-13 LAB
ALBUMIN SERPL-MCNC: 4.8 G/DL (ref 3.5–5.2)
ALBUMIN/GLOB SERPL: 1.8 G/DL
ALP SERPL-CCNC: 87 U/L (ref 39–117)
ALT SERPL W P-5'-P-CCNC: 19 U/L (ref 1–41)
ANION GAP SERPL CALCULATED.3IONS-SCNC: 9 MMOL/L (ref 5–15)
AST SERPL-CCNC: 18 U/L (ref 1–40)
BILIRUB SERPL-MCNC: 0.4 MG/DL (ref 0–1.2)
BUN SERPL-MCNC: 14 MG/DL (ref 8–23)
BUN/CREAT SERPL: 11.7 (ref 7–25)
CALCIUM SPEC-SCNC: 9.5 MG/DL (ref 8.6–10.5)
CHLORIDE SERPL-SCNC: 101 MMOL/L (ref 98–107)
CO2 SERPL-SCNC: 27 MMOL/L (ref 22–29)
CREAT SERPL-MCNC: 1.2 MG/DL (ref 0.76–1.27)
DEPRECATED RDW RBC AUTO: 42.9 FL (ref 37–54)
ERYTHROCYTE [DISTWIDTH] IN BLOOD BY AUTOMATED COUNT: 14.3 % (ref 12.3–15.4)
GFR SERPL CREATININE-BSD FRML MDRD: 60 ML/MIN/1.73
GLOBULIN UR ELPH-MCNC: 2.6 GM/DL
GLUCOSE SERPL-MCNC: 94 MG/DL (ref 65–99)
HCT VFR BLD AUTO: 45.4 % (ref 37.5–51)
HGB BLD-MCNC: 14.8 G/DL (ref 13–17.7)
LYMPHOCYTES # BLD MANUAL: 37.98 10*3/MM3 (ref 0.7–3.1)
LYMPHOCYTES NFR BLD MANUAL: 3 % (ref 5–12)
LYMPHOCYTES NFR BLD MANUAL: 85 % (ref 19.6–45.3)
MCH RBC QN AUTO: 27.4 PG (ref 26.6–33)
MCHC RBC AUTO-ENTMCNC: 32.6 G/DL (ref 31.5–35.7)
MCV RBC AUTO: 84.1 FL (ref 79–97)
MONOCYTES # BLD AUTO: 1.34 10*3/MM3 (ref 0.1–0.9)
NEUTROPHILS # BLD AUTO: 5.36 10*3/MM3 (ref 1.7–7)
NEUTROPHILS NFR BLD MANUAL: 12 % (ref 42.7–76)
PLATELET # BLD AUTO: 169 10*3/MM3 (ref 140–450)
PMV BLD AUTO: 9.6 FL (ref 6–12)
POTASSIUM SERPL-SCNC: 4.4 MMOL/L (ref 3.5–5.2)
PROT SERPL-MCNC: 7.4 G/DL (ref 6–8.5)
RBC # BLD AUTO: 5.4 10*6/MM3 (ref 4.14–5.8)
RBC MORPH BLD: NORMAL
SMALL PLATELETS BLD QL SMEAR: ADEQUATE
SMUDGE CELLS IN BLOOD BY LIGHT MICROSCOPY: 3 /100 WBC
SODIUM SERPL-SCNC: 137 MMOL/L (ref 136–145)
WBC # BLD AUTO: 44.68 10*3/MM3 (ref 3.4–10.8)
WBC MORPH BLD: NORMAL

## 2020-11-13 PROCEDURE — 25010000002 IOPAMIDOL 61 % SOLUTION: Performed by: INTERNAL MEDICINE

## 2020-11-13 PROCEDURE — 85007 BL SMEAR W/DIFF WBC COUNT: CPT

## 2020-11-13 PROCEDURE — 80053 COMPREHEN METABOLIC PANEL: CPT

## 2020-11-13 PROCEDURE — 74177 CT ABD & PELVIS W/CONTRAST: CPT

## 2020-11-13 PROCEDURE — 71260 CT THORAX DX C+: CPT

## 2020-11-13 PROCEDURE — 85025 COMPLETE CBC W/AUTO DIFF WBC: CPT

## 2020-11-13 RX ADMIN — IOPAMIDOL 90 ML: 612 INJECTION, SOLUTION INTRAVENOUS at 08:44

## 2020-11-17 ENCOUNTER — OFFICE VISIT (OUTPATIENT)
Dept: ONCOLOGY | Facility: CLINIC | Age: 70
End: 2020-11-17

## 2020-11-17 VITALS
WEIGHT: 180.3 LBS | RESPIRATION RATE: 18 BRPM | HEIGHT: 67 IN | DIASTOLIC BLOOD PRESSURE: 71 MMHG | TEMPERATURE: 97.9 F | SYSTOLIC BLOOD PRESSURE: 140 MMHG | HEART RATE: 69 BPM | BODY MASS INDEX: 28.3 KG/M2

## 2020-11-17 DIAGNOSIS — D72.820 LYMPHOCYTOSIS: ICD-10-CM

## 2020-11-17 DIAGNOSIS — C91.10 CLL (CHRONIC LYMPHOCYTIC LEUKEMIA) (HCC): Primary | ICD-10-CM

## 2020-11-17 PROCEDURE — G9903 PT SCRN TBCO ID AS NON USER: HCPCS | Performed by: INTERNAL MEDICINE

## 2020-11-17 PROCEDURE — 1123F ACP DISCUSS/DSCN MKR DOCD: CPT | Performed by: INTERNAL MEDICINE

## 2020-11-17 PROCEDURE — G0463 HOSPITAL OUTPT CLINIC VISIT: HCPCS | Performed by: INTERNAL MEDICINE

## 2020-11-17 PROCEDURE — G8731 PAIN NEG NO PLAN: HCPCS | Performed by: INTERNAL MEDICINE

## 2020-11-17 PROCEDURE — 99214 OFFICE O/P EST MOD 30 MIN: CPT | Performed by: INTERNAL MEDICINE

## 2020-11-17 NOTE — PROGRESS NOTES
DATE OF VISIT: 11/17/2020      REASON FOR VISIT: Chronic lymphocytic leukemia, lymphocytosis      HISTORY OF PRESENT ILLNESS:   70-year-old male with medical problem consisting of history of CABG, dyslipidemia, chronic lymphocytic leukemia that was diagnosed on peripheral blood flow cytometry in September 2016 currently on surveillance is here for follow-up appointment today.  Patient had blood work done as well as CT scan done for surveillance purposes recently.  He is here to discuss the results and further recommendation.  Complains of recent worsening of chronic stable night sweats.  Denies any new lymph node enlargement.  Denies any fevers.  Denies any recent infection or any bleeding.            PAST MEDICAL HISTORY:    Past Medical History:   Diagnosis Date   • Arthritis    • CLL (chronic lymphocytic leukemia) (CMS/HCC)    • CVA (cerebral vascular accident) (CMS/HCC) 2011   • Dyslipidemia    • Gastritis    • GERD (gastroesophageal reflux disease)    • Night sweats        SOCIAL HISTORY:    Social History     Tobacco Use   • Smoking status: Never Smoker   • Smokeless tobacco: Never Used   • Tobacco comment: never smoked cigarettes   Substance Use Topics   • Alcohol use: No   • Drug use: No       Surgical History :  Past Surgical History:   Procedure Laterality Date   • APPENDECTOMY     • COLONOSCOPY  03/26/2014   • COLONOSCOPY N/A 12/16/2019    Procedure: COLONOSCOPY;  Surgeon: Gregg Martinez MD;  Location: [facility] ENDOSCOPY;  Service: Gastroenterology   • ENDOSCOPY N/A 12/16/2019    Procedure: ESOPHAGOGASTRODUODENOSCOPY--with dilation;  Surgeon: Gregg Martinez MD;  Location: [facility] ENDOSCOPY;  Service: Gastroenterology   • HERNIA REPAIR     • KNEE SURGERY     • LEG SURGERY     • MANDIBLE FRACTURE SURGERY     • NH INSJ TUNNELED CVC W/O SUBQ PORT/ AGE 5 YR/> Right 7/7/2017    Procedure: MEDIPORT PLACEMENT WITH ULTRASOUND GUIDANCE       ;  Surgeon: Lucien Mcneal MD;  Location: [facility] OR;  Service: General  "  • PROSTATE SURGERY     • UPPER GASTROINTESTINAL ENDOSCOPY  03/26/2014   • UPPER GASTROINTESTINAL ENDOSCOPY  12/16/2019       ALLERGIES:    No Known Allergies      FAMILY HISTORY:  Family History   Problem Relation Age of Onset   • Prostate cancer Father    • Stomach cancer Father    • Throat cancer Father    • Leukemia Brother    • Stomach cancer Brother    • Breast cancer Paternal Aunt    • Colon cancer Paternal Uncle    • Kidney cancer Brother    • Prostate cancer Paternal Uncle            REVIEW OF SYSTEMS:      CONSTITUTIONAL:  Complains of fatigue.  Complains of recent worsening of chronic night sweats.  Denies any fever, chills or weight loss.     EYES: No visual disturbances. No discharge. No new lesions    ENMT:  No epistaxis, mouth sores or difficulty swallowing.    RESPIRATORY: Positive for chronic intermittent shortness of breath. No new cough or hemoptysis.    CARDIOVASCULAR:  No chest pain or palpitations.    GASTROINTESTINAL: Positive for chronic discomfort in left upper quadrant. no  nausea, vomiting or blood in the stool.    GENITOURINARY: No Dysuria or Hematuria.    MUSCULOSKELETAL: Positive for chronic back pain.    LYMPHATICS:  Denies any abnormal swollen glands anywhere in the body.    NEUROLOGICAL : No tingling or numbness. No headache or dizziness. No seizures or balance problems.    SKIN: No new skin lesions.    ENDOCRINE : No new hot or cold intolerance. No new polyuria . No polydipsia.        PHYSICAL EXAMINATION:      VITAL SIGNS:  /71   Pulse 69   Temp 97.9 °F (36.6 °C) (Temporal)   Resp 18   Ht 170.2 cm (67.01\")   Wt 81.8 kg (180 lb 4.8 oz)   BMI 28.23 kg/m²       11/17/20  1049   Weight: 81.8 kg (180 lb 4.8 oz)         ECOG performance status: 2    CONSTITUTIONAL:  Not in any distress.    EYES: Mild conjunctival Pallor. No Icterus. No Pterygium. Extraocular Movements intact.No ptosis.    ENMT:  Normocephalic, Atraumatic.No Facial Asymmetry noted.    NECK:  No " adenopathy.Trachea midline. NO JVD.    RESPIRATORY:  Fair air entry bilateral. No rhonchi or wheezing.Fair respiratory effort.    CARDIOVASCULAR:  S1, S2. Regular rate and rhythm. No murmur or gallop appreciated.    ABDOMEN:  Soft, obese, nontender. Bowel sounds present in all four quadrants.  No Hepatosplenomegaly appreciated.    MUSCULOSKELETAL:  No edema.No Calf Tenderness.Normal range of motion.    NEUROLOGIC:    No  Motor  deficit appreciated. Cranial Nerves 2-12 grossly intact.    SKIN : No new skin lesion identified. Skin is warm and moist to touch.    LYMPHATICS: No new enlarged lymph nodes in neck or supraclavicular area.    PSYCHIATRY: Alert, awake and oriented ×3.Normal affect.  Normal judgment.  Makes good eye contact.        DIAGNOSTIC DATA:    Glucose   Date Value Ref Range Status   11/13/2020 94 65 - 99 mg/dL Final     Sodium   Date Value Ref Range Status   11/13/2020 137 136 - 145 mmol/L Final     Potassium   Date Value Ref Range Status   11/13/2020 4.4 3.5 - 5.2 mmol/L Final     CO2   Date Value Ref Range Status   11/13/2020 27.0 22.0 - 29.0 mmol/L Final     Chloride   Date Value Ref Range Status   11/13/2020 101 98 - 107 mmol/L Final     Anion Gap   Date Value Ref Range Status   11/13/2020 9.0 5.0 - 15.0 mmol/L Final     Creatinine   Date Value Ref Range Status   11/13/2020 1.20 0.76 - 1.27 mg/dL Final     BUN   Date Value Ref Range Status   11/13/2020 14 8 - 23 mg/dL Final     BUN/Creatinine Ratio   Date Value Ref Range Status   11/13/2020 11.7 7.0 - 25.0 Final     Calcium   Date Value Ref Range Status   11/13/2020 9.5 8.6 - 10.5 mg/dL Final     eGFR Non  Amer   Date Value Ref Range Status   11/13/2020 60 (L) >60 mL/min/1.73 Final     Alkaline Phosphatase   Date Value Ref Range Status   11/13/2020 87 39 - 117 U/L Final     Total Protein   Date Value Ref Range Status   11/13/2020 7.4 6.0 - 8.5 g/dL Final     ALT (SGPT)   Date Value Ref Range Status   11/13/2020 19 1 - 41 U/L Final     AST  (SGOT)   Date Value Ref Range Status   11/13/2020 18 1 - 40 U/L Final     Total Bilirubin   Date Value Ref Range Status   11/13/2020 0.4 0.0 - 1.2 mg/dL Final     Albumin   Date Value Ref Range Status   11/13/2020 4.80 3.50 - 5.20 g/dL Final     Globulin   Date Value Ref Range Status   11/13/2020 2.6 gm/dL Final     Lab Results   Component Value Date    WBC 44.68 (C) 11/13/2020    HGB 14.8 11/13/2020    HCT 45.4 11/13/2020    MCV 84.1 11/13/2020     11/13/2020     Lab Results   Component Value Date    NEUTROABS 5.36 11/13/2020     Lab Results   Component Value Date    REFLABREPO SEE NOTE: 10/19/2016           PATHOLOGY:  * Cannot find OR log *         RADIOLOGY DATA :  Ct Chest With Contrast    Result Date: 11/15/2020  Slightly enlarged bilateral axillary lymph nodes, unchanged. Several small retroperitoneal lymph nodes, also unchanged. Superior endplate defect L3 vertebral body probably a Schmorl's node. This however is larger than on prior CT examination and for this reason a follow-up examination, MR lumbar spine may be useful. CT chest, abdomen, pelvis with contrast is otherwise unremarkable.  Electronically signed by:  Olayinka Giron MD  11/15/2020 8:06 AM CST Workstation: [id number]    Ct Abdomen Pelvis With Contrast    Result Date: 11/15/2020  Slightly enlarged bilateral axillary lymph nodes, unchanged. Several small retroperitoneal lymph nodes, also unchanged. Superior endplate defect L3 vertebral body probably a Schmorl's node. This however is larger than on prior CT examination and for this reason a follow-up examination, MR lumbar spine may be useful. CT chest, abdomen, pelvis with contrast is otherwise unremarkable.  Electronically signed by:  Olayinka Giron MD  11/15/2020 8:06 AM CST Workstation: [id number]        ASSESSMENT AND PLAN:      1.  Chronic lymphocytic leukemia:  -Patient was initially diagnosed in September 2016.  -Patient received 2 cycles of chemotherapy with bendamustine and rituximab  between July 10, 2017 and August 7, 2017.  -Patient had an allergic reaction on day 2 of cycle 2 of chemotherapy with flushing and facial swelling for which chemotherapy was discontinued.  -Recent CBC shows white blood cell count is elevated at 44,000.  Hemoglobin and platelet count is stable.  -Surveillance CT of chest abdomen and pelvis with contrast done on November 15, 2020 does not show any evidence of bulky adenopathy or any other evidence of progression.  CT scan was reviewed and discussed with patient.  -At this point will continue with clinical surveillance.  -We will ask patient to return to clinic in 4 months with repeat CBC and CMP on that day.  -Plan is to do surveillance CT of chest abdomen and pelvis with contrast in November 2021.    2.  Leukocytosis:  -Secondary to CLL.  Will monitor with CMP    3.  Health maintenance: Patient does not smoke.  Had a colonoscopy in 2013    4. Advance Care Planning: For now patient remains full code and is able to make decisions.  Patient has health care surrogate mentioned on chart.         PHQ-9 Total Score: 0   -Patient is not homicidal or suicidal.  No acute intervention required.    Roe Gu Sr. reports a pain score of 0.  Given his pain assessment as noted, treatment options were discussed and the following options were decided upon as a follow-up plan to address the patient's pain: No acute intervention required.         Andres Lubin MD  11/17/2020  12:29 CST        Part of this note may be an electronic transcription/translation of spoken language to printed text using the Dragon Dictation System.

## 2021-03-04 ENCOUNTER — OFFICE VISIT (OUTPATIENT)
Dept: GASTROENTEROLOGY | Facility: CLINIC | Age: 71
End: 2021-03-04

## 2021-03-04 VITALS
HEART RATE: 69 BPM | BODY MASS INDEX: 27.97 KG/M2 | SYSTOLIC BLOOD PRESSURE: 123 MMHG | DIASTOLIC BLOOD PRESSURE: 70 MMHG | WEIGHT: 178.2 LBS | HEIGHT: 67 IN

## 2021-03-04 DIAGNOSIS — R10.84 GENERALIZED ABDOMINAL PAIN: Primary | ICD-10-CM

## 2021-03-04 DIAGNOSIS — K59.09 OTHER CONSTIPATION: ICD-10-CM

## 2021-03-04 DIAGNOSIS — K92.89 GAS BLOAT SYNDROME: ICD-10-CM

## 2021-03-04 DIAGNOSIS — K21.00 GASTROESOPHAGEAL REFLUX DISEASE WITH ESOPHAGITIS WITHOUT HEMORRHAGE: ICD-10-CM

## 2021-03-04 PROCEDURE — 99213 OFFICE O/P EST LOW 20 MIN: CPT | Performed by: PHYSICIAN ASSISTANT

## 2021-03-04 RX ORDER — SIMETHICONE 80 MG
80 TABLET,CHEWABLE ORAL EVERY 6 HOURS PRN
Qty: 60 TABLET | Refills: 2 | Status: SHIPPED | OUTPATIENT
Start: 2021-03-04 | End: 2021-06-03

## 2021-03-04 NOTE — PATIENT INSTRUCTIONS
MyPlate from USDA    MyPlate is an outline of a general healthy diet based on the 2010 Dietary Guidelines for Americans, from the U.S. Department of Agriculture (USDA). It sets guidelines for how much food you should eat from each food group based on your age, sex, and level of physical activity.  What are tips for following MyPlate?  To follow MyPlate recommendations:  · Eat a wide variety of fruits and vegetables, grains, and protein foods.  · Serve smaller portions and eat less food throughout the day.  · Limit portion sizes to avoid overeating.  · Enjoy your food.  · Get at least 150 minutes of exercise every week. This is about 30 minutes each day, 5 or more days per week.  It can be difficult to have every meal look like MyPlate. Think about MyPlate as eating guidelines for an entire day, rather than each individual meal.  Fruits and vegetables  · Make half of your plate fruits and vegetables.  · Eat many different colors of fruits and vegetables each day.  · For a 2,000 calorie daily food plan, eat:  ? 2½ cups of vegetables every day.  ? 2 cups of fruit every day.  · 1 cup is equal to:  ? 1 cup raw or cooked vegetables.  ? 1 cup raw fruit.  ? 1 medium-sized orange, apple, or banana.  ? 1 cup 100% fruit or vegetable juice.  ? 2 cups raw leafy greens, such as lettuce, spinach, or kale.  ? ½ cup dried fruit.  Grains  · One fourth of your plate should be grains.  · Make at least half of the grains you eat each day whole grains.  · For a 2,000 calorie daily food plan, eat 6 oz of grains every day.  · 1 oz is equal to:  ? 1 slice bread.  ? 1 cup cereal.  ? ½ cup cooked rice, cereal, or pasta.  Protein  · One fourth of your plate should be protein.  · Eat a wide variety of protein foods, including meat, poultry, fish, eggs, beans, nuts, and tofu.  · For a 2,000 calorie daily food plan, eat 5½ oz of protein every day.  · 1 oz is equal to:  ? 1 oz meat, poultry, or fish.  ? ¼ cup cooked beans.  ? 1 egg.  ? ½ oz nuts  or seeds.  ? 1 Tbsp peanut butter.  Dairy  · Drink fat-free or low-fat (1%) milk.  · Eat or drink dairy as a side to meals.  · For a 2,000 calorie daily food plan, eat or drink 3 cups of dairy every day.  · 1 cup is equal to:  ? 1 cup milk, yogurt, cottage cheese, or soy milk (soy beverage).  ? 2 oz processed cheese.  ? 1½ oz natural cheese.  Fats, oils, salt, and sugars  · Only small amounts of oils are recommended.  · Avoid foods that are high in calories and low in nutritional value (empty calories), like foods high in fat or added sugars.  · Choose foods that are low in salt (sodium). Choose foods that have less than 140 milligrams (mg) of sodium per serving.  · Drink water instead of sugary drinks. Drink enough water each day to keep your urine pale yellow.  Where to find support  · Work with your health care provider or a nutrition specialist (dietitian) to develop a customized eating plan that is right for you.  · Download an kirill (mobile application) to help you track your daily food intake.  Where to find more information  · Go to ChooseMyPlate.gov for more information.  Summary  · MyPlate is a general guideline for healthy eating from the USDA. It is based on the 2010 Dietary Guidelines for Americans.  · In general, fruits and vegetables should take up ½ of your plate, grains should take up ¼ of your plate, and protein should take up ¼ of your plate.  This information is not intended to replace advice given to you by your health care provider. Make sure you discuss any questions you have with your health care provider.  Document Revised: 05/21/2020 Document Reviewed: 03/19/2018  Elsevier Patient Education © 2020 [employer]  BMI for Adults  What is BMI?  Body mass index (BMI) is a number that is calculated from a person's weight and height. BMI can help estimate how much of a person's weight is composed of fat. BMI does not measure body fat directly. Rather, it is an alternative to procedures that  "directly measure body fat, which can be difficult and expensive.  BMI can help identify people who may be at higher risk for certain medical problems.  What are BMI measurements used for?  BMI is used as a screening tool to identify possible weight problems. It helps determine whether a person is obese, overweight, a healthy weight, or underweight.  BMI is useful for:  · Identifying a weight problem that may be related to a medical condition or may increase the risk for medical problems.  · Promoting changes, such as changes in diet and exercise, to help reach a healthy weight. BMI screening can be repeated to see if these changes are working.  How is BMI calculated?  BMI involves measuring your weight in relation to your height. Both height and weight are measured, and the BMI is calculated from those numbers. This can be done either in English ([place] or metric measurements. Note that charts and online BMI calculators are available to help you find your BMI quickly and easily without having to do these calculations yourself.  To calculate your BMI in English ([place] measurements:    1. Measure your weight in pounds (lb).  2. Multiply the number of pounds by 703.  ? For example, for a person who weighs 180 lb, multiply that number by 703, which equals 126,540.  3. Measure your height in inches. Then multiply that number by itself to get a measurement called \"inches squared.\"  ? For example, for a person who is 70 inches tall, the \"inches squared\" measurement is 70 inches x 70 inches, which equals 4,900 inches squared.  4. Divide the total from step 2 (number of lb x 703) by the total from step 3 (inches squared): 126,540 ÷ 4,900 = 25.8. This is your BMI.  To calculate your BMI in metric measurements:  1. Measure your weight in kilograms (kg).  2. Measure your height in meters (m). Then multiply that number by itself to get a measurement called \"meters squared.\"  ? For example, for a person who is 1.75 m tall, the " "\"meters squared\" measurement is 1.75 m x 1.75 m, which is equal to 3.1 meters squared.  3. Divide the number of kilograms (your weight) by the meters squared number. In this example: 70 ÷ 3.1 = 22.6. This is your BMI.  What do the results mean?  BMI charts are used to identify whether you are underweight, normal weight, overweight, or obese. The following guidelines will be used:  · Underweight: BMI less than 18.5.  · Normal weight: BMI between 18.5 and 24.9.  · Overweight: BMI between 25 and 29.9.  · Obese: BMI of 30 or above.  Keep these notes in mind:  · Weight includes both fat and muscle, so someone with a muscular build, such as an athlete, may have a BMI that is higher than 24.9. In cases like these, BMI is not an accurate measure of body fat.  · To determine if excess body fat is the cause of a BMI of 25 or higher, further assessments may need to be done by a health care provider.  · BMI is usually interpreted in the same way for men and women.  Where to find more information  For more information about BMI, including tools to quickly calculate your BMI, go to these websites:  · Centers for Disease Control and Prevention: www.cdc.gov  · American Heart Association: www.heart.org  · National Heart, Lung, and Blood [place]: www.nhlbi.nih.gov  Summary  · Body mass index (BMI) is a number that is calculated from a person's weight and height.  · BMI may help estimate how much of a person's weight is composed of fat. BMI can help identify those who may be at higher risk for certain medical problems.  · BMI can be measured using English measurements or metric measurements.  · BMI charts are used to identify whether you are underweight, normal weight, overweight, or obese.  This information is not intended to replace advice given to you by your health care provider. Make sure you discuss any questions you have with your health care provider.  Document Revised: 09/09/2020 Document Reviewed: 07/17/2020  Marissa " Patient Education © 2020 [employer].

## 2021-03-04 NOTE — PROGRESS NOTES
Chief Complaint   Patient presents with   • Heartburn   • Abdominal Pain   • Constipation       ENDO PROCEDURE ORDERED:    Subjective    Roe Gu Sr. is a 70 y.o. male. he is here today for follow-up.    History of Present Illness    The patient is seen on a recheck of his GERD, abdominal pain, and constipation.  Last seen 08/12/2020.  The patient was accompanied by his wife.  Laboratories from that visit showed normal vitamin D, PTH, and phosphorus.  Magnesium was elevated at 2.4.  Negative HIV.  Hepatitis C antibody negative.  Hepatitis B surface antigen negative.  REJI and PSA were negative.  CMP showed a glucose of 105, otherwise normal.      The patient had CT scan chest, abdomen and pelvis with contrast 11/13/2020 that showed stable retroperitoneal nodes with no change.  CBC showed a white count of 44.68.  CMP showed a creatinine of 1.2, GFR 60.    He did have a followup for his CLL with Dr. Lubin on 11/17/2020.    The patient states he is not eating as much because of stomach pain.  He gets bloated with a lot of gas.  He complains of a 6-7 out of 10 abdominal pain.  GERD doing well on the Prilosec 20 mg b.i.d.  He does have Bentyl and MiraLAX as needed. Weight is up 0.5 lb. since last visit.  On last EGD/colonoscopy 12/16/2019, he had esophageal stricture dilated and hemorrhoids.      A/P:  Patient with increasing abdominal pain, may be related to gas. Last imaging was stable.  I suggested a trial on simethicone.  Will continue on the Prilosec and the Bentyl.  We will plan followup in a few months, sooner if needed; he is scheduled for repeat imaging and followup with Dr. Lubin.  Further pending clinical course and the results of the above.        The following portions of the patient's history were reviewed and updated as appropriate:   Past Medical History:   Diagnosis Date   • Arthritis    • CLL (chronic lymphocytic leukemia) (CMS/HCC)    • CVA (cerebral vascular accident) (CMS/HCC) 2011   •  Dyslipidemia    • Gastritis    • GERD (gastroesophageal reflux disease)    • Night sweats      Past Surgical History:   Procedure Laterality Date   • APPENDECTOMY     • COLONOSCOPY  03/26/2014   • COLONOSCOPY N/A 12/16/2019    Procedure: COLONOSCOPY;  Surgeon: Gregg Martinez MD;  Location: [facility] ENDOSCOPY;  Service: Gastroenterology   • ENDOSCOPY N/A 12/16/2019    Procedure: ESOPHAGOGASTRODUODENOSCOPY--with dilation;  Surgeon: Gregg Martinez MD;  Location: [facility] ENDOSCOPY;  Service: Gastroenterology   • HERNIA REPAIR     • KNEE SURGERY     • LEG SURGERY     • MANDIBLE FRACTURE SURGERY     • AK INSJ TUNNELED CVC W/O SUBQ PORT/ AGE 5 YR/> Right 7/7/2017    Procedure: MEDIPORT PLACEMENT WITH ULTRASOUND GUIDANCE       ;  Surgeon: Lucien Mcneal MD;  Location: [facility] OR;  Service: General   • PROSTATE SURGERY     • UPPER GASTROINTESTINAL ENDOSCOPY  03/26/2014   • UPPER GASTROINTESTINAL ENDOSCOPY  12/16/2019     Family History   Problem Relation Age of Onset   • Prostate cancer Father    • Stomach cancer Father    • Throat cancer Father    • Leukemia Brother    • Stomach cancer Brother    • Breast cancer Paternal Aunt    • Colon cancer Paternal Uncle    • Kidney cancer Brother    • Prostate cancer Paternal Uncle        No Known Allergies  Social History     Socioeconomic History   • Marital status:      Spouse name: Not on file   • Number of children: Not on file   • Years of education: Not on file   • Highest education level: Not on file   Tobacco Use   • Smoking status: Never Smoker   • Smokeless tobacco: Never Used   • Tobacco comment: never smoked cigarettes   Vaping Use   • Vaping Use: Never used   Substance and Sexual Activity   • Alcohol use: No   • Drug use: No   • Sexual activity: Defer     Current Medications:  Prior to Admission medications    Medication Sig Start Date End Date Taking? Authorizing Provider   aspirin 81 MG tablet Take 81 mg by mouth Every Night.   Yes Provider, MD Reyes  "  Cholecalciferol (VITAMIN D3) 5000 units capsule capsule Take 5,000 Units by mouth Daily.   Yes Reyes Sherwood MD   dicyclomine (BENTYL) 10 MG capsule Take 1 capsule by mouth 3 (Three) Times a Day Before Meals. 2/17/20  Yes Jackson Choudhary PA-C   omeprazole (priLOSEC) 20 MG capsule Take 1 capsule by mouth 2 (Two) Times a Day. 12/31/19  Yes Jackson Choudhary PA-C   ondansetron (ZOFRAN) 4 MG tablet Take 1 tablet by mouth 4 (Four) Times a Day As Needed for Nausea or Vomiting.  Patient taking differently: Take 4 mg by mouth As Needed for Nausea or Vomiting. 6/28/17  Yes Andres Lubin MD   PARoxetine (PAXIL) 40 MG tablet Take 1 tablet by mouth Daily. 8/5/19  Yes Reyes Sherwood MD   polyethylene glycol (MIRALAX) powder Take 17 g by mouth As Needed.   Yes Reyes Sherwood MD   rosuvastatin (CRESTOR) 10 MG tablet Take 10 mg by mouth Every Night. 5/8/19  Yes Reyes Sherwood MD   sodium chloride 1 g tablet Take 1 tablet by mouth 3 (Three) Times a Day. 7/14/20  Yes Andres Lubin MD   vitamin B-12 (CYANOCOBALAMIN) 500 MCG tablet Take 500 mcg by mouth Daily.   Yes Reyse Sherwood MD     Review of Systems  Review of Systems       Objective    /70   Pulse 69   Ht 170.2 cm (67\")   Wt 80.8 kg (178 lb 3.2 oz)   BMI 27.91 kg/m²   Physical Exam  Vitals signs and nursing note reviewed.   Constitutional:       General: He is not in acute distress.     Appearance: He is well-developed.   HENT:      Head: Normocephalic and atraumatic.   Eyes:      Pupils: Pupils are equal, round, and reactive to light.   Neck:      Musculoskeletal: Normal range of motion.   Cardiovascular:      Rate and Rhythm: Normal rate and regular rhythm.      Heart sounds: Normal heart sounds.   Pulmonary:      Effort: Pulmonary effort is normal.      Breath sounds: Normal breath sounds.   Abdominal:      General: Bowel sounds are normal. There is distension. There is no abdominal bruit.      Palpations: Abdomen is soft. " Abdomen is not rigid. There is no shifting dullness or mass.      Tenderness: There is abdominal tenderness. There is no guarding or rebound.      Hernia: No hernia is present. There is no hernia in the ventral area.      Comments: Mild distended, diffuse   Musculoskeletal: Normal range of motion.   Skin:     General: Skin is warm and dry.   Neurological:      Mental Status: He is alert and oriented to person, place, and time.   Psychiatric:         Behavior: Behavior normal.         Thought Content: Thought content normal.         Judgment: Judgment normal.       Assessment/Plan      1. Generalized abdominal pain    2. Gastroesophageal reflux disease with esophagitis without hemorrhage    3. Other constipation    4. Gas bloat syndrome    .   Diagnoses and all orders for this visit:    1. Generalized abdominal pain (Primary)    2. Gastroesophageal reflux disease with esophagitis without hemorrhage    3. Other constipation    4. Gas bloat syndrome    Other orders  -     simethicone (Gas-X) 80 MG chewable tablet; Chew 1 tablet Every 6 (Six) Hours As Needed for Flatulence.  Dispense: 60 tablet; Refill: 2        Orders placed during this encounter include:  No orders of the defined types were placed in this encounter.      Medications prescribed:  New Medications Ordered This Visit   Medications   • simethicone (Gas-X) 80 MG chewable tablet     Sig: Chew 1 tablet Every 6 (Six) Hours As Needed for Flatulence.     Dispense:  60 tablet     Refill:  2       Requested Prescriptions     Signed Prescriptions Disp Refills   • simethicone (Gas-X) 80 MG chewable tablet 60 tablet 2     Sig: Chew 1 tablet Every 6 (Six) Hours As Needed for Flatulence.       Review and/or summary of lab tests, radiology, procedures, medications. Review and summary of old records and obtaining of history. The risks and benefits of my recommendations, as well as other treatment options were discussed with the patient today. Questions were  answered.    Follow-up: Return in about 3 months (around 6/4/2021), or if symptoms worsen or fail to improve.     * Surgery not found *      This document has been electronically signed by Jackson Choudhary PA-C on March 20, 2021 11:43 CDT      Results for orders placed or performed in visit on 03/09/21   CBC Auto Differential    Specimen: Blood   Result Value Ref Range    WBC 57.21 (C) 3.40 - 10.80 10*3/mm3    RBC 5.16 4.14 - 5.80 10*6/mm3    Hemoglobin 14.3 13.0 - 17.7 g/dL    Hematocrit 43.0 37.5 - 51.0 %    MCV 83.3 79.0 - 97.0 fL    MCH 27.7 26.6 - 33.0 pg    MCHC 33.3 31.5 - 35.7 g/dL    RDW 14.3 12.3 - 15.4 %    RDW-SD 43.0 37.0 - 54.0 fl    MPV 9.8 6.0 - 12.0 fL    Platelets 197 140 - 450 10*3/mm3    Neutrophil % 6.7 (L) 42.7 - 76.0 %    Lymphocyte % 87.0 (H) 19.6 - 45.3 %    Monocyte % 5.2 5.0 - 12.0 %    Eosinophil % 0.7 0.3 - 6.2 %    Basophil % 0.2 0.0 - 1.5 %    Immature Grans % 0.2 0.0 - 0.5 %    Neutrophils, Absolute 3.80 1.70 - 7.00 10*3/mm3    Lymphocytes, Absolute 49.80 (H) 0.70 - 3.10 10*3/mm3    Monocytes, Absolute 2.98 (H) 0.10 - 0.90 10*3/mm3    Eosinophils, Absolute 0.42 (H) 0.00 - 0.40 10*3/mm3    Basophils, Absolute 0.12 0.00 - 0.20 10*3/mm3    Immature Grans, Absolute 0.09 (H) 0.00 - 0.05 10*3/mm3    nRBC 0.1 0.0 - 0.2 /100 WBC   Manual Differential    Specimen: Blood   Result Value Ref Range    Neutrophil % 5.0 (L) 42.7 - 76.0 %    Lymphocyte % 87.0 (H) 19.6 - 45.3 %    Monocyte % 2.0 (L) 5.0 - 12.0 %    Eosinophil % 1.0 0.3 - 6.2 %    Myelocyte % 1.0 (H) 0.0 - 0.0 %    Atypical Lymphocyte % 4.0 0.0 - 5.0 %    Neutrophils Absolute 2.86 1.70 - 7.00 10*3/mm3    Lymphocytes Absolute 49.77 (H) 0.70 - 3.10 10*3/mm3    Monocytes Absolute 1.14 (H) 0.10 - 0.90 10*3/mm3    Eosinophils Absolute 0.57 (H) 0.00 - 0.40 10*3/mm3    RBC Morphology Normal Normal    WBC Morphology Normal Normal    Platelet Estimate Adequate Normal   Comprehensive Metabolic Panel    Specimen: Blood   Result Value Ref Range     Glucose 85 65 - 99 mg/dL    BUN 19 8 - 23 mg/dL    Creatinine 1.19 0.76 - 1.27 mg/dL    Sodium 138 136 - 145 mmol/L    Potassium 4.4 3.5 - 5.2 mmol/L    Chloride 104 98 - 107 mmol/L    CO2 26.0 22.0 - 29.0 mmol/L    Calcium 9.3 8.6 - 10.5 mg/dL    Total Protein 7.2 6.0 - 8.5 g/dL    Albumin 4.40 3.50 - 5.20 g/dL    ALT (SGPT) 12 1 - 41 U/L    AST (SGOT) 19 1 - 40 U/L    Alkaline Phosphatase 95 39 - 117 U/L    Total Bilirubin 0.4 0.0 - 1.2 mg/dL    eGFR Non African Amer 60 (L) >60 mL/min/1.73    Globulin 2.8 gm/dL    A/G Ratio 1.6 g/dL    BUN/Creatinine Ratio 16.0 7.0 - 25.0    Anion Gap 8.0 5.0 - 15.0 mmol/L   Results for orders placed or performed in visit on 11/13/20   CBC Auto Differential    Specimen: Blood   Result Value Ref Range    WBC 44.68 (C) 3.40 - 10.80 10*3/mm3    RBC 5.40 4.14 - 5.80 10*6/mm3    Hemoglobin 14.8 13.0 - 17.7 g/dL    Hematocrit 45.4 37.5 - 51.0 %    MCV 84.1 79.0 - 97.0 fL    MCH 27.4 26.6 - 33.0 pg    MCHC 32.6 31.5 - 35.7 g/dL    RDW 14.3 12.3 - 15.4 %    RDW-SD 42.9 37.0 - 54.0 fl    MPV 9.6 6.0 - 12.0 fL    Platelets 169 140 - 450 10*3/mm3   Manual Differential    Specimen: Blood   Result Value Ref Range    Neutrophil % 12.0 (L) 42.7 - 76.0 %    Lymphocyte % 85.0 (H) 19.6 - 45.3 %    Monocyte % 3.0 (L) 5.0 - 12.0 %    Neutrophils Absolute 5.36 1.70 - 7.00 10*3/mm3    Lymphocytes Absolute 37.98 (H) 0.70 - 3.10 10*3/mm3    Monocytes Absolute 1.34 (H) 0.10 - 0.90 10*3/mm3    Smudge Cells 3.0 /100 WBC    RBC Morphology Normal Normal    WBC Morphology Normal Normal    Platelet Estimate Adequate Normal   Comprehensive Metabolic Panel    Specimen: Blood   Result Value Ref Range    Glucose 94 65 - 99 mg/dL    BUN 14 8 - 23 mg/dL    Creatinine 1.20 0.76 - 1.27 mg/dL    Sodium 137 136 - 145 mmol/L    Potassium 4.4 3.5 - 5.2 mmol/L    Chloride 101 98 - 107 mmol/L    CO2 27.0 22.0 - 29.0 mmol/L    Calcium 9.5 8.6 - 10.5 mg/dL    Total Protein 7.4 6.0 - 8.5 g/dL    Albumin 4.80 3.50 - 5.20 g/dL     ALT (SGPT) 19 1 - 41 U/L    AST (SGOT) 18 1 - 40 U/L    Alkaline Phosphatase 87 39 - 117 U/L    Total Bilirubin 0.4 0.0 - 1.2 mg/dL    eGFR Non African Amer 60 (L) >60 mL/min/1.73    Globulin 2.6 gm/dL    A/G Ratio 1.8 g/dL    BUN/Creatinine Ratio 11.7 7.0 - 25.0    Anion Gap 9.0 5.0 - 15.0 mmol/L   Results for orders placed or performed in visit on 08/28/20   Urinalysis, Microscopic Only - Urine, Clean Catch    Specimen: Urine, Clean Catch   Result Value Ref Range    RBC, UA 3-5 (A) None Seen /HPF    WBC, UA None Seen None Seen /HPF    Bacteria, UA None Seen None Seen /HPF    Squamous Epithelial Cells, UA None Seen None Seen, 0-2 /HPF    Hyaline Casts, UA None Seen None Seen /LPF    Methodology Manual Light Microscopy      *Note: Due to a large number of results and/or encounters for the requested time period, some results have not been displayed. A complete set of results can be found in Results Review.       Some portions of this note have been dictated using voice recognition software and may contain errors and/or omissions.

## 2021-03-09 ENCOUNTER — OFFICE VISIT (OUTPATIENT)
Dept: ONCOLOGY | Facility: CLINIC | Age: 71
End: 2021-03-09

## 2021-03-09 ENCOUNTER — LAB (OUTPATIENT)
Dept: ONCOLOGY | Facility: HOSPITAL | Age: 71
End: 2021-03-09

## 2021-03-09 VITALS
RESPIRATION RATE: 18 BRPM | SYSTOLIC BLOOD PRESSURE: 144 MMHG | BODY MASS INDEX: 28.11 KG/M2 | TEMPERATURE: 98.8 F | HEIGHT: 67 IN | DIASTOLIC BLOOD PRESSURE: 74 MMHG | WEIGHT: 179.1 LBS | HEART RATE: 65 BPM

## 2021-03-09 DIAGNOSIS — C91.10 CLL (CHRONIC LYMPHOCYTIC LEUKEMIA) (HCC): ICD-10-CM

## 2021-03-09 DIAGNOSIS — D72.820 LYMPHOCYTOSIS: ICD-10-CM

## 2021-03-09 DIAGNOSIS — C91.10 CLL (CHRONIC LYMPHOCYTIC LEUKEMIA) (HCC): Primary | ICD-10-CM

## 2021-03-09 LAB
ALBUMIN SERPL-MCNC: 4.4 G/DL (ref 3.5–5.2)
ALBUMIN/GLOB SERPL: 1.6 G/DL
ALP SERPL-CCNC: 95 U/L (ref 39–117)
ALT SERPL W P-5'-P-CCNC: 12 U/L (ref 1–41)
ANION GAP SERPL CALCULATED.3IONS-SCNC: 8 MMOL/L (ref 5–15)
AST SERPL-CCNC: 19 U/L (ref 1–40)
BASOPHILS # BLD AUTO: 0.12 10*3/MM3 (ref 0–0.2)
BASOPHILS NFR BLD AUTO: 0.2 % (ref 0–1.5)
BILIRUB SERPL-MCNC: 0.4 MG/DL (ref 0–1.2)
BUN SERPL-MCNC: 19 MG/DL (ref 8–23)
BUN/CREAT SERPL: 16 (ref 7–25)
CALCIUM SPEC-SCNC: 9.3 MG/DL (ref 8.6–10.5)
CHLORIDE SERPL-SCNC: 104 MMOL/L (ref 98–107)
CO2 SERPL-SCNC: 26 MMOL/L (ref 22–29)
CREAT SERPL-MCNC: 1.19 MG/DL (ref 0.76–1.27)
DEPRECATED RDW RBC AUTO: 43 FL (ref 37–54)
EOSINOPHIL # BLD AUTO: 0.42 10*3/MM3 (ref 0–0.4)
EOSINOPHIL # BLD MANUAL: 0.57 10*3/MM3 (ref 0–0.4)
EOSINOPHIL NFR BLD AUTO: 0.7 % (ref 0.3–6.2)
EOSINOPHIL NFR BLD MANUAL: 1 % (ref 0.3–6.2)
ERYTHROCYTE [DISTWIDTH] IN BLOOD BY AUTOMATED COUNT: 14.3 % (ref 12.3–15.4)
GFR SERPL CREATININE-BSD FRML MDRD: 60 ML/MIN/1.73
GLOBULIN UR ELPH-MCNC: 2.8 GM/DL
GLUCOSE SERPL-MCNC: 85 MG/DL (ref 65–99)
HCT VFR BLD AUTO: 43 % (ref 37.5–51)
HGB BLD-MCNC: 14.3 G/DL (ref 13–17.7)
IMM GRANULOCYTES # BLD AUTO: 0.09 10*3/MM3 (ref 0–0.05)
IMM GRANULOCYTES NFR BLD AUTO: 0.2 % (ref 0–0.5)
LYMPHOCYTES # BLD AUTO: 49.8 10*3/MM3 (ref 0.7–3.1)
LYMPHOCYTES # BLD MANUAL: 49.77 10*3/MM3 (ref 0.7–3.1)
LYMPHOCYTES NFR BLD AUTO: 87 % (ref 19.6–45.3)
LYMPHOCYTES NFR BLD MANUAL: 2 % (ref 5–12)
LYMPHOCYTES NFR BLD MANUAL: 87 % (ref 19.6–45.3)
MCH RBC QN AUTO: 27.7 PG (ref 26.6–33)
MCHC RBC AUTO-ENTMCNC: 33.3 G/DL (ref 31.5–35.7)
MCV RBC AUTO: 83.3 FL (ref 79–97)
MONOCYTES # BLD AUTO: 1.14 10*3/MM3 (ref 0.1–0.9)
MONOCYTES # BLD AUTO: 2.98 10*3/MM3 (ref 0.1–0.9)
MONOCYTES NFR BLD AUTO: 5.2 % (ref 5–12)
MYELOCYTES NFR BLD MANUAL: 1 % (ref 0–0)
NEUTROPHILS # BLD AUTO: 2.86 10*3/MM3 (ref 1.7–7)
NEUTROPHILS NFR BLD AUTO: 3.8 10*3/MM3 (ref 1.7–7)
NEUTROPHILS NFR BLD AUTO: 6.7 % (ref 42.7–76)
NEUTROPHILS NFR BLD MANUAL: 5 % (ref 42.7–76)
NRBC BLD AUTO-RTO: 0.1 /100 WBC (ref 0–0.2)
PLATELET # BLD AUTO: 197 10*3/MM3 (ref 140–450)
PMV BLD AUTO: 9.8 FL (ref 6–12)
POTASSIUM SERPL-SCNC: 4.4 MMOL/L (ref 3.5–5.2)
PROT SERPL-MCNC: 7.2 G/DL (ref 6–8.5)
RBC # BLD AUTO: 5.16 10*6/MM3 (ref 4.14–5.8)
RBC MORPH BLD: NORMAL
SMALL PLATELETS BLD QL SMEAR: ADEQUATE
SODIUM SERPL-SCNC: 138 MMOL/L (ref 136–145)
VARIANT LYMPHS NFR BLD MANUAL: 4 % (ref 0–5)
WBC # BLD AUTO: 57.21 10*3/MM3 (ref 3.4–10.8)
WBC MORPH BLD: NORMAL

## 2021-03-09 PROCEDURE — 99213 OFFICE O/P EST LOW 20 MIN: CPT | Performed by: INTERNAL MEDICINE

## 2021-03-09 PROCEDURE — 85007 BL SMEAR W/DIFF WBC COUNT: CPT

## 2021-03-09 PROCEDURE — 1126F AMNT PAIN NOTED NONE PRSNT: CPT | Performed by: INTERNAL MEDICINE

## 2021-03-09 PROCEDURE — 85025 COMPLETE CBC W/AUTO DIFF WBC: CPT

## 2021-03-09 PROCEDURE — G9903 PT SCRN TBCO ID AS NON USER: HCPCS | Performed by: INTERNAL MEDICINE

## 2021-03-09 PROCEDURE — G0463 HOSPITAL OUTPT CLINIC VISIT: HCPCS | Performed by: INTERNAL MEDICINE

## 2021-03-09 PROCEDURE — 80053 COMPREHEN METABOLIC PANEL: CPT

## 2021-03-09 PROCEDURE — 1123F ACP DISCUSS/DSCN MKR DOCD: CPT | Performed by: INTERNAL MEDICINE

## 2021-03-09 NOTE — PROGRESS NOTES
"DATE OF VISIT: 3/9/2021      REASON FOR VISIT: Chronic lymphocytic leukemia, lymphocytosis      HISTORY OF PRESENT ILLNESS:   70-year-old male with medical problems consisting of coronary artery bypass graft, dyslipidemia, chronic lymphocytic leukemia that was diagnosed on peripheral blood flow cytometry in September 2016 currently on surveillance is here for follow-up appointment today.  Complains of chronic night sweats.  Denies any new lymph node enlargement.  Denies any fever.  Denies any recent infection or bleeding.          Past Medical History, Past Surgical History, Social History, Family History have been reviewed and are without significant changes except as mentioned.    Review of Systems   Constitutional: Positive for fatigue.        Positive for chronic night sweats   Respiratory: Positive for shortness of breath.    Gastrointestinal:        Positive for chronic left upper quadrant discomfort   Musculoskeletal: Positive for back pain.   Hematological: Negative for adenopathy.      A comprehensive 14 point review of systems was performed and was negative except as mentioned.    Medications:  The current medication list was reviewed in the EMR    ALLERGIES:  No Known Allergies    Objective      Vitals:    03/09/21 1057   BP: 144/74   Pulse: 65   Resp: 18   Temp: 98.8 °F (37.1 °C)   TempSrc: Temporal   Weight: 81.2 kg (179 lb 1.6 oz)   Height: 170.2 cm (67.01\")   PainSc: 0-No pain     Current Status 3/9/2021   ECOG score 0       Physical Exam  Cardiovascular:      Rate and Rhythm: Normal rate and regular rhythm.      Heart sounds: Normal heart sounds.   Pulmonary:      Effort: Pulmonary effort is normal.      Breath sounds: Normal breath sounds.   Abdominal:      General: Bowel sounds are normal.      Palpations: Abdomen is soft.      Tenderness: There is no abdominal tenderness.   Neurological:      Mental Status: He is alert and oriented to person, place, and time.           RECENT LABS:  Glucose   Date " Value Ref Range Status   03/09/2021 85 65 - 99 mg/dL Final     Sodium   Date Value Ref Range Status   03/09/2021 138 136 - 145 mmol/L Final     Potassium   Date Value Ref Range Status   03/09/2021 4.4 3.5 - 5.2 mmol/L Final     CO2   Date Value Ref Range Status   03/09/2021 26.0 22.0 - 29.0 mmol/L Final     Chloride   Date Value Ref Range Status   03/09/2021 104 98 - 107 mmol/L Final     Anion Gap   Date Value Ref Range Status   03/09/2021 8.0 5.0 - 15.0 mmol/L Final     Creatinine   Date Value Ref Range Status   03/09/2021 1.19 0.76 - 1.27 mg/dL Final     BUN   Date Value Ref Range Status   03/09/2021 19 8 - 23 mg/dL Final     BUN/Creatinine Ratio   Date Value Ref Range Status   03/09/2021 16.0 7.0 - 25.0 Final     Calcium   Date Value Ref Range Status   03/09/2021 9.3 8.6 - 10.5 mg/dL Final     eGFR Non  Amer   Date Value Ref Range Status   03/09/2021 60 (L) >60 mL/min/1.73 Final     Alkaline Phosphatase   Date Value Ref Range Status   03/09/2021 95 39 - 117 U/L Final     Total Protein   Date Value Ref Range Status   03/09/2021 7.2 6.0 - 8.5 g/dL Final     ALT (SGPT)   Date Value Ref Range Status   03/09/2021 12 1 - 41 U/L Final     AST (SGOT)   Date Value Ref Range Status   03/09/2021 19 1 - 40 U/L Final     Total Bilirubin   Date Value Ref Range Status   03/09/2021 0.4 0.0 - 1.2 mg/dL Final     Albumin   Date Value Ref Range Status   03/09/2021 4.40 3.50 - 5.20 g/dL Final     Globulin   Date Value Ref Range Status   03/09/2021 2.8 gm/dL Final     Lab Results   Component Value Date    WBC 57.21 (C) 03/09/2021    HGB 14.3 03/09/2021    HCT 43.0 03/09/2021    MCV 83.3 03/09/2021     03/09/2021     Lab Results   Component Value Date    NEUTROABS 3.80 03/09/2021     Lab Results   Component Value Date    REFLABREPO SEE NOTE: 10/19/2016         PATHOLOGY:  * Cannot find OR log *         RADIOLOGY DATA :  No radiology results for the last 7 days        Assessment/Plan     1.  Chronic lymphocytic  leukemia:  -Patient was initially diagnosed in September 2016  -Patient received 2 cycles of chemotherapy with bendamustine and rituximab between July 10, 2017 and August 7, 2017  -Patient had an allergic reaction to day 2 of cycle 8 of chemotherapy with flushing and facial swelling for which chemotherapy was discontinued  -Most recent surveillance CT of chest abdomen and pelvis with contrast done on November 15, 2020 did not show any evidence of bulky adenopathy.  -CBC done today shows white blood cell count is 57.21, hemoglobin is 14.3, platelet count is 197,000  -At this point we will continue with clinical surveillance  -We will ask patient to return to clinic in 4 months with repeat CBC and CMP on that day.    2.  Lymphocytosis:  -Secondary to CLL.  Will monitor with CBC    3.  Health maintenance: Patient does not smoke.  Had a colonoscopy in 2013    4.Advance Care Planning: For now patient remains full code and is able to make decisions.  Patient has health care surrogate mentioned on chart.                 PHQ-9 Total Score: 0   -Patient is not homicidal or suicidal.  No acute intervention required.    Roe Gu Sr. reports a pain score of 0.  Given his pain assessment as noted, treatment options were discussed and the following options were decided upon as a follow-up plan to address the patient's pain: No acute intervention required.         Andres Lbuin MD  3/9/2021  11:12 CST        Part of this note may be an electronic transcription/translation of spoken language to printed text using the Dragon Dictation System.          CC:

## 2021-06-03 ENCOUNTER — OFFICE VISIT (OUTPATIENT)
Dept: GASTROENTEROLOGY | Facility: CLINIC | Age: 71
End: 2021-06-03

## 2021-06-03 VITALS
DIASTOLIC BLOOD PRESSURE: 76 MMHG | SYSTOLIC BLOOD PRESSURE: 132 MMHG | BODY MASS INDEX: 28.25 KG/M2 | HEART RATE: 66 BPM | HEIGHT: 67 IN | WEIGHT: 180 LBS

## 2021-06-03 DIAGNOSIS — K59.09 OTHER CONSTIPATION: ICD-10-CM

## 2021-06-03 DIAGNOSIS — R10.84 GENERALIZED ABDOMINAL PAIN: Primary | ICD-10-CM

## 2021-06-03 DIAGNOSIS — K21.00 GASTROESOPHAGEAL REFLUX DISEASE WITH ESOPHAGITIS WITHOUT HEMORRHAGE: ICD-10-CM

## 2021-06-03 DIAGNOSIS — K92.89 GAS BLOAT SYNDROME: ICD-10-CM

## 2021-06-03 PROCEDURE — 99213 OFFICE O/P EST LOW 20 MIN: CPT | Performed by: PHYSICIAN ASSISTANT

## 2021-06-03 NOTE — PATIENT INSTRUCTIONS
MyPlate from USDA    MyPlate is an outline of a general healthy diet based on the 2010 Dietary Guidelines for Americans, from the U.S. Department of Agriculture (USDA). It sets guidelines for how much food you should eat from each food group based on your age, sex, and level of physical activity.  What are tips for following MyPlate?  To follow MyPlate recommendations:  · Eat a wide variety of fruits and vegetables, grains, and protein foods.  · Serve smaller portions and eat less food throughout the day.  · Limit portion sizes to avoid overeating.  · Enjoy your food.  · Get at least 150 minutes of exercise every week. This is about 30 minutes each day, 5 or more days per week.  It can be difficult to have every meal look like MyPlate. Think about MyPlate as eating guidelines for an entire day, rather than each individual meal.  Fruits and vegetables  · Make half of your plate fruits and vegetables.  · Eat many different colors of fruits and vegetables each day.  · For a 2,000 calorie daily food plan, eat:  ? 2½ cups of vegetables every day.  ? 2 cups of fruit every day.  · 1 cup is equal to:  ? 1 cup raw or cooked vegetables.  ? 1 cup raw fruit.  ? 1 medium-sized orange, apple, or banana.  ? 1 cup 100% fruit or vegetable juice.  ? 2 cups raw leafy greens, such as lettuce, spinach, or kale.  ? ½ cup dried fruit.  Grains  · One fourth of your plate should be grains.  · Make at least half of the grains you eat each day whole grains.  · For a 2,000 calorie daily food plan, eat 6 oz of grains every day.  · 1 oz is equal to:  ? 1 slice bread.  ? 1 cup cereal.  ? ½ cup cooked rice, cereal, or pasta.  Protein  · One fourth of your plate should be protein.  · Eat a wide variety of protein foods, including meat, poultry, fish, eggs, beans, nuts, and tofu.  · For a 2,000 calorie daily food plan, eat 5½ oz of protein every day.  · 1 oz is equal to:  ? 1 oz meat, poultry, or fish.  ? ¼ cup cooked beans.  ? 1 egg.  ? ½ oz nuts  or seeds.  ? 1 Tbsp peanut butter.  Dairy  · Drink fat-free or low-fat (1%) milk.  · Eat or drink dairy as a side to meals.  · For a 2,000 calorie daily food plan, eat or drink 3 cups of dairy every day.  · 1 cup is equal to:  ? 1 cup milk, yogurt, cottage cheese, or soy milk (soy beverage).  ? 2 oz processed cheese.  ? 1½ oz natural cheese.  Fats, oils, salt, and sugars  · Only small amounts of oils are recommended.  · Avoid foods that are high in calories and low in nutritional value (empty calories), like foods high in fat or added sugars.  · Choose foods that are low in salt (sodium). Choose foods that have less than 140 milligrams (mg) of sodium per serving.  · Drink water instead of sugary drinks. Drink enough water each day to keep your urine pale yellow.  Where to find support  · Work with your health care provider or a nutrition specialist (dietitian) to develop a customized eating plan that is right for you.  · Download an kirill (mobile application) to help you track your daily food intake.  Where to find more information  · Go to ChooseMyPlate.gov for more information.  Summary  · MyPlate is a general guideline for healthy eating from the USDA. It is based on the 2010 Dietary Guidelines for Americans.  · In general, fruits and vegetables should take up ½ of your plate, grains should take up ¼ of your plate, and protein should take up ¼ of your plate.  This information is not intended to replace advice given to you by your health care provider. Make sure you discuss any questions you have with your health care provider.  Document Revised: 05/21/2020 Document Reviewed: 03/19/2018  Elsevier Patient Education © 2021 Elsevier Inc.  BMI for Adults  What is BMI?  Body mass index (BMI) is a number that is calculated from a person's weight and height. BMI can help estimate how much of a person's weight is composed of fat. BMI does not measure body fat directly. Rather, it is an alternative to procedures that  "directly measure body fat, which can be difficult and expensive.  BMI can help identify people who may be at higher risk for certain medical problems.  What are BMI measurements used for?  BMI is used as a screening tool to identify possible weight problems. It helps determine whether a person is obese, overweight, a healthy weight, or underweight.  BMI is useful for:  · Identifying a weight problem that may be related to a medical condition or may increase the risk for medical problems.  · Promoting changes, such as changes in diet and exercise, to help reach a healthy weight. BMI screening can be repeated to see if these changes are working.  How is BMI calculated?  BMI involves measuring your weight in relation to your height. Both height and weight are measured, and the BMI is calculated from those numbers. This can be done either in English (U[place] or metric measurements. Note that charts and online BMI calculators are available to help you find your BMI quickly and easily without having to do these calculations yourself.  To calculate your BMI in English (U.S[place] measurements:    1. Measure your weight in pounds (lb).  2. Multiply the number of pounds by 703.  ? For example, for a person who weighs 180 lb, multiply that number by 703, which equals 126,540.  3. Measure your height in inches. Then multiply that number by itself to get a measurement called \"inches squared.\"  ? For example, for a person who is 70 inches tall, the \"inches squared\" measurement is 70 inches x 70 inches, which equals 4,900 inches squared.  4. Divide the total from step 2 (number of lb x 703) by the total from step 3 (inches squared): 126,540 ÷ 4,900 = 25.8. This is your BMI.  To calculate your BMI in metric measurements:  1. Measure your weight in kilograms (kg).  2. Measure your height in meters (m). Then multiply that number by itself to get a measurement called \"meters squared.\"  ? For example, for a person who is 1.75 m tall, the " "\"meters squared\" measurement is 1.75 m x 1.75 m, which is equal to 3.1 meters squared.  3. Divide the number of kilograms (your weight) by the meters squared number. In this example: 70 ÷ 3.1 = 22.6. This is your BMI.  What do the results mean?  BMI charts are used to identify whether you are underweight, normal weight, overweight, or obese. The following guidelines will be used:  · Underweight: BMI less than 18.5.  · Normal weight: BMI between 18.5 and 24.9.  · Overweight: BMI between 25 and 29.9.  · Obese: BMI of 30 or above.  Keep these notes in mind:  · Weight includes both fat and muscle, so someone with a muscular build, such as an athlete, may have a BMI that is higher than 24.9. In cases like these, BMI is not an accurate measure of body fat.  · To determine if excess body fat is the cause of a BMI of 25 or higher, further assessments may need to be done by a health care provider.  · BMI is usually interpreted in the same way for men and women.  Where to find more information  For more information about BMI, including tools to quickly calculate your BMI, go to these websites:  · Centers for Disease Control and Prevention: www.cdc.gov  · American Heart Association: www.heart.org  · National Heart, Lung, and Blood [place]: www.nhlbi.nih.gov  Summary  · Body mass index (BMI) is a number that is calculated from a person's weight and height.  · BMI may help estimate how much of a person's weight is composed of fat. BMI can help identify those who may be at higher risk for certain medical problems.  · BMI can be measured using English measurements or metric measurements.  · BMI charts are used to identify whether you are underweight, normal weight, overweight, or obese.  This information is not intended to replace advice given to you by your health care provider. Make sure you discuss any questions you have with your health care provider.  Document Revised: 09/09/2020 Document Reviewed: 07/17/2020  Marissa " Patient Education © 2021 Elsevier Inc.

## 2021-06-03 NOTE — PROGRESS NOTES
Chief Complaint   Patient presents with   • Abdominal Pain   • Heartburn   • Constipation       ENDO PROCEDURE ORDERED:    Subjective    Roe Gu Sr. is a 70 y.o. male. he is here today for follow-up.    History of Present Illness    The patient is seen on a recheck of his GERD, abdominal pain, and constipation. Last seen 03/04/2021. Patient has been using Gas-Ex for his upper discomfort. He states he has had no issues significantly since he stopped taking Prilosec. He denied nausea, vomiting, and dysphagia. He does report a 3 out of 10 abdominal discomfort. He is typically taking the 10 mg Bentyl once a day. He does have the MiraLAX as needed for constipation. Weight is up 1.7 pounds since last visit. Last EGD/colonoscopy 12/16/2019 he had esophageal stricture and hemorrhoids.     Laboratory 03/09/2021: CBC showed white count of 57.21, otherwise normal. He is followed in the [facility]. CMP showed GFR of 60, otherwise normal.     A/P: Patient with abdominal pain, GERD, and constipation. Appears to be doing well on current regimen. Encouraged to continue current medications. Last LFTs were normal. Followup with the [facility]. Will plan followup in 6 months, sooner if needed, further pending clinical course and the results of the above.       The following portions of the patient's history were reviewed and updated as appropriate:   Past Medical History:   Diagnosis Date   • Arthritis    • CLL (chronic lymphocytic leukemia) (CMS/HCC)    • CVA (cerebral vascular accident) (CMS/[facility]) 2011   • Dyslipidemia    • Gastritis    • GERD (gastroesophageal reflux disease)    • Night sweats      Past Surgical History:   Procedure Laterality Date   • APPENDECTOMY     • COLONOSCOPY  03/26/2014   • COLONOSCOPY N/A 12/16/2019    Procedure: COLONOSCOPY;  Surgeon: Gregg Martinez MD;  Location: [facility] ENDOSCOPY;  Service: Gastroenterology   • ENDOSCOPY N/A 12/16/2019    Procedure: ESOPHAGOGASTRODUODENOSCOPY--with  dilation;  Surgeon: Gregg Martinez MD;  Location: [facility] ENDOSCOPY;  Service: Gastroenterology   • HERNIA REPAIR     • KNEE SURGERY     • LEG SURGERY     • MANDIBLE FRACTURE SURGERY     • RI INSJ TUNNELED CVC W/O SUBQ PORT/ AGE 5 YR/> Right 7/7/2017    Procedure: MEDIPORT PLACEMENT WITH ULTRASOUND GUIDANCE       ;  Surgeon: Lucien Mcneal MD;  Location: [facility] OR;  Service: General   • PROSTATE SURGERY     • UPPER GASTROINTESTINAL ENDOSCOPY  03/26/2014   • UPPER GASTROINTESTINAL ENDOSCOPY  12/16/2019     Family History   Problem Relation Age of Onset   • Prostate cancer Father    • Stomach cancer Father    • Throat cancer Father    • Leukemia Brother    • Stomach cancer Brother    • Breast cancer Paternal Aunt    • Colon cancer Paternal Uncle    • Kidney cancer Brother    • Prostate cancer Paternal Uncle        No Known Allergies  Social History     Socioeconomic History   • Marital status:      Spouse name: Not on file   • Number of children: Not on file   • Years of education: Not on file   • Highest education level: Not on file   Tobacco Use   • Smoking status: Never Smoker   • Smokeless tobacco: Never Used   • Tobacco comment: never smoked cigarettes   Vaping Use   • Vaping Use: Never used   Substance and Sexual Activity   • Alcohol use: No   • Drug use: No   • Sexual activity: Defer     Current Medications:  Prior to Admission medications    Medication Sig Start Date End Date Taking? Authorizing Provider   aspirin 81 MG tablet Take 81 mg by mouth Every Night.   Yes Provider, MD Reyes   Cholecalciferol (VITAMIN D3) 5000 units capsule capsule Take 5,000 Units by mouth Daily.   Yes Provider, MD Reyes   dicyclomine (BENTYL) 10 MG capsule Take 1 capsule by mouth 3 (Three) Times a Day Before Meals. 2/17/20  Yes Jackson Choudhary PA-C   ondansetron (ZOFRAN) 4 MG tablet Take 1 tablet by mouth 4 (Four) Times a Day As Needed for Nausea or Vomiting.  Patient taking differently: Take 4 mg by mouth As  "Needed for Nausea or Vomiting. 6/28/17  Yes Andres Lubin MD   PARoxetine (PAXIL) 40 MG tablet Take 1 tablet by mouth Daily. 8/5/19  Yes Ryees Sherwood MD   polyethylene glycol (MIRALAX) powder Take 17 g by mouth As Needed.   Yes Provider, MD Reyes   rosuvastatin (CRESTOR) 10 MG tablet Take 10 mg by mouth Every Night. 5/8/19  Yes Reyes Sherwood MD   vitamin B-12 (CYANOCOBALAMIN) 500 MCG tablet Take 500 mcg by mouth Daily.   Yes Provider, MD Reyes   omeprazole (priLOSEC) 20 MG capsule Take 1 capsule by mouth 2 (Two) Times a Day. 12/31/19 6/3/21 Yes Jackson Choudhary PA-C   simethicone (Gas-X) 80 MG chewable tablet Chew 1 tablet Every 6 (Six) Hours As Needed for Flatulence. 3/4/21 6/3/21  Jackson Choudhary PA-C   sodium chloride 1 g tablet Take 1 tablet by mouth 3 (Three) Times a Day. 7/14/20 6/3/21  Andres Lubin MD     Review of Systems  Review of Systems       Objective    /76   Pulse 66   Ht 170.2 cm (67\")   Wt 81.6 kg (180 lb)   BMI 28.19 kg/m²   Physical Exam  Vitals and nursing note reviewed.   Constitutional:       General: He is not in acute distress.     Appearance: He is well-developed.   HENT:      Head: Normocephalic and atraumatic.   Eyes:      Pupils: Pupils are equal, round, and reactive to light.   Cardiovascular:      Rate and Rhythm: Normal rate and regular rhythm.      Heart sounds: Normal heart sounds.   Pulmonary:      Effort: Pulmonary effort is normal.      Breath sounds: Normal breath sounds.   Abdominal:      General: Bowel sounds are normal. There is no distension or abdominal bruit.      Palpations: Abdomen is soft. Abdomen is not rigid. There is no shifting dullness or mass.      Tenderness: There is abdominal tenderness. There is no guarding or rebound.      Hernia: No hernia is present. There is no hernia in the ventral area.   Musculoskeletal:         General: Normal range of motion.      Cervical back: Normal range of motion.   Skin:     General: " Skin is warm and dry.   Neurological:      Mental Status: He is alert and oriented to person, place, and time.   Psychiatric:         Behavior: Behavior normal.         Thought Content: Thought content normal.         Judgment: Judgment normal.       Assessment/Plan      1. Generalized abdominal pain    2. Gas bloat syndrome    3. Other constipation    4. Gastroesophageal reflux disease with esophagitis without hemorrhage    .   Diagnoses and all orders for this visit:    1. Generalized abdominal pain (Primary)    2. Gas bloat syndrome    3. Other constipation    4. Gastroesophageal reflux disease with esophagitis without hemorrhage        Orders placed during this encounter include:  No orders of the defined types were placed in this encounter.      Medications prescribed:  No orders of the defined types were placed in this encounter.    Discontinued Medications       Reason for Discontinue     omeprazole (priLOSEC) 20 MG capsule    *Therapy completed     simethicone (Gas-X) 80 MG chewable tablet    Cost of medication     sodium chloride 1 g tablet    *Therapy completed        Requested Prescriptions      No prescriptions requested or ordered in this encounter       Review and/or summary of lab tests, radiology, procedures, medications. Review and summary of old records and obtaining of history. The risks and benefits of my recommendations, as well as other treatment options were discussed with the patient today. Questions were answered.    Follow-up: Return in about 6 months (around 12/3/2021), or if symptoms worsen or fail to improve.     * Surgery not found *      This document has been electronically signed by Jackson Choudhary PA-C on June 16, 2021 12:32 CDT      Results for orders placed or performed in visit on 03/09/21   CBC Auto Differential    Specimen: Blood   Result Value Ref Range    WBC 57.21 (C) 3.40 - 10.80 10*3/mm3    RBC 5.16 4.14 - 5.80 10*6/mm3    Hemoglobin 14.3 13.0 - 17.7 g/dL    Hematocrit  43.0 37.5 - 51.0 %    MCV 83.3 79.0 - 97.0 fL    MCH 27.7 26.6 - 33.0 pg    MCHC 33.3 31.5 - 35.7 g/dL    RDW 14.3 12.3 - 15.4 %    RDW-SD 43.0 37.0 - 54.0 fl    MPV 9.8 6.0 - 12.0 fL    Platelets 197 140 - 450 10*3/mm3    Neutrophil % 6.7 (L) 42.7 - 76.0 %    Lymphocyte % 87.0 (H) 19.6 - 45.3 %    Monocyte % 5.2 5.0 - 12.0 %    Eosinophil % 0.7 0.3 - 6.2 %    Basophil % 0.2 0.0 - 1.5 %    Immature Grans % 0.2 0.0 - 0.5 %    Neutrophils, Absolute 3.80 1.70 - 7.00 10*3/mm3    Lymphocytes, Absolute 49.80 (H) 0.70 - 3.10 10*3/mm3    Monocytes, Absolute 2.98 (H) 0.10 - 0.90 10*3/mm3    Eosinophils, Absolute 0.42 (H) 0.00 - 0.40 10*3/mm3    Basophils, Absolute 0.12 0.00 - 0.20 10*3/mm3    Immature Grans, Absolute 0.09 (H) 0.00 - 0.05 10*3/mm3    nRBC 0.1 0.0 - 0.2 /100 WBC   Manual Differential    Specimen: Blood   Result Value Ref Range    Neutrophil % 5.0 (L) 42.7 - 76.0 %    Lymphocyte % 87.0 (H) 19.6 - 45.3 %    Monocyte % 2.0 (L) 5.0 - 12.0 %    Eosinophil % 1.0 0.3 - 6.2 %    Myelocyte % 1.0 (H) 0.0 - 0.0 %    Atypical Lymphocyte % 4.0 0.0 - 5.0 %    Neutrophils Absolute 2.86 1.70 - 7.00 10*3/mm3    Lymphocytes Absolute 49.77 (H) 0.70 - 3.10 10*3/mm3    Monocytes Absolute 1.14 (H) 0.10 - 0.90 10*3/mm3    Eosinophils Absolute 0.57 (H) 0.00 - 0.40 10*3/mm3    RBC Morphology Normal Normal    WBC Morphology Normal Normal    Platelet Estimate Adequate Normal   Comprehensive Metabolic Panel    Specimen: Blood   Result Value Ref Range    Glucose 85 65 - 99 mg/dL    BUN 19 8 - 23 mg/dL    Creatinine 1.19 0.76 - 1.27 mg/dL    Sodium 138 136 - 145 mmol/L    Potassium 4.4 3.5 - 5.2 mmol/L    Chloride 104 98 - 107 mmol/L    CO2 26.0 22.0 - 29.0 mmol/L    Calcium 9.3 8.6 - 10.5 mg/dL    Total Protein 7.2 6.0 - 8.5 g/dL    Albumin 4.40 3.50 - 5.20 g/dL    ALT (SGPT) 12 1 - 41 U/L    AST (SGOT) 19 1 - 40 U/L    Alkaline Phosphatase 95 39 - 117 U/L    Total Bilirubin 0.4 0.0 - 1.2 mg/dL    eGFR Non African Amer 60 (L) >60  mL/min/1.73    Globulin 2.8 gm/dL    A/G Ratio 1.6 g/dL    BUN/Creatinine Ratio 16.0 7.0 - 25.0    Anion Gap 8.0 5.0 - 15.0 mmol/L   Results for orders placed or performed in visit on 11/13/20   CBC Auto Differential    Specimen: Blood   Result Value Ref Range    WBC 44.68 (C) 3.40 - 10.80 10*3/mm3    RBC 5.40 4.14 - 5.80 10*6/mm3    Hemoglobin 14.8 13.0 - 17.7 g/dL    Hematocrit 45.4 37.5 - 51.0 %    MCV 84.1 79.0 - 97.0 fL    MCH 27.4 26.6 - 33.0 pg    MCHC 32.6 31.5 - 35.7 g/dL    RDW 14.3 12.3 - 15.4 %    RDW-SD 42.9 37.0 - 54.0 fl    MPV 9.6 6.0 - 12.0 fL    Platelets 169 140 - 450 10*3/mm3   Manual Differential    Specimen: Blood   Result Value Ref Range    Neutrophil % 12.0 (L) 42.7 - 76.0 %    Lymphocyte % 85.0 (H) 19.6 - 45.3 %    Monocyte % 3.0 (L) 5.0 - 12.0 %    Neutrophils Absolute 5.36 1.70 - 7.00 10*3/mm3    Lymphocytes Absolute 37.98 (H) 0.70 - 3.10 10*3/mm3    Monocytes Absolute 1.34 (H) 0.10 - 0.90 10*3/mm3    Smudge Cells 3.0 /100 WBC    RBC Morphology Normal Normal    WBC Morphology Normal Normal    Platelet Estimate Adequate Normal   Comprehensive Metabolic Panel    Specimen: Blood   Result Value Ref Range    Glucose 94 65 - 99 mg/dL    BUN 14 8 - 23 mg/dL    Creatinine 1.20 0.76 - 1.27 mg/dL    Sodium 137 136 - 145 mmol/L    Potassium 4.4 3.5 - 5.2 mmol/L    Chloride 101 98 - 107 mmol/L    CO2 27.0 22.0 - 29.0 mmol/L    Calcium 9.5 8.6 - 10.5 mg/dL    Total Protein 7.4 6.0 - 8.5 g/dL    Albumin 4.80 3.50 - 5.20 g/dL    ALT (SGPT) 19 1 - 41 U/L    AST (SGOT) 18 1 - 40 U/L    Alkaline Phosphatase 87 39 - 117 U/L    Total Bilirubin 0.4 0.0 - 1.2 mg/dL    eGFR Non African Amer 60 (L) >60 mL/min/1.73    Globulin 2.6 gm/dL    A/G Ratio 1.8 g/dL    BUN/Creatinine Ratio 11.7 7.0 - 25.0    Anion Gap 9.0 5.0 - 15.0 mmol/L   Results for orders placed or performed in visit on 08/28/20   Urinalysis, Microscopic Only - Urine, Clean Catch    Specimen: Urine, Clean Catch   Result Value Ref Range    RBC, UA 3-5  (A) None Seen /HPF    WBC, UA None Seen None Seen /HPF    Bacteria, UA None Seen None Seen /HPF    Squamous Epithelial Cells, UA None Seen None Seen, 0-2 /HPF    Hyaline Casts, UA None Seen None Seen /LPF    Methodology Manual Light Microscopy      *Note: Due to a large number of results and/or encounters for the requested time period, some results have not been displayed. A complete set of results can be found in Results Review.       Some portions of this note have been dictated using voice recognition software and may contain errors and/or omissions.

## 2021-06-15 RX ORDER — DICYCLOMINE HYDROCHLORIDE 10 MG/1
CAPSULE ORAL
Qty: 90 CAPSULE | Refills: 5 | Status: SHIPPED | OUTPATIENT
Start: 2021-06-15 | End: 2022-08-18

## 2021-07-13 ENCOUNTER — LAB (OUTPATIENT)
Dept: ONCOLOGY | Facility: HOSPITAL | Age: 71
End: 2021-07-13

## 2021-07-13 ENCOUNTER — OFFICE VISIT (OUTPATIENT)
Dept: ONCOLOGY | Facility: CLINIC | Age: 71
End: 2021-07-13

## 2021-07-13 VITALS
DIASTOLIC BLOOD PRESSURE: 74 MMHG | RESPIRATION RATE: 18 BRPM | BODY MASS INDEX: 28.35 KG/M2 | HEIGHT: 67 IN | TEMPERATURE: 98.1 F | HEART RATE: 64 BPM | SYSTOLIC BLOOD PRESSURE: 160 MMHG | WEIGHT: 180.6 LBS

## 2021-07-13 DIAGNOSIS — D72.820 LYMPHOCYTOSIS: ICD-10-CM

## 2021-07-13 DIAGNOSIS — C91.10 CLL (CHRONIC LYMPHOCYTIC LEUKEMIA) (HCC): ICD-10-CM

## 2021-07-13 DIAGNOSIS — D72.820 LYMPHOCYTOSIS: Chronic | ICD-10-CM

## 2021-07-13 DIAGNOSIS — C91.10 CLL (CHRONIC LYMPHOCYTIC LEUKEMIA) (HCC): Primary | Chronic | ICD-10-CM

## 2021-07-13 LAB
ALBUMIN SERPL-MCNC: 4.5 G/DL (ref 3.5–5.2)
ALBUMIN/GLOB SERPL: 1.7 G/DL
ALP SERPL-CCNC: 97 U/L (ref 39–117)
ALT SERPL W P-5'-P-CCNC: 16 U/L (ref 1–41)
ANION GAP SERPL CALCULATED.3IONS-SCNC: 10 MMOL/L (ref 5–15)
AST SERPL-CCNC: 22 U/L (ref 1–40)
BILIRUB SERPL-MCNC: 0.4 MG/DL (ref 0–1.2)
BUN SERPL-MCNC: 13 MG/DL (ref 8–23)
BUN/CREAT SERPL: 10.6 (ref 7–25)
CALCIUM SPEC-SCNC: 9.5 MG/DL (ref 8.6–10.5)
CHLORIDE SERPL-SCNC: 103 MMOL/L (ref 98–107)
CO2 SERPL-SCNC: 25 MMOL/L (ref 22–29)
CREAT SERPL-MCNC: 1.23 MG/DL (ref 0.76–1.27)
DEPRECATED RDW RBC AUTO: 43.9 FL (ref 37–54)
EOSINOPHIL # BLD MANUAL: 0.73 10*3/MM3 (ref 0–0.4)
EOSINOPHIL NFR BLD MANUAL: 1 % (ref 0.3–6.2)
ERYTHROCYTE [DISTWIDTH] IN BLOOD BY AUTOMATED COUNT: 14.8 % (ref 12.3–15.4)
GFR SERPL CREATININE-BSD FRML MDRD: 58 ML/MIN/1.73
GLOBULIN UR ELPH-MCNC: 2.6 GM/DL
GLUCOSE SERPL-MCNC: 88 MG/DL (ref 65–99)
HCT VFR BLD AUTO: 40.5 % (ref 37.5–51)
HGB BLD-MCNC: 13.4 G/DL (ref 13–17.7)
HOLD SPECIMEN: NORMAL
LYMPHOCYTES # BLD MANUAL: 65.89 10*3/MM3 (ref 0.7–3.1)
LYMPHOCYTES NFR BLD MANUAL: 1 % (ref 5–12)
LYMPHOCYTES NFR BLD MANUAL: 90 % (ref 19.6–45.3)
MCH RBC QN AUTO: 27.5 PG (ref 26.6–33)
MCHC RBC AUTO-ENTMCNC: 33.1 G/DL (ref 31.5–35.7)
MCV RBC AUTO: 83 FL (ref 79–97)
MONOCYTES # BLD AUTO: 0.73 10*3/MM3 (ref 0.1–0.9)
NEUTROPHILS # BLD AUTO: 5.86 10*3/MM3 (ref 1.7–7)
NEUTROPHILS NFR BLD MANUAL: 8 % (ref 42.7–76)
PLATELET # BLD AUTO: 157 10*3/MM3 (ref 140–450)
PMV BLD AUTO: 9.9 FL (ref 6–12)
POTASSIUM SERPL-SCNC: 4.1 MMOL/L (ref 3.5–5.2)
PROT SERPL-MCNC: 7.1 G/DL (ref 6–8.5)
RBC # BLD AUTO: 4.88 10*6/MM3 (ref 4.14–5.8)
RBC MORPH BLD: NORMAL
SMALL PLATELETS BLD QL SMEAR: ADEQUATE
SODIUM SERPL-SCNC: 138 MMOL/L (ref 136–145)
WBC # BLD AUTO: 73.21 10*3/MM3 (ref 3.4–10.8)
WBC MORPH BLD: NORMAL

## 2021-07-13 PROCEDURE — 1126F AMNT PAIN NOTED NONE PRSNT: CPT | Performed by: INTERNAL MEDICINE

## 2021-07-13 PROCEDURE — G0463 HOSPITAL OUTPT CLINIC VISIT: HCPCS | Performed by: INTERNAL MEDICINE

## 2021-07-13 PROCEDURE — G9903 PT SCRN TBCO ID AS NON USER: HCPCS | Performed by: INTERNAL MEDICINE

## 2021-07-13 PROCEDURE — 1123F ACP DISCUSS/DSCN MKR DOCD: CPT | Performed by: INTERNAL MEDICINE

## 2021-07-13 PROCEDURE — 99213 OFFICE O/P EST LOW 20 MIN: CPT | Performed by: INTERNAL MEDICINE

## 2021-07-13 PROCEDURE — 85007 BL SMEAR W/DIFF WBC COUNT: CPT

## 2021-07-13 PROCEDURE — 80053 COMPREHEN METABOLIC PANEL: CPT

## 2021-07-13 PROCEDURE — 85025 COMPLETE CBC W/AUTO DIFF WBC: CPT

## 2021-07-13 NOTE — PROGRESS NOTES
"DATE OF VISIT: 7/13/2021      REASON FOR VISIT: Chronic lymphocytic leukemia, lymphocytosis      HISTORY OF PRESENT ILLNESS:   70-year-old male with medical problem consisting of coronary artery disease s/p CABG, dyslipidemia, CLL diagnosed in September 2016 currently on surveillance is here for follow-up appointment today.  Complains of skin lesion on forehead.  Complains of chronic night sweats.  Denies any new lymph node enlargement.  Denies any fevers.  Denies any recent infection or bleeding.          Past Medical History, Past Surgical History, Social History, Family History have been reviewed and are without significant changes except as mentioned.    Review of Systems   Constitutional: Positive for fatigue.        Positive for chronic night sweats   Respiratory: Positive for shortness of breath.    Gastrointestinal:        Positive for chronic left upper quadrant discomfort   Musculoskeletal: Positive for back pain.   Skin:        Complains of skin lesion on forehead   Hematological: Negative for adenopathy.      A comprehensive 14 point review of systems was performed and was negative except as mentioned.    Medications:  The current medication list was reviewed in the EMR    ALLERGIES:  No Known Allergies    Objective      Vitals:    07/13/21 1118   BP: 160/74   Pulse: 64   Resp: 18   Temp: 98.1 °F (36.7 °C)   TempSrc: Temporal   Weight: 81.9 kg (180 lb 9.6 oz)   Height: 170.2 cm (67.01\")   PainSc: 0-No pain     Current Status 3/9/2021   ECOG score 0       Physical Exam  Cardiovascular:      Rate and Rhythm: Normal rate and regular rhythm.   Pulmonary:      Breath sounds: Normal breath sounds.   Skin:     Comments: Patient's area of concern on for today appears to be pressure indentation.  There is no hyperpigmentation or ulceration or any nodularity on physical exam.   Neurological:      Mental Status: He is alert and oriented to person, place, and time.           RECENT LABS:  Glucose   Date Value Ref " Range Status   07/13/2021 88 65 - 99 mg/dL Final     Sodium   Date Value Ref Range Status   07/13/2021 138 136 - 145 mmol/L Final     Potassium   Date Value Ref Range Status   07/13/2021 4.1 3.5 - 5.2 mmol/L Final     CO2   Date Value Ref Range Status   07/13/2021 25.0 22.0 - 29.0 mmol/L Final     Chloride   Date Value Ref Range Status   07/13/2021 103 98 - 107 mmol/L Final     Anion Gap   Date Value Ref Range Status   07/13/2021 10.0 5.0 - 15.0 mmol/L Final     Creatinine   Date Value Ref Range Status   07/13/2021 1.23 0.76 - 1.27 mg/dL Final     BUN   Date Value Ref Range Status   07/13/2021 13 8 - 23 mg/dL Final     BUN/Creatinine Ratio   Date Value Ref Range Status   07/13/2021 10.6 7.0 - 25.0 Final     Calcium   Date Value Ref Range Status   07/13/2021 9.5 8.6 - 10.5 mg/dL Final     eGFR Non  Amer   Date Value Ref Range Status   07/13/2021 58 (L) >60 mL/min/1.73 Final     Alkaline Phosphatase   Date Value Ref Range Status   07/13/2021 97 39 - 117 U/L Final     Total Protein   Date Value Ref Range Status   07/13/2021 7.1 6.0 - 8.5 g/dL Final     ALT (SGPT)   Date Value Ref Range Status   07/13/2021 16 1 - 41 U/L Final     AST (SGOT)   Date Value Ref Range Status   07/13/2021 22 1 - 40 U/L Final     Total Bilirubin   Date Value Ref Range Status   07/13/2021 0.4 0.0 - 1.2 mg/dL Final     Albumin   Date Value Ref Range Status   07/13/2021 4.50 3.50 - 5.20 g/dL Final     Globulin   Date Value Ref Range Status   07/13/2021 2.6 gm/dL Final     Lab Results   Component Value Date    WBC 73.21 (C) 07/13/2021    HGB 13.4 07/13/2021    HCT 40.5 07/13/2021    MCV 83.0 07/13/2021     07/13/2021     Lab Results   Component Value Date    NEUTROABS 5.86 07/13/2021     Lab Results   Component Value Date    REFLABREPO SEE NOTE: 10/19/2016         PATHOLOGY:  * Cannot find OR log *         RADIOLOGY DATA :  No radiology results for the last 7 days        Assessment/Plan     1.  Chronic lymphocytic leukemia:  -Patient  was diagnosed in September 2016 on peripheral blood flow cytometry  -Patient received 2 cycles of chemotherapy with bendamustine and rituximab between July 10, 2017 and August 7, 2017  -Patient had allergic reaction with day 2 of cycle 2 of chemotherapy with flexion and swelling of face for which chemotherapy has been discontinued  -CBC done today shows white blood cell count is elevated at 73,000 which is predominantly lymphocyte.  Hemoglobin is 13.4, platelet count is 157,000  -Patient does have a chronic night sweats without any weight loss.  We will continue with clinical surveillance  -We will ask patient to return to clinic in 4 months with repeat CBC,, CMP and CT of chest abdomen and pelvis with contrast to be done prior to that for surveillance purposes    2.  Lymphocytosis:  -Secondary to CLL.  We will monitor with CBC for now    3.  Health maintenance: Patient does not smoke.  Had a colonoscopy in 2013    4. Advance Care Planning: For now patient remains full code and is able to make decisions.  Patient has health care surrogate mentioned on chart.                 PHQ-9 Total Score: 0   -Patient is not homicidal or suicidal.  No acute intervention required.    Roe Gu Sr. reports a pain score of 0.  Given his pain assessment as noted, treatment options were discussed and the following options were decided upon as a follow-up plan to address the patient's pain: continuation of current treatment plan for pain.         Andres Lubin MD  7/13/2021  11:53 CDT        Part of this note may be an electronic transcription/translation of spoken language to printed text using the Dragon Dictation System.          CC:

## 2021-10-26 ENCOUNTER — APPOINTMENT (OUTPATIENT)
Dept: GENERAL RADIOLOGY | Facility: HOSPITAL | Age: 71
End: 2021-10-26

## 2021-10-26 ENCOUNTER — HOSPITAL ENCOUNTER (EMERGENCY)
Facility: HOSPITAL | Age: 71
Discharge: HOME | End: 2021-10-26
Attending: FAMILY MEDICINE | Admitting: FAMILY MEDICINE

## 2021-10-26 VITALS
OXYGEN SATURATION: 93 % | DIASTOLIC BLOOD PRESSURE: 69 MMHG | HEIGHT: 67 IN | RESPIRATION RATE: 18 BRPM | HEART RATE: 82 BPM | SYSTOLIC BLOOD PRESSURE: 139 MMHG | WEIGHT: 185 LBS | TEMPERATURE: 100.4 F | BODY MASS INDEX: 29.03 KG/M2

## 2021-10-26 DIAGNOSIS — U07.1 COVID: Primary | ICD-10-CM

## 2021-10-26 LAB
ALBUMIN SERPL-MCNC: 4.2 G/DL (ref 3.5–5.2)
ALBUMIN/GLOB SERPL: 1.5 G/DL
ALP SERPL-CCNC: 99 U/L (ref 39–117)
ALT SERPL W P-5'-P-CCNC: 17 U/L (ref 1–41)
ANION GAP SERPL CALCULATED.3IONS-SCNC: 12 MMOL/L (ref 5–15)
AST SERPL-CCNC: 21 U/L (ref 1–40)
BASOPHILS # BLD AUTO: 0.07 10*3/MM3 (ref 0–0.2)
BASOPHILS NFR BLD AUTO: 0.2 % (ref 0–1.5)
BILIRUB SERPL-MCNC: 0.4 MG/DL (ref 0–1.2)
BUN SERPL-MCNC: 22 MG/DL (ref 8–23)
BUN/CREAT SERPL: 15.4 (ref 7–25)
CALCIUM SPEC-SCNC: 8.7 MG/DL (ref 8.6–10.5)
CHLORIDE SERPL-SCNC: 99 MMOL/L (ref 98–107)
CK SERPL-CCNC: 65 U/L (ref 20–200)
CO2 SERPL-SCNC: 26 MMOL/L (ref 22–29)
CREAT SERPL-MCNC: 1.43 MG/DL (ref 0.76–1.27)
DEPRECATED RDW RBC AUTO: 43.4 FL (ref 37–54)
EOSINOPHIL # BLD AUTO: 0.1 10*3/MM3 (ref 0–0.4)
EOSINOPHIL # BLD MANUAL: 0.39 10*3/MM3 (ref 0–0.4)
EOSINOPHIL NFR BLD AUTO: 0.3 % (ref 0.3–6.2)
EOSINOPHIL NFR BLD MANUAL: 1 % (ref 0.3–6.2)
ERYTHROCYTE [DISTWIDTH] IN BLOOD BY AUTOMATED COUNT: 14.4 % (ref 12.3–15.4)
FLUAV RNA RESP QL NAA+PROBE: NOT DETECTED
FLUBV RNA RESP QL NAA+PROBE: NOT DETECTED
GFR SERPL CREATININE-BSD FRML MDRD: 49 ML/MIN/1.73
GLOBULIN UR ELPH-MCNC: 2.8 GM/DL
GLUCOSE SERPL-MCNC: 119 MG/DL (ref 65–99)
HCT VFR BLD AUTO: 39.7 % (ref 37.5–51)
HGB BLD-MCNC: 12.8 G/DL (ref 13–17.7)
HOLD SPECIMEN: NORMAL
IMM GRANULOCYTES # BLD AUTO: 0.12 10*3/MM3 (ref 0–0.05)
IMM GRANULOCYTES NFR BLD AUTO: 0.3 % (ref 0–0.5)
LIPASE SERPL-CCNC: 30 U/L (ref 13–60)
LYMPHOCYTES # BLD AUTO: 29.62 10*3/MM3 (ref 0.7–3.1)
LYMPHOCYTES # BLD MANUAL: 31.52 10*3/MM3 (ref 0.7–3.1)
LYMPHOCYTES NFR BLD AUTO: 76.1 % (ref 19.6–45.3)
LYMPHOCYTES NFR BLD MANUAL: 71 % (ref 19.6–45.3)
LYMPHOCYTES NFR BLD MANUAL: 8 % (ref 5–12)
MAGNESIUM SERPL-MCNC: 2.1 MG/DL (ref 1.6–2.4)
MCH RBC QN AUTO: 26.9 PG (ref 26.6–33)
MCHC RBC AUTO-ENTMCNC: 32.2 G/DL (ref 31.5–35.7)
MCV RBC AUTO: 83.6 FL (ref 79–97)
MONOCYTES # BLD AUTO: 3.11 10*3/MM3 (ref 0.1–0.9)
MONOCYTES # BLD AUTO: 3.99 10*3/MM3 (ref 0.1–0.9)
MONOCYTES NFR BLD AUTO: 10.3 % (ref 5–12)
NEUTROPHILS # BLD AUTO: 3.89 10*3/MM3 (ref 1.7–7)
NEUTROPHILS NFR BLD AUTO: 12.8 % (ref 42.7–76)
NEUTROPHILS NFR BLD AUTO: 5.01 10*3/MM3 (ref 1.7–7)
NEUTROPHILS NFR BLD MANUAL: 10 % (ref 42.7–76)
NRBC BLD AUTO-RTO: 0 /100 WBC (ref 0–0.2)
NT-PROBNP SERPL-MCNC: 67.6 PG/ML (ref 0–900)
PLATELET # BLD AUTO: 133 10*3/MM3 (ref 140–450)
PMV BLD AUTO: 9.6 FL (ref 6–12)
POTASSIUM SERPL-SCNC: 4.3 MMOL/L (ref 3.5–5.2)
PROT SERPL-MCNC: 7 G/DL (ref 6–8.5)
RBC # BLD AUTO: 4.75 10*6/MM3 (ref 4.14–5.8)
RBC MORPH BLD: NORMAL
SARS-COV-2 RNA RESP QL NAA+PROBE: DETECTED
SMALL PLATELETS BLD QL SMEAR: ABNORMAL
SODIUM SERPL-SCNC: 137 MMOL/L (ref 136–145)
TROPONIN T SERPL-MCNC: <0.01 NG/ML (ref 0–0.03)
VARIANT LYMPHS NFR BLD MANUAL: 10 % (ref 0–5)
WBC # BLD AUTO: 38.91 10*3/MM3 (ref 3.4–10.8)
WBC MORPH BLD: NORMAL

## 2021-10-26 PROCEDURE — 85007 BL SMEAR W/DIFF WBC COUNT: CPT | Performed by: FAMILY MEDICINE

## 2021-10-26 PROCEDURE — 74022 RADEX COMPL AQT ABD SERIES: CPT

## 2021-10-26 PROCEDURE — 83690 ASSAY OF LIPASE: CPT | Performed by: FAMILY MEDICINE

## 2021-10-26 PROCEDURE — 93010 ELECTROCARDIOGRAM REPORT: CPT | Performed by: INTERNAL MEDICINE

## 2021-10-26 PROCEDURE — 80053 COMPREHEN METABOLIC PANEL: CPT | Performed by: FAMILY MEDICINE

## 2021-10-26 PROCEDURE — 87636 SARSCOV2 & INF A&B AMP PRB: CPT | Performed by: FAMILY MEDICINE

## 2021-10-26 PROCEDURE — 99283 EMERGENCY DEPT VISIT LOW MDM: CPT

## 2021-10-26 PROCEDURE — 96374 THER/PROPH/DIAG INJ IV PUSH: CPT

## 2021-10-26 PROCEDURE — 82550 ASSAY OF CK (CPK): CPT | Performed by: FAMILY MEDICINE

## 2021-10-26 PROCEDURE — 84484 ASSAY OF TROPONIN QUANT: CPT | Performed by: FAMILY MEDICINE

## 2021-10-26 PROCEDURE — 93005 ELECTROCARDIOGRAM TRACING: CPT | Performed by: FAMILY MEDICINE

## 2021-10-26 PROCEDURE — 83735 ASSAY OF MAGNESIUM: CPT | Performed by: FAMILY MEDICINE

## 2021-10-26 PROCEDURE — 83880 ASSAY OF NATRIURETIC PEPTIDE: CPT | Performed by: FAMILY MEDICINE

## 2021-10-26 PROCEDURE — 85025 COMPLETE CBC W/AUTO DIFF WBC: CPT | Performed by: FAMILY MEDICINE

## 2021-10-26 PROCEDURE — 96361 HYDRATE IV INFUSION ADD-ON: CPT

## 2021-10-26 PROCEDURE — 25010000002 ONDANSETRON PER 1 MG: Performed by: FAMILY MEDICINE

## 2021-10-26 RX ORDER — SODIUM CHLORIDE 9 MG/ML
125 INJECTION, SOLUTION INTRAVENOUS CONTINUOUS
Status: DISCONTINUED | OUTPATIENT
Start: 2021-10-26 | End: 2021-10-27 | Stop reason: HOSPADM

## 2021-10-26 RX ORDER — ONDANSETRON 2 MG/ML
4 INJECTION INTRAMUSCULAR; INTRAVENOUS ONCE
Status: COMPLETED | OUTPATIENT
Start: 2021-10-26 | End: 2021-10-26

## 2021-10-26 RX ADMIN — ONDANSETRON 4 MG: 2 INJECTION INTRAMUSCULAR; INTRAVENOUS at 21:25

## 2021-10-26 RX ADMIN — SODIUM CHLORIDE 125 ML/HR: 900 INJECTION, SOLUTION INTRAVENOUS at 21:27

## 2021-10-26 RX ADMIN — SODIUM CHLORIDE 1000 ML: 9 INJECTION, SOLUTION INTRAVENOUS at 21:27

## 2021-10-27 LAB
QT INTERVAL: 362 MS
QTC INTERVAL: 438 MS

## 2021-11-01 ENCOUNTER — TELEPHONE (OUTPATIENT)
Dept: ONCOLOGY | Facility: CLINIC | Age: 71
End: 2021-11-01

## 2021-11-10 ENCOUNTER — OFFICE VISIT (OUTPATIENT)
Dept: ONCOLOGY | Facility: CLINIC | Age: 71
End: 2021-11-10

## 2021-11-10 ENCOUNTER — LAB (OUTPATIENT)
Dept: ONCOLOGY | Facility: HOSPITAL | Age: 71
End: 2021-11-10

## 2021-11-10 VITALS
TEMPERATURE: 98.1 F | WEIGHT: 172.2 LBS | DIASTOLIC BLOOD PRESSURE: 88 MMHG | OXYGEN SATURATION: 95 % | SYSTOLIC BLOOD PRESSURE: 139 MMHG | BODY MASS INDEX: 26.97 KG/M2 | HEART RATE: 79 BPM

## 2021-11-10 DIAGNOSIS — C91.10 CLL (CHRONIC LYMPHOCYTIC LEUKEMIA) (HCC): Chronic | ICD-10-CM

## 2021-11-10 DIAGNOSIS — D72.820 LYMPHOCYTOSIS: ICD-10-CM

## 2021-11-10 DIAGNOSIS — D64.9 ANEMIA, UNSPECIFIED TYPE: ICD-10-CM

## 2021-11-10 DIAGNOSIS — D69.6 THROMBOCYTOPENIA (HCC): ICD-10-CM

## 2021-11-10 DIAGNOSIS — C91.10 CLL (CHRONIC LYMPHOCYTIC LEUKEMIA) (HCC): Primary | ICD-10-CM

## 2021-11-10 LAB
ALBUMIN SERPL-MCNC: 5 G/DL (ref 3.5–5.2)
ALBUMIN/GLOB SERPL: 2.3 G/DL
ALP SERPL-CCNC: 98 U/L (ref 39–117)
ALT SERPL W P-5'-P-CCNC: 19 U/L (ref 1–41)
ANION GAP SERPL CALCULATED.3IONS-SCNC: 10 MMOL/L (ref 5–15)
ANISOCYTOSIS BLD QL: ABNORMAL
AST SERPL-CCNC: 20 U/L (ref 1–40)
BILIRUB SERPL-MCNC: 0.3 MG/DL (ref 0–1.2)
BUN SERPL-MCNC: 13 MG/DL (ref 8–23)
BUN/CREAT SERPL: 11.3 (ref 7–25)
CALCIUM SPEC-SCNC: 9.7 MG/DL (ref 8.6–10.5)
CHLORIDE SERPL-SCNC: 101 MMOL/L (ref 98–107)
CO2 SERPL-SCNC: 27 MMOL/L (ref 22–29)
CREAT SERPL-MCNC: 1.15 MG/DL (ref 0.76–1.27)
DEPRECATED RDW RBC AUTO: 42.5 FL (ref 37–54)
ERYTHROCYTE [DISTWIDTH] IN BLOOD BY AUTOMATED COUNT: 14.6 % (ref 12.3–15.4)
FERRITIN SERPL-MCNC: 134.7 NG/ML (ref 30–400)
FOLATE SERPL-MCNC: 8.56 NG/ML (ref 4.78–24.2)
GFR SERPL CREATININE-BSD FRML MDRD: 63 ML/MIN/1.73
GLOBULIN UR ELPH-MCNC: 2.2 GM/DL
GLUCOSE SERPL-MCNC: 89 MG/DL (ref 65–99)
HCT VFR BLD AUTO: 43.1 % (ref 37.5–51)
HGB BLD-MCNC: 14.2 G/DL (ref 13–17.7)
IRON 24H UR-MRATE: 62 MCG/DL (ref 59–158)
IRON SATN MFR SERPL: 18 % (ref 20–50)
LDH SERPL-CCNC: 233 U/L (ref 135–225)
LYMPHOCYTES # BLD MANUAL: 56.72 10*3/MM3 (ref 0.7–3.1)
LYMPHOCYTES NFR BLD MANUAL: 6 % (ref 5–12)
LYMPHOCYTES NFR BLD MANUAL: 85 % (ref 19.6–45.3)
MCH RBC QN AUTO: 27.2 PG (ref 26.6–33)
MCHC RBC AUTO-ENTMCNC: 32.9 G/DL (ref 31.5–35.7)
MCV RBC AUTO: 82.4 FL (ref 79–97)
MONOCYTES # BLD AUTO: 3.78 10*3/MM3 (ref 0.1–0.9)
NEUTROPHILS # BLD AUTO: 2.52 10*3/MM3 (ref 1.7–7)
NEUTROPHILS NFR BLD MANUAL: 4 % (ref 42.7–76)
PLATELET # BLD AUTO: 228 10*3/MM3 (ref 140–450)
PMV BLD AUTO: 9.8 FL (ref 6–12)
POTASSIUM SERPL-SCNC: 4.5 MMOL/L (ref 3.5–5.2)
PROT SERPL-MCNC: 7.2 G/DL (ref 6–8.5)
RBC # BLD AUTO: 5.23 10*6/MM3 (ref 4.14–5.8)
SMALL PLATELETS BLD QL SMEAR: ADEQUATE
SMUDGE CELLS IN BLOOD BY LIGHT MICROSCOPY: 7 /100 WBC
SODIUM SERPL-SCNC: 138 MMOL/L (ref 136–145)
TIBC SERPL-MCNC: 337 MCG/DL (ref 298–536)
TRANSFERRIN SERPL-MCNC: 226 MG/DL (ref 200–360)
VARIANT LYMPHS NFR BLD MANUAL: 5 % (ref 0–5)
VIT B12 BLD-MCNC: 1090 PG/ML (ref 211–946)
WBC # BLD AUTO: 63.02 10*3/MM3 (ref 3.4–10.8)
WBC MORPH BLD: NORMAL

## 2021-11-10 PROCEDURE — 85007 BL SMEAR W/DIFF WBC COUNT: CPT | Performed by: INTERNAL MEDICINE

## 2021-11-10 PROCEDURE — 83540 ASSAY OF IRON: CPT | Performed by: INTERNAL MEDICINE

## 2021-11-10 PROCEDURE — G0463 HOSPITAL OUTPT CLINIC VISIT: HCPCS | Performed by: INTERNAL MEDICINE

## 2021-11-10 PROCEDURE — 1125F AMNT PAIN NOTED PAIN PRSNT: CPT | Performed by: INTERNAL MEDICINE

## 2021-11-10 PROCEDURE — 85025 COMPLETE CBC W/AUTO DIFF WBC: CPT | Performed by: INTERNAL MEDICINE

## 2021-11-10 PROCEDURE — 82607 VITAMIN B-12: CPT | Performed by: INTERNAL MEDICINE

## 2021-11-10 PROCEDURE — 1123F ACP DISCUSS/DSCN MKR DOCD: CPT | Performed by: INTERNAL MEDICINE

## 2021-11-10 PROCEDURE — 82746 ASSAY OF FOLIC ACID SERUM: CPT | Performed by: INTERNAL MEDICINE

## 2021-11-10 PROCEDURE — 99214 OFFICE O/P EST MOD 30 MIN: CPT | Performed by: INTERNAL MEDICINE

## 2021-11-10 PROCEDURE — 80053 COMPREHEN METABOLIC PANEL: CPT | Performed by: INTERNAL MEDICINE

## 2021-11-10 PROCEDURE — 84466 ASSAY OF TRANSFERRIN: CPT | Performed by: INTERNAL MEDICINE

## 2021-11-10 PROCEDURE — 83615 LACTATE (LD) (LDH) ENZYME: CPT | Performed by: INTERNAL MEDICINE

## 2021-11-10 PROCEDURE — 82728 ASSAY OF FERRITIN: CPT | Performed by: INTERNAL MEDICINE

## 2021-11-10 RX ORDER — DOCUSATE SODIUM 100 MG/1
100 CAPSULE, LIQUID FILLED ORAL 2 TIMES DAILY PRN
Qty: 60 CAPSULE | Refills: 2 | Status: SHIPPED | OUTPATIENT
Start: 2021-11-10 | End: 2022-05-20 | Stop reason: SDUPTHER

## 2021-11-10 RX ORDER — FERROUS SULFATE 325(65) MG
TABLET ORAL
Qty: 36 TABLET | Refills: 1 | Status: SHIPPED | OUTPATIENT
Start: 2021-11-10 | End: 2022-05-20

## 2021-11-10 RX ORDER — ONDANSETRON 4 MG/1
4 TABLET, FILM COATED ORAL 4 TIMES DAILY PRN
Qty: 40 TABLET | Refills: 3 | Status: SHIPPED | OUTPATIENT
Start: 2021-11-10 | End: 2022-11-21 | Stop reason: SDUPTHER

## 2021-11-10 RX ORDER — TAMSULOSIN HYDROCHLORIDE 0.4 MG/1
2 CAPSULE ORAL DAILY
COMMUNITY

## 2021-11-10 NOTE — PROGRESS NOTES
DATE OF VISIT: 11/10/2021      REASON FOR VISIT: Chronic lymphocytic leukemia, lymphocytosis, acute kidney injury      HISTORY OF PRESENT ILLNESS:   71-year-old male with medical problem consisting of coronary artery disease s/p CABG, dyslipidemia, chronic lymphocytic leukemia diagnosed in September 2016, currently has been on surveillance is here for follow-up appointment today with complaint of knots on the back of the neck.  Patient states he was seen in the emergency room on October 26, 2021 with complaint of shortness of breath and was found to have Covid positivity.  Still complains of shortness of breath.  Denies any fever.  States knot on the back of the neck started around the same time he became symptomatic with shortness of breath.  Denies any other new lymph node enlargement.  Complains of worsening night sweats and 7 pound of weight loss since last clinic visit.  Denies any bleeding.          Past Medical History, Past Surgical History, Social History, Family History have been reviewed and are without significant changes except as mentioned.    Review of Systems   A comprehensive 14 point review of systems was performed and was negative except as mentioned in HPI.    Medications:  The current medication list was reviewed in the EMR    ALLERGIES:  No Known Allergies    Objective      Vitals:    11/10/21 0814   BP: 139/88   Pulse: 79   Temp: 98.1 °F (36.7 °C)   SpO2: 95%   Weight: 78.1 kg (172 lb 3.2 oz)   PainSc:   2   PainLoc: Neck     Current Status 3/9/2021   ECOG score 0       Physical Exam  Neck:      Comments: Lymph node enlargement measuring one 1 cm present on left side on back of neck.  Other skin nodules are less than half centimeter   Pulmonary:      Comments: Crackles heard on right lower lobe lung field  Neurological:      Mental Status: He is alert and oriented to person, place, and time.           RECENT LABS:  Glucose   Date Value Ref Range Status   11/10/2021 89 65 - 99 mg/dL Final    10/04/2021 89 70 - 110 mg/dL Final     Sodium   Date Value Ref Range Status   11/10/2021 138 136 - 145 mmol/L Final   10/04/2021 140 136 - 145 mmol/L Final     Potassium   Date Value Ref Range Status   11/10/2021 4.5 3.5 - 5.2 mmol/L Final   10/04/2021 4.5 3.5 - 5.1 mmol/L Final     CO2   Date Value Ref Range Status   11/10/2021 27.0 22.0 - 29.0 mmol/L Final   10/04/2021 27 21 - 32 mmol/L Final     Chloride   Date Value Ref Range Status   11/10/2021 101 98 - 107 mmol/L Final   10/04/2021 104 98 - 107 mmol/L Final     Anion Gap   Date Value Ref Range Status   11/10/2021 10.0 5.0 - 15.0 mmol/L Final     Creatinine   Date Value Ref Range Status   11/10/2021 1.15 0.76 - 1.27 mg/dL Final   10/04/2021 1.4 (H) 0.70 - 1.30 mg/dL Final     BUN   Date Value Ref Range Status   11/10/2021 13 8 - 23 mg/dL Final   10/04/2021 15 7 - 18 mg/dL Final     BUN/Creatinine Ratio   Date Value Ref Range Status   11/10/2021 11.3 7.0 - 25.0 Final     Calcium   Date Value Ref Range Status   11/10/2021 9.7 8.6 - 10.5 mg/dL Final   10/04/2021 8.8 8.5 - 10.1 mg/dL Final     eGFR Non  Amer   Date Value Ref Range Status   11/10/2021 63 >60 mL/min/1.73 Final     Alkaline Phosphatase   Date Value Ref Range Status   11/10/2021 98 39 - 117 U/L Final     Total Protein   Date Value Ref Range Status   11/10/2021 7.2 6.0 - 8.5 g/dL Final     ALT (SGPT)   Date Value Ref Range Status   11/10/2021 19 1 - 41 U/L Final     AST (SGOT)   Date Value Ref Range Status   11/10/2021 20 1 - 40 U/L Final     Total Bilirubin   Date Value Ref Range Status   11/10/2021 0.3 0.0 - 1.2 mg/dL Final     Albumin   Date Value Ref Range Status   11/10/2021 5.00 3.50 - 5.20 g/dL Final   10/04/2021 4.6 3.4 - 5.0 g/dL Final     Globulin   Date Value Ref Range Status   11/10/2021 2.2 gm/dL Final     Lab Results   Component Value Date    WBC 63.02 (C) 11/10/2021    HGB 14.2 11/10/2021    HCT 43.1 11/10/2021    MCV 82.4 11/10/2021     11/10/2021     Lab Results    Component Value Date    NEUTROABS 2.52 11/10/2021    IRON 62 11/10/2021    TIBC 337 11/10/2021    LABIRON 18 (L) 11/10/2021    FERRITIN 134.70 11/10/2021     Lab Results   Component Value Date    REFLABREPO SEE NOTE: 10/19/2016         PATHOLOGY:  * Cannot find OR log *         RADIOLOGY DATA :  No radiology results for the last 7 days        Assessment/Plan     1.  Chronic lymphocytic leukemia:  -Patient was diagnosed in September 2016 on peripheral blood flow cytometry  -Received 2 cycles of chemotherapy with bendamustine and Rituxan between July 10, 2017 and August 7, 2017.  -Had allergic reaction with day 2 of cycle 2 of chemotherapy with swelling of face and erythema.  And chemotherapy was discontinued  -CBC done earlier today shows white blood cell count is 63,000 which is predominantly increase in lymphocytes.  Hemoglobin is 14.2, platelet count is 228,000.  -Patient does have a 1 lymph node that measuring close to 1 cm on back of neck on left side.  He is scheduled to get CT of chest, abdomen and pelvis with contrast next week.  Patient will return to clinic after CT scan for further recommendation.  -If required next line Of treatment would be Acalabrutinib.    2.  Lymphocytosis:  -Secondary to CLL.  Will monitor with CBC for now    3.  Anemia:  -Hemoglobin today is 14.2  -Iron studies shows developing iron deficiency with iron saturation of 18% with ferritin of 134.   -We will start patient on ferrous sulfate 3 times a week with stool softener.  -Folate level is 8.  We will start patient on folic acid 1 mg p.o. daily.    4.  Acute kidney injury:  -Resolved.  Creatinine is 1.15    5.  Health maintenance: Patient does not smoke.  Had a colonoscopy in 2013    6.  Recent Covid infection    7. Advance Care Planning: For now patient remains full code and is able to make decisions.  Patient has health care surrogate mentioned on chart.      8.  Prescriptions: Prescription for ferrous sulfate, folic acid and  Colace has been sent to his pharmacy today             PHQ-9 Total Score: 1   -Patient is not homicidal or suicidal.  No acute intervention required.    Roe Gu Sr. reports a pain score of 2.  Given his pain assessment as noted, treatment options were discussed and the following options were decided upon as a follow-up plan to address the patient's pain: continuation of current treatment plan for pain.         Andres Lubin MD  11/10/2021  17:44 CST        Part of this note may be an electronic transcription/translation of spoken language to printed text using the Dragon Dictation System.          CC:

## 2021-11-11 ENCOUNTER — TELEPHONE (OUTPATIENT)
Dept: ONCOLOGY | Facility: HOSPITAL | Age: 71
End: 2021-11-11

## 2021-11-11 RX ORDER — FOLIC ACID 1 MG/1
1 TABLET ORAL DAILY
Qty: 90 TABLET | Refills: 1 | Status: SHIPPED | OUTPATIENT
Start: 2021-11-11 | End: 2023-02-17 | Stop reason: SDUPTHER

## 2021-11-11 NOTE — TELEPHONE ENCOUNTER
----- Message from Andres Lubin MD sent at 11/11/2021  6:20 AM CST -----  Please let patient know, kidney blood work was normal today at 1.15.  Is okay to get CT scan next week with contrast.  CBC from today shows white blood cell count was 63,000, hemoglobin and platelets were normal.  Iron studies are showing developing iron deficiency, recommend starting 1 tablet of ferrous sulfate 3 times a week with stool softener.  Folate level is also borderline low recommend starting folic acid p.o. daily.  Prescription for ferrous sulfate, Colace and folic acid has been sent to his pharmacy today.  Thank you

## 2021-11-11 NOTE — TELEPHONE ENCOUNTER
Called and spoke with pt's wife regarding lab results and new med instructions per Dr. Lubin, v/u obtained.

## 2021-11-15 ENCOUNTER — HOSPITAL ENCOUNTER (OUTPATIENT)
Dept: CT IMAGING | Facility: HOSPITAL | Age: 71
Discharge: HOME | End: 2021-11-15
Admitting: INTERNAL MEDICINE

## 2021-11-15 DIAGNOSIS — C91.10 CLL (CHRONIC LYMPHOCYTIC LEUKEMIA) (HCC): Chronic | ICD-10-CM

## 2021-11-15 DIAGNOSIS — D72.820 LYMPHOCYTOSIS: Chronic | ICD-10-CM

## 2021-11-15 PROCEDURE — 74177 CT ABD & PELVIS W/CONTRAST: CPT

## 2021-11-15 PROCEDURE — 25010000002 IOPAMIDOL 61 % SOLUTION: Performed by: INTERNAL MEDICINE

## 2021-11-15 PROCEDURE — 71260 CT THORAX DX C+: CPT

## 2021-11-15 RX ADMIN — IOPAMIDOL 90 ML: 612 INJECTION, SOLUTION INTRAVENOUS at 09:19

## 2021-11-17 ENCOUNTER — OFFICE VISIT (OUTPATIENT)
Dept: ONCOLOGY | Facility: CLINIC | Age: 71
End: 2021-11-17

## 2021-11-17 VITALS
HEART RATE: 72 BPM | WEIGHT: 173.8 LBS | TEMPERATURE: 97.9 F | DIASTOLIC BLOOD PRESSURE: 77 MMHG | BODY MASS INDEX: 27.22 KG/M2 | OXYGEN SATURATION: 95 % | SYSTOLIC BLOOD PRESSURE: 148 MMHG

## 2021-11-17 DIAGNOSIS — D69.6 THROMBOCYTOPENIA (HCC): Chronic | ICD-10-CM

## 2021-11-17 DIAGNOSIS — D64.9 ANEMIA, UNSPECIFIED TYPE: Chronic | ICD-10-CM

## 2021-11-17 DIAGNOSIS — C91.10 CLL (CHRONIC LYMPHOCYTIC LEUKEMIA) (HCC): Primary | Chronic | ICD-10-CM

## 2021-11-17 DIAGNOSIS — D72.820 LYMPHOCYTOSIS: Chronic | ICD-10-CM

## 2021-11-17 PROCEDURE — 99214 OFFICE O/P EST MOD 30 MIN: CPT | Performed by: INTERNAL MEDICINE

## 2021-11-17 PROCEDURE — 1123F ACP DISCUSS/DSCN MKR DOCD: CPT | Performed by: INTERNAL MEDICINE

## 2021-11-17 PROCEDURE — G0463 HOSPITAL OUTPT CLINIC VISIT: HCPCS | Performed by: INTERNAL MEDICINE

## 2021-11-17 PROCEDURE — 1125F AMNT PAIN NOTED PAIN PRSNT: CPT | Performed by: INTERNAL MEDICINE

## 2021-11-17 NOTE — PROGRESS NOTES
DATE OF VISIT: 11/17/2021      REASON FOR VISIT: Chronic lymphocytic leukemia, lymphocytosis, night sweats, fatigue      HISTORY OF PRESENT ILLNESS:   71-year-old male with medical problem consisting of coronary artery disease s/p CABG, dyslipidemia, chronic lymphocytic leukemia diagnosed in September 2016 currently on surveillance who was last seen here at clinic on November 10, 2021 with complaint of skin nodule and lymph node enlargement on back of neck that started after his Covid infection on October 26, 2021.  Patient had a blood work done as well as CT scan for restaging, patient is here to discuss the results and further recommendation.  Still complains of intermittent pain with lymph node enlargement on back of neck.  Complains of night sweats and weight loss of 7 pounds in last 3 months.  Denies any bleeding.      Past Medical History, Past Surgical History, Social History, Family History have been reviewed and are without significant changes except as mentioned.    Review of Systems   A comprehensive 14 point review of systems was performed and was negative except as mentioned in HPI.    Medications:  The current medication list was reviewed in the EMR    ALLERGIES:  No Known Allergies    Objective      Vitals:    11/17/21 1301   BP: 148/77   Pulse: 72   Temp: 97.9 °F (36.6 °C)   SpO2: 95%   Weight: 78.8 kg (173 lb 12.8 oz)   PainSc:   1   PainLoc: Neck  Comment: neck pain/small nodules on the posterior part of N     Current Status 3/9/2021   ECOG score 0       Physical Exam  Pulmonary:      Breath sounds: Normal breath sounds.   Lymphadenopathy:      Cervical: Cervical adenopathy present.   Neurological:      Mental Status: He is alert and oriented to person, place, and time.           RECENT LABS:  Glucose   Date Value Ref Range Status   11/10/2021 89 65 - 99 mg/dL Final   10/04/2021 89 70 - 110 mg/dL Final     Sodium   Date Value Ref Range Status   11/10/2021 138 136 - 145 mmol/L Final   10/04/2021 140  136 - 145 mmol/L Final     Potassium   Date Value Ref Range Status   11/10/2021 4.5 3.5 - 5.2 mmol/L Final   10/04/2021 4.5 3.5 - 5.1 mmol/L Final     CO2   Date Value Ref Range Status   11/10/2021 27.0 22.0 - 29.0 mmol/L Final   10/04/2021 27 21 - 32 mmol/L Final     Chloride   Date Value Ref Range Status   11/10/2021 101 98 - 107 mmol/L Final   10/04/2021 104 98 - 107 mmol/L Final     Anion Gap   Date Value Ref Range Status   11/10/2021 10.0 5.0 - 15.0 mmol/L Final     Creatinine   Date Value Ref Range Status   11/10/2021 1.15 0.76 - 1.27 mg/dL Final   10/04/2021 1.4 (H) 0.70 - 1.30 mg/dL Final     BUN   Date Value Ref Range Status   11/10/2021 13 8 - 23 mg/dL Final   10/04/2021 15 7 - 18 mg/dL Final     BUN/Creatinine Ratio   Date Value Ref Range Status   11/10/2021 11.3 7.0 - 25.0 Final     Calcium   Date Value Ref Range Status   11/10/2021 9.7 8.6 - 10.5 mg/dL Final   10/04/2021 8.8 8.5 - 10.1 mg/dL Final     eGFR Non  Amer   Date Value Ref Range Status   11/10/2021 63 >60 mL/min/1.73 Final     Alkaline Phosphatase   Date Value Ref Range Status   11/10/2021 98 39 - 117 U/L Final     Total Protein   Date Value Ref Range Status   11/10/2021 7.2 6.0 - 8.5 g/dL Final     ALT (SGPT)   Date Value Ref Range Status   11/10/2021 19 1 - 41 U/L Final     AST (SGOT)   Date Value Ref Range Status   11/10/2021 20 1 - 40 U/L Final     Total Bilirubin   Date Value Ref Range Status   11/10/2021 0.3 0.0 - 1.2 mg/dL Final     Albumin   Date Value Ref Range Status   11/10/2021 5.00 3.50 - 5.20 g/dL Final   10/04/2021 4.6 3.4 - 5.0 g/dL Final     Globulin   Date Value Ref Range Status   11/10/2021 2.2 gm/dL Final     Lab Results   Component Value Date    WBC 63.02 (C) 11/10/2021    HGB 14.2 11/10/2021    HCT 43.1 11/10/2021    MCV 82.4 11/10/2021     11/10/2021     Lab Results   Component Value Date    NEUTROABS 2.52 11/10/2021    IRON 62 11/10/2021    TIBC 337 11/10/2021    LABIRON 18 (L) 11/10/2021    FERRITIN  134.70 11/10/2021    [id number] 1,090 (H) 11/10/2021    FOLATE 8.56 11/10/2021     Lab Results   Component Value Date    REFLABREPO SEE NOTE: 10/19/2016         PATHOLOGY:  * Cannot find OR log *         RADIOLOGY DATA :  CT Chest With Contrast Diagnostic    Result Date: 11/15/2021  1. AP window, subcarinal, right perihilar, right axillary, and bilateral distal external iliac chain enlarged lymph nodes consistent with patient's history of CLL. 2. Splenomegaly consistent with history of CLL. 3. Diffusely enlarged prostate gland impinges upon the base of bladder. The differential for this finding would include benign prostatic hypertrophy versus prostate malignancy. Recommend clinical correlation. 4. Remainder CT chest abdomen and pelvis exam is unremarkable. Electronically signed by:  Zac Blanco MD  11/15/2021 12:36 PM CST Workstation: [id number]    CT Abdomen Pelvis With Contrast    Result Date: 11/15/2021  1. AP window, subcarinal, right perihilar, right axillary, and bilateral distal external iliac chain enlarged lymph nodes consistent with patient's history of CLL. 2. Splenomegaly consistent with history of CLL. 3. Diffusely enlarged prostate gland impinges upon the base of bladder. The differential for this finding would include benign prostatic hypertrophy versus prostate malignancy. Recommend clinical correlation. 4. Remainder CT chest abdomen and pelvis exam is unremarkable. Electronically signed by:  Zac Blanco MD  11/15/2021 12:36 PM CST Workstation: [id number]          Assessment/Plan     1.  Chronic lymphocytic leukemia:  -Patient was diagnosed in September 2016 on peripheral blood flow cytometry  -Received 2 cycles of chemotherapy with bendamustine rituximab between July 10, 2017 and August 7, 2017  -Had allergic reaction with day 2 of cycle 2 of chemotherapy with swelling of face and erythema.  -Recent CT of chest, abdomen and pelvis with contrast done on November 15, 2021 shows minimal  enlargement of lymph node involving chest, abdomen and inguinal region without any bulky adenopathy.  -Patient did have a recent Covid infection in October 26 2021.  -Discussed with patient and his wife that his recent CT scan showing minimal lymphadenopathy could be related to recent Covid infection versus CLL.  None of the lymph nodes are bulky.  -Option of observation with 3-month follow-up and CT scan for restaging versus starting Acalabrutinib was discussed with patient.  -Side effect of Acalabrutinib were discussed with patient.  Will provide him information to read about Acalabrutinib today  -For now we will provide him 3-month follow-up appointment to return to clinic with repeat CBC, CMP, iron studies, ferritin, B12, folate and CT of chest abdomen and pelvis with contrast to be done prior to that.    2.  Lymphocytosis:  -Secondary to CLL.    3.  Anemia:  -Hemoglobin is 14.2.  -Patient has been started on ferrous sulfate 1 tablet 3 times a week along with folic acid p.o. daily    4.  Health maintenance: Patient does not smoke    5. Advance Care Planning: For now patient remains full code and is able to make decisions.  Patient has health care surrogate mentioned on chart.                 PHQ-9 Total Score: 2   -Patient is not homicidal or suicidal.  No acute intervention required.    Roe Gu Sr. reports a pain score of 1.  Given his pain assessment as noted, treatment options were discussed and the following options were decided upon as a follow-up plan to address the patient's pain: continuation of current treatment plan for pain.         Andres Lubin MD  11/17/2021  13:25 CST        Part of this note may be an electronic transcription/translation of spoken language to printed text using the Dragon Dictation System.          CC:

## 2021-11-17 NOTE — PATIENT INSTRUCTIONS
Acalabrutinib capsules  What is this medicine?  ACALABRUTINIB (a kwan a broo ti nib) is a medicine that targets proteins in cancer cells and stops the cancer cells from growing. It is used to treat mantle cell lymphoma, chronic lymphocytic leukemia, and small lymphocytic lymphoma.  This medicine may be used for other purposes; ask your health care provider or pharmacist if you have questions.  COMMON BRAND NAME(S): CALQUENCE  What should I tell my health care provider before I take this medicine?  They need to know if you have any of these conditions:  · bleeding disorders  · high blood pressure  · history of irregular heartbeat  · infection including hepatitis B virus infection  · liver disease  · recent surgery  · take medicines that treat or prevent blood clots  · an unusual or allergic reaction to acalabrutinib, other medicines, foods, dyes, or preservatives  · pregnant or trying to get pregnant  · breast-feeding  How should I use this medicine?  Take this medicine by mouth with a glass of water. Follow the directions on the prescription label. You can take it with or without food. If it upsets your stomach, take it with food. Do not cut, crush or chew this medicine. Do not take with grapefruit juice. Avoid taking H2-blockers and antacids within 2 hours of taking this medicine. Take your medicine at regular intervals. Do not take it more often than directed. Do not stop taking except on your doctor's advice.  Talk to your pediatrician regarding the use of this medicine in children. Special care may be needed.  Overdosage: If you think you have taken too much of this medicine contact a poison control center or emergency room at once.  NOTE: This medicine is only for you. Do not share this medicine with others.  What if I miss a dose?  If you miss a dose, take it as soon as you can. If your next dose is to be taken in less than 9 hours, then do not take the missed dose. Take the next dose at your regular time. Do  not take double or extra doses.  What may interact with this medicine?  This medicine may interact with the following medications:  · antiviral medications for HIV or AIDS  · aprepitant  · boceprevir  · calcium channel blockers like diltiazem and verapamil  · certain antibiotics like clarithromycin, erythromycin, and troleandomycin  · certain medicines for fungal infections like fluconazole, ketoconazole, itraconazole, posaconazole, and voriconazole  · certain medicines for seizures like carbamazepine and phenytoin  · certain medicines for stomach problems like cimetidine, famotidine, omeprazole, lansoprazole  · ciprofloxacin  · clotrimazole  · conivaptan  · crizotinib  · cyclosporine  · dronedarone  · enzalutamide  · fluvoxamine  · grapefruit juice  · idelalisib  · imatinib  · methotrexate  · mitotane  · nefazodone  · rifampin  · [place]'s wort  This list may not describe all possible interactions. Give your health care provider a list of all the medicines, herbs, non-prescription drugs, or dietary supplements you use. Also tell them if you smoke, drink alcohol, or use illegal drugs. Some items may interact with your medicine.  What should I watch for while using this medicine?  You may need blood work done while you are taking this medicine.  This medicine may increase your risk to bruise or bleed. Call your doctor or health care professional if you notice any unusual bleeding.  Call your doctor or health care professional for advice if you get a fever, chills or sore throat, or other symptoms of a cold or flu. Do not treat yourself. This drug decreases your body's ability to fight infections. Try to avoid being around people who are sick.  If you are going to have surgery or any other procedures, tell your doctor you are taking this medicine. Tell your dentist and dental surgeon that you are taking this medicine. You should not have major dental surgery while on this medicine. See your dentist to have a dental  exam and fix any dental problems before starting this medicine.  Talk to your doctor about your risk of cancer. You may be more at risk for certain types of cancers if you take this medicine. Keep out of the sun. If you cannot avoid being in the sun, wear protective clothing and use sunscreen.  Do not become pregnant while taking this medicine or for at least 1 week after stopping it. Women should inform their doctor if they wish to become pregnant or think they might be pregnant. There is a potential for serious side effects to an unborn child. Talk to your health care professional or pharmacist for more information. Do not breast-feed an infant while taking this medicine or for 2 weeks after stopping it.  What side effects may I notice from receiving this medicine?  Side effects that you should report to your doctor or health care professional as soon as possible:  · allergic reactions like skin rash, itching or hives, swelling of the face, lips, or tongue  · low blood counts - this medicine may decrease the number of white blood cells, red blood cells and platelets. You may be at increased risk for infections and bleeding.  · signs of decreased red blood cells - unusually weak or tired, feeling faint or lightheaded, falls  · signs or symptoms of bleeding such as bloody or black, tarry stools; red or dark-brown urine; spitting up blood or brown material that looks like coffee grounds; red spots on the skin; unusual bruising or bleeding from the eye, gums, or nose; confusion; trouble speaking or understanding; severe headaches; weakness; or dizziness  · signs and symptoms of a dangerous change in heartbeat or heart rhythm like chest pain; dizziness; fast or irregular heartbeat; palpitations; feeling faint or lightheaded, falls; breathing problems  · signs of infection - fever or chills, cough, sore throat, pain or difficulty passing urine  Side effects that usually do not require medical attention (report these to  your doctor or health care professional if they continue or are bothersome):  · constipation  · diarrhea  · headache  · muscle aches  · stomach pain  · tiredness  This list may not describe all possible side effects. Call your doctor for medical advice about side effects. You may report side effects to FDA at 2-464-WRO-7599.  Where should I keep my medicine?  Keep out of the reach of children.  Store between 20 and 25 degrees C (68 and 77 degrees F). Throw away any unused medicine after the expiration date.  NOTE: This sheet is a summary. It may not cover all possible information. If you have questions about this medicine, talk to your doctor, pharmacist, or health care provider.  © 2021 [employer] (2019-11-22 17:27:50)  Acalabrutinib capsules  What is this medicine?  ACALABRUTINIB (a kwan a broo ti nib) is a medicine that targets proteins in cancer cells and stops the cancer cells from growing. It is used to treat mantle cell lymphoma, chronic lymphocytic leukemia, and small lymphocytic lymphoma.  This medicine may be used for other purposes; ask your health care provider or pharmacist if you have questions.  COMMON BRAND NAME(S): CALQUENCE  What should I tell my health care provider before I take this medicine?  They need to know if you have any of these conditions:  · bleeding disorders  · high blood pressure  · history of irregular heartbeat  · infection including hepatitis B virus infection  · liver disease  · recent surgery  · take medicines that treat or prevent blood clots  · an unusual or allergic reaction to acalabrutinib, other medicines, foods, dyes, or preservatives  · pregnant or trying to get pregnant  · breast-feeding  How should I use this medicine?  Take this medicine by mouth with a glass of water. Follow the directions on the prescription label. You can take it with or without food. If it upsets your stomach, take it with food. Do not cut, crush or chew this medicine. Do not take with  grapefruit juice. Avoid taking H2-blockers and antacids within 2 hours of taking this medicine. Take your medicine at regular intervals. Do not take it more often than directed. Do not stop taking except on your doctor's advice.  Talk to your pediatrician regarding the use of this medicine in children. Special care may be needed.  Overdosage: If you think you have taken too much of this medicine contact a poison control center or emergency room at once.  NOTE: This medicine is only for you. Do not share this medicine with others.  What if I miss a dose?  If you miss a dose, take it as soon as you can. If your next dose is to be taken in less than 9 hours, then do not take the missed dose. Take the next dose at your regular time. Do not take double or extra doses.  What may interact with this medicine?  This medicine may interact with the following medications:  · antiviral medications for HIV or AIDS  · aprepitant  · boceprevir  · calcium channel blockers like diltiazem and verapamil  · certain antibiotics like clarithromycin, erythromycin, and troleandomycin  · certain medicines for fungal infections like fluconazole, ketoconazole, itraconazole, posaconazole, and voriconazole  · certain medicines for seizures like carbamazepine and phenytoin  · certain medicines for stomach problems like cimetidine, famotidine, omeprazole, lansoprazole  · ciprofloxacin  · clotrimazole  · conivaptan  · crizotinib  · cyclosporine  · dronedarone  · enzalutamide  · fluvoxamine  · grapefruit juice  · idelalisib  · imatinib  · methotrexate  · mitotane  · nefazodone  · rifampin  · Alexandra's wort  This list may not describe all possible interactions. Give your health care provider a list of all the medicines, herbs, non-prescription drugs, or dietary supplements you use. Also tell them if you smoke, drink alcohol, or use illegal drugs. Some items may interact with your medicine.  What should I watch for while using this medicine?  You may  need blood work done while you are taking this medicine.  This medicine may increase your risk to bruise or bleed. Call your doctor or health care professional if you notice any unusual bleeding.  Call your doctor or health care professional for advice if you get a fever, chills or sore throat, or other symptoms of a cold or flu. Do not treat yourself. This drug decreases your body's ability to fight infections. Try to avoid being around people who are sick.  If you are going to have surgery or any other procedures, tell your doctor you are taking this medicine. Tell your dentist and dental surgeon that you are taking this medicine. You should not have major dental surgery while on this medicine. See your dentist to have a dental exam and fix any dental problems before starting this medicine.  Talk to your doctor about your risk of cancer. You may be more at risk for certain types of cancers if you take this medicine. Keep out of the sun. If you cannot avoid being in the sun, wear protective clothing and use sunscreen.  Do not become pregnant while taking this medicine or for at least 1 week after stopping it. Women should inform their doctor if they wish to become pregnant or think they might be pregnant. There is a potential for serious side effects to an unborn child. Talk to your health care professional or pharmacist for more information. Do not breast-feed an infant while taking this medicine or for 2 weeks after stopping it.  What side effects may I notice from receiving this medicine?  Side effects that you should report to your doctor or health care professional as soon as possible:  · allergic reactions like skin rash, itching or hives, swelling of the face, lips, or tongue  · low blood counts - this medicine may decrease the number of white blood cells, red blood cells and platelets. You may be at increased risk for infections and bleeding.  · signs of decreased red blood cells - unusually weak or tired,  feeling faint or lightheaded, falls  · signs or symptoms of bleeding such as bloody or black, tarry stools; red or dark-brown urine; spitting up blood or brown material that looks like coffee grounds; red spots on the skin; unusual bruising or bleeding from the eye, gums, or nose; confusion; trouble speaking or understanding; severe headaches; weakness; or dizziness  · signs and symptoms of a dangerous change in heartbeat or heart rhythm like chest pain; dizziness; fast or irregular heartbeat; palpitations; feeling faint or lightheaded, falls; breathing problems  · signs of infection - fever or chills, cough, sore throat, pain or difficulty passing urine  Side effects that usually do not require medical attention (report these to your doctor or health care professional if they continue or are bothersome):  · constipation  · diarrhea  · headache  · muscle aches  · stomach pain  · tiredness  This list may not describe all possible side effects. Call your doctor for medical advice about side effects. You may report side effects to FDA at 1-704-TLA-7252.  Where should I keep my medicine?  Keep out of the reach of children.  Store between 20 and 25 degrees C (68 and 77 degrees F). Throw away any unused medicine after the expiration date.  NOTE: This sheet is a summary. It may not cover all possible information. If you have questions about this medicine, talk to your doctor, pharmacist, or health care provider.  © 2021 [employer] (2019-11-22 17:27:50)

## 2021-12-02 ENCOUNTER — OFFICE VISIT (OUTPATIENT)
Dept: GASTROENTEROLOGY | Facility: CLINIC | Age: 71
End: 2021-12-02

## 2021-12-02 VITALS
DIASTOLIC BLOOD PRESSURE: 81 MMHG | WEIGHT: 175.4 LBS | HEART RATE: 83 BPM | BODY MASS INDEX: 27.53 KG/M2 | HEIGHT: 67 IN | SYSTOLIC BLOOD PRESSURE: 131 MMHG

## 2021-12-02 DIAGNOSIS — R10.84 GENERALIZED ABDOMINAL PAIN: Primary | ICD-10-CM

## 2021-12-02 DIAGNOSIS — K21.00 GASTROESOPHAGEAL REFLUX DISEASE WITH ESOPHAGITIS WITHOUT HEMORRHAGE: ICD-10-CM

## 2021-12-02 DIAGNOSIS — K59.09 OTHER CONSTIPATION: ICD-10-CM

## 2021-12-02 PROCEDURE — 99214 OFFICE O/P EST MOD 30 MIN: CPT | Performed by: PHYSICIAN ASSISTANT

## 2021-12-02 NOTE — PROGRESS NOTES
Chief Complaint   Patient presents with   • Abdominal Pain   • Heartburn       ENDO PROCEDURE ORDERED:    Subjective    Roe Gu Sr. is a 71 y.o. male. he is here today for follow-up.    History of Present Illness    The patient is seen on recheck of his GERD, abdominal pain, constipation. Last seen on 06/03/2021. Patient went to the ER on 10/26/2021. Chest x-ray showed no acute abnormalities. He was positive for COVID. He had laboratories on 11/10/2021. CBC showed white count of 63.02, hemoglobin 14.2, hematocrit 43.1, 228 platelets. B12 1,090. Folate was low at 8.56, ferritin 134.7, iron 62 with 18% saturation. LDH was high at 233. CMP showed creatinine 1.15, GFR 63, otherwise normal. Patient had a CT scan of the chest, abdomen and pelvis on 11/15/2021 and showed nodules with splenomegaly, enlarged prostate. He was seen by Dr. Lubin for his CLL on 11/17/2021. They state he is to have repeat imaging in February and considering chemotherapy.    Patient currently denies abdominal pain, heartburn, nausea, and vomiting. He states his swelling is doing okay currently. He is on Bentyl and MiraLAX as needed. Weight is down 4.6 pounds since the last visit. He has been having some gassy and bloating feeling. Last EGD/colonoscopy on 12/16/2019 showed esophageal stricture dilated with hemorrhoids.     ASSESSMENT AND PLAN:  Patient declined any changes in medications. We will continue current medication. Encouraged high fiber diet. We will plan follow up in 6 months, sooner if needed. I encouraged him to keep his appointment with Dr. Lubin.       The following portions of the patient's history were reviewed and updated as appropriate:   Past Medical History:   Diagnosis Date   • Arthritis    • CLL (chronic lymphocytic leukemia) (HCC)    • CVA (cerebral vascular accident) (HCC) 2011   • Dyslipidemia    • Gastritis    • GERD (gastroesophageal reflux disease)    • Night sweats      Past Surgical History:   Procedure  Laterality Date   • APPENDECTOMY     • COLONOSCOPY  03/26/2014   • COLONOSCOPY N/A 12/16/2019    Procedure: COLONOSCOPY;  Surgeon: Gregg Martinez MD;  Location: [facility] ENDOSCOPY;  Service: Gastroenterology   • ENDOSCOPY N/A 12/16/2019    Procedure: ESOPHAGOGASTRODUODENOSCOPY--with dilation;  Surgeon: Gregg Martinez MD;  Location: [facility] ENDOSCOPY;  Service: Gastroenterology   • HERNIA REPAIR     • KNEE SURGERY     • LEG SURGERY     • MANDIBLE FRACTURE SURGERY     • [place] INSJ TUNNELED CVC W/O SUBQ PORT/ AGE 5 YR/> Right 7/7/2017    Procedure: MEDIPORT PLACEMENT WITH ULTRASOUND GUIDANCE       ;  Surgeon: Lucien Mcneal MD;  Location: [facility] OR;  Service: General   • PROSTATE SURGERY     • UPPER GASTROINTESTINAL ENDOSCOPY  03/26/2014   • UPPER GASTROINTESTINAL ENDOSCOPY  12/16/2019     Family History   Problem Relation Age of Onset   • Prostate cancer Father    • Stomach cancer Father    • Throat cancer Father    • Leukemia Brother    • Stomach cancer Brother    • Breast cancer Paternal Aunt    • Colon cancer Paternal Uncle    • Kidney cancer Brother    • Prostate cancer Paternal Uncle        No Known Allergies  Social History     Socioeconomic History   • Marital status:    Tobacco Use   • Smoking status: Never Smoker   • Smokeless tobacco: Never Used   • Tobacco comment: never smoked cigarettes   Vaping Use   • Vaping Use: Never used   Substance and Sexual Activity   • Alcohol use: No   • Drug use: No   • Sexual activity: Defer     Current Medications:  Prior to Admission medications    Medication Sig Start Date End Date Taking? Authorizing Provider   aspirin 81 MG tablet Take 81 mg by mouth Every Night.   Yes ProviderReyes MD   Cholecalciferol (VITAMIN D3) 5000 units capsule capsule Take 5,000 Units by mouth Daily.   Yes ProviderReyes MD   dicyclomine (BENTYL) 10 MG capsule TAKE 1 CAPSULE BY MOUTH THREE TIMES DAILY BEFORE MEAL(S) 6/15/21  Yes Jackson Choudhary PA-C   docusate sodium  "(COLACE) 100 MG capsule Take 1 capsule by mouth 2 (Two) Times a Day As Needed for Constipation. 11/10/21  Yes Andres Lubin MD   ferrous sulfate 325 (65 FE) MG tablet Take 1 tablet by mouth on Monday, Wednesday and Friday 11/10/21  Yes Andres Lubin MD   folic acid (FOLVITE) 1 MG tablet Take 1 tablet by mouth Daily. 11/11/21  Yes Andres Lubin MD   ondansetron (ZOFRAN) 4 MG tablet Take 1 tablet by mouth 4 (Four) Times a Day As Needed for Nausea or Vomiting. 11/10/21  Yes Andres Lubin MD   PARoxetine (PAXIL) 40 MG tablet Take 1 tablet by mouth Daily. 8/5/19  Yes Reyes Sherwood MD   polyethylene glycol (MIRALAX) powder Take 17 g by mouth As Needed.   Yes Reyes Sherwood MD   rosuvastatin (CRESTOR) 10 MG tablet Take 10 mg by mouth Every Night. 5/8/19  Yes Reyes Sherwood MD   tamsulosin (FLOMAX) 0.4 MG capsule 24 hr capsule Take 1 capsule by mouth Daily.   Yes ProviderReyes MD   vitamin B-12 (CYANOCOBALAMIN) 500 MCG tablet Take 500 mcg by mouth Daily.   Yes ProviderReyes MD     Review of Systems  Review of Systems       Objective    /81   Pulse 83   Ht 170.2 cm (67\")   Wt 79.6 kg (175 lb 6.4 oz)   BMI 27.47 kg/m²   Physical Exam  Vitals and nursing note reviewed.   Constitutional:       General: He is not in acute distress.     Appearance: He is well-developed. He is ill-appearing.   HENT:      Head: Normocephalic and atraumatic.   Eyes:      Pupils: Pupils are equal, round, and reactive to light.   Cardiovascular:      Rate and Rhythm: Normal rate and regular rhythm.      Heart sounds: Normal heart sounds.   Pulmonary:      Effort: Pulmonary effort is normal.      Breath sounds: Normal breath sounds.   Abdominal:      General: Bowel sounds are normal. There is no distension or abdominal bruit.      Palpations: Abdomen is soft. Abdomen is not rigid. There is no shifting dullness or mass.      Tenderness: There is abdominal tenderness. There is no guarding or rebound.    "   Hernia: No hernia is present. There is no hernia in the ventral area.   Musculoskeletal:         General: Normal range of motion.      Cervical back: Normal range of motion.   Skin:     General: Skin is warm and dry.   Neurological:      Mental Status: He is alert and oriented to person, place, and time.   Psychiatric:         Behavior: Behavior normal.         Thought Content: Thought content normal.         Judgment: Judgment normal.       Assessment/Plan      1. Generalized abdominal pain    2. Gastroesophageal reflux disease with esophagitis without hemorrhage    3. Other constipation    .   Diagnoses and all orders for this visit:    1. Generalized abdominal pain (Primary)    2. Gastroesophageal reflux disease with esophagitis without hemorrhage    3. Other constipation        Orders placed during this encounter include:  No orders of the defined types were placed in this encounter.      Medications prescribed:  No orders of the defined types were placed in this encounter.      Requested Prescriptions      No prescriptions requested or ordered in this encounter       Review and/or summary of lab tests, radiology, procedures, medications. Review and summary of old records and obtaining of history. The risks and benefits of my recommendations, as well as other treatment options were discussed with the patient today. Questions were answered.    Follow-up: Return in about 6 months (around 6/2/2022), or if symptoms worsen or fail to improve.     * Surgery not found *      This document has been electronically signed by Jackson Choudhary PA-C on December 23, 2021 13:49 CST      Results for orders placed or performed in visit on 11/10/21   CBC Auto Differential    Specimen: Blood   Result Value Ref Range    WBC 63.02 (C) 3.40 - 10.80 10*3/mm3    RBC 5.23 4.14 - 5.80 10*6/mm3    Hemoglobin 14.2 13.0 - 17.7 g/dL    Hematocrit 43.1 37.5 - 51.0 %    MCV 82.4 79.0 - 97.0 fL    MCH 27.2 26.6 - 33.0 pg    MCHC 32.9 31.5 -  35.7 g/dL    RDW 14.6 12.3 - 15.4 %    RDW-SD 42.5 37.0 - 54.0 fl    MPV 9.8 6.0 - 12.0 fL    Platelets 228 140 - 450 10*3/mm3   Iron and TIBC    Specimen: Blood   Result Value Ref Range    Iron 62 59 - 158 mcg/dL    Iron Saturation 18 (L) 20 - 50 %    Transferrin 226 200 - 360 mg/dL    TIBC 337 298 - 536 mcg/dL   Manual Differential    Specimen: Blood   Result Value Ref Range    Neutrophil % 4.0 (L) 42.7 - 76.0 %    Lymphocyte % 85.0 (H) 19.6 - 45.3 %    Monocyte % 6.0 5.0 - 12.0 %    Atypical Lymphocyte % 5.0 0.0 - 5.0 %    Neutrophils Absolute 2.52 1.70 - 7.00 10*3/mm3    Lymphocytes Absolute 56.72 (H) 0.70 - 3.10 10*3/mm3    Monocytes Absolute 3.78 (H) 0.10 - 0.90 10*3/mm3    Smudge Cells 7.0 /100 WBC    Anisocytosis Slight/1+ None Seen    WBC Morphology Normal Normal    Platelet Estimate Adequate Normal   Lactate Dehydrogenase    Specimen: Blood   Result Value Ref Range     (H) 135 - 225 U/L   Folate    Specimen: Blood   Result Value Ref Range    Folate 8.56 4.78 - 24.20 ng/mL   Ferritin    Specimen: Blood   Result Value Ref Range    Ferritin 134.70 30.00 - 400.00 ng/mL   Vitamin B12    Specimen: Blood   Result Value Ref Range    Vitamin B-12 1,090 (H) 211 - 946 pg/mL   Comprehensive Metabolic Panel    Specimen: Blood   Result Value Ref Range    Glucose 89 65 - 99 mg/dL    BUN 13 8 - 23 mg/dL    Creatinine 1.15 0.76 - 1.27 mg/dL    Sodium 138 136 - 145 mmol/L    Potassium 4.5 3.5 - 5.2 mmol/L    Chloride 101 98 - 107 mmol/L    CO2 27.0 22.0 - 29.0 mmol/L    Calcium 9.7 8.6 - 10.5 mg/dL    Total Protein 7.2 6.0 - 8.5 g/dL    Albumin 5.00 3.50 - 5.20 g/dL    ALT (SGPT) 19 1 - 41 U/L    AST (SGOT) 20 1 - 40 U/L    Alkaline Phosphatase 98 39 - 117 U/L    Total Bilirubin 0.3 0.0 - 1.2 mg/dL    eGFR Non African Amer 63 >60 mL/min/1.73    Globulin 2.2 gm/dL    A/G Ratio 2.3 g/dL    BUN/Creatinine Ratio 11.3 7.0 - 25.0    Anion Gap 10.0 5.0 - 15.0 mmol/L   Results for orders placed or performed during the  hospital encounter of 10/26/21   [facility]   Result Value Ref Range    Extra Tube Hold for add-ons.    COVID-19 and FLU A/B PCR - Swab, Nasopharynx    Specimen: Nasopharynx; Swab   Result Value Ref Range    COVID19 Detected (C) Not Detected - Ref. Range    Influenza A PCR Not Detected Not Detected    Influenza B PCR Not Detected Not Detected   CBC Auto Differential    Specimen: Blood   Result Value Ref Range    WBC 38.91 (C) 3.40 - 10.80 10*3/mm3    RBC 4.75 4.14 - 5.80 10*6/mm3    Hemoglobin 12.8 (L) 13.0 - 17.7 g/dL    Hematocrit 39.7 37.5 - 51.0 %    MCV 83.6 79.0 - 97.0 fL    MCH 26.9 26.6 - 33.0 pg    MCHC 32.2 31.5 - 35.7 g/dL    RDW 14.4 12.3 - 15.4 %    RDW-SD 43.4 37.0 - 54.0 fl    MPV 9.6 6.0 - 12.0 fL    Platelets 133 (L) 140 - 450 10*3/mm3    Neutrophil % 12.8 (L) 42.7 - 76.0 %    Lymphocyte % 76.1 (H) 19.6 - 45.3 %    Monocyte % 10.3 5.0 - 12.0 %    Eosinophil % 0.3 0.3 - 6.2 %    Basophil % 0.2 0.0 - 1.5 %    Immature Grans % 0.3 0.0 - 0.5 %    Neutrophils, Absolute 5.01 1.70 - 7.00 10*3/mm3    Lymphocytes, Absolute 29.62 (H) 0.70 - 3.10 10*3/mm3    Monocytes, Absolute 3.99 (H) 0.10 - 0.90 10*3/mm3    Eosinophils, Absolute 0.10 0.00 - 0.40 10*3/mm3    Basophils, Absolute 0.07 0.00 - 0.20 10*3/mm3    Immature Grans, Absolute 0.12 (H) 0.00 - 0.05 10*3/mm3    nRBC 0.0 0.0 - 0.2 /100 WBC   Troponin    Specimen: Blood   Result Value Ref Range    Troponin T <0.010 0.000 - 0.030 ng/mL   Manual Differential    Specimen: Blood   Result Value Ref Range    Neutrophil % 10.0 (L) 42.7 - 76.0 %    Lymphocyte % 71.0 (H) 19.6 - 45.3 %    Monocyte % 8.0 5.0 - 12.0 %    Eosinophil % 1.0 0.3 - 6.2 %    Atypical Lymphocyte % 10.0 (H) 0.0 - 5.0 %    Neutrophils Absolute 3.89 1.70 - 7.00 10*3/mm3    Lymphocytes Absolute 31.52 (H) 0.70 - 3.10 10*3/mm3    Monocytes Absolute 3.11 (H) 0.10 - 0.90 10*3/mm3    Eosinophils Absolute 0.39 0.00 - 0.40 10*3/mm3    RBC Morphology Normal Normal    WBC Morphology Normal Normal     Platelet Estimate Decreased Normal   BNP    Specimen: Blood   Result Value Ref Range    proBNP 67.6 0.0 - 900.0 pg/mL   Magnesium    Specimen: Blood   Result Value Ref Range    Magnesium 2.1 1.6 - 2.4 mg/dL   Lipase    Specimen: Blood   Result Value Ref Range    Lipase 30 13 - 60 U/L   CK    Specimen: Blood   Result Value Ref Range    Creatine Kinase 65 20 - 200 U/L   Comprehensive Metabolic Panel    Specimen: Blood   Result Value Ref Range    Glucose 119 (H) 65 - 99 mg/dL    BUN 22 8 - 23 mg/dL    Creatinine 1.43 (H) 0.76 - 1.27 mg/dL    Sodium 137 136 - 145 mmol/L    Potassium 4.3 3.5 - 5.2 mmol/L    Chloride 99 98 - 107 mmol/L    CO2 26.0 22.0 - 29.0 mmol/L    Calcium 8.7 8.6 - 10.5 mg/dL    Total Protein 7.0 6.0 - 8.5 g/dL    Albumin 4.20 3.50 - 5.20 g/dL    ALT (SGPT) 17 1 - 41 U/L    AST (SGOT) 21 1 - 40 U/L    Alkaline Phosphatase 99 39 - 117 U/L    Total Bilirubin 0.4 0.0 - 1.2 mg/dL    eGFR Non African Amer 49 (L) >60 mL/min/1.73    Globulin 2.8 gm/dL    A/G Ratio 1.5 g/dL    BUN/Creatinine Ratio 15.4 7.0 - 25.0    Anion Gap 12.0 5.0 - 15.0 mmol/L     *Note: Due to a large number of results and/or encounters for the requested time period, some results have not been displayed. A complete set of results can be found in Results Review.

## 2021-12-02 NOTE — PATIENT INSTRUCTIONS
MyPlate from USDA    MyPlate is an outline of a general healthy diet based on the 2010 Dietary Guidelines for Americans, from the U.S. Department of Agriculture (USDA). It sets guidelines for how much food you should eat from each food group based on your age, sex, and level of physical activity.  What are tips for following MyPlate?  To follow MyPlate recommendations:  · Eat a wide variety of fruits and vegetables, grains, and protein foods.  · Serve smaller portions and eat less food throughout the day.  · Limit portion sizes to avoid overeating.  · Enjoy your food.  · Get at least 150 minutes of exercise every week. This is about 30 minutes each day, 5 or more days per week.  It can be difficult to have every meal look like MyPlate. Think about MyPlate as eating guidelines for an entire day, rather than each individual meal.  Fruits and vegetables  · Make half of your plate fruits and vegetables.  · Eat many different colors of fruits and vegetables each day.  · For a 2,000 calorie daily food plan, eat:  ? 2½ cups of vegetables every day.  ? 2 cups of fruit every day.  · 1 cup is equal to:  ? 1 cup raw or cooked vegetables.  ? 1 cup raw fruit.  ? 1 medium-sized orange, apple, or banana.  ? 1 cup 100% fruit or vegetable juice.  ? 2 cups raw leafy greens, such as lettuce, spinach, or kale.  ? ½ cup dried fruit.  Grains  · One fourth of your plate should be grains.  · Make at least half of the grains you eat each day whole grains.  · For a 2,000 calorie daily food plan, eat 6 oz of grains every day.  · 1 oz is equal to:  ? 1 slice bread.  ? 1 cup cereal.  ? ½ cup cooked rice, cereal, or pasta.  Protein  · One fourth of your plate should be protein.  · Eat a wide variety of protein foods, including meat, poultry, fish, eggs, beans, nuts, and tofu.  · For a 2,000 calorie daily food plan, eat 5½ oz of protein every day.  · 1 oz is equal to:  ? 1 oz meat, poultry, or fish.  ? ¼ cup cooked beans.  ? 1 egg.  ? ½ oz nuts  or seeds.  ? 1 Tbsp peanut butter.  Dairy  · Drink fat-free or low-fat (1%) milk.  · Eat or drink dairy as a side to meals.  · For a 2,000 calorie daily food plan, eat or drink 3 cups of dairy every day.  · 1 cup is equal to:  ? 1 cup milk, yogurt, cottage cheese, or soy milk (soy beverage).  ? 2 oz processed cheese.  ? 1½ oz natural cheese.  Fats, oils, salt, and sugars  · Only small amounts of oils are recommended.  · Avoid foods that are high in calories and low in nutritional value (empty calories), like foods high in fat or added sugars.  · Choose foods that are low in salt (sodium). Choose foods that have less than 140 milligrams (mg) of sodium per serving.  · Drink water instead of sugary drinks. Drink enough water each day to keep your urine pale yellow.  Where to find support  · Work with your health care provider or a nutrition specialist (dietitian) to develop a customized eating plan that is right for you.  · Download an kirill (mobile application) to help you track your daily food intake.  Where to find more information  · Go to ChooseMyPlate.gov for more information.  Summary  · MyPlate is a general guideline for healthy eating from the USDA. It is based on the 2010 Dietary Guidelines for Americans.  · In general, fruits and vegetables should take up ½ of your plate, grains should take up ¼ of your plate, and protein should take up ¼ of your plate.  This information is not intended to replace advice given to you by your health care provider. Make sure you discuss any questions you have with your health care provider.  Document Revised: 05/21/2020 Document Reviewed: 03/19/2018  Elsevier Patient Education © 2021 [employer]  BMI for Adults  What is BMI?  Body mass index (BMI) is a number that is calculated from a person's weight and height. BMI can help estimate how much of a person's weight is composed of fat. BMI does not measure body fat directly. Rather, it is an alternative to procedures that  "directly measure body fat, which can be difficult and expensive.  BMI can help identify people who may be at higher risk for certain medical problems.  What are BMI measurements used for?  BMI is used as a screening tool to identify possible weight problems. It helps determine whether a person is obese, overweight, a healthy weight, or underweight.  BMI is useful for:  · Identifying a weight problem that may be related to a medical condition or may increase the risk for medical problems.  · Promoting changes, such as changes in diet and exercise, to help reach a healthy weight. BMI screening can be repeated to see if these changes are working.  How is BMI calculated?  BMI involves measuring your weight in relation to your height. Both height and weight are measured, and the BMI is calculated from those numbers. This can be done either in English ([place] or metric measurements. Note that charts and online BMI calculators are available to help you find your BMI quickly and easily without having to do these calculations yourself.  To calculate your BMI in English ([place] measurements:    1. Measure your weight in pounds (lb).  2. Multiply the number of pounds by 703.  ? For example, for a person who weighs 180 lb, multiply that number by 703, which equals 126,540.  3. Measure your height in inches. Then multiply that number by itself to get a measurement called \"inches squared.\"  ? For example, for a person who is 70 inches tall, the \"inches squared\" measurement is 70 inches x 70 inches, which equals 4,900 inches squared.  4. Divide the total from step 2 (number of lb x 703) by the total from step 3 (inches squared): 126,540 ÷ 4,900 = 25.8. This is your BMI.    To calculate your BMI in metric measurements:  1. Measure your weight in kilograms (kg).  2. Measure your height in meters (m). Then multiply that number by itself to get a measurement called \"meters squared.\"  ? For example, for a person who is 1.75 m tall, the " "\"meters squared\" measurement is 1.75 m x 1.75 m, which is equal to 3.1 meters squared.  3. Divide the number of kilograms (your weight) by the meters squared number. In this example: 70 ÷ 3.1 = 22.6. This is your BMI.  What do the results mean?  BMI charts are used to identify whether you are underweight, normal weight, overweight, or obese. The following guidelines will be used:  · Underweight: BMI less than 18.5.  · Normal weight: BMI between 18.5 and 24.9.  · Overweight: BMI between 25 and 29.9.  · Obese: BMI of 30 or above.  Keep these notes in mind:  · Weight includes both fat and muscle, so someone with a muscular build, such as an athlete, may have a BMI that is higher than 24.9. In cases like these, BMI is not an accurate measure of body fat.  · To determine if excess body fat is the cause of a BMI of 25 or higher, further assessments may need to be done by a health care provider.  · BMI is usually interpreted in the same way for men and women.  Where to find more information  For more information about BMI, including tools to quickly calculate your BMI, go to these websites:  · Centers for Disease Control and Prevention: www.cdc.gov  · American Heart Association: www.heart.org  · National Heart, Lung, and Blood [place]: www.nhlbi.nih.gov  Summary  · Body mass index (BMI) is a number that is calculated from a person's weight and height.  · BMI may help estimate how much of a person's weight is composed of fat. BMI can help identify those who may be at higher risk for certain medical problems.  · BMI can be measured using English measurements or metric measurements.  · BMI charts are used to identify whether you are underweight, normal weight, overweight, or obese.  This information is not intended to replace advice given to you by your health care provider. Make sure you discuss any questions you have with your health care provider.  Document Revised: 09/09/2020 Document Reviewed: 07/17/2020  Marissa " Patient Education © 2021 [employer].

## 2022-02-14 ENCOUNTER — HOSPITAL ENCOUNTER (OUTPATIENT)
Dept: CT IMAGING | Facility: HOSPITAL | Age: 72
Discharge: HOME | End: 2022-02-14

## 2022-02-14 ENCOUNTER — LAB (OUTPATIENT)
Dept: LAB | Facility: HOSPITAL | Age: 72
End: 2022-02-14

## 2022-02-14 DIAGNOSIS — D72.820 LYMPHOCYTOSIS: Chronic | ICD-10-CM

## 2022-02-14 DIAGNOSIS — D72.820 LYMPHOCYTOSIS: ICD-10-CM

## 2022-02-14 DIAGNOSIS — D69.6 THROMBOCYTOPENIA: ICD-10-CM

## 2022-02-14 DIAGNOSIS — D69.6 THROMBOCYTOPENIA: Chronic | ICD-10-CM

## 2022-02-14 DIAGNOSIS — C91.10 CLL (CHRONIC LYMPHOCYTIC LEUKEMIA): Chronic | ICD-10-CM

## 2022-02-14 DIAGNOSIS — C91.10 CLL (CHRONIC LYMPHOCYTIC LEUKEMIA): ICD-10-CM

## 2022-02-14 LAB
ALBUMIN SERPL-MCNC: 5 G/DL (ref 3.5–5.2)
ALBUMIN/GLOB SERPL: 2.6 G/DL
ALP SERPL-CCNC: 103 U/L (ref 39–117)
ALT SERPL W P-5'-P-CCNC: 17 U/L (ref 1–41)
ANION GAP SERPL CALCULATED.3IONS-SCNC: 8 MMOL/L (ref 5–15)
AST SERPL-CCNC: 22 U/L (ref 1–40)
BILIRUB SERPL-MCNC: 0.5 MG/DL (ref 0–1.2)
BUN SERPL-MCNC: 12 MG/DL (ref 8–23)
BUN/CREAT SERPL: 8.5 (ref 7–25)
CALCIUM SPEC-SCNC: 9.4 MG/DL (ref 8.6–10.5)
CHLORIDE SERPL-SCNC: 104 MMOL/L (ref 98–107)
CO2 SERPL-SCNC: 28 MMOL/L (ref 22–29)
CREAT SERPL-MCNC: 1.41 MG/DL (ref 0.76–1.27)
DEPRECATED RDW RBC AUTO: 46.5 FL (ref 37–54)
ERYTHROCYTE [DISTWIDTH] IN BLOOD BY AUTOMATED COUNT: 15.3 % (ref 12.3–15.4)
FERRITIN SERPL-MCNC: 82.25 NG/ML (ref 30–400)
FOLATE SERPL-MCNC: >20 NG/ML (ref 4.78–24.2)
GFR SERPL CREATININE-BSD FRML MDRD: 50 ML/MIN/1.73
GLOBULIN UR ELPH-MCNC: 1.9 GM/DL
GLUCOSE SERPL-MCNC: 91 MG/DL (ref 65–99)
HCT VFR BLD AUTO: 44.1 % (ref 37.5–51)
HGB BLD-MCNC: 14.2 G/DL (ref 13–17.7)
IRON 24H UR-MRATE: 78 MCG/DL (ref 59–158)
IRON SATN MFR SERPL: 21 % (ref 20–50)
LYMPHOCYTES # BLD MANUAL: 80.63 10*3/MM3 (ref 0.7–3.1)
LYMPHOCYTES NFR BLD MANUAL: 1 % (ref 5–12)
MCH RBC QN AUTO: 26.9 PG (ref 26.6–33)
MCHC RBC AUTO-ENTMCNC: 32.2 G/DL (ref 31.5–35.7)
MCV RBC AUTO: 83.7 FL (ref 79–97)
MONOCYTES # BLD: 0.87 10*3/MM3 (ref 0.1–0.9)
NEUTROPHILS # BLD AUTO: 5.2 10*3/MM3 (ref 1.7–7)
NEUTROPHILS NFR BLD MANUAL: 6 % (ref 42.7–76)
PLATELET # BLD AUTO: 131 10*3/MM3 (ref 140–450)
PMV BLD AUTO: 9.7 FL (ref 6–12)
POTASSIUM SERPL-SCNC: 4.8 MMOL/L (ref 3.5–5.2)
PROT SERPL-MCNC: 6.9 G/DL (ref 6–8.5)
RBC # BLD AUTO: 5.27 10*6/MM3 (ref 4.14–5.8)
RBC MORPH BLD: NORMAL
SMALL PLATELETS BLD QL SMEAR: ABNORMAL
SODIUM SERPL-SCNC: 140 MMOL/L (ref 136–145)
TIBC SERPL-MCNC: 378 MCG/DL (ref 298–536)
TRANSFERRIN SERPL-MCNC: 254 MG/DL (ref 200–360)
VARIANT LYMPHS NFR BLD MANUAL: 93 % (ref 19.6–45.3)
VIT B12 BLD-MCNC: 1015 PG/ML (ref 211–946)
WBC MORPH BLD: NORMAL
WBC NRBC COR # BLD: 86.7 10*3/MM3 (ref 3.4–10.8)

## 2022-02-14 PROCEDURE — 85025 COMPLETE CBC W/AUTO DIFF WBC: CPT

## 2022-02-14 PROCEDURE — 83540 ASSAY OF IRON: CPT

## 2022-02-14 PROCEDURE — 71260 CT THORAX DX C+: CPT

## 2022-02-14 PROCEDURE — 82746 ASSAY OF FOLIC ACID SERUM: CPT

## 2022-02-14 PROCEDURE — 82728 ASSAY OF FERRITIN: CPT

## 2022-02-14 PROCEDURE — 25010000002 IOPAMIDOL 61 % SOLUTION: Performed by: INTERNAL MEDICINE

## 2022-02-14 PROCEDURE — 82607 VITAMIN B-12: CPT

## 2022-02-14 PROCEDURE — 80053 COMPREHEN METABOLIC PANEL: CPT

## 2022-02-14 PROCEDURE — 85007 BL SMEAR W/DIFF WBC COUNT: CPT

## 2022-02-14 PROCEDURE — 74177 CT ABD & PELVIS W/CONTRAST: CPT

## 2022-02-14 PROCEDURE — 84466 ASSAY OF TRANSFERRIN: CPT

## 2022-02-14 RX ADMIN — IOPAMIDOL 90 ML: 612 INJECTION, SOLUTION INTRAVENOUS at 09:54

## 2022-02-16 ENCOUNTER — OFFICE VISIT (OUTPATIENT)
Dept: ONCOLOGY | Facility: CLINIC | Age: 72
End: 2022-02-16

## 2022-02-16 VITALS
WEIGHT: 176.8 LBS | BODY MASS INDEX: 27.69 KG/M2 | OXYGEN SATURATION: 96 % | HEART RATE: 78 BPM | DIASTOLIC BLOOD PRESSURE: 67 MMHG | SYSTOLIC BLOOD PRESSURE: 138 MMHG | TEMPERATURE: 98.6 F

## 2022-02-16 DIAGNOSIS — C91.10 CLL (CHRONIC LYMPHOCYTIC LEUKEMIA): Primary | Chronic | ICD-10-CM

## 2022-02-16 DIAGNOSIS — D69.6 THROMBOCYTOPENIA: Chronic | ICD-10-CM

## 2022-02-16 DIAGNOSIS — D64.9 ANEMIA, UNSPECIFIED TYPE: Chronic | ICD-10-CM

## 2022-02-16 DIAGNOSIS — D72.820 LYMPHOCYTOSIS: Chronic | ICD-10-CM

## 2022-02-16 PROCEDURE — G0463 HOSPITAL OUTPT CLINIC VISIT: HCPCS | Performed by: INTERNAL MEDICINE

## 2022-02-16 PROCEDURE — 99214 OFFICE O/P EST MOD 30 MIN: CPT | Performed by: INTERNAL MEDICINE

## 2022-02-16 PROCEDURE — 1126F AMNT PAIN NOTED NONE PRSNT: CPT | Performed by: INTERNAL MEDICINE

## 2022-02-16 PROCEDURE — 1123F ACP DISCUSS/DSCN MKR DOCD: CPT | Performed by: INTERNAL MEDICINE

## 2022-02-16 NOTE — PROGRESS NOTES
DATE OF VISIT: 2/16/2022      REASON FOR VISIT: Chronic lymphocytic leukemia, lymphocytosis, night sweats, fatigue      HISTORY OF PRESENT ILLNESS:   71-year-old male with medical problem consisting of coronary artery disease status post CABG, dyslipidemia, chronic lymphocytic leukemia diagnosed in September 2016 currently on surveillance is here for follow-up appointment today to discuss recently done blood work as well as CT scan for restaging purpose.  Complains of  night sweats.  Complains of fatigue.  Denies any fever or any recent infection.  Denies any bleeding      Past Medical History, Past Surgical History, Social History, Family History have been reviewed and are without significant changes except as mentioned.    Review of Systems   A comprehensive 14 point review of systems was performed and was negative except as mentioned.    Medications:  The current medication list was reviewed in the EMR    ALLERGIES:  No Known Allergies    Objective      Vitals:    02/16/22 1314   BP: 138/67   Pulse: 78   Temp: 98.6 °F (37 °C)   TempSrc: Temporal   SpO2: 96%   Weight: 80.2 kg (176 lb 12.8 oz)   PainSc: 0-No pain     Current Status 3/9/2021   ECOG score 0       Physical Exam  Neck:      Comments: 1 cm lymph node present in left occipital area.  Pulmonary:      Breath sounds: Normal breath sounds.   Lymphadenopathy:      Cervical: Cervical adenopathy present.   Neurological:      Mental Status: He is alert and oriented to person, place, and time.           RECENT LABS:  Glucose   Date Value Ref Range Status   02/14/2022 91 65 - 99 mg/dL Final   10/04/2021 89 70 - 110 mg/dL Final     Sodium   Date Value Ref Range Status   02/14/2022 140 136 - 145 mmol/L Final   10/04/2021 140 136 - 145 mmol/L Final     Potassium   Date Value Ref Range Status   02/14/2022 4.8 3.5 - 5.2 mmol/L Final   10/04/2021 4.5 3.5 - 5.1 mmol/L Final     CO2   Date Value Ref Range Status   02/14/2022 28.0 22.0 - 29.0 mmol/L Final   10/04/2021 27  21 - 32 mmol/L Final     Chloride   Date Value Ref Range Status   02/14/2022 104 98 - 107 mmol/L Final   10/04/2021 104 98 - 107 mmol/L Final     Anion Gap   Date Value Ref Range Status   02/14/2022 8.0 5.0 - 15.0 mmol/L Final     Creatinine   Date Value Ref Range Status   02/14/2022 1.41 (H) 0.76 - 1.27 mg/dL Final   10/04/2021 1.4 (H) 0.70 - 1.30 mg/dL Final     BUN   Date Value Ref Range Status   02/14/2022 12 8 - 23 mg/dL Final   10/04/2021 15 7 - 18 mg/dL Final     BUN/Creatinine Ratio   Date Value Ref Range Status   02/14/2022 8.5 7.0 - 25.0 Final     Calcium   Date Value Ref Range Status   02/14/2022 9.4 8.6 - 10.5 mg/dL Final   10/04/2021 8.8 8.5 - 10.1 mg/dL Final     eGFR Non  Amer   Date Value Ref Range Status   02/14/2022 50 (L) >60 mL/min/1.73 Final     Alkaline Phosphatase   Date Value Ref Range Status   02/14/2022 103 39 - 117 U/L Final     Total Protein   Date Value Ref Range Status   02/14/2022 6.9 6.0 - 8.5 g/dL Final     ALT (SGPT)   Date Value Ref Range Status   02/14/2022 17 1 - 41 U/L Final     AST (SGOT)   Date Value Ref Range Status   02/14/2022 22 1 - 40 U/L Final     Total Bilirubin   Date Value Ref Range Status   02/14/2022 0.5 0.0 - 1.2 mg/dL Final     Albumin   Date Value Ref Range Status   02/14/2022 5.00 3.50 - 5.20 g/dL Final   10/04/2021 4.6 3.4 - 5.0 g/dL Final     Globulin   Date Value Ref Range Status   02/14/2022 1.9 gm/dL Final     Lab Results   Component Value Date    WBC 86.70 (C) 02/14/2022    HGB 14.2 02/14/2022    HCT 44.1 02/14/2022    MCV 83.7 02/14/2022     (L) 02/14/2022     Lab Results   Component Value Date    NEUTROABS 5.20 02/14/2022    IRON 78 02/14/2022    IRON 62 11/10/2021    TIBC 378 02/14/2022    TIBC 337 11/10/2021    LABIRON 21 02/14/2022    LABIRON 18 (L) 11/10/2021    FERRITIN 82.25 02/14/2022    FERRITIN 134.70 11/10/2021    [id number] 1,015 (H) 02/14/2022    [id number] 1,090 (H) 11/10/2021    FOLATE >20.00 02/14/2022    FOLATE 8.56  11/10/2021     Lab Results   Component Value Date    REFLABREPO SEE NOTE: 10/19/2016         PATHOLOGY:  * Cannot find OR log *         RADIOLOGY DATA :  CT Chest With Contrast Diagnostic    Result Date: 2/14/2022  Stable lymphadenopathy in the chest abdomen and pelvis consistent with known CLL. Stable splenomegaly consistent with known CLL. Other findings as above. See body of report for full details. Electronically signed by:  Arnol Benavides MD  2/14/2022 10:36 AM CST Workstation: [id number]    CT Abdomen Pelvis With Contrast    Result Date: 2/14/2022  Stable lymphadenopathy in the chest abdomen and pelvis consistent with known CLL. Stable splenomegaly consistent with known CLL. Other findings as above. See body of report for full details. Electronically signed by:  Arnol Benavides MD  2/14/2022 10:36 AM CST Workstation: [id number]          Assessment/Plan     1.  Chronic lymphocytic leukemia  -Patient was diagnosed with chronic lymphocytic leukemia based on peripheral blood flow cytometry in September 2016  -Patient received 2 cycles of chemotherapy with bendamustine and rituximab between July 10, 2017 until August 7, 2017  -Had allergic reaction to day 2 of cycle 2 of chemotherapy with swelling of face and erythema  -Recent blood work shows white blood cell count is elevated at 86,000 which was predominantly increase in neutrophil.  -Hemoglobin is normal at 14.2, platelet count is 131,000  -CT of chest abdomen and pelvis done on February 14, 2022 does not show any evidence of bulky adenopathy.  Prominent lymph node has not changed over the last 3 months.  -Patient was provided information about Acalabrutinib in November 2021  -Recommend clinical surveillance for now.  Patient will return to clinic in 3 months with repeat CBC, CMP, iron studies, ferritin, B12 and folate to be done on that day.  -Next CT scan for surveillance purposes will be done either around August 2022 or February 2023 depending on  patient's clinical symptoms and blood work which was discussed with patient and his family.    2.  Lymphocytosis:  -Secondary to CLL    3.  Anemia:  -Hemoglobin is 14.1.  -Patient is currently on ferrous sulfate 3 times a week along with folic acid p.o. daily    4.  Thrombocytopenia:  -Secondary to splenomegaly  -Platelet count is 131,000.  Patient denies any bleeding.  Will monitor with CBC    5.  Health maintenance: Patient does not smoke    6. Advance Care Planning: For now patient remains full code and is able to make decisions.  Patient has health care surrogate mentioned on chart.    7.  Acute kidney injury:  -Creatinine is elevated at 1.41.  Patient was notified about elevated creatinine and need to increase hydration.    8.  Prescriptions: Patient has enough prescription for ferrous sulfate and folic acid.             PHQ-9 Total Score: 0   -Patient is not homicidal or suicidal.  No acute intervention required.    Roe Gu Sr. reports a pain score of 0.  Given his pain assessment as noted, treatment options were discussed and the following options were decided upon as a follow-up plan to address the patient's pain: No acute intervention required.         Andres Lubin MD  2/16/2022  17:23 CST        Part of this note may be an electronic transcription/translation of spoken language to printed text using the Dragon Dictation System.          CC:

## 2022-05-19 ENCOUNTER — LAB (OUTPATIENT)
Dept: ONCOLOGY | Facility: HOSPITAL | Age: 72
End: 2022-05-19

## 2022-05-19 ENCOUNTER — OFFICE VISIT (OUTPATIENT)
Dept: ONCOLOGY | Facility: CLINIC | Age: 72
End: 2022-05-19

## 2022-05-19 VITALS
TEMPERATURE: 98.8 F | BODY MASS INDEX: 27.39 KG/M2 | DIASTOLIC BLOOD PRESSURE: 70 MMHG | WEIGHT: 174.9 LBS | HEART RATE: 77 BPM | SYSTOLIC BLOOD PRESSURE: 123 MMHG | OXYGEN SATURATION: 99 %

## 2022-05-19 DIAGNOSIS — C91.10 CLL (CHRONIC LYMPHOCYTIC LEUKEMIA): ICD-10-CM

## 2022-05-19 DIAGNOSIS — C91.10 CLL (CHRONIC LYMPHOCYTIC LEUKEMIA): Primary | Chronic | ICD-10-CM

## 2022-05-19 DIAGNOSIS — D72.820 LYMPHOCYTOSIS: ICD-10-CM

## 2022-05-19 DIAGNOSIS — D69.6 THROMBOCYTOPENIA: Chronic | ICD-10-CM

## 2022-05-19 DIAGNOSIS — D72.820 LYMPHOCYTOSIS: Chronic | ICD-10-CM

## 2022-05-19 DIAGNOSIS — D64.9 ANEMIA, UNSPECIFIED TYPE: ICD-10-CM

## 2022-05-19 DIAGNOSIS — D69.6 THROMBOCYTOPENIA: ICD-10-CM

## 2022-05-19 DIAGNOSIS — D64.9 ANEMIA, UNSPECIFIED TYPE: Chronic | ICD-10-CM

## 2022-05-19 LAB
ALBUMIN SERPL-MCNC: 4.8 G/DL (ref 3.5–5.2)
ALBUMIN/GLOB SERPL: 2.2 G/DL
ALP SERPL-CCNC: 102 U/L (ref 39–117)
ALT SERPL W P-5'-P-CCNC: 15 U/L (ref 1–41)
ANION GAP SERPL CALCULATED.3IONS-SCNC: 10 MMOL/L (ref 5–15)
ANISOCYTOSIS BLD QL: ABNORMAL
AST SERPL-CCNC: 21 U/L (ref 1–40)
BASOPHILS # BLD MANUAL: 1.05 10*3/MM3 (ref 0–0.2)
BASOPHILS NFR BLD MANUAL: 1 % (ref 0–1.5)
BILIRUB SERPL-MCNC: 0.5 MG/DL (ref 0–1.2)
BUN SERPL-MCNC: 19 MG/DL (ref 8–23)
BUN/CREAT SERPL: 13.2 (ref 7–25)
CALCIUM SPEC-SCNC: 9.3 MG/DL (ref 8.6–10.5)
CHLORIDE SERPL-SCNC: 104 MMOL/L (ref 98–107)
CO2 SERPL-SCNC: 24 MMOL/L (ref 22–29)
CREAT SERPL-MCNC: 1.44 MG/DL (ref 0.76–1.27)
DEPRECATED RDW RBC AUTO: 43.2 FL (ref 37–54)
EGFRCR SERPLBLD CKD-EPI 2021: 51.9 ML/MIN/1.73
ERYTHROCYTE [DISTWIDTH] IN BLOOD BY AUTOMATED COUNT: 14.6 % (ref 12.3–15.4)
FERRITIN SERPL-MCNC: 86.66 NG/ML (ref 30–400)
FOLATE SERPL-MCNC: 17 NG/ML (ref 4.78–24.2)
GLOBULIN UR ELPH-MCNC: 2.2 GM/DL
GLUCOSE SERPL-MCNC: 85 MG/DL (ref 65–99)
HCT VFR BLD AUTO: 39.5 % (ref 37.5–51)
HGB BLD-MCNC: 13.3 G/DL (ref 13–17.7)
IRON 24H UR-MRATE: 60 MCG/DL (ref 59–158)
IRON SATN MFR SERPL: 18 % (ref 20–50)
LYMPHOCYTES # BLD MANUAL: 99.83 10*3/MM3 (ref 0.7–3.1)
LYMPHOCYTES NFR BLD MANUAL: 2 % (ref 5–12)
MCH RBC QN AUTO: 28 PG (ref 26.6–33)
MCHC RBC AUTO-ENTMCNC: 33.7 G/DL (ref 31.5–35.7)
MCV RBC AUTO: 83.2 FL (ref 79–97)
MONOCYTES # BLD: 2.1 10*3/MM3 (ref 0.1–0.9)
NEUTROPHILS # BLD AUTO: 2.1 10*3/MM3 (ref 1.7–7)
NEUTROPHILS NFR BLD MANUAL: 2 % (ref 42.7–76)
OVALOCYTES BLD QL SMEAR: ABNORMAL
PLATELET # BLD AUTO: 137 10*3/MM3 (ref 140–450)
PMV BLD AUTO: 9.6 FL (ref 6–12)
POTASSIUM SERPL-SCNC: 4.6 MMOL/L (ref 3.5–5.2)
PROT SERPL-MCNC: 7 G/DL (ref 6–8.5)
RBC # BLD AUTO: 4.75 10*6/MM3 (ref 4.14–5.8)
SMALL PLATELETS BLD QL SMEAR: ABNORMAL
SMUDGE CELLS IN BLOOD BY LIGHT MICROSCOPY: 13 /100 WBC
SODIUM SERPL-SCNC: 138 MMOL/L (ref 136–145)
TIBC SERPL-MCNC: 337 MCG/DL (ref 298–536)
TRANSFERRIN SERPL-MCNC: 226 MG/DL (ref 200–360)
VARIANT LYMPHS NFR BLD MANUAL: 3 % (ref 0–5)
VARIANT LYMPHS NFR BLD MANUAL: 92 % (ref 19.6–45.3)
VIT B12 BLD-MCNC: 1002 PG/ML (ref 211–946)
WBC MORPH BLD: NORMAL
WBC NRBC COR # BLD: 105.08 10*3/MM3 (ref 3.4–10.8)

## 2022-05-19 PROCEDURE — G0463 HOSPITAL OUTPT CLINIC VISIT: HCPCS | Performed by: INTERNAL MEDICINE

## 2022-05-19 PROCEDURE — 85025 COMPLETE CBC W/AUTO DIFF WBC: CPT

## 2022-05-19 PROCEDURE — 82728 ASSAY OF FERRITIN: CPT

## 2022-05-19 PROCEDURE — 1126F AMNT PAIN NOTED NONE PRSNT: CPT | Performed by: INTERNAL MEDICINE

## 2022-05-19 PROCEDURE — 85007 BL SMEAR W/DIFF WBC COUNT: CPT

## 2022-05-19 PROCEDURE — 1123F ACP DISCUSS/DSCN MKR DOCD: CPT | Performed by: INTERNAL MEDICINE

## 2022-05-19 PROCEDURE — 82746 ASSAY OF FOLIC ACID SERUM: CPT

## 2022-05-19 PROCEDURE — 80053 COMPREHEN METABOLIC PANEL: CPT

## 2022-05-19 PROCEDURE — 83540 ASSAY OF IRON: CPT

## 2022-05-19 PROCEDURE — 82607 VITAMIN B-12: CPT

## 2022-05-19 PROCEDURE — 84466 ASSAY OF TRANSFERRIN: CPT

## 2022-05-19 PROCEDURE — 99214 OFFICE O/P EST MOD 30 MIN: CPT | Performed by: INTERNAL MEDICINE

## 2022-05-19 RX ORDER — ERGOCALCIFEROL 1.25 MG/1
50000 CAPSULE ORAL WEEKLY
COMMUNITY
Start: 2022-03-28 | End: 2022-08-18

## 2022-05-19 NOTE — PROGRESS NOTES
DATE OF VISIT: 5/20/2022      REASON FOR VISIT: Chronic lymphocytic leukemia, lymphocytosis, night sweats, fatigue      HISTORY OF PRESENT ILLNESS:   71-year-old male with medical problem consisting of coronary artery disease s/p CABG, dyslipidemia, chronic lymphocytic leukemia diagnosed in September 2016 currently on surveillance is here for follow-up appointment today.  Complains of chronic night sweats.  Complains of fatigue.  Complains of enlarged lymph node on left side of last 2 to 3 weeks without any significant change.  Denies any fevers or any recent bleeding.  Denies any recent infections.          Past Medical History, Past Surgical History, Social History, Family History have been reviewed and are without significant changes except as mentioned.    Review of Systems   A comprehensive 14 point review of systems was performed and was negative except as mentioned in HPI.    Medications:  The current medication list was reviewed in the EMR    ALLERGIES:  No Known Allergies    Objective      Vitals:    05/19/22 1355   BP: 123/70   Pulse: 77   Temp: 98.8 °F (37.1 °C)   TempSrc: Temporal   SpO2: 99%   Weight: 79.3 kg (174 lb 14.4 oz)   PainSc: 0-No pain     Current Status 3/9/2021   ECOG score 0       Physical Exam  Pulmonary:      Breath sounds: Normal breath sounds.   Abdominal:      Comments: Splenomegaly present   Lymphadenopathy:      Cervical: Cervical adenopathy (Enlarged lymph node present on left side of neck less than 1 cm in size) present.   Neurological:      Mental Status: He is alert and oriented to person, place, and time.           RECENT LABS:  Glucose   Date Value Ref Range Status   05/19/2022 85 65 - 99 mg/dL Final   10/04/2021 89 70 - 110 mg/dL Final     Sodium   Date Value Ref Range Status   05/19/2022 138 136 - 145 mmol/L Final   10/04/2021 140 136 - 145 mmol/L Final     Potassium   Date Value Ref Range Status   05/19/2022 4.6 3.5 - 5.2 mmol/L Final   10/04/2021 4.5 3.5 - 5.1 mmol/L Final      CO2   Date Value Ref Range Status   05/19/2022 24.0 22.0 - 29.0 mmol/L Final   10/04/2021 27 21 - 32 mmol/L Final     Chloride   Date Value Ref Range Status   05/19/2022 104 98 - 107 mmol/L Final   10/04/2021 104 98 - 107 mmol/L Final     Anion Gap   Date Value Ref Range Status   05/19/2022 10.0 5.0 - 15.0 mmol/L Final     Creatinine   Date Value Ref Range Status   05/19/2022 1.44 (H) 0.76 - 1.27 mg/dL Final   10/04/2021 1.4 (H) 0.7 - 1.3 mg/dL Final     BUN   Date Value Ref Range Status   05/19/2022 19 8 - 23 mg/dL Final   10/04/2021 15 7 - 18 mg/dL Final     BUN/Creatinine Ratio   Date Value Ref Range Status   05/19/2022 13.2 7.0 - 25.0 Final     Calcium   Date Value Ref Range Status   05/19/2022 9.3 8.6 - 10.5 mg/dL Final   10/04/2021 8.8 8.5 - 10.1 mg/dL Final     eGFR Non  Amer   Date Value Ref Range Status   02/14/2022 50 (L) >60 mL/min/1.73 Final     Alkaline Phosphatase   Date Value Ref Range Status   05/19/2022 102 39 - 117 U/L Final     Total Protein   Date Value Ref Range Status   05/19/2022 7.0 6.0 - 8.5 g/dL Final     ALT (SGPT)   Date Value Ref Range Status   05/19/2022 15 1 - 41 U/L Final     AST (SGOT)   Date Value Ref Range Status   05/19/2022 21 1 - 40 U/L Final     Total Bilirubin   Date Value Ref Range Status   05/19/2022 0.5 0.0 - 1.2 mg/dL Final     Albumin   Date Value Ref Range Status   05/19/2022 4.80 3.50 - 5.20 g/dL Final   10/04/2021 4.6 3.4 - 5.0 g/dL Final     Globulin   Date Value Ref Range Status   05/19/2022 2.2 gm/dL Final     Lab Results   Component Value Date    .08 (C) 05/19/2022    HGB 13.3 05/19/2022    HCT 39.5 05/19/2022    MCV 83.2 05/19/2022     (L) 05/19/2022     Lab Results   Component Value Date    NEUTROABS 2.10 05/19/2022    IRON 60 05/19/2022    IRON 78 02/14/2022    IRON 62 11/10/2021    TIBC 337 05/19/2022    TIBC 378 02/14/2022    TIBC 337 11/10/2021    LABIRON 18 (L) 05/19/2022    LABIRON 21 02/14/2022    LABIRON 18 (L) 11/10/2021     FERRITIN 86.66 05/19/2022    FERRITIN 82.25 02/14/2022    FERRITIN 134.70 11/10/2021    [id number] 1,002 (H) 05/19/2022    [id number] 1,015 (H) 02/14/2022    [id number] 1,090 (H) 11/10/2021    FOLATE 17.00 05/19/2022    FOLATE >20.00 02/14/2022    FOLATE 8.56 11/10/2021     Lab Results   Component Value Date    REFLABREPO SEE NOTE: 10/19/2016         PATHOLOGY:  * Cannot find OR log *         RADIOLOGY DATA :  No radiology results for the last 7 days        Assessment & Plan     1.  Chronic lymphocytic leukemia:  - Patient was diagnosed with chronic lymphocytic leukemia based on peripheral blood flow cytometry in September 2016  - Received 2 cycles of chemotherapy with Bendamustine rituximab until July 10, 2017 until 2017  - Had allergic reaction with day 2 of cycle 2 of chemotherapy with swelling of face and erythema.  - Recent blood work shows white blood cell count is 105,000 which is predominantly increase in lymphocyte, hemoglobin is 13.3, platelet count is 137,000  - We will continue with clinical surveillance for now.  - Patient will return to clinic in 3 months with repeat CBC, CMP, iron studies, ferritin, B12 and folate, CT of chest, abdomen and pelvis without contrast to be done prior to that    2.  Lymphocytosis:  - Secondary to CLL    3.  Anemia:  - Hemoglobin is 13.3  - Patient is currently on ferrous sulfate p.o. 3 times a week along with folic acid p.o. daily.  -Iron studies from earlier today shows iron saturation is decreased to 18%.  Recommend increasing ferrous sulfate to 1 tablet p.o. daily.  Recommend continue with folic acid p.o. daily.  Prescription for ferrous sulfate has been sent to his pharmacy today.    4.  Thrombocytopenia:  - Secondary to splenomegaly  - Platelet count is 137,000.  Patient denies any bleeding.  Will monitor with CBC    5.  Health maintenance: Patient does not smoke    6. Advance Care Planning: For now patient remains full code and is able to make decisions.  Patient has  health care surrogate mentioned on chart.                 PHQ-9 Total Score: 0   -Patient is not homicidal or suicidal.  No acute intervention required.    Roe Gu Sr. reports a pain score of 0.  Given his pain assessment as noted, treatment options were discussed and the following options were decided upon as a follow-up plan to address the patient's pain: continuation of current treatment plan for pain.         Andres Lubin MD  5/20/2022  07:12 CDT        Part of this note may be an electronic transcription/translation of spoken language to printed text using the Dragon Dictation System.          CC:

## 2022-05-20 ENCOUNTER — TELEPHONE (OUTPATIENT)
Dept: ONCOLOGY | Facility: CLINIC | Age: 72
End: 2022-05-20

## 2022-05-20 RX ORDER — FERROUS SULFATE 325(65) MG
325 TABLET ORAL
Qty: 90 TABLET | Refills: 1 | Status: SHIPPED | OUTPATIENT
Start: 2022-05-20 | End: 2023-02-17

## 2022-05-20 RX ORDER — DOCUSATE SODIUM 100 MG/1
100 CAPSULE, LIQUID FILLED ORAL 2 TIMES DAILY PRN
Qty: 60 CAPSULE | Refills: 2 | Status: SHIPPED | OUTPATIENT
Start: 2022-05-20

## 2022-05-20 NOTE — TELEPHONE ENCOUNTER
Called and spoke with pt's wife regarding lab results and medication instructions as per Dr. Lubin, v/u obtained.

## 2022-05-20 NOTE — TELEPHONE ENCOUNTER
[facility] called. Regarding ferrous sulfate. Please clarify if it is to be take daily or Monday, Wednesday, and Friday

## 2022-05-20 NOTE — TELEPHONE ENCOUNTER
----- Message from Andres Lubin MD sent at 5/20/2022  7:15 AM CDT -----  Regarding: labs  Please let patient know, iron studies are not showing any significant improvement.  Recommend increasing ferrous sulfate to 1 tablet p.o. daily.  Recommend continuing with folic acid p.o. daily.  Thank you

## 2022-06-02 ENCOUNTER — OFFICE VISIT (OUTPATIENT)
Dept: GASTROENTEROLOGY | Facility: CLINIC | Age: 72
End: 2022-06-02

## 2022-06-02 VITALS
DIASTOLIC BLOOD PRESSURE: 64 MMHG | WEIGHT: 174.2 LBS | HEIGHT: 67 IN | BODY MASS INDEX: 27.34 KG/M2 | HEART RATE: 78 BPM | SYSTOLIC BLOOD PRESSURE: 115 MMHG

## 2022-06-02 DIAGNOSIS — K59.09 OTHER CONSTIPATION: ICD-10-CM

## 2022-06-02 DIAGNOSIS — K21.00 GASTROESOPHAGEAL REFLUX DISEASE WITH ESOPHAGITIS WITHOUT HEMORRHAGE: ICD-10-CM

## 2022-06-02 DIAGNOSIS — R10.84 GENERALIZED ABDOMINAL PAIN: Primary | ICD-10-CM

## 2022-06-02 PROCEDURE — 99214 OFFICE O/P EST MOD 30 MIN: CPT | Performed by: PHYSICIAN ASSISTANT

## 2022-08-15 ENCOUNTER — LAB (OUTPATIENT)
Dept: LAB | Facility: HOSPITAL | Age: 72
End: 2022-08-15

## 2022-08-15 ENCOUNTER — HOSPITAL ENCOUNTER (OUTPATIENT)
Dept: CT IMAGING | Facility: HOSPITAL | Age: 72
Discharge: HOME | End: 2022-08-15

## 2022-08-15 DIAGNOSIS — C91.10 CLL (CHRONIC LYMPHOCYTIC LEUKEMIA): Chronic | ICD-10-CM

## 2022-08-15 DIAGNOSIS — C91.10 CLL (CHRONIC LYMPHOCYTIC LEUKEMIA): ICD-10-CM

## 2022-08-15 DIAGNOSIS — D72.820 LYMPHOCYTOSIS: ICD-10-CM

## 2022-08-15 DIAGNOSIS — D69.6 THROMBOCYTOPENIA: ICD-10-CM

## 2022-08-15 DIAGNOSIS — D64.9 ANEMIA, UNSPECIFIED TYPE: Chronic | ICD-10-CM

## 2022-08-15 DIAGNOSIS — D72.820 LYMPHOCYTOSIS: Chronic | ICD-10-CM

## 2022-08-15 DIAGNOSIS — D69.6 THROMBOCYTOPENIA: Chronic | ICD-10-CM

## 2022-08-15 DIAGNOSIS — D64.9 ANEMIA, UNSPECIFIED TYPE: ICD-10-CM

## 2022-08-15 LAB
ALBUMIN SERPL-MCNC: 5.4 G/DL (ref 3.5–5.2)
ALBUMIN/GLOB SERPL: 2.7 G/DL
ALP SERPL-CCNC: 106 U/L (ref 39–117)
ALT SERPL W P-5'-P-CCNC: 12 U/L (ref 1–41)
ANION GAP SERPL CALCULATED.3IONS-SCNC: 10 MMOL/L (ref 5–15)
AST SERPL-CCNC: 20 U/L (ref 1–40)
BILIRUB SERPL-MCNC: 0.6 MG/DL (ref 0–1.2)
BUN SERPL-MCNC: 14 MG/DL (ref 8–23)
BUN/CREAT SERPL: 10.5 (ref 7–25)
CALCIUM SPEC-SCNC: 9.2 MG/DL (ref 8.6–10.5)
CHLORIDE SERPL-SCNC: 102 MMOL/L (ref 98–107)
CO2 SERPL-SCNC: 27 MMOL/L (ref 22–29)
CREAT SERPL-MCNC: 1.33 MG/DL (ref 0.76–1.27)
DEPRECATED RDW RBC AUTO: 44.9 FL (ref 37–54)
EGFRCR SERPLBLD CKD-EPI 2021: 57.1 ML/MIN/1.73
EOSINOPHIL # BLD MANUAL: 1.04 10*3/MM3 (ref 0–0.4)
EOSINOPHIL NFR BLD MANUAL: 1 % (ref 0.3–6.2)
ERYTHROCYTE [DISTWIDTH] IN BLOOD BY AUTOMATED COUNT: 14.9 % (ref 12.3–15.4)
FERRITIN SERPL-MCNC: 93.15 NG/ML (ref 30–400)
FOLATE SERPL-MCNC: >20 NG/ML (ref 4.78–24.2)
GLOBULIN UR ELPH-MCNC: 2 GM/DL
GLUCOSE SERPL-MCNC: 91 MG/DL (ref 65–99)
HCT VFR BLD AUTO: 40.9 % (ref 37.5–51)
HGB BLD-MCNC: 13.1 G/DL (ref 13–17.7)
IRON 24H UR-MRATE: 67 MCG/DL (ref 59–158)
IRON SATN MFR SERPL: 20 % (ref 20–50)
LYMPHOCYTES # BLD MANUAL: 91.26 10*3/MM3 (ref 0.7–3.1)
LYMPHOCYTES NFR BLD MANUAL: 4 % (ref 5–12)
MCH RBC QN AUTO: 27 PG (ref 26.6–33)
MCHC RBC AUTO-ENTMCNC: 32 G/DL (ref 31.5–35.7)
MCV RBC AUTO: 84.3 FL (ref 79–97)
MICROCYTES BLD QL: ABNORMAL
MONOCYTES # BLD: 4.15 10*3/MM3 (ref 0.1–0.9)
NEUTROPHILS # BLD AUTO: 7.26 10*3/MM3 (ref 1.7–7)
NEUTROPHILS NFR BLD MANUAL: 7 % (ref 42.7–76)
NRBC BLD AUTO-RTO: 0 /100 WBC (ref 0–0.2)
PLAT MORPH BLD: NORMAL
PLATELET # BLD AUTO: 133 10*3/MM3 (ref 140–450)
PMV BLD AUTO: 10.4 FL (ref 6–12)
POTASSIUM SERPL-SCNC: 4.7 MMOL/L (ref 3.5–5.2)
PROT SERPL-MCNC: 7.4 G/DL (ref 6–8.5)
RBC # BLD AUTO: 4.85 10*6/MM3 (ref 4.14–5.8)
SODIUM SERPL-SCNC: 139 MMOL/L (ref 136–145)
TIBC SERPL-MCNC: 338 MCG/DL (ref 298–536)
TRANSFERRIN SERPL-MCNC: 227 MG/DL (ref 200–360)
VARIANT LYMPHS NFR BLD MANUAL: 88 % (ref 19.6–45.3)
VIT B12 BLD-MCNC: 1032 PG/ML (ref 211–946)
WBC MORPH BLD: NORMAL
WBC NRBC COR # BLD: 103.71 10*3/MM3 (ref 3.4–10.8)

## 2022-08-15 PROCEDURE — 74176 CT ABD & PELVIS W/O CONTRAST: CPT

## 2022-08-15 PROCEDURE — 71250 CT THORAX DX C-: CPT

## 2022-08-15 PROCEDURE — 84466 ASSAY OF TRANSFERRIN: CPT

## 2022-08-15 PROCEDURE — 83540 ASSAY OF IRON: CPT

## 2022-08-15 PROCEDURE — 85025 COMPLETE CBC W/AUTO DIFF WBC: CPT

## 2022-08-15 PROCEDURE — 82607 VITAMIN B-12: CPT

## 2022-08-15 PROCEDURE — 85007 BL SMEAR W/DIFF WBC COUNT: CPT

## 2022-08-15 PROCEDURE — 80053 COMPREHEN METABOLIC PANEL: CPT

## 2022-08-15 PROCEDURE — 82728 ASSAY OF FERRITIN: CPT

## 2022-08-15 PROCEDURE — 82746 ASSAY OF FOLIC ACID SERUM: CPT

## 2022-08-18 ENCOUNTER — OFFICE VISIT (OUTPATIENT)
Dept: ONCOLOGY | Facility: CLINIC | Age: 72
End: 2022-08-18

## 2022-08-18 VITALS
OXYGEN SATURATION: 96 % | SYSTOLIC BLOOD PRESSURE: 123 MMHG | WEIGHT: 172.2 LBS | HEART RATE: 81 BPM | DIASTOLIC BLOOD PRESSURE: 56 MMHG | TEMPERATURE: 98.2 F | BODY MASS INDEX: 26.97 KG/M2

## 2022-08-18 DIAGNOSIS — C91.10 CLL (CHRONIC LYMPHOCYTIC LEUKEMIA): Primary | Chronic | ICD-10-CM

## 2022-08-18 DIAGNOSIS — D69.6 THROMBOCYTOPENIA: Chronic | ICD-10-CM

## 2022-08-18 DIAGNOSIS — D72.820 LYMPHOCYTOSIS: Chronic | ICD-10-CM

## 2022-08-18 DIAGNOSIS — D64.9 ANEMIA, UNSPECIFIED TYPE: Chronic | ICD-10-CM

## 2022-08-18 PROCEDURE — G0463 HOSPITAL OUTPT CLINIC VISIT: HCPCS | Performed by: INTERNAL MEDICINE

## 2022-08-18 PROCEDURE — 1126F AMNT PAIN NOTED NONE PRSNT: CPT | Performed by: INTERNAL MEDICINE

## 2022-08-18 PROCEDURE — 99214 OFFICE O/P EST MOD 30 MIN: CPT | Performed by: INTERNAL MEDICINE

## 2022-08-18 PROCEDURE — 1123F ACP DISCUSS/DSCN MKR DOCD: CPT | Performed by: INTERNAL MEDICINE

## 2022-08-18 RX ORDER — DICYCLOMINE HYDROCHLORIDE 10 MG/1
10 CAPSULE ORAL 3 TIMES DAILY
COMMUNITY
Start: 2022-04-21

## 2022-08-18 RX ORDER — ASPIRIN 81 MG/1
81 TABLET ORAL DAILY
COMMUNITY
Start: 2022-03-30

## 2022-08-18 NOTE — PROGRESS NOTES
DATE OF VISIT: 8/18/2022      REASON FOR VISIT: Chronic lymphocytic leukemia, lymphocytosis, night sweats, fatigue      HISTORY OF PRESENT ILLNESS:   71-year-old male with medical problem consisting of coronary artery disease s/p CABG, dyslipidemia, chronic lymphocytic leukemia diagnosed in September 2016 currently on surveillance is here for follow-up appointment to discuss recently done blood work and CT scan.  Complains of fatigue.  Complains of chronic night sweats.  Complains of decreasing appetite recently.  Denies any new lymph node enlargement.  Denies any fevers.  Denies any bleeding.  Denies any recent hospital admission.              Past Medical History, Past Surgical History, Social History, Family History have been reviewed and are without significant changes except as mentioned.    Review of Systems   A comprehensive 14 point review of systems was performed and was negative except as mentioned in HPI.    Medications:  The current medication list was reviewed in the EMR    ALLERGIES:  No Known Allergies    Objective      Vitals:    08/18/22 1321   BP: 123/56   Pulse: 81   Temp: 98.2 °F (36.8 °C)   TempSrc: Temporal   SpO2: 96%   Weight: 78.1 kg (172 lb 3.2 oz)   PainSc: 0-No pain     Current Status 3/9/2021   ECOG score 0       Physical Exam  Pulmonary:      Breath sounds: Normal breath sounds.   Lymphadenopathy:      Cervical: No cervical adenopathy.   Neurological:      Mental Status: He is alert and oriented to person, place, and time.           RECENT LABS:  Glucose   Date Value Ref Range Status   08/15/2022 91 65 - 99 mg/dL Final   10/04/2021 89 70 - 110 mg/dL Final     Sodium   Date Value Ref Range Status   08/15/2022 139 136 - 145 mmol/L Final   10/04/2021 140 136 - 145 mmol/L Final     Potassium   Date Value Ref Range Status   08/15/2022 4.7 3.5 - 5.2 mmol/L Final   10/04/2021 4.5 3.5 - 5.1 mmol/L Final     CO2   Date Value Ref Range Status   08/15/2022 27.0 22.0 - 29.0 mmol/L Final    10/04/2021 27 21 - 32 mmol/L Final     Chloride   Date Value Ref Range Status   08/15/2022 102 98 - 107 mmol/L Final   10/04/2021 104 98 - 107 mmol/L Final     Anion Gap   Date Value Ref Range Status   08/15/2022 10.0 5.0 - 15.0 mmol/L Final     Creatinine   Date Value Ref Range Status   08/15/2022 1.33 (H) 0.76 - 1.27 mg/dL Final   10/04/2021 1.4 (H) 0.7 - 1.3 mg/dL Final     BUN   Date Value Ref Range Status   08/15/2022 14 8 - 23 mg/dL Final   10/04/2021 15 7 - 18 mg/dL Final     BUN/Creatinine Ratio   Date Value Ref Range Status   08/15/2022 10.5 7.0 - 25.0 Final     Calcium   Date Value Ref Range Status   08/15/2022 9.2 8.6 - 10.5 mg/dL Final   10/04/2021 8.8 8.5 - 10.1 mg/dL Final     eGFR Non  Amer   Date Value Ref Range Status   02/14/2022 50 (L) >60 mL/min/1.73 Final     Alkaline Phosphatase   Date Value Ref Range Status   08/15/2022 106 39 - 117 U/L Final     Total Protein   Date Value Ref Range Status   08/15/2022 7.4 6.0 - 8.5 g/dL Final     ALT (SGPT)   Date Value Ref Range Status   08/15/2022 12 1 - 41 U/L Final     AST (SGOT)   Date Value Ref Range Status   08/15/2022 20 1 - 40 U/L Final     Total Bilirubin   Date Value Ref Range Status   08/15/2022 0.6 0.0 - 1.2 mg/dL Final     Albumin   Date Value Ref Range Status   08/15/2022 5.40 (H) 3.50 - 5.20 g/dL Final   10/04/2021 4.6 3.4 - 5.0 g/dL Final     Globulin   Date Value Ref Range Status   08/15/2022 2.0 gm/dL Final     Lab Results   Component Value Date    .71 (C) 08/15/2022    HGB 13.1 08/15/2022    HCT 40.9 08/15/2022    MCV 84.3 08/15/2022     (L) 08/15/2022     Lab Results   Component Value Date    NEUTROABS 7.26 (H) 08/15/2022    IRON 67 08/15/2022    IRON 60 05/19/2022    IRON 78 02/14/2022    TIBC 338 08/15/2022    TIBC 337 05/19/2022    TIBC 378 02/14/2022    LABIRON 20 08/15/2022    LABIRON 18 (L) 05/19/2022    LABIRON 21 02/14/2022    FERRITIN 93.15 08/15/2022    FERRITIN 86.66 05/19/2022    FERRITIN 82.25  02/14/2022    [id number] 1,032 (H) 08/15/2022    [id number] 1,002 (H) 05/19/2022    [id number] 1,015 (H) 02/14/2022    FOLATE >20.00 08/15/2022    FOLATE 17.00 05/19/2022    FOLATE >20.00 02/14/2022     Lab Results   Component Value Date    REFLABREPO SEE NOTE: 10/19/2016         PATHOLOGY:  * Cannot find OR log *         RADIOLOGY DATA :  CT Abdomen Pelvis Without Contrast    Result Date: 8/15/2022  Stable appearing CT chest abdomen pelvis without contrast. Enlarged axillary lymph nodes, unchanged. Numerous small mediastinal lymph nodes unchanged. Splenomegaly and numerous slightly enlarged retroperitoneal lymph nodes, unchanged. CT chest abdomen pelvis without contrast is otherwise unremarkable. Electronically signed by:  Olayinka Giron MD  8/15/2022 3:13 PM CDT Workstation: [id number]    CT Chest Without Contrast Diagnostic    Result Date: 8/15/2022  Stable appearing CT chest abdomen pelvis without contrast. Enlarged axillary lymph nodes, unchanged. Numerous small mediastinal lymph nodes unchanged. Splenomegaly and numerous slightly enlarged retroperitoneal lymph nodes, unchanged. CT chest abdomen pelvis without contrast is otherwise unremarkable. Electronically signed by:  Olayinka Giron MD  8/15/2022 3:13 PM CDT Workstation: [id number]          Assessment & Plan     1.  Chronic lymphocytic leukemia:  - Patient was diagnosed with chronic lymphocytic leukemia based on peripheral blood flow cytometry in September 2016  - Received 2 cycles of chemotherapy with Bendamustine and rituximab until July 10, 2017.  - Had an allergic reaction with day 2 of cycle 2 with swelling of face and erythema.  - Since then patient has been on surveillance  - Recent CBC shows white blood cell count is 103,000 which is predominantly increase in lymphocyte, hemoglobin is 13.1, platelet count is 133,000.  - CT of chest, abdomen and pelvis done on August 15, 2022 shows stable lymph node enlargement without any evidence of progression.   Results of CT scan were discussed with patient.  - Option of starting chemotherapy with Acalabrutinib versus observation and surveillance was discussed with patient.  Patient wants to hold off on chemotherapy at present  - Patient will return to clinic in 3 months with repeat CBC, CMP, iron studies, ferritin, B12 and folate on that day.  - We will repeat surveillance CT of chest, abdomen and pelvis without contrast around February 2023    2.  Lymphocytosis:  - Secondary to CLL    3.  Anemia:  - Hemoglobin is 13.1.  Recommend continue with ferrous sulfate daily with folic acid 3 times a week.    4.  Thrombocytopenia:  - Secondary to splenomegaly and CLL  - Platelet count is 133,000 we will monitor with CBC    5.  Elevated creatinine:  - Creatinine is elevated at 1.33.  Patient was notified about elevated creatinine and need to increase hydration    6.  Health maintenance: Patient does not smoke    7. Advance Care Planning: For now patient remains full code and is able to make decisions.  Patient has health care surrogate mentioned on chart.                 PHQ-9 Total Score: 0   -Patient is not homicidal or suicidal.  No acute intervention required.    Roe Gu Sr. reports a pain score of 0.  Given his pain assessment as noted, treatment options were discussed and the following options were decided upon as a follow-up plan to address the patient's pain: continuation of current treatment plan for pain.         Andres Lubin MD  8/18/2022  16:46 CDT        Part of this note may be an electronic transcription/translation of spoken language to printed text using the Dragon Dictation System.          CC:

## 2022-11-12 NOTE — PROGRESS NOTES
DATE OF VISIT: 11/12/2022      REASON FOR VISIT: Chronic lymphocytic leukemia, lymphocytosis, night sweats, fatigue      HISTORY OF PRESENT ILLNESS:   72-year-old male with medical problem consisting of coronary artery disease s/p CABG, dyslipidemia, chronic lymphocytic leukemia diagnosed in September 2016 currently on surveillance is here for follow-up appointment to discuss recently done blood work.  Continues to have fatigue.  Complains of chronic night sweats.  Denies any new lymph node enlargement.  Denies any fevers.  Denies any bleeding.  Denies any recent hospital admission.              Past Medical History, Past Surgical History, Social History, Family History have been reviewed and are without significant changes except as mentioned.    Review of Systems   A comprehensive 14 point review of systems was performed and was negative except as mentioned in HPI.    Medications:  The current medication list was reviewed in the EMR    ALLERGIES:  No Known Allergies    Objective      There were no vitals filed for this visit.  Current Status 3/9/2021   ECOG score 0       Physical Exam      RECENT LABS:  Glucose   Date Value Ref Range Status   08/15/2022 91 65 - 99 mg/dL Final   10/04/2021 89 70 - 110 mg/dL Final     Sodium   Date Value Ref Range Status   08/15/2022 139 136 - 145 mmol/L Final   10/04/2021 140 136 - 145 mmol/L Final     Potassium   Date Value Ref Range Status   08/15/2022 4.7 3.5 - 5.2 mmol/L Final   10/04/2021 4.5 3.5 - 5.1 mmol/L Final     CO2   Date Value Ref Range Status   08/15/2022 27.0 22.0 - 29.0 mmol/L Final   10/04/2021 27 21 - 32 mmol/L Final     Chloride   Date Value Ref Range Status   08/15/2022 102 98 - 107 mmol/L Final   10/04/2021 104 98 - 107 mmol/L Final     Anion Gap   Date Value Ref Range Status   08/15/2022 10.0 5.0 - 15.0 mmol/L Final     Creatinine   Date Value Ref Range Status   08/15/2022 1.33 (H) 0.76 - 1.27 mg/dL Final   10/04/2021 1.4 (H) 0.7 - 1.3 mg/dL Final     BUN    Date Value Ref Range Status   08/15/2022 14 8 - 23 mg/dL Final   10/04/2021 15 7 - 18 mg/dL Final     BUN/Creatinine Ratio   Date Value Ref Range Status   08/15/2022 10.5 7.0 - 25.0 Final     Calcium   Date Value Ref Range Status   08/15/2022 9.2 8.6 - 10.5 mg/dL Final   10/04/2021 8.8 8.5 - 10.1 mg/dL Final     eGFR Non  Amer   Date Value Ref Range Status   02/14/2022 50 (L) >60 mL/min/1.73 Final     Alkaline Phosphatase   Date Value Ref Range Status   08/15/2022 106 39 - 117 U/L Final     Total Protein   Date Value Ref Range Status   08/15/2022 7.4 6.0 - 8.5 g/dL Final     ALT (SGPT)   Date Value Ref Range Status   08/15/2022 12 1 - 41 U/L Final     AST (SGOT)   Date Value Ref Range Status   08/15/2022 20 1 - 40 U/L Final     Total Bilirubin   Date Value Ref Range Status   08/15/2022 0.6 0.0 - 1.2 mg/dL Final     Albumin   Date Value Ref Range Status   08/15/2022 5.40 (H) 3.50 - 5.20 g/dL Final   10/04/2021 4.6 3.4 - 5.0 g/dL Final     Globulin   Date Value Ref Range Status   08/15/2022 2.0 gm/dL Final     Lab Results   Component Value Date    .71 (C) 08/15/2022    HGB 13.1 08/15/2022    HCT 40.9 08/15/2022    MCV 84.3 08/15/2022     (L) 08/15/2022     Lab Results   Component Value Date    NEUTROABS 7.26 (H) 08/15/2022    IRON 67 08/15/2022    IRON 60 05/19/2022    IRON 78 02/14/2022    TIBC 338 08/15/2022    TIBC 337 05/19/2022    TIBC 378 02/14/2022    LABIRON 20 08/15/2022    LABIRON 18 (L) 05/19/2022    LABIRON 21 02/14/2022    FERRITIN 93.15 08/15/2022    FERRITIN 86.66 05/19/2022    FERRITIN 82.25 02/14/2022    YVTJLLGT08 1,032 (H) 08/15/2022    UXCBJYOM89 1,002 (H) 05/19/2022    KUNIOZEC51 1,015 (H) 02/14/2022    FOLATE >20.00 08/15/2022    FOLATE 17.00 05/19/2022    FOLATE >20.00 02/14/2022     Lab Results   Component Value Date    REFLABREPO SEE NOTE: 10/19/2016         PATHOLOGY:  * Cannot find OR log *         RADIOLOGY DATA :  No radiology results for the last 7  days        Assessment & Plan     1.  Chronic lymphocytic leukemia:  - Patient was diagnosed with chronic lymphocytic leukemia based on peripheral blood flow cytometry in September 2016  - Received 2 cycles of chemotherapy with Bendamustine and rituximab until July 10, 2017  - Had allergic reaction with day 2 of cycle 2 of chemotherapy with swelling of face and erythema.  - Since then patient has been on surveillance  - CBC shows***  - Recommend continuing with surveillance at present. Next line Of treatment would be Acalabrutinib whenever patient requires treatment which has been discussed with patient  - We will have patient return to clinic in 3 months with repeat CBC, CMP, iron studies, ferritin, B12 and folate to be done prior to that with CT of chest abdomen and pelvis without contrast prior to that.    2.  Lymphocytosis:  - Secondary to CLL    3.  Anemia:  - Hemoglobin is***recommend continue with ferrous sulfate p.o. daily with folic acid 3 times a week    4.  Thrombocytopenia:  - Secondary to splenomegaly and CLL  - Platelet count is***.  Will monitor with CBC    5.  Health maintenance: Patient does not smoke    6. Advance Care Planning: For now patient remains full code and is able to make decisions.  Patient has health care surrogate mentioned on chart.                 PHQ-9 Total Score:       Roe Aniceto Gu Sr. reports a pain score of .  Given his pain assessment as noted, treatment options were discussed and the following options were decided upon as a follow-up plan to address the patient's pain: {[facility] PAIN ASSESSMENT FOLLOW-UP PLAN:17732}.         Andres Lubin MD  11/12/2022  11:31 CST        Part of this note may be an electronic transcription/translation of spoken language to printed text using the Dragon Dictation System.          CC:

## 2022-11-18 ENCOUNTER — LAB (OUTPATIENT)
Dept: ONCOLOGY | Facility: HOSPITAL | Age: 72
End: 2022-11-18

## 2022-11-18 ENCOUNTER — APPOINTMENT (OUTPATIENT)
Dept: ONCOLOGY | Facility: HOSPITAL | Age: 72
End: 2022-11-18

## 2022-11-18 ENCOUNTER — HOSPITAL ENCOUNTER (EMERGENCY)
Facility: HOSPITAL | Age: 72
Discharge: HOME | End: 2022-11-18
Attending: FAMILY MEDICINE | Admitting: FAMILY MEDICINE

## 2022-11-18 ENCOUNTER — APPOINTMENT (OUTPATIENT)
Dept: ONCOLOGY | Facility: CLINIC | Age: 72
End: 2022-11-18

## 2022-11-18 ENCOUNTER — OFFICE VISIT (OUTPATIENT)
Dept: ONCOLOGY | Facility: CLINIC | Age: 72
End: 2022-11-18

## 2022-11-18 ENCOUNTER — APPOINTMENT (OUTPATIENT)
Dept: CT IMAGING | Facility: HOSPITAL | Age: 72
End: 2022-11-18

## 2022-11-18 VITALS
HEART RATE: 76 BPM | OXYGEN SATURATION: 97 % | TEMPERATURE: 98.1 F | BODY MASS INDEX: 26.64 KG/M2 | DIASTOLIC BLOOD PRESSURE: 73 MMHG | SYSTOLIC BLOOD PRESSURE: 141 MMHG | WEIGHT: 170.1 LBS

## 2022-11-18 VITALS
BODY MASS INDEX: 26.68 KG/M2 | SYSTOLIC BLOOD PRESSURE: 121 MMHG | OXYGEN SATURATION: 99 % | TEMPERATURE: 98.2 F | HEART RATE: 62 BPM | WEIGHT: 170 LBS | DIASTOLIC BLOOD PRESSURE: 65 MMHG | RESPIRATION RATE: 20 BRPM | HEIGHT: 67 IN

## 2022-11-18 DIAGNOSIS — C91.10 CLL (CHRONIC LYMPHOCYTIC LEUKEMIA): ICD-10-CM

## 2022-11-18 DIAGNOSIS — R10.84 GENERALIZED ABDOMINAL PAIN: Primary | ICD-10-CM

## 2022-11-18 DIAGNOSIS — D72.820 LYMPHOCYTOSIS: ICD-10-CM

## 2022-11-18 DIAGNOSIS — D69.6 THROMBOCYTOPENIA: ICD-10-CM

## 2022-11-18 DIAGNOSIS — D64.9 ANEMIA, UNSPECIFIED TYPE: Chronic | ICD-10-CM

## 2022-11-18 DIAGNOSIS — D64.9 ANEMIA, UNSPECIFIED TYPE: ICD-10-CM

## 2022-11-18 DIAGNOSIS — C91.10 CLL (CHRONIC LYMPHOCYTIC LEUKEMIA): Primary | Chronic | ICD-10-CM

## 2022-11-18 DIAGNOSIS — D69.6 THROMBOCYTOPENIA: Chronic | ICD-10-CM

## 2022-11-18 DIAGNOSIS — D72.820 LYMPHOCYTOSIS: Chronic | ICD-10-CM

## 2022-11-18 LAB
ALBUMIN SERPL-MCNC: 4.8 G/DL (ref 3.5–5.2)
ALBUMIN SERPL-MCNC: 4.9 G/DL (ref 3.5–5.2)
ALBUMIN/GLOB SERPL: 2.3 G/DL
ALBUMIN/GLOB SERPL: 2.4 G/DL
ALP SERPL-CCNC: 106 U/L (ref 39–117)
ALP SERPL-CCNC: 108 U/L (ref 39–117)
ALT SERPL W P-5'-P-CCNC: 14 U/L (ref 1–41)
ALT SERPL W P-5'-P-CCNC: 16 U/L (ref 1–41)
ANION GAP SERPL CALCULATED.3IONS-SCNC: 8 MMOL/L (ref 5–15)
ANION GAP SERPL CALCULATED.3IONS-SCNC: 9 MMOL/L (ref 5–15)
ANISOCYTOSIS BLD QL: ABNORMAL
AST SERPL-CCNC: 21 U/L (ref 1–40)
AST SERPL-CCNC: 22 U/L (ref 1–40)
BACTERIA UR QL AUTO: ABNORMAL /HPF
BASOPHILS # BLD AUTO: 0.08 10*3/MM3 (ref 0–0.2)
BASOPHILS # BLD MANUAL: 2.28 10*3/MM3 (ref 0–0.2)
BASOPHILS NFR BLD AUTO: 0.1 % (ref 0–1.5)
BASOPHILS NFR BLD MANUAL: 2 % (ref 0–1.5)
BILIRUB SERPL-MCNC: 0.5 MG/DL (ref 0–1.2)
BILIRUB SERPL-MCNC: 0.6 MG/DL (ref 0–1.2)
BILIRUB UR QL STRIP: NEGATIVE
BUN SERPL-MCNC: 18 MG/DL (ref 8–23)
BUN SERPL-MCNC: 19 MG/DL (ref 8–23)
BUN/CREAT SERPL: 13.5 (ref 7–25)
BUN/CREAT SERPL: 14.8 (ref 7–25)
CALCIUM SPEC-SCNC: 9.2 MG/DL (ref 8.6–10.5)
CALCIUM SPEC-SCNC: 9.3 MG/DL (ref 8.6–10.5)
CHLORIDE SERPL-SCNC: 104 MMOL/L (ref 98–107)
CHLORIDE SERPL-SCNC: 104 MMOL/L (ref 98–107)
CLARITY UR: CLEAR
CO2 SERPL-SCNC: 27 MMOL/L (ref 22–29)
CO2 SERPL-SCNC: 27 MMOL/L (ref 22–29)
COLOR UR: YELLOW
CREAT SERPL-MCNC: 1.28 MG/DL (ref 0.76–1.27)
CREAT SERPL-MCNC: 1.33 MG/DL (ref 0.76–1.27)
D-LACTATE SERPL-SCNC: 0.8 MMOL/L (ref 0.5–2)
DEPRECATED RDW RBC AUTO: 45.3 FL (ref 37–54)
DEPRECATED RDW RBC AUTO: 45.9 FL (ref 37–54)
EGFRCR SERPLBLD CKD-EPI 2021: 56.8 ML/MIN/1.73
EGFRCR SERPLBLD CKD-EPI 2021: 59.5 ML/MIN/1.73
EOSINOPHIL # BLD AUTO: 0.3 10*3/MM3 (ref 0–0.4)
EOSINOPHIL NFR BLD AUTO: 0.3 % (ref 0.3–6.2)
ERYTHROCYTE [DISTWIDTH] IN BLOOD BY AUTOMATED COUNT: 15 % (ref 12.3–15.4)
ERYTHROCYTE [DISTWIDTH] IN BLOOD BY AUTOMATED COUNT: 15.1 % (ref 12.3–15.4)
FERRITIN SERPL-MCNC: 93.44 NG/ML (ref 30–400)
FOLATE SERPL-MCNC: 17.6 NG/ML (ref 4.78–24.2)
GLOBULIN UR ELPH-MCNC: 2 GM/DL
GLOBULIN UR ELPH-MCNC: 2.1 GM/DL
GLUCOSE SERPL-MCNC: 88 MG/DL (ref 65–99)
GLUCOSE SERPL-MCNC: 89 MG/DL (ref 65–99)
GLUCOSE UR STRIP-MCNC: NEGATIVE MG/DL
HCT VFR BLD AUTO: 38.7 % (ref 37.5–51)
HCT VFR BLD AUTO: 39.4 % (ref 37.5–51)
HGB BLD-MCNC: 12.1 G/DL (ref 13–17.7)
HGB BLD-MCNC: 12.7 G/DL (ref 13–17.7)
HGB UR QL STRIP.AUTO: ABNORMAL
HOLD SPECIMEN: NORMAL
HOLD SPECIMEN: NORMAL
HYALINE CASTS UR QL AUTO: ABNORMAL /LPF
IMM GRANULOCYTES # BLD AUTO: 0.19 10*3/MM3 (ref 0–0.05)
IMM GRANULOCYTES NFR BLD AUTO: 0.2 % (ref 0–0.5)
IRON 24H UR-MRATE: 60 MCG/DL (ref 59–158)
IRON SATN MFR SERPL: 18 % (ref 20–50)
KETONES UR QL STRIP: ABNORMAL
LEUKOCYTE ESTERASE UR QL STRIP.AUTO: NEGATIVE
LIPASE SERPL-CCNC: 19 U/L (ref 13–60)
LYMPHOCYTES # BLD AUTO: 97.5 10*3/MM3 (ref 0.7–3.1)
LYMPHOCYTES # BLD MANUAL: 106.03 10*3/MM3 (ref 0.7–3.1)
LYMPHOCYTES NFR BLD AUTO: 87.5 % (ref 19.6–45.3)
LYMPHOCYTES NFR BLD MANUAL: 1 % (ref 5–12)
MCH RBC QN AUTO: 26.4 PG (ref 26.6–33)
MCH RBC QN AUTO: 27.7 PG (ref 26.6–33)
MCHC RBC AUTO-ENTMCNC: 30.7 G/DL (ref 31.5–35.7)
MCHC RBC AUTO-ENTMCNC: 32.8 G/DL (ref 31.5–35.7)
MCV RBC AUTO: 84.3 FL (ref 79–97)
MCV RBC AUTO: 86 FL (ref 79–97)
MONOCYTES # BLD AUTO: 9.16 10*3/MM3 (ref 0.1–0.9)
MONOCYTES # BLD: 1.14 10*3/MM3 (ref 0.1–0.9)
MONOCYTES NFR BLD AUTO: 8.2 % (ref 5–12)
NEUTROPHILS # BLD AUTO: 4.56 10*3/MM3 (ref 1.7–7)
NEUTROPHILS NFR BLD AUTO: 3.7 % (ref 42.7–76)
NEUTROPHILS NFR BLD AUTO: 4.22 10*3/MM3 (ref 1.7–7)
NEUTROPHILS NFR BLD MANUAL: 4 % (ref 42.7–76)
NITRITE UR QL STRIP: NEGATIVE
NRBC BLD AUTO-RTO: 0 /100 WBC (ref 0–0.2)
OVALOCYTES BLD QL SMEAR: ABNORMAL
PH UR STRIP.AUTO: 5.5 [PH] (ref 5–9)
PLATELET # BLD AUTO: 104 10*3/MM3 (ref 140–450)
PLATELET # BLD AUTO: 110 10*3/MM3 (ref 140–450)
PMV BLD AUTO: 9 FL (ref 6–12)
PMV BLD AUTO: 9.4 FL (ref 6–12)
POTASSIUM SERPL-SCNC: 4.7 MMOL/L (ref 3.5–5.2)
POTASSIUM SERPL-SCNC: 4.7 MMOL/L (ref 3.5–5.2)
PROT SERPL-MCNC: 6.8 G/DL (ref 6–8.5)
PROT SERPL-MCNC: 7 G/DL (ref 6–8.5)
PROT UR QL STRIP: NEGATIVE
RBC # BLD AUTO: 4.58 10*6/MM3 (ref 4.14–5.8)
RBC # BLD AUTO: 4.59 10*6/MM3 (ref 4.14–5.8)
RBC # UR STRIP: ABNORMAL /HPF
REF LAB TEST METHOD: ABNORMAL
SMALL PLATELETS BLD QL SMEAR: ABNORMAL
SODIUM SERPL-SCNC: 139 MMOL/L (ref 136–145)
SODIUM SERPL-SCNC: 140 MMOL/L (ref 136–145)
SP GR UR STRIP: 1.02 (ref 1–1.03)
SQUAMOUS #/AREA URNS HPF: ABNORMAL /HPF
TIBC SERPL-MCNC: 341 MCG/DL (ref 298–536)
TRANSFERRIN SERPL-MCNC: 229 MG/DL (ref 200–360)
UROBILINOGEN UR QL STRIP: ABNORMAL
VARIANT LYMPHS NFR BLD MANUAL: 1 % (ref 0–5)
VARIANT LYMPHS NFR BLD MANUAL: 92 % (ref 19.6–45.3)
VIT B12 BLD-MCNC: 854 PG/ML (ref 211–946)
WBC # UR STRIP: ABNORMAL /HPF
WBC MORPH BLD: NORMAL
WBC NRBC COR # BLD: 111.45 10*3/MM3 (ref 3.4–10.8)
WBC NRBC COR # BLD: 114.01 10*3/MM3 (ref 3.4–10.8)
WHOLE BLOOD HOLD COAG: NORMAL
WHOLE BLOOD HOLD SPECIMEN: NORMAL

## 2022-11-18 PROCEDURE — 82607 VITAMIN B-12: CPT

## 2022-11-18 PROCEDURE — 82746 ASSAY OF FOLIC ACID SERUM: CPT

## 2022-11-18 PROCEDURE — 81001 URINALYSIS AUTO W/SCOPE: CPT | Performed by: FAMILY MEDICINE

## 2022-11-18 PROCEDURE — 80053 COMPREHEN METABOLIC PANEL: CPT | Performed by: FAMILY MEDICINE

## 2022-11-18 PROCEDURE — 1125F AMNT PAIN NOTED PAIN PRSNT: CPT | Performed by: INTERNAL MEDICINE

## 2022-11-18 PROCEDURE — 25010000002 IOPAMIDOL 61 % SOLUTION: Performed by: FAMILY MEDICINE

## 2022-11-18 PROCEDURE — 83690 ASSAY OF LIPASE: CPT | Performed by: FAMILY MEDICINE

## 2022-11-18 PROCEDURE — 84466 ASSAY OF TRANSFERRIN: CPT

## 2022-11-18 PROCEDURE — 83540 ASSAY OF IRON: CPT

## 2022-11-18 PROCEDURE — 99284 EMERGENCY DEPT VISIT MOD MDM: CPT

## 2022-11-18 PROCEDURE — G0463 HOSPITAL OUTPT CLINIC VISIT: HCPCS | Performed by: INTERNAL MEDICINE

## 2022-11-18 PROCEDURE — 96374 THER/PROPH/DIAG INJ IV PUSH: CPT

## 2022-11-18 PROCEDURE — 25010000002 MORPHINE PER 10 MG: Performed by: FAMILY MEDICINE

## 2022-11-18 PROCEDURE — 96375 TX/PRO/DX INJ NEW DRUG ADDON: CPT

## 2022-11-18 PROCEDURE — 74177 CT ABD & PELVIS W/CONTRAST: CPT

## 2022-11-18 PROCEDURE — 83605 ASSAY OF LACTIC ACID: CPT

## 2022-11-18 PROCEDURE — 25010000002 ONDANSETRON PER 1 MG: Performed by: FAMILY MEDICINE

## 2022-11-18 PROCEDURE — 80053 COMPREHEN METABOLIC PANEL: CPT

## 2022-11-18 PROCEDURE — 85025 COMPLETE CBC W/AUTO DIFF WBC: CPT

## 2022-11-18 PROCEDURE — 85007 BL SMEAR W/DIFF WBC COUNT: CPT

## 2022-11-18 PROCEDURE — 82728 ASSAY OF FERRITIN: CPT

## 2022-11-18 PROCEDURE — 1123F ACP DISCUSS/DSCN MKR DOCD: CPT | Performed by: INTERNAL MEDICINE

## 2022-11-18 PROCEDURE — 99214 OFFICE O/P EST MOD 30 MIN: CPT | Performed by: INTERNAL MEDICINE

## 2022-11-18 RX ORDER — MORPHINE SULFATE 2 MG/ML
2 INJECTION, SOLUTION INTRAMUSCULAR; INTRAVENOUS ONCE
Status: COMPLETED | OUTPATIENT
Start: 2022-11-18 | End: 2022-11-18

## 2022-11-18 RX ORDER — SODIUM CHLORIDE 0.9 % (FLUSH) 0.9 %
10 SYRINGE (ML) INJECTION AS NEEDED
Status: DISCONTINUED | OUTPATIENT
Start: 2022-11-18 | End: 2022-11-18 | Stop reason: HOSPADM

## 2022-11-18 RX ORDER — ONDANSETRON 2 MG/ML
4 INJECTION INTRAMUSCULAR; INTRAVENOUS ONCE
Status: COMPLETED | OUTPATIENT
Start: 2022-11-18 | End: 2022-11-18

## 2022-11-18 RX ADMIN — IOPAMIDOL 90 ML: 612 INJECTION, SOLUTION INTRAVENOUS at 12:58

## 2022-11-18 RX ADMIN — MORPHINE SULFATE 2 MG: 2 INJECTION, SOLUTION INTRAMUSCULAR; INTRAVENOUS at 13:18

## 2022-11-18 RX ADMIN — SODIUM CHLORIDE 1000 ML: 9 INJECTION, SOLUTION INTRAVENOUS at 13:18

## 2022-11-18 RX ADMIN — ONDANSETRON 4 MG: 2 INJECTION INTRAMUSCULAR; INTRAVENOUS at 13:18

## 2022-11-18 NOTE — ED PROVIDER NOTES
"Subjective   History of Present Illness  72 year old male patient presents to ER with complaint of diffuse abdominal pain, worse in suprapubic area, for \"awhile\" worse over past 2-3 days. He denies nausea, vomiting, diarrhea, fever, or urinary symptoms. He was at the [facility] center today for appointment for his CLL. Last treatment was over one year ago due to reaction to medication. He is rating his pain 5/10 and \"tender.\" he takes bentyl with meals daily and denies relief. He denies tobacco, ETOH, or illicit drug use.        Review of Systems   Constitutional: Negative.  Negative for fever.   HENT: Negative.    Eyes: Negative.    Respiratory: Negative.    Cardiovascular: Negative.    Gastrointestinal: Positive for abdominal pain. Negative for constipation, diarrhea, nausea and vomiting.   Endocrine: Negative.    Genitourinary: Negative.  Negative for difficulty urinating and dysuria.   Musculoskeletal: Positive for back pain.   Skin: Negative.    Allergic/Immunologic: Negative.    Neurological: Negative.    Hematological: Negative.    Psychiatric/Behavioral: Negative.        Past Medical History:   Diagnosis Date   • Arthritis    • CLL (chronic lymphocytic leukemia) ([facility])    • CVA (cerebral vascular accident) ([facility]) 2011   • Dyslipidemia    • Gastritis    • GERD (gastroesophageal reflux disease)    • Night sweats        No Known Allergies    Past Surgical History:   Procedure Laterality Date   • APPENDECTOMY     • COLONOSCOPY  03/26/2014   • COLONOSCOPY N/A 12/16/2019    Procedure: COLONOSCOPY;  Surgeon: Gregg Martinez MD;  Location: [facility] ENDOSCOPY;  Service: Gastroenterology   • ENDOSCOPY N/A 12/16/2019    Procedure: ESOPHAGOGASTRODUODENOSCOPY--with dilation;  Surgeon: Gregg Martinez MD;  Location: [facility] ENDOSCOPY;  Service: Gastroenterology   • HERNIA REPAIR     • KNEE SURGERY     • LEG SURGERY     • MANDIBLE FRACTURE SURGERY     • NV INSJ TUNNELED CVC W/O SUBQ PORT/ AGE 5 YR/> Right 7/7/2017    Procedure: " "MEDIPORT PLACEMENT WITH ULTRASOUND GUIDANCE       ;  Surgeon: Lucien Mcneal MD;  Location: [facility];  Service: General   • PROSTATE SURGERY     • UPPER GASTROINTESTINAL ENDOSCOPY  03/26/2014   • UPPER GASTROINTESTINAL ENDOSCOPY  12/16/2019       Family History   Problem Relation Age of Onset   • Prostate cancer Father    • Stomach cancer Father    • Throat cancer Father    • Leukemia Brother    • Stomach cancer Brother    • Breast cancer Paternal Aunt    • Colon cancer Paternal Uncle    • Kidney cancer Brother    • Prostate cancer Paternal Uncle        Social History     Socioeconomic History   • Marital status:    Tobacco Use   • Smoking status: Never   • Smokeless tobacco: Never   • Tobacco comments:     never smoked cigarettes   Vaping Use   • Vaping Use: Never used   Substance and Sexual Activity   • Alcohol use: No   • Drug use: No   • Sexual activity: Defer           Objective    /70   Pulse 60   Temp 98.2 °F (36.8 °C) (Oral)   Resp 20   Ht 170.2 cm (67\")   Wt 77.1 kg (170 lb)   SpO2 98%   BMI 26.63 kg/m²     Physical Exam  Vitals and nursing note reviewed.   Constitutional:       General: He is not in acute distress.     Appearance: Normal appearance. He is not ill-appearing, toxic-appearing or diaphoretic.   HENT:      Head: Normocephalic.      Nose: Nose normal.      Mouth/Throat:      Mouth: Mucous membranes are moist.   Eyes:      Pupils: Pupils are equal, round, and reactive to light.   Cardiovascular:      Rate and Rhythm: Normal rate and regular rhythm.      Pulses: Normal pulses.      Heart sounds: Normal heart sounds.   Pulmonary:      Effort: Pulmonary effort is normal. No respiratory distress.      Breath sounds: Normal breath sounds. No stridor. No wheezing or rhonchi.   Chest:      Chest wall: No tenderness.   Abdominal:      General: Bowel sounds are normal. There is distension.      Palpations: Abdomen is soft.      Tenderness: There is generalized abdominal tenderness and " tenderness in the suprapubic area. There is no guarding or rebound.   Musculoskeletal:         General: Normal range of motion.      Cervical back: Normal range of motion.   Skin:     General: Skin is warm and dry.      Capillary Refill: Capillary refill takes less than 2 seconds.   Neurological:      Mental Status: He is alert and oriented to person, place, and time.   Psychiatric:         Mood and Affect: Mood normal.         Behavior: Behavior normal.         Thought Content: Thought content normal.         Judgment: Judgment normal.         Procedures           ED Course  ED Course as of 11/18/22 1438   Fri Nov 18, 2022   1434 Spoke with Dr. Lubin about results and he recommends follow up next week at [facility]. Wants patient to call Monday for follow up. [HS]   1435 Spoke with patient and wife about results and consultation with Dr. Lubin. They verbalize understanding and are agreeable with discharge plan. [HS]      ED Course User Index  [HS] Carol Marin APRN           Results for orders placed or performed during the hospital encounter of 11/18/22   Comprehensive Metabolic Panel    Specimen: Blood   Result Value Ref Range    Glucose 88 65 - 99 mg/dL    BUN 19 8 - 23 mg/dL    Creatinine 1.28 (H) 0.76 - 1.27 mg/dL    Sodium 139 136 - 145 mmol/L    Potassium 4.7 3.5 - 5.2 mmol/L    Chloride 104 98 - 107 mmol/L    CO2 27.0 22.0 - 29.0 mmol/L    Calcium 9.2 8.6 - 10.5 mg/dL    Total Protein 6.8 6.0 - 8.5 g/dL    Albumin 4.80 3.50 - 5.20 g/dL    ALT (SGPT) 14 1 - 41 U/L    AST (SGOT) 21 1 - 40 U/L    Alkaline Phosphatase 106 39 - 117 U/L    Total Bilirubin 0.5 0.0 - 1.2 mg/dL    Globulin 2.0 gm/dL    A/G Ratio 2.4 g/dL    BUN/Creatinine Ratio 14.8 7.0 - 25.0    Anion Gap 8.0 5.0 - 15.0 mmol/L    eGFR 59.5 (L) >60.0 mL/min/1.73   Lipase    Specimen: Blood   Result Value Ref Range    Lipase 19 13 - 60 U/L   Urinalysis With Microscopic If Indicated (No Culture) - Urine, Clean Catch    Specimen: Urine, Clean  Catch   Result Value Ref Range    Color, UA Yellow Yellow, Straw, Dark Yellow, Lakesha    Appearance, UA Clear Clear    pH, UA 5.5 5.0 - 9.0    Specific Gravity, UA 1.021 1.003 - 1.030    Glucose, UA Negative Negative    Ketones, UA Trace (A) Negative    Bilirubin, UA Negative Negative    Blood, UA Small (1+) (A) Negative    Protein, UA Negative Negative    Leuk Esterase, UA Negative Negative    Nitrite, UA Negative Negative    Urobilinogen, UA 0.2 E.U./dL 0.2 - 1.0 E.U./dL   Lactic Acid, Plasma    Specimen: Blood   Result Value Ref Range    Lactate 0.8 0.5 - 2.0 mmol/L   CBC Auto Differential    Specimen: Blood   Result Value Ref Range    .45 (C) 3.40 - 10.80 10*3/mm3    RBC 4.58 4.14 - 5.80 10*6/mm3    Hemoglobin 12.1 (L) 13.0 - 17.7 g/dL    Hematocrit 39.4 37.5 - 51.0 %    MCV 86.0 79.0 - 97.0 fL    MCH 26.4 (L) 26.6 - 33.0 pg    MCHC 30.7 (L) 31.5 - 35.7 g/dL    RDW 15.0 12.3 - 15.4 %    RDW-SD 45.9 37.0 - 54.0 fl    MPV 9.4 6.0 - 12.0 fL    Platelets 104 (L) 140 - 450 10*3/mm3    Neutrophil % 3.7 (L) 42.7 - 76.0 %    Lymphocyte % 87.5 (H) 19.6 - 45.3 %    Monocyte % 8.2 5.0 - 12.0 %    Eosinophil % 0.3 0.3 - 6.2 %    Basophil % 0.1 0.0 - 1.5 %    Immature Grans % 0.2 0.0 - 0.5 %    Neutrophils, Absolute 4.22 1.70 - 7.00 10*3/mm3    Lymphocytes, Absolute 97.50 (H) 0.70 - 3.10 10*3/mm3    Monocytes, Absolute 9.16 (H) 0.10 - 0.90 10*3/mm3    Eosinophils, Absolute 0.30 0.00 - 0.40 10*3/mm3    Basophils, Absolute 0.08 0.00 - 0.20 10*3/mm3    Immature Grans, Absolute 0.19 (H) 0.00 - 0.05 10*3/mm3    nRBC 0.0 0.0 - 0.2 /100 WBC   Urinalysis, Microscopic Only - Urine, Clean Catch    Specimen: Urine, Clean Catch   Result Value Ref Range    RBC, UA 3-5 (A) None Seen /HPF    WBC, UA 0-2 None Seen, 0-2, 3-5 /HPF    Bacteria, UA None Seen None Seen /HPF    Squamous Epithelial Cells, UA 0-2 None Seen, 0-2 /HPF    Hyaline Casts, UA None Seen None Seen /LPF    Methodology Automated Microscopy    Green Top (Gel)   Result  Value Ref Range    Extra Tube Hold for add-ons.    Lavender Top   Result Value Ref Range    Extra Tube hold for add-on    Gold Top - SST   Result Value Ref Range    Extra Tube Hold for add-ons.    Light Blue Top   Result Value Ref Range    Extra Tube Hold for add-ons.          CT Abdomen Pelvis With Contrast    Result Date: 11/18/2022  EXAM: CT ABDOMEN PELVIS WITH IV CONTRAST ORDERING PROVIDER: DALE BOWMAN CLINICAL HISTORY: diffuse abdominal pain COMPARISON: 11/15/2021 TECHNIQUE: CT abdomen and pelvis performed with 90ml of Isovue-300 as IV contrast and reformatted in the sagittal and coronal planes. This examination was performed according to our departmental dose optimization program which includes automated exposure control, adjustment of the MA and kV according to patient size, and/or use of iterative reconstruction technique. FINDINGS: BASILAR CHEST: No consolidation, nodule or effusion. Stable small hiatal hernia. LIVER: No mass, enlargement or abnormal density. BILIARY TRACT: Unremarkable gallbladder. SPLEEN: Increase in splenomegaly now measuring 19.9 cm, previously 17.4 cm in craniocaudal dimension PANCREAS: No mass or inflammatory process. Normal pancreatic duct. ADRENAL GLANDS: Unremarkable. No mass. URINARY SYSTEM: Kidneys are normal in size. No stone, hydronephrosis, or mass. Normal ureters.  Bladder is normal without mass or stone.  GI TRACT: No mass, dilation, or wall thickening.  No diverticula.  No hernia.  Appendix is surgically absent.  REPRODUCTIVE SYSTEM: There is prostatomegaly PERITONEAL SPACE:No free air, free fluid, mass or adenopathy. Multiple shotty lymph nodes in the mesenteric space with mild increase since prior. RETROPERITONEAL SPACE: Left distal external iliac lymph node now measures 1.5 cm along the short axis, previously 1.3 cm. Mild increase in prominence of the lymph nodes in the flavia hepatis measuring up to 1 cm.  No other abnormal mass, aneurysm or significant vascular  abnormality. BONES AND EXTRA-ABDOMINAL SOFT TISSUES: Unremarkable. Bilateral mildly prominent inguinal adenopathy with no evidence of hernia.     Increase in splenomegaly now measuring 19.9 cm, previously 17.4 cm in craniocaudal dimension Left distal external iliac lymph node now measures 1.5 cm along the short axis, previously 1.3 cm. Mild increase in prominence of the lymph nodes in the flavia hepatis measuring up to 1 cm. along the short axis  Multiple shotty lymph nodes in the mesenteric space with mild increase since prior. Bilateral mildly prominent inguinal adenopathy. Stable small hiatal hernia. Correlation with PET/CT is recommended for this patient with history of chronic lymphocytic leukemia of B cell type as per history. Electronically signed by:  Tylor Ribeiro MD  11/18/2022 2:09 PM CST Workstation: [id number]                                 [facility]    Final diagnoses:   Generalized abdominal pain   CLL (chronic lymphocytic leukemia) ([facility])       ED Disposition  ED Disposition     ED Disposition   Discharge    Condition   Stable    Comment   --             Andres Lubin MD  [address] [place] [address]   [facility] [address] [phone]    Call on 11/21/2022           Medication List      No changes were made to your prescriptions during this visit.          Carol Marin, APRN  11/18/22 8407

## 2022-11-18 NOTE — PROGRESS NOTES
DATE OF VISIT: 11/21/2022      REASON FOR VISIT: Chronic lymphocytic leukemia, lymphocytosis, night sweats, fatigue      HISTORY OF PRESENT ILLNESS:   72-year-old male with medical problem consisting of coronary artery disease s/p CABG, dyslipidemia, chronic lymphocytic leukemia diagnosed in September 2016 currently on surveillance is here for follow-up appointment to discuss recently done blood work and CT scan.  Complains of fatigue.  Complains of chronic night sweats.  Complains of worsening abdominal pain affecting bilateral lower quadrant.  Denies any new lymph node enlargement.  Denies any fevers.  Denies any bleeding.  Denies any recent hospital admission.              Past Medical History, Past Surgical History, Social History, Family History have been reviewed and are without significant changes except as mentioned.    Review of Systems   Constitutional:        He has lost 2 pounds since last clinic visit      A comprehensive 14 point review of systems was performed and was negative except as mentioned in HPI.    Medications:  The current medication list was reviewed in the EMR    ALLERGIES:  No Known Allergies    Objective      Vitals:    11/18/22 1017   BP: 141/73   Pulse: 76   Temp: 98.1 °F (36.7 °C)   TempSrc: Temporal   SpO2: 97%   Weight: 77.2 kg (170 lb 1.6 oz)   PainSc:   5   PainLoc: Abdomen     Current Status 3/9/2021   ECOG score 0       Physical Exam  Pulmonary:      Breath sounds: Normal breath sounds.   Abdominal:      Tenderness: There is abdominal tenderness (Tenderness to deep palpation in bilateral lower quadrants without any guarding and rigidity).      Comments: Splenomegaly present   Lymphadenopathy:      Cervical: No cervical adenopathy.   Neurological:      Mental Status: He is alert and oriented to person, place, and time.           RECENT LABS:  Glucose   Date Value Ref Range Status   11/18/2022 88 65 - 99 mg/dL Final   10/04/2021 89 70 - 110 mg/dL Final     Sodium   Date Value Ref  Range Status   11/18/2022 139 136 - 145 mmol/L Final   10/12/2022 140 136 - 145 mmol/L Final     Potassium   Date Value Ref Range Status   11/18/2022 4.7 3.5 - 5.2 mmol/L Final   10/12/2022 4.4 3.5 - 5.1 mmol/L Final     CO2   Date Value Ref Range Status   11/18/2022 27.0 22.0 - 29.0 mmol/L Final     Total CO2   Date Value Ref Range Status   10/12/2022 28 21 - 31 mmol/L Final     Chloride   Date Value Ref Range Status   11/18/2022 104 98 - 107 mmol/L Final   10/12/2022 103 98 - 107 mmol/L Final     Anion Gap   Date Value Ref Range Status   11/18/2022 8.0 5.0 - 15.0 mmol/L Final   10/12/2022 9 4 - 12 mmol/L Final     Creatinine   Date Value Ref Range Status   11/18/2022 1.28 (H) 0.76 - 1.27 mg/dL Final   10/12/2022 1.4 (H) 0.7 - 1.3 mg/dL Final     BUN   Date Value Ref Range Status   11/18/2022 19 8 - 23 mg/dL Final   10/12/2022 20 7 - 25 mg/dL Final     BUN/Creatinine Ratio   Date Value Ref Range Status   11/18/2022 14.8 7.0 - 25.0 Final     Calcium   Date Value Ref Range Status   11/18/2022 9.2 8.6 - 10.5 mg/dL Final   10/12/2022 9.4 8.6 - 10.3 mg/dL Final     eGFR Non  Amer   Date Value Ref Range Status   02/14/2022 50 (L) >60 mL/min/1.73 Final     Alkaline Phosphatase   Date Value Ref Range Status   11/18/2022 106 39 - 117 U/L Final   09/22/2022 95 34 - 104 U/L Final     Total Protein   Date Value Ref Range Status   11/18/2022 6.8 6.0 - 8.5 g/dL Final     ALT (SGPT)   Date Value Ref Range Status   11/18/2022 14 1 - 41 U/L Final   09/22/2022 13 7 - 52 U/L Final     AST (SGOT)   Date Value Ref Range Status   11/18/2022 21 1 - 40 U/L Final   09/22/2022 18 13 - 39 U/L Final     Total Bilirubin   Date Value Ref Range Status   11/18/2022 0.5 0.0 - 1.2 mg/dL Final   09/22/2022 0.62 0.3 - 1.0 mg/dL Final     Albumin   Date Value Ref Range Status   11/18/2022 4.80 3.50 - 5.20 g/dL Final   10/12/2022 4.7 3.5 - 5.7 g/dL Final     Globulin   Date Value Ref Range Status   11/18/2022 2.0 gm/dL Final     Lab Results    Component Value Date    .45 (C) 11/18/2022    HGB 12.1 (L) 11/18/2022    HCT 39.4 11/18/2022    MCV 86.0 11/18/2022     (L) 11/18/2022     Lab Results   Component Value Date    NEUTROABS 4.22 11/18/2022    IRON 60 11/18/2022    IRON 67 08/15/2022    IRON 60 05/19/2022    TIBC 341 11/18/2022    TIBC 338 08/15/2022    TIBC 337 05/19/2022    LABIRON 18 (L) 11/18/2022    LABIRON 20 08/15/2022    LABIRON 18 (L) 05/19/2022    FERRITIN 93.44 11/18/2022    FERRITIN 93.15 08/15/2022    FERRITIN 86.66 05/19/2022    KNVEWPTD45 854 11/18/2022    HJADSRPI57 1,032 (H) 08/15/2022    CRJTQMTZ10 1,002 (H) 05/19/2022    FOLATE 17.60 11/18/2022    FOLATE >20.00 08/15/2022    FOLATE 17.00 05/19/2022     Lab Results   Component Value Date    REFLABREPO SEE NOTE: 10/19/2016         PATHOLOGY:  * Cannot find OR log *         RADIOLOGY DATA :  CT Abdomen Pelvis With Contrast    Result Date: 11/18/2022  Increase in splenomegaly now measuring 19.9 cm, previously 17.4 cm in craniocaudal dimension Left distal external iliac lymph node now measures 1.5 cm along the short axis, previously 1.3 cm. Mild increase in prominence of the lymph nodes in the flavia hepatis measuring up to 1 cm. along the short axis  Multiple shotty lymph nodes in the mesenteric space with mild increase since prior. Bilateral mildly prominent inguinal adenopathy. Stable small hiatal hernia. Correlation with PET/CT is recommended for this patient with history of chronic lymphocytic leukemia of B cell type as per history. Electronically signed by:  Tylor Ribeiro MD  11/18/2022 2:09 PM CST Workstation: [id number]          Assessment & Plan     1.  Chronic lymphocytic leukemia:  - Patient was diagnosed with chronic lymphocytic leukemia based on peripheral blood flow cytometry in September 2016  - Received 2 cycles of chemotherapy with Bendamustine and rituximab until July 10, 2017  - Had an allergic reaction to day 2 of cycle 2 of chemotherapy with swelling of face  and erythema  - Since then patient has been on observation  - CBC done today shows white blood cell count is 114,000 which is predominantly increase in lymphocytes, hemoglobin is 12.7, platelet count is  110,000  - CT scan done in emergency room today shows worsening splenomegaly measuring at 19.9 cm.  Minimal enlargement of inguinal adenopathy.  There was no explanation of patient's abdominal discomfort.  Due to worsening splenomegaly and anemia and worsening thrombocytopenia recommend starting Acalabrutinib starting next week.  - Side effect of acalabrutinib were discussed with patient.  - Recommend starting Acalabrutinib once available through insurance company  - Recommend returning to clinic in 4 weeks with repeat CBC and CMP on that day  - Recommend repeating CT of chest, abdomen and pelvis without contrast around February 2023 for evaluation      2.  Lymphocytosis:  - Secondary to CLL    3.  Anemia:  - Hemoglobin is 12.7  - Remains on ferrous sulfate p.o. daily with folic acid 3 times a week    4.  Thrombocytopenia:  Is secondary to CLL plus splenomegaly  - Platelet count is 110,000  - We will monitor with CBC    5.  Abdominal pain: Problem is new to me  - Patient is complaining of worsening abdominal pain affecting bilateral lower quadrant  over the last few days.  Patient's white blood cell count is relatively stable from his CLL.  Recommend emergency room evaluation for new onset of abdominal pain to rule out underlying etiologies.  - Patient and family is agreeable to emergency room evaluation.  Case was discussed with emergency room physician Dr. Khan    6.  Health maintenance: Patient does not smoke    7. Advance Care Planning: For now patient remains full code and is able to make decisions.  Patient has health care surrogate mentioned on chart.    8.  Prescriptions: Prescription for Acalabrutinib and Zofran has been provided today.             PHQ-9 Total Score: 0   -Patient is not homicidal or  suicidal.  No acute intervention required.    Roe Gu Sr. reports a pain score of 5.  Given his pain assessment as noted, treatment options were discussed and the following options were decided upon as a follow-up plan to address the patient's pain: Patient has been referred to emergency room for evaluation of abdominal pain.         Andres Lubin MD  11/21/2022  07:20 CST        Part of this note may be an electronic transcription/translation of spoken language to printed text using the Dragon Dictation System.          CC:

## 2022-11-18 NOTE — ED NOTES
"Patient presents to the ED with c/o lower abdominal and back pain that has \"been going on for awhile.\"  Wife at bedside reports patient was at the [facility] and they sent him here for ct scans.   "

## 2022-11-18 NOTE — DISCHARGE INSTRUCTIONS
Home to rest. Call Dr. Lubin's office Monday morning for appointment for follow up. Continue your home medications.

## 2022-11-21 ENCOUNTER — SPECIALTY PHARMACY (OUTPATIENT)
Dept: ONCOLOGY | Facility: CLINIC | Age: 72
End: 2022-11-21

## 2022-11-21 ENCOUNTER — OFFICE VISIT (OUTPATIENT)
Dept: ONCOLOGY | Facility: CLINIC | Age: 72
End: 2022-11-21

## 2022-11-21 ENCOUNTER — TELEPHONE (OUTPATIENT)
Dept: ONCOLOGY | Facility: HOSPITAL | Age: 72
End: 2022-11-21

## 2022-11-21 VITALS
SYSTOLIC BLOOD PRESSURE: 101 MMHG | HEART RATE: 89 BPM | OXYGEN SATURATION: 98 % | BODY MASS INDEX: 26.63 KG/M2 | WEIGHT: 170 LBS | RESPIRATION RATE: 18 BRPM | DIASTOLIC BLOOD PRESSURE: 64 MMHG

## 2022-11-21 DIAGNOSIS — C91.10 CLL (CHRONIC LYMPHOCYTIC LEUKEMIA): Chronic | ICD-10-CM

## 2022-11-21 PROCEDURE — 99213 OFFICE O/P EST LOW 20 MIN: CPT | Performed by: NURSE PRACTITIONER

## 2022-11-21 PROCEDURE — G0463 HOSPITAL OUTPT CLINIC VISIT: HCPCS | Performed by: NURSE PRACTITIONER

## 2022-11-21 RX ORDER — ONDANSETRON 4 MG/1
4 TABLET, FILM COATED ORAL 4 TIMES DAILY PRN
Qty: 40 TABLET | Refills: 3 | Status: SHIPPED | OUTPATIENT
Start: 2022-11-21

## 2022-11-21 NOTE — TELEPHONE ENCOUNTER
----- Message from Andres Lubin MD sent at 11/21/2022  7:22 AM CST -----  Regarding: Lab results  Please let patient know, he needs to continue taking ferrous sulfate p.o. daily with folic acid 3 times a week.  Due to finding on CT scan he will need to start on treatment with Acalabrutinib, please get patient on the phone I will go over the result of CT scan and discuss with them whether they want to start Acalabrutinib or not.  Thank you

## 2022-11-21 NOTE — PROGRESS NOTES
11/21/2022    CHEMOTHERAPY PREPARATION    Roe Gu Sr.  [id number]  1950    Chief Complaint: chemo education     History of present illness:  Roe Gu Sr. is a 72 y.o. year old male who is here today for chemotherapy preparation and needs assessment. The patient has been diagnosed with CLL and is scheduled to begin treatment with Acalabrutinib.     Oncology History:    Oncology/Hematology History    No history exists.       Past Medical History:   Diagnosis Date   • Arthritis    • CLL (chronic lymphocytic leukemia) (HCC)    • CVA (cerebral vascular accident) (HCC) 2011   • Dyslipidemia    • Gastritis    • GERD (gastroesophageal reflux disease)    • Night sweats        Past Surgical History:   Procedure Laterality Date   • APPENDECTOMY     • COLONOSCOPY  03/26/2014   • COLONOSCOPY N/A 12/16/2019    Procedure: COLONOSCOPY;  Surgeon: Gregg Martinez MD;  Location: [facility] ENDOSCOPY;  Service: Gastroenterology   • ENDOSCOPY N/A 12/16/2019    Procedure: ESOPHAGOGASTRODUODENOSCOPY--with dilation;  Surgeon: Gregg Martinez MD;  Location: [facility] ENDOSCOPY;  Service: Gastroenterology   • HERNIA REPAIR     • KNEE SURGERY     • LEG SURGERY     • MANDIBLE FRACTURE SURGERY     • VT INSJ TUNNELED CVC W/O SUBQ PORT/ AGE 5 YR/> Right 7/7/2017    Procedure: MEDIPORT PLACEMENT WITH ULTRASOUND GUIDANCE       ;  Surgeon: Lucien Mcneal MD;  Location: [facility] OR;  Service: General   • PROSTATE SURGERY     • UPPER GASTROINTESTINAL ENDOSCOPY  03/26/2014   • UPPER GASTROINTESTINAL ENDOSCOPY  12/16/2019       MEDICATIONS: The current medication list was reviewed and reconciled.     Allergies:  has No Known Allergies.    Family History   Problem Relation Age of Onset   • Prostate cancer Father    • Stomach cancer Father    • Throat cancer Father    • Leukemia Brother    • Stomach cancer Brother    • Breast cancer Paternal Aunt    • Colon cancer Paternal Uncle    • Kidney cancer Brother    • Prostate cancer  "Paternal Uncle          Review of Systems    Physical Exam  Vital Signs: /64   Pulse 89   Resp 18   Wt 77.1 kg (170 lb)   SpO2 98%   BMI 26.63 kg/m²    General Appearance:  alert, cooperative, no apparent distress, appears stated age and normal weight   Neurologic/Psychiatric: A&O x 3, gait steady, appropriate affect   HEENT:  Normocephalic, without obvious abnormality, mucous membranes moist   Lungs:   Clear to auscultation bilaterally; respirations regular, even, and unlabored bilaterally   Heart:  Regular rate and rhythm, no murmurs appreciated   Extremities: Normal, atraumatic; no clubbing, cyanosis, or edema    Skin: No rashes, lesions, or abnormal coloration noted     ECOG Performance Status: (0) Fully Active - Able to Carry On All Pre-disease Performance Without Restriction          NEEDS ASSESSMENTS    Genetics  The patient's new diagnosis and family history have been reviewed for genetic counseling needs. A genetic referral is not recommended.     Psychosocial  The patient has completed a PHQ-9 Depression Screening today.   PHQ-9 results show 0 (No Depression).   Copies of patient's questionnaires will be scanned into EMR for details and further reference.      Advanced Care Planning  The patient and I discussed advanced care planning, \"Conversations that Matter\".   This service was offered, free of charge, for development of advance directives with a certified ACP facilitator.  The patient does not have an up-to-date advanced directive. This document is not on file with our office. The patient is not interested in an appointment with one of our facilitators to create or update their advanced directives.      Additional Referral needs  none      CHEMOTHERAPY EDUCATION    Booklets Given: Chemo cares education sheet on Acalabrutinib and new consent form signed.      Chemotherapy/Biotherapy Education Sheets: (list all that apply)  nausea management, acid reflux management, diarrhea management, " Cancer resourse contacts information, skin and mouth care and vaccination information                                                                                                                                                                 Chemotherapy Regimen:   Treatment Plans     No treatment plans exist          TOPICS EDUCATION PROVIDED COMMENTS   ANEMIA:  role of RBC, cause, s/s, ways to manage, role of transfusion [x]    THROMBOCYTOPENIA:  role of platelet, cause, s/s, ways to prevent bleeding, things to avoid, when to seek help [x]    NEUTROPENIA:  role of WBC, cause, infection precautions, s/s of infection, when to call MD [x]    NUTRITION & APPETITE CHANGES:  importance of maintaining healthy diet & weight, ways to manage to improve intake, dietary consult, exercise regimen [x]    DIARRHEA:  causes, s/s of dehydration, ways to manage, dietary changes, when to call MD [x]    CONSTIPATION:  causes, ways to manage, dietary changes, when to call MD [x]    NAUSEA & VOMITING:  cause, use of antiemetics, dietary changes, when to call MD [x]    MOUTH SORES:  causes, oral care, ways to manage [x]    ALOPECIA:  cause, ways to manage, resources [x]    INFERTILITY & SEXUALITY:  causes, fertility preservation options, sexuality changes, ways to manage, importance of birth control [x]    NERVOUS SYSTEM CHANGES:  causes, s/s, neuropathies, cognitive changes, ways to manage [x]    PAIN:  causes, ways to manage [x] ????   SKIN & NAIL CHANGES:  cause, s/s, ways to manage [x]    ORGAN TOXICITIES:  cause, s/s, need for diagnostic tests, labs, when to notify MD [x]    SURVIVORSHIP:  distress, distress assessment, secondary malignancies, early/late effects, follow-up, social issues, social support [x]    HOME CARE:  use of spill kits, storing of PO chemo, how to manage bodily fluids [x]    MISCELLANEOUS:  drug interactions, administration, vesicant, et [x]        Assessment and Plan:    Diagnoses and all orders for this  visit:    1. CLL (chronic lymphocytic leukemia) ([facility])        This was a 25 minute face-to-face visit with 25 minutes spent in  counseling and coordination of care as documented above.   The patient and I have reviewed their new cancer diagnosis and scheduled treatment plan. Needs assessment was completed including genetics, psychosocial needs, barriers to care, VAD evaluation, advanced care planning, and palliative care services. Referrals have been ordered as appropriate based upon our evaluation and patient desires.     Chemotherapy teaching was also completed today as documented above. Adequate time was given to answer all questions to his satisfaction. Patient and family are aware of their care team members and contact information if they have questions or problems throughout the treatment course. Needs assessments and education has been completed. The patient is adequately prepared to begin treatment as scheduled.       Stefania Potter, APRN

## 2022-11-23 NOTE — PROGRESS NOTES
Specialty Pharmacy Refill Coordination Note     Roe is a 72 y.o. male     [place] appeal [place] for linnea Beckwith, Pharmacy Technician  Specialty Pharmacy Technician

## 2022-12-05 ENCOUNTER — OFFICE VISIT (OUTPATIENT)
Dept: GASTROENTEROLOGY | Facility: CLINIC | Age: 72
End: 2022-12-05

## 2022-12-05 VITALS
SYSTOLIC BLOOD PRESSURE: 148 MMHG | HEIGHT: 67 IN | WEIGHT: 168.8 LBS | OXYGEN SATURATION: 99 % | DIASTOLIC BLOOD PRESSURE: 84 MMHG | BODY MASS INDEX: 26.49 KG/M2

## 2022-12-05 DIAGNOSIS — K59.09 OTHER CONSTIPATION: ICD-10-CM

## 2022-12-05 DIAGNOSIS — K21.00 GASTROESOPHAGEAL REFLUX DISEASE WITH ESOPHAGITIS WITHOUT HEMORRHAGE: ICD-10-CM

## 2022-12-05 DIAGNOSIS — R10.84 GENERALIZED ABDOMINAL PAIN: Primary | ICD-10-CM

## 2022-12-05 PROCEDURE — 99213 OFFICE O/P EST LOW 20 MIN: CPT | Performed by: PHYSICIAN ASSISTANT

## 2022-12-05 NOTE — PROGRESS NOTES
Chief Complaint   Patient presents with   • Follow-up     6 Month   • Heartburn   • Abdominal Pain   • Constipation       ENDO PROCEDURE ORDERED:    Subjective    Roe Gu Sr. is a 72 y.o. male. he is here today for follow-up.    History of Present Illness    Patient seen on a recheck of his GERD, abdominal pain, constipation. Last seen 06/02/2022. He is accompanied by his wife. Currently denies heartburn, nausea, vomiting, dysphagia. Bowels are moving without blood or mucus. He does have the MiraLAX and Bentyl as needed. Weight is down 6 pounds since last visit, but he states he has not had as much appetite. Last EGD with esophageal dilatation and hemorrhoids on 12/16/2019.    Patient had studies on 11/18/2022. CBC showed white count 114.01, hemoglobin 12.7, platelets 110,000. CMP showed creatinine 1.33, GFR 56.8. Iron 60 with 18% saturation. Ferritin 93.44. Folate 17.6. B12 was 854. Lactic acid was normal. Urinalysis showed trace abnormalities. CT abdomen and pelvis with contrast showed an enlarged spleen of 19.9 cm, prominent prostate, multiple diffuse nodes. He was started on chemotherapy after seeing Dr. Lubin on 11/21/2022. Currently he is not having any significant difficulties.    ASSESSMENT/PLAN: Patient currently denies GERD. His constipation is doing well on current regimen. He denies significant abdominal pain. Last LFTs normal. He has started treatment for his CLL. We will plan followup in 6 months. Would consider a trial on Elavil if his appetite continues to be diminished. I am concerned about his weight loss, but we will plan followup in 3-6 months, sooner if needed.       The following portions of the patient's history were reviewed and updated as appropriate:   Past Medical History:   Diagnosis Date   • Arthritis    • CLL (chronic lymphocytic leukemia) (HCC)    • CVA (cerebral vascular accident) (HCC) 2011   • Dyslipidemia    • Gastritis    • GERD (gastroesophageal reflux disease)    •  Night sweats      Past Surgical History:   Procedure Laterality Date   • APPENDECTOMY     • COLONOSCOPY  03/26/2014   • COLONOSCOPY N/A 12/16/2019    Procedure: COLONOSCOPY;  Surgeon: Gregg Martinez MD;  Location: [facility] ENDOSCOPY;  Service: Gastroenterology   • ENDOSCOPY N/A 12/16/2019    Procedure: ESOPHAGOGASTRODUODENOSCOPY--with dilation;  Surgeon: Gregg Martinez MD;  Location: [facility] ENDOSCOPY;  Service: Gastroenterology   • HERNIA REPAIR     • KNEE SURGERY     • LEG SURGERY     • MANDIBLE FRACTURE SURGERY     • NV INSJ TUNNELED CVC W/O SUBQ PORT/ AGE 5 YR/> Right 7/7/2017    Procedure: MEDIPORT PLACEMENT WITH ULTRASOUND GUIDANCE       ;  Surgeon: Lucien Mcneal MD;  Location: [facility] OR;  Service: General   • PROSTATE SURGERY     • UPPER GASTROINTESTINAL ENDOSCOPY  03/26/2014   • UPPER GASTROINTESTINAL ENDOSCOPY  12/16/2019     Family History   Problem Relation Age of Onset   • Prostate cancer Father    • Stomach cancer Father    • Throat cancer Father    • Leukemia Brother    • Stomach cancer Brother    • Breast cancer Paternal Aunt    • Colon cancer Paternal Uncle    • Kidney cancer Brother    • Prostate cancer Paternal Uncle        No Known Allergies  Social History     Socioeconomic History   • Marital status:    Tobacco Use   • Smoking status: Never   • Smokeless tobacco: Never   • Tobacco comments:     never smoked cigarettes   Vaping Use   • Vaping Use: Never used   Substance and Sexual Activity   • Alcohol use: No   • Drug use: No   • Sexual activity: Defer     Current Medications:  Prior to Admission medications    Medication Sig Start Date End Date Taking? Authorizing Provider   acalabrutinib (CALQUENCE) 100 MG capsule capsule Take 1 capsule by mouth 2 (Two) Times a Day. 11/21/22  Yes Andres Lubin MD   aspirin 81 MG EC tablet Take 81 mg by mouth Daily. 3/30/22  Yes ProviderReyes MD   dicyclomine (BENTYL) 10 MG capsule Take 10 mg by mouth 3 (Three) Times a Day. 4/21/22  Yes  "Reyes Sherwood MD   docusate sodium (COLACE) 100 MG capsule Take 1 capsule by mouth 2 (Two) Times a Day As Needed for Constipation. 5/20/22  Yes Andres Lubin MD   ferrous sulfate 325 (65 FE) MG tablet Take 1 tablet by mouth Daily With Breakfast. Take 1 tablet by mouth on Monday, Wednesday and Friday 5/20/22  Yes Andres Lubin MD   folic acid (FOLVITE) 1 MG tablet Take 1 tablet by mouth Daily. 11/11/21  Yes Andres Lubin MD   ondansetron (ZOFRAN) 4 MG tablet Take 1 tablet by mouth 4 (Four) Times a Day As Needed for Nausea or Vomiting. 11/21/22  Yes Andres Lubin MD   PARoxetine (PAXIL) 40 MG tablet Take 1 tablet by mouth Daily. 8/5/19  Yes Reyes Sherwood MD   polyethylene glycol (MIRALAX) 17 GM/SCOOP powder Take 17 g by mouth As Needed.   Yes Reyes Sherwood MD   rosuvastatin (CRESTOR) 10 MG tablet Take 40 mg by mouth Every Night. 5/8/19  Yes Reyes Sherwood MD   tamsulosin (FLOMAX) 0.4 MG capsule 24 hr capsule Take 2 capsules by mouth Daily.   Yes Reyes Sherwood MD   vitamin B-12 (CYANOCOBALAMIN) 500 MCG tablet Take 500 mcg by mouth Daily.   Yes Reyes Sherwood MD   vitamin D3 125 MCG (5000 UT) capsule capsule Take 5,000 Units by mouth Daily.   Yes Reyes Sherwood MD     Review of Systems  Review of Systems       Objective    /84 (BP Location: Right arm, Patient Position: Sitting)   Ht 170.2 cm (67\")   Wt 76.6 kg (168 lb 12.8 oz)   SpO2 99%   BMI 26.44 kg/m²   Physical Exam  Vitals and nursing note reviewed.   Constitutional:       General: He is not in acute distress.     Appearance: He is well-developed.   HENT:      Head: Normocephalic and atraumatic.   Eyes:      Pupils: Pupils are equal, round, and reactive to light.   Cardiovascular:      Rate and Rhythm: Normal rate and regular rhythm.      Heart sounds: Normal heart sounds.   Pulmonary:      Effort: Pulmonary effort is normal.      Breath sounds: Normal breath sounds.   Abdominal:      General: " Bowel sounds are normal. There is no distension or abdominal bruit.      Palpations: Abdomen is soft. Abdomen is not rigid. There is no shifting dullness or mass.      Tenderness: There is abdominal tenderness. There is no guarding or rebound.      Hernia: No hernia is present. There is no hernia in the ventral area.   Musculoskeletal:         General: Normal range of motion.      Cervical back: Normal range of motion.   Skin:     General: Skin is warm and dry.   Neurological:      Mental Status: He is alert and oriented to person, place, and time.   Psychiatric:         Behavior: Behavior normal.         Thought Content: Thought content normal.         Judgment: Judgment normal.       Assessment & Plan      1. Generalized abdominal pain    2. Gastroesophageal reflux disease with esophagitis without hemorrhage    3. Other constipation    .   Diagnoses and all orders for this visit:    1. Generalized abdominal pain (Primary)    2. Gastroesophageal reflux disease with esophagitis without hemorrhage    3. Other constipation        Orders placed during this encounter include:  No orders of the defined types were placed in this encounter.      Medications prescribed:  No orders of the defined types were placed in this encounter.      Requested Prescriptions      No prescriptions requested or ordered in this encounter       Review and/or summary of lab tests, radiology, procedures, medications. Review and summary of old records and obtaining of history. The risks and benefits of my recommendations, as well as other treatment options were discussed with the patient today. Questions were answered.    Follow-up: Return in about 6 months (around 6/5/2023), or if symptoms worsen or fail to improve.     * Surgery not found *      This document has been electronically signed by Jackson Choudhary PA-C on December 16, 2022 14:57 CST      Results for orders placed or performed during the hospital encounter of 11/18/22   [facility]  SST   Result Value Ref Range    Extra Tube Hold for add-ons.    Green Top (Gel)   Result Value Ref Range    Extra Tube Hold for add-ons.    Urinalysis, Microscopic Only - Urine, Clean Catch    Specimen: Urine, Clean Catch   Result Value Ref Range    RBC, UA 3-5 (A) None Seen /HPF    WBC, UA 0-2 None Seen, 0-2, 3-5 /HPF    Bacteria, UA None Seen None Seen /HPF    Squamous Epithelial Cells, UA 0-2 None Seen, 0-2 /HPF    Hyaline Casts, UA None Seen None Seen /LPF    Methodology Automated Microscopy    Urinalysis With Microscopic If Indicated (No Culture) - Urine, Clean Catch    Specimen: Urine, Clean Catch   Result Value Ref Range    Color, UA Yellow Yellow, Straw, Dark Yellow, Lakesha    Appearance, UA Clear Clear    pH, UA 5.5 5.0 - 9.0    Specific Gravity, UA 1.021 1.003 - 1.030    Glucose, UA Negative Negative    Ketones, UA Trace (A) Negative    Bilirubin, UA Negative Negative    Blood, UA Small (1+) (A) Negative    Protein, UA Negative Negative    Leuk Esterase, UA Negative Negative    Nitrite, UA Negative Negative    Urobilinogen, UA 0.2 E.U./dL 0.2 - 1.0 E.U./dL   CBC Auto Differential    Specimen: Blood   Result Value Ref Range    .45 (C) 3.40 - 10.80 10*3/mm3    RBC 4.58 4.14 - 5.80 10*6/mm3    Hemoglobin 12.1 (L) 13.0 - 17.7 g/dL    Hematocrit 39.4 37.5 - 51.0 %    MCV 86.0 79.0 - 97.0 fL    MCH 26.4 (L) 26.6 - 33.0 pg    MCHC 30.7 (L) 31.5 - 35.7 g/dL    RDW 15.0 12.3 - 15.4 %    RDW-SD 45.9 37.0 - 54.0 fl    MPV 9.4 6.0 - 12.0 fL    Platelets 104 (L) 140 - 450 10*3/mm3    Neutrophil % 3.7 (L) 42.7 - 76.0 %    Lymphocyte % 87.5 (H) 19.6 - 45.3 %    Monocyte % 8.2 5.0 - 12.0 %    Eosinophil % 0.3 0.3 - 6.2 %    Basophil % 0.1 0.0 - 1.5 %    Immature Grans % 0.2 0.0 - 0.5 %    Neutrophils, Absolute 4.22 1.70 - 7.00 10*3/mm3    Lymphocytes, Absolute 97.50 (H) 0.70 - 3.10 10*3/mm3    Monocytes, Absolute 9.16 (H) 0.10 - 0.90 10*3/mm3    Eosinophils, Absolute 0.30 0.00 - 0.40 10*3/mm3    Basophils, Absolute  0.08 0.00 - 0.20 10*3/mm3    Immature Grans, Absolute 0.19 (H) 0.00 - 0.05 10*3/mm3    nRBC 0.0 0.0 - 0.2 /100 WBC   Lavender Top   Result Value Ref Range    Extra Tube hold for add-on    Light Blue Top   Result Value Ref Range    Extra Tube Hold for add-ons.    Lipase    Specimen: Blood   Result Value Ref Range    Lipase 19 13 - 60 U/L   Lactic Acid, Plasma    Specimen: Blood   Result Value Ref Range    Lactate 0.8 0.5 - 2.0 mmol/L   Comprehensive Metabolic Panel    Specimen: Blood   Result Value Ref Range    Glucose 88 65 - 99 mg/dL    BUN 19 8 - 23 mg/dL    Creatinine 1.28 (H) 0.76 - 1.27 mg/dL    Sodium 139 136 - 145 mmol/L    Potassium 4.7 3.5 - 5.2 mmol/L    Chloride 104 98 - 107 mmol/L    CO2 27.0 22.0 - 29.0 mmol/L    Calcium 9.2 8.6 - 10.5 mg/dL    Total Protein 6.8 6.0 - 8.5 g/dL    Albumin 4.80 3.50 - 5.20 g/dL    ALT (SGPT) 14 1 - 41 U/L    AST (SGOT) 21 1 - 40 U/L    Alkaline Phosphatase 106 39 - 117 U/L    Total Bilirubin 0.5 0.0 - 1.2 mg/dL    Globulin 2.0 gm/dL    A/G Ratio 2.4 g/dL    BUN/Creatinine Ratio 14.8 7.0 - 25.0    Anion Gap 8.0 5.0 - 15.0 mmol/L    eGFR 59.5 (L) >60.0 mL/min/1.73   Results for orders placed or performed in visit on 11/18/22   CBC Auto Differential    Specimen: Arm, Right; Blood   Result Value Ref Range    .01 (C) 3.40 - 10.80 10*3/mm3    RBC 4.59 4.14 - 5.80 10*6/mm3    Hemoglobin 12.7 (L) 13.0 - 17.7 g/dL    Hematocrit 38.7 37.5 - 51.0 %    MCV 84.3 79.0 - 97.0 fL    MCH 27.7 26.6 - 33.0 pg    MCHC 32.8 31.5 - 35.7 g/dL    RDW 15.1 12.3 - 15.4 %    RDW-SD 45.3 37.0 - 54.0 fl    MPV 9.0 6.0 - 12.0 fL    Platelets 110 (L) 140 - 450 10*3/mm3   Iron and TIBC    Specimen: Arm, Right; Blood   Result Value Ref Range    Iron 60 59 - 158 mcg/dL    Iron Saturation 18 (L) 20 - 50 %    Transferrin 229 200 - 360 mg/dL    TIBC 341 298 - 536 mcg/dL   Manual Differential    Specimen: Arm, Right; Blood   Result Value Ref Range    Neutrophil % 4.0 (L) 42.7 - 76.0 %    Lymphocyte %  92.0 (H) 19.6 - 45.3 %    Monocyte % 1.0 (L) 5.0 - 12.0 %    Basophil % 2.0 (H) 0.0 - 1.5 %    Atypical Lymphocyte % 1.0 0.0 - 5.0 %    Neutrophils Absolute 4.56 1.70 - 7.00 10*3/mm3    Lymphocytes Absolute 106.03 (H) 0.70 - 3.10 10*3/mm3    Monocytes Absolute 1.14 (H) 0.10 - 0.90 10*3/mm3    Basophils Absolute 2.28 (H) 0.00 - 0.20 10*3/mm3    Anisocytosis Slight/1+ None Seen    Ovalocytes Slight/1+ None Seen    WBC Morphology Normal Normal    Platelet Estimate Decreased Normal   Folate    Specimen: Arm, Right; Blood   Result Value Ref Range    Folate 17.60 4.78 - 24.20 ng/mL   Ferritin    Specimen: Arm, Right; Blood   Result Value Ref Range    Ferritin 93.44 30.00 - 400.00 ng/mL   Vitamin B12    Specimen: Arm, Right; Blood   Result Value Ref Range    Vitamin B-12 854 211 - 946 pg/mL   Comprehensive Metabolic Panel    Specimen: Arm, Right; Blood   Result Value Ref Range    Glucose 89 65 - 99 mg/dL    BUN 18 8 - 23 mg/dL    Creatinine 1.33 (H) 0.76 - 1.27 mg/dL    Sodium 140 136 - 145 mmol/L    Potassium 4.7 3.5 - 5.2 mmol/L    Chloride 104 98 - 107 mmol/L    CO2 27.0 22.0 - 29.0 mmol/L    Calcium 9.3 8.6 - 10.5 mg/dL    Total Protein 7.0 6.0 - 8.5 g/dL    Albumin 4.90 3.50 - 5.20 g/dL    ALT (SGPT) 16 1 - 41 U/L    AST (SGOT) 22 1 - 40 U/L    Alkaline Phosphatase 108 39 - 117 U/L    Total Bilirubin 0.6 0.0 - 1.2 mg/dL    Globulin 2.1 gm/dL    A/G Ratio 2.3 g/dL    BUN/Creatinine Ratio 13.5 7.0 - 25.0    Anion Gap 9.0 5.0 - 15.0 mmol/L    eGFR 56.8 (L) >60.0 mL/min/1.73     *Note: Due to a large number of results and/or encounters for the requested time period, some results have not been displayed. A complete set of results can be found in Results Review.

## 2022-12-08 ENCOUNTER — TELEPHONE (OUTPATIENT)
Dept: ONCOLOGY | Facility: CLINIC | Age: 72
End: 2022-12-08

## 2022-12-08 NOTE — TELEPHONE ENCOUNTER
Wife called and states he passed out in the bathroom. States he feels fine now. Does not want to go to ER. Just wanted Dr Lubin to be aware. Educated wife on Bp monitoring and hydration management. Advised to go to ER with any further complaints. Dr Lubin made aware of the situation.

## 2022-12-14 RX ORDER — ACALABRUTINIB 100 MG/1
100 CAPSULE, GELATIN COATED ORAL 2 TIMES DAILY
Qty: 60 CAPSULE | Refills: 1 | Status: CANCELLED | OUTPATIENT
Start: 2022-12-14

## 2022-12-15 ENCOUNTER — SPECIALTY PHARMACY (OUTPATIENT)
Dept: ONCOLOGY | Facility: CLINIC | Age: 72
End: 2022-12-15

## 2022-12-15 NOTE — PROGRESS NOTES
Specialty Pharmacy Refill Coordination Note     Roe is a 72 y.o. male contacted today regarding refills of  calquence  specialty medication(s).    Reviewed and verified with patient:       Specialty medication(s) and dose(s) confirmed: yes    Refill Questions    Flowsheet Row Most Recent Value   Changes to allergies? No   Changes to medications? No   New conditions since last clinic visit No   Unplanned office visit, urgent care, ED, or hospital admission in the last 4 weeks  No   How does patient/caregiver feel medication is working? Good   Financial problems or insurance changes  No   Since the previous refill, were any specialty medication doses or scheduled injections missed or delayed?  No   Does this patient require a clinical escalation to a pharmacist? No          pt wife stated he had a fainting episode and contacted Dr. Lubin about the situation. He is scheduled for a follow up appointment on 12/20          Follow-up: 30 day(s)     Jocy Beckwith, Pharmacy Technician  Specialty Pharmacy Technician

## 2022-12-15 NOTE — TELEPHONE ENCOUNTER
Rx Refill Note  Requested Prescriptions     Pending Prescriptions Disp Refills   • acalabrutinib (Calquence) 100 MG capsule capsule 60 capsule 1     Sig: Take 1 capsule by mouth 2 (Two) Times a Day.      Last office visit with prescribing clinician: 11/18/2022   Last telemedicine visit with prescribing clinician: 12/15/2022   Next office visit with prescribing clinician: 12/20/2022                         Would you like a call back once the refill request has been completed: [] Yes [] No    If the office needs to give you a call back, can they leave a voicemail: [] Yes [] No    Pierre Martinez Rep  12/15/22, 08:26 CST

## 2022-12-18 NOTE — PROGRESS NOTES
DATE OF VISIT: 12/20/2022      REASON FOR VISIT: Chronic lymphocytic leukemia, lymphocytosis, night sweats, fatigue      HISTORY OF PRESENT ILLNESS:   72-year-old male with medical problems significant for coronary artery disease s/p CABG, dyslipidemia, chronic lymphocytic leukemia diagnosed in September 2016 currently on Acalabrutinib since November 22, 2022 he is here for follow-up appointment today.  Complains of episode of syncope x2 at home.  Denies any recurrence of syncope since then.  Complains of fatigue.  States his night sweats and left upper quadrant abdominal discomfort is improved since starting chemotherapy.  Denies any excessive nausea or vomiting or diarrhea with chemotherapy denies any new lymph node enlargement.  Denies any fevers.  Denies any bleeding.              Past Medical History, Past Surgical History, Social History, Family History have been reviewed and are without significant changes except as mentioned.    Review of Systems   A comprehensive 14 point review of systems was performed and was negative except as mentioned in HPI.    Medications:  The current medication list was reviewed in the EMR    ALLERGIES:  No Known Allergies    Objective      Vitals:    12/20/22 1104   BP: 132/61   Pulse: 74   Temp: 96.9 °F (36.1 °C)   TempSrc: Oral   SpO2: 98%   Weight: 77.2 kg (170 lb 4.8 oz)   PainSc: 0-No pain     Current Status 3/9/2021   ECOG score 0       Physical Exam  Cardiovascular:      Rate and Rhythm: Normal rate and regular rhythm.   Pulmonary:      Breath sounds: Normal breath sounds.   Neurological:      Mental Status: He is alert and oriented to person, place, and time.           RECENT LABS:  Glucose   Date Value Ref Range Status   12/20/2022 77 65 - 99 mg/dL Final   10/04/2021 89 70 - 110 mg/dL Final     Sodium   Date Value Ref Range Status   12/20/2022 135 (L) 136 - 145 mmol/L Final   10/12/2022 140 136 - 145 mmol/L Final     Potassium   Date Value Ref Range Status   12/20/2022  4.4 3.5 - 5.2 mmol/L Final     Comment:     Slight hemolysis detected by analyzer. Results may be affected.   10/12/2022 4.4 3.5 - 5.1 mmol/L Final     CO2   Date Value Ref Range Status   12/20/2022 26.0 22.0 - 29.0 mmol/L Final     Total CO2   Date Value Ref Range Status   10/12/2022 28 21 - 31 mmol/L Final     Chloride   Date Value Ref Range Status   12/20/2022 101 98 - 107 mmol/L Final   10/12/2022 103 98 - 107 mmol/L Final     Anion Gap   Date Value Ref Range Status   12/20/2022 8.0 5.0 - 15.0 mmol/L Final   10/12/2022 9 4 - 12 mmol/L Final     Creatinine   Date Value Ref Range Status   12/20/2022 1.25 0.76 - 1.27 mg/dL Final   10/12/2022 1.4 (H) 0.7 - 1.3 mg/dL Final     BUN   Date Value Ref Range Status   12/20/2022 18 8 - 23 mg/dL Final   10/12/2022 20 7 - 25 mg/dL Final     BUN/Creatinine Ratio   Date Value Ref Range Status   12/20/2022 14.4 7.0 - 25.0 Final     Calcium   Date Value Ref Range Status   12/20/2022 9.3 8.6 - 10.5 mg/dL Final   10/12/2022 9.4 8.6 - 10.3 mg/dL Final     eGFR Non  Amer   Date Value Ref Range Status   02/14/2022 50 (L) >60 mL/min/1.73 Final     Alkaline Phosphatase   Date Value Ref Range Status   12/20/2022 86 39 - 117 U/L Final   09/22/2022 95 34 - 104 U/L Final     Total Protein   Date Value Ref Range Status   12/20/2022 7.1 6.0 - 8.5 g/dL Final     ALT (SGPT)   Date Value Ref Range Status   12/20/2022 15 1 - 41 U/L Final   09/22/2022 13 7 - 52 U/L Final     AST (SGOT)   Date Value Ref Range Status   12/20/2022 16 1 - 40 U/L Final   09/22/2022 18 13 - 39 U/L Final     Total Bilirubin   Date Value Ref Range Status   12/20/2022 0.5 0.0 - 1.2 mg/dL Final   09/22/2022 0.62 0.3 - 1.0 mg/dL Final     Albumin   Date Value Ref Range Status   12/20/2022 4.90 3.50 - 5.20 g/dL Final   10/12/2022 4.7 3.5 - 5.7 g/dL Final     Globulin   Date Value Ref Range Status   12/20/2022 2.2 gm/dL Final     Lab Results   Component Value Date    WBC 44.66 (C) 12/20/2022    HGB 12.9 (L) 12/20/2022     HCT 39.7 12/20/2022    MCV 85.4 12/20/2022    PLT 79 (L) 12/20/2022     Lab Results   Component Value Date    NEUTROABS 4.22 11/18/2022    IRON 60 11/18/2022    IRON 67 08/15/2022    IRON 60 05/19/2022    TIBC 341 11/18/2022    TIBC 338 08/15/2022    TIBC 337 05/19/2022    LABIRON 18 (L) 11/18/2022    LABIRON 20 08/15/2022    LABIRON 18 (L) 05/19/2022    FERRITIN 93.44 11/18/2022    FERRITIN 93.15 08/15/2022    FERRITIN 86.66 05/19/2022    [id number] 854 11/18/2022    [id number] 1,032 (H) 08/15/2022    [id number] 1,002 (H) 05/19/2022    FOLATE 17.60 11/18/2022    FOLATE >20.00 08/15/2022    FOLATE 17.00 05/19/2022     Lab Results   Component Value Date    REFLABREPO SEE NOTE: 10/19/2016         PATHOLOGY:  * Cannot find OR log *         RADIOLOGY DATA :  No radiology results for the last 7 days        Assessment & Plan     1.  Chronic lymphocytic leukemia:  - Patient was diagnosed with CLL based on peripheral blood flow cytometry in September 2016  - Received 2 cycles of chemotherapy with Bendamustine and rituximab until July 10, 2017.  - With day 2 of cycle 2 of chemotherapy patient had allergic reaction with swelling of face and erythema affecting face.  - Recently due to splenomegaly 9 minimal enlargement of lymph node as well as having night sweats and abdominal pain patient has been started on acalabrutinib on November 22, 2022  The CBC done today shows white blood cell count is 44,000, hemoglobin is 12.9, platelet count is 79,000  - Recommend continuing with Acalabrutinib for now  - Patient will return to clinic in 4 weeks with repeat CBC, CMP  -We will repeat anemia work-up in February 2023      2.  Lymphocytosis:  - Secondary to CLL    3.  Anemia:  - Hemoglobin is 12.9   -Remains on ferrous sulfate p.o. daily with folic acid 3 times a week    4.  Thrombocytopenia:  - Secondary to CLL plus splenomegaly  - Platelet is 79,000  - We will monitor with CBC    5.  Syncope:  - Patient had a syncope episode x2  since starting Acalabrutinib  - Discussed with patient and family 1 of right side effect of Acalabrutinib is cardiac arrhythmia.  Sometimes cardiac arrhythmia can also cause syncope.  Recommend coming to the emergency room if he has any more syncopal episode.  Upon auscultation today his heart rate and rhythm was regular.    6.  Health maintenance: Patient does not smoke    7. Advance Care Planning: For now patient remains full code and is able to make decisions.  Patient has health care surrogate mentioned on chart.               PHQ-9 Total Score: 0   -Patient is not homicidal or suicidal.  No acute intervention required.    Roe Gu Sr. reports a pain score of 0.  Given his pain assessment as noted, treatment options were discussed and the following options were decided upon as a follow-up plan to address the patient's pain: continuation of current treatment plan for pain.         Andres Lubin MD  12/20/2022  11:12 CST        Part of this note may be an electronic transcription/translation of spoken language to printed text using the Dragon Dictation System.          CC:

## 2022-12-20 ENCOUNTER — OFFICE VISIT (OUTPATIENT)
Dept: ONCOLOGY | Facility: CLINIC | Age: 72
End: 2022-12-20

## 2022-12-20 ENCOUNTER — LAB (OUTPATIENT)
Dept: ONCOLOGY | Facility: HOSPITAL | Age: 72
End: 2022-12-20

## 2022-12-20 VITALS
WEIGHT: 170.3 LBS | BODY MASS INDEX: 26.67 KG/M2 | TEMPERATURE: 96.9 F | OXYGEN SATURATION: 98 % | DIASTOLIC BLOOD PRESSURE: 61 MMHG | HEART RATE: 74 BPM | SYSTOLIC BLOOD PRESSURE: 132 MMHG

## 2022-12-20 DIAGNOSIS — D64.9 ANEMIA, UNSPECIFIED TYPE: ICD-10-CM

## 2022-12-20 DIAGNOSIS — D64.9 ANEMIA, UNSPECIFIED TYPE: Chronic | ICD-10-CM

## 2022-12-20 DIAGNOSIS — D69.6 THROMBOCYTOPENIA: Chronic | ICD-10-CM

## 2022-12-20 DIAGNOSIS — C91.10 CLL (CHRONIC LYMPHOCYTIC LEUKEMIA): Primary | Chronic | ICD-10-CM

## 2022-12-20 DIAGNOSIS — C91.10 CLL (CHRONIC LYMPHOCYTIC LEUKEMIA): ICD-10-CM

## 2022-12-20 DIAGNOSIS — D72.820 LYMPHOCYTOSIS: ICD-10-CM

## 2022-12-20 DIAGNOSIS — D72.820 LYMPHOCYTOSIS: Chronic | ICD-10-CM

## 2022-12-20 DIAGNOSIS — D69.6 THROMBOCYTOPENIA: ICD-10-CM

## 2022-12-20 LAB
ALBUMIN SERPL-MCNC: 4.9 G/DL (ref 3.5–5.2)
ALBUMIN/GLOB SERPL: 2.2 G/DL
ALP SERPL-CCNC: 86 U/L (ref 39–117)
ALT SERPL W P-5'-P-CCNC: 15 U/L (ref 1–41)
ANION GAP SERPL CALCULATED.3IONS-SCNC: 8 MMOL/L (ref 5–15)
ANISOCYTOSIS BLD QL: ABNORMAL
AST SERPL-CCNC: 16 U/L (ref 1–40)
BILIRUB SERPL-MCNC: 0.5 MG/DL (ref 0–1.2)
BUN SERPL-MCNC: 18 MG/DL (ref 8–23)
BUN/CREAT SERPL: 14.4 (ref 7–25)
CALCIUM SPEC-SCNC: 9.3 MG/DL (ref 8.6–10.5)
CHLORIDE SERPL-SCNC: 101 MMOL/L (ref 98–107)
CO2 SERPL-SCNC: 26 MMOL/L (ref 22–29)
CREAT SERPL-MCNC: 1.25 MG/DL (ref 0.76–1.27)
DEPRECATED RDW RBC AUTO: 45.6 FL (ref 37–54)
EGFRCR SERPLBLD CKD-EPI 2021: 61.2 ML/MIN/1.73
ERYTHROCYTE [DISTWIDTH] IN BLOOD BY AUTOMATED COUNT: 14.7 % (ref 12.3–15.4)
GLOBULIN UR ELPH-MCNC: 2.2 GM/DL
GLUCOSE SERPL-MCNC: 77 MG/DL (ref 65–99)
HCT VFR BLD AUTO: 39.7 % (ref 37.5–51)
HGB BLD-MCNC: 12.9 G/DL (ref 13–17.7)
HOLD SPECIMEN: NORMAL
LYMPHOCYTES # BLD MANUAL: 38.85 10*3/MM3 (ref 0.7–3.1)
LYMPHOCYTES NFR BLD MANUAL: 7 % (ref 5–12)
MCH RBC QN AUTO: 27.7 PG (ref 26.6–33)
MCHC RBC AUTO-ENTMCNC: 32.5 G/DL (ref 31.5–35.7)
MCV RBC AUTO: 85.4 FL (ref 79–97)
MONOCYTES # BLD: 3.13 10*3/MM3 (ref 0.1–0.9)
NEUTROPHILS # BLD AUTO: 2.68 10*3/MM3 (ref 1.7–7)
NEUTROPHILS NFR BLD MANUAL: 6 % (ref 42.7–76)
PLATELET # BLD AUTO: 79 10*3/MM3 (ref 140–450)
PMV BLD AUTO: 11.2 FL (ref 6–12)
POTASSIUM SERPL-SCNC: 4.4 MMOL/L (ref 3.5–5.2)
PROT SERPL-MCNC: 7.1 G/DL (ref 6–8.5)
RBC # BLD AUTO: 4.65 10*6/MM3 (ref 4.14–5.8)
SMALL PLATELETS BLD QL SMEAR: ABNORMAL
SODIUM SERPL-SCNC: 135 MMOL/L (ref 136–145)
VARIANT LYMPHS NFR BLD MANUAL: 87 % (ref 19.6–45.3)
WBC MORPH BLD: NORMAL
WBC NRBC COR # BLD: 44.66 10*3/MM3 (ref 3.4–10.8)

## 2022-12-20 PROCEDURE — 1126F AMNT PAIN NOTED NONE PRSNT: CPT | Performed by: INTERNAL MEDICINE

## 2022-12-20 PROCEDURE — 1123F ACP DISCUSS/DSCN MKR DOCD: CPT | Performed by: INTERNAL MEDICINE

## 2022-12-20 PROCEDURE — 80053 COMPREHEN METABOLIC PANEL: CPT

## 2022-12-20 PROCEDURE — 85025 COMPLETE CBC W/AUTO DIFF WBC: CPT

## 2022-12-20 PROCEDURE — 85007 BL SMEAR W/DIFF WBC COUNT: CPT

## 2022-12-20 PROCEDURE — 99214 OFFICE O/P EST MOD 30 MIN: CPT | Performed by: INTERNAL MEDICINE

## 2022-12-20 PROCEDURE — G0463 HOSPITAL OUTPT CLINIC VISIT: HCPCS | Performed by: INTERNAL MEDICINE

## 2023-01-05 ENCOUNTER — SPECIALTY PHARMACY (OUTPATIENT)
Dept: ENDOCRINOLOGY | Facility: CLINIC | Age: 73
End: 2023-01-05
Payer: MEDICARE

## 2023-01-05 NOTE — PROGRESS NOTES
Specialty Pharmacy Refill Coordination Note     Roe is a 72 y.o. male contacted today regarding refills of  calquence specialty medication(s).    Reviewed and verified with patient:       Specialty medication(s) and dose(s) confirmed: yes    Refill Questions    Flowsheet Row Most Recent Value   Changes to allergies? No   Changes to medications? No   New conditions since last clinic visit No   Unplanned office visit, urgent care, ED, or hospital admission in the last 4 weeks  No   How does patient/caregiver feel medication is working? Good   Financial problems or insurance changes  No   Since the previous refill, were any specialty medication doses or scheduled injections missed or delayed?  No   Does this patient require a clinical escalation to a pharmacist? No          Delivery Questions    Flowsheet Row Most Recent Value   Delivery method  at Pharmacy      pt will  medication 01/18            Follow-up: 30 day(s)     Jocy Beckwith, Pharmacy Technician  Specialty Pharmacy Technician

## 2023-01-13 NOTE — PROGRESS NOTES
DATE OF VISIT: 1/18/2023      REASON FOR VISIT: Chronic lymphocytic leukemia, lymphocytosis, night sweats, fatigue      HISTORY OF PRESENT ILLNESS:   72-year-old male with medical problems significant for coronary artery disease s/p CABG, dyslipidemia, chronic lymphocytic leukemia diagnosed in September 2016 currently on Acalabrutinib since November 22, 2022 is here for follow-up appointment today.  States night sweats, abdominal discomfort and appetite is improving..  Complains of fatigue.  Denies any recent worsening of abdominal pain.  Denies any excessive nausea or vomiting or diarrhea with Acalabrutinib.  Denies any fevers.  Denies any bleeding.              Past Medical History, Past Surgical History, Social History, Family History have been reviewed and are without significant changes except as mentioned.    Review of Systems   A comprehensive 14 point review of systems was performed and was negative except as mentioned in HPI.    Medications:  The current medication list was reviewed in the EMR    ALLERGIES:  No Known Allergies    Objective      Vitals:    01/18/23 0948   BP: 109/65   Pulse: 70   Temp: 97.2 °F (36.2 °C)   TempSrc: Temporal   SpO2: 97%   Weight: 78.9 kg (173 lb 14.4 oz)   PainSc: 0-No pain     Current Status 3/9/2021   ECOG score 0       Physical Exam  Pulmonary:      Breath sounds: Normal breath sounds.   Lymphadenopathy:      Cervical: No cervical adenopathy.   Neurological:      Mental Status: He is alert and oriented to person, place, and time.           RECENT LABS:  Glucose   Date Value Ref Range Status   12/20/2022 77 65 - 99 mg/dL Final   10/04/2021 89 70 - 110 mg/dL Final     Sodium   Date Value Ref Range Status   12/20/2022 135 (L) 136 - 145 mmol/L Final   10/12/2022 140 136 - 145 mmol/L Final     Potassium   Date Value Ref Range Status   12/20/2022 4.4 3.5 - 5.2 mmol/L Final     Comment:     Slight hemolysis detected by analyzer. Results may be affected.   10/12/2022 4.4 3.5 - 5.1  mmol/L Final     CO2   Date Value Ref Range Status   12/20/2022 26.0 22.0 - 29.0 mmol/L Final     Total CO2   Date Value Ref Range Status   10/12/2022 28 21 - 31 mmol/L Final     Chloride   Date Value Ref Range Status   12/20/2022 101 98 - 107 mmol/L Final   10/12/2022 103 98 - 107 mmol/L Final     Anion Gap   Date Value Ref Range Status   12/20/2022 8.0 5.0 - 15.0 mmol/L Final   10/12/2022 9 4 - 12 mmol/L Final     Creatinine   Date Value Ref Range Status   12/20/2022 1.25 0.76 - 1.27 mg/dL Final   10/12/2022 1.4 (H) 0.7 - 1.3 mg/dL Final     BUN   Date Value Ref Range Status   12/20/2022 18 8 - 23 mg/dL Final   10/12/2022 20 7 - 25 mg/dL Final     BUN/Creatinine Ratio   Date Value Ref Range Status   12/20/2022 14.4 7.0 - 25.0 Final     Calcium   Date Value Ref Range Status   12/20/2022 9.3 8.6 - 10.5 mg/dL Final   10/12/2022 9.4 8.6 - 10.3 mg/dL Final     eGFR Non  Amer   Date Value Ref Range Status   02/14/2022 50 (L) >60 mL/min/1.73 Final     Alkaline Phosphatase   Date Value Ref Range Status   12/20/2022 86 39 - 117 U/L Final   09/22/2022 95 34 - 104 U/L Final     Total Protein   Date Value Ref Range Status   12/20/2022 7.1 6.0 - 8.5 g/dL Final     ALT (SGPT)   Date Value Ref Range Status   12/20/2022 15 1 - 41 U/L Final   09/22/2022 13 7 - 52 U/L Final     AST (SGOT)   Date Value Ref Range Status   12/20/2022 16 1 - 40 U/L Final   09/22/2022 18 13 - 39 U/L Final     Total Bilirubin   Date Value Ref Range Status   12/20/2022 0.5 0.0 - 1.2 mg/dL Final   09/22/2022 0.62 0.3 - 1.0 mg/dL Final     Albumin   Date Value Ref Range Status   12/20/2022 4.90 3.50 - 5.20 g/dL Final   10/12/2022 4.7 3.5 - 5.7 g/dL Final     Globulin   Date Value Ref Range Status   12/20/2022 2.2 gm/dL Final     Lab Results   Component Value Date    WBC 35.06 (C) 01/18/2023    HGB 13.5 01/18/2023    HCT 41.6 01/18/2023    MCV 84.6 01/18/2023    PLT 93 (L) 01/18/2023     Lab Results   Component Value Date    NEUTROABS 2.68 12/20/2022     IRON 60 11/18/2022    IRON 67 08/15/2022    IRON 60 05/19/2022    TIBC 341 11/18/2022    TIBC 338 08/15/2022    TIBC 337 05/19/2022    LABIRON 18 (L) 11/18/2022    LABIRON 20 08/15/2022    LABIRON 18 (L) 05/19/2022    FERRITIN 93.44 11/18/2022    FERRITIN 93.15 08/15/2022    FERRITIN 86.66 05/19/2022    [id number] 854 11/18/2022    [id number] 1,032 (H) 08/15/2022    [id number] 1,002 (H) 05/19/2022    FOLATE 17.60 11/18/2022    FOLATE >20.00 08/15/2022    FOLATE 17.00 05/19/2022     Lab Results   Component Value Date    REFLABREPO SEE NOTE: 10/19/2016         PATHOLOGY:  * Cannot find OR log *         RADIOLOGY DATA :  No radiology results for the last 7 days        Assessment & Plan     1.  Chronic lymphocytic leukemia:  - Patient was diagnosed with CLL based on peripheral blood flow cytometry in September 2016  - Received 2 cycles of chemotherapy with Bendamustine and rituximab until July 10, 2027.  - On day 2 of cycle 2 of chemotherapy patient had an allergic reaction with swelling of face and erythema affecting face.  - Due to worsening splenomegaly as well as abdominal pain patient was started on acalabrutinib on November 22, 2022  - Patient is tolerating acalabrutinib well  - CBC done today shows white blood cell count is 35,000, hemoglobin is 13.5, platelet count is 93,000  -We will have patient return to clinic in 4 weeks with repeat CBC, CMP, iron studies, ferritin, B12, folate and CT of chest abdomen and pelvis without contrast to be done prior to that.    2.  Anemia:  - Hemoglobin is 12.9  - Remains on ferrous sulfate p.o. daily with folic acid 3 times a week    3.  Thrombocytopenia:  - Secondary to CLL plus splenomegaly  - Platelet count is 93,000  - We will monitor with CBC    4.  Lymphocytosis:  - Secondary to CLL    5.  Health maintenance: Patient does not smoke    6. Advance Care Planning: For now patient remains full code and is able to make decisions.  Patient has health care surrogate mentioned  on chart.    7.  Prescriptions: Patient has enough prescription for Acalabrutinib.                   PHQ-9 Total Score: 0   -Patient is not homicidal or suicidal.  No acute intervention required.    Roe Gu Sr. reports a pain score of 0.  Given his pain assessment as noted, treatment options were discussed and the following options were decided upon as a follow-up plan to address the patient's pain: continuation of current treatment plan for pain.         Andres Lubin MD  1/18/2023  09:59 CST        Part of this note may be an electronic transcription/translation of spoken language to printed text using the Dragon Dictation System.          CC:

## 2023-01-18 ENCOUNTER — OFFICE VISIT (OUTPATIENT)
Dept: ONCOLOGY | Facility: CLINIC | Age: 73
End: 2023-01-18
Payer: MEDICARE

## 2023-01-18 ENCOUNTER — LAB (OUTPATIENT)
Dept: ONCOLOGY | Facility: HOSPITAL | Age: 73
End: 2023-01-18
Payer: MEDICARE

## 2023-01-18 VITALS
TEMPERATURE: 97.2 F | WEIGHT: 173.9 LBS | HEART RATE: 70 BPM | BODY MASS INDEX: 27.24 KG/M2 | OXYGEN SATURATION: 97 % | SYSTOLIC BLOOD PRESSURE: 109 MMHG | DIASTOLIC BLOOD PRESSURE: 65 MMHG

## 2023-01-18 DIAGNOSIS — C91.10 CLL (CHRONIC LYMPHOCYTIC LEUKEMIA): ICD-10-CM

## 2023-01-18 DIAGNOSIS — D64.9 ANEMIA, UNSPECIFIED TYPE: ICD-10-CM

## 2023-01-18 DIAGNOSIS — D69.6 THROMBOCYTOPENIA: ICD-10-CM

## 2023-01-18 DIAGNOSIS — D64.9 ANEMIA, UNSPECIFIED TYPE: Chronic | ICD-10-CM

## 2023-01-18 DIAGNOSIS — D72.820 LYMPHOCYTOSIS: Chronic | ICD-10-CM

## 2023-01-18 DIAGNOSIS — C91.10 CLL (CHRONIC LYMPHOCYTIC LEUKEMIA): Primary | Chronic | ICD-10-CM

## 2023-01-18 DIAGNOSIS — D72.820 LYMPHOCYTOSIS: ICD-10-CM

## 2023-01-18 DIAGNOSIS — D69.6 THROMBOCYTOPENIA: Chronic | ICD-10-CM

## 2023-01-18 LAB
ALBUMIN SERPL-MCNC: 4.7 G/DL (ref 3.5–5.2)
ALBUMIN/GLOB SERPL: 2.2 G/DL
ALP SERPL-CCNC: 76 U/L (ref 39–117)
ALT SERPL W P-5'-P-CCNC: 11 U/L (ref 1–41)
ANION GAP SERPL CALCULATED.3IONS-SCNC: 9 MMOL/L (ref 5–15)
ANISOCYTOSIS BLD QL: ABNORMAL
AST SERPL-CCNC: 14 U/L (ref 1–40)
BASOPHILS # BLD MANUAL: 0.35 10*3/MM3 (ref 0–0.2)
BASOPHILS NFR BLD MANUAL: 1 % (ref 0–1.5)
BILIRUB SERPL-MCNC: 0.5 MG/DL (ref 0–1.2)
BUN SERPL-MCNC: 16 MG/DL (ref 8–23)
BUN/CREAT SERPL: 13.3 (ref 7–25)
CALCIUM SPEC-SCNC: 9.2 MG/DL (ref 8.6–10.5)
CHLORIDE SERPL-SCNC: 100 MMOL/L (ref 98–107)
CO2 SERPL-SCNC: 25 MMOL/L (ref 22–29)
CREAT SERPL-MCNC: 1.2 MG/DL (ref 0.76–1.27)
DEPRECATED RDW RBC AUTO: 44.2 FL (ref 37–54)
EGFRCR SERPLBLD CKD-EPI 2021: 64.3 ML/MIN/1.73
ERYTHROCYTE [DISTWIDTH] IN BLOOD BY AUTOMATED COUNT: 14.5 % (ref 12.3–15.4)
GLOBULIN UR ELPH-MCNC: 2.1 GM/DL
GLUCOSE SERPL-MCNC: 82 MG/DL (ref 65–99)
HCT VFR BLD AUTO: 41.6 % (ref 37.5–51)
HGB BLD-MCNC: 13.5 G/DL (ref 13–17.7)
HOLD SPECIMEN: NORMAL
LYMPHOCYTES # BLD MANUAL: 32.61 10*3/MM3 (ref 0.7–3.1)
LYMPHOCYTES NFR BLD MANUAL: 1 % (ref 5–12)
MCH RBC QN AUTO: 27.4 PG (ref 26.6–33)
MCHC RBC AUTO-ENTMCNC: 32.5 G/DL (ref 31.5–35.7)
MCV RBC AUTO: 84.6 FL (ref 79–97)
MONOCYTES # BLD: 0.35 10*3/MM3 (ref 0.1–0.9)
NEUTROPHILS # BLD AUTO: 1.75 10*3/MM3 (ref 1.7–7)
NEUTROPHILS NFR BLD MANUAL: 5 % (ref 42.7–76)
OVALOCYTES BLD QL SMEAR: ABNORMAL
PLATELET # BLD AUTO: 93 10*3/MM3 (ref 140–450)
PMV BLD AUTO: 10 FL (ref 6–12)
POTASSIUM SERPL-SCNC: 4.7 MMOL/L (ref 3.5–5.2)
PROT SERPL-MCNC: 6.8 G/DL (ref 6–8.5)
RBC # BLD AUTO: 4.92 10*6/MM3 (ref 4.14–5.8)
SMALL PLATELETS BLD QL SMEAR: ABNORMAL
SODIUM SERPL-SCNC: 134 MMOL/L (ref 136–145)
VARIANT LYMPHS NFR BLD MANUAL: 93 % (ref 19.6–45.3)
WBC MORPH BLD: NORMAL
WBC NRBC COR # BLD: 35.06 10*3/MM3 (ref 3.4–10.8)

## 2023-01-18 PROCEDURE — 80053 COMPREHEN METABOLIC PANEL: CPT

## 2023-01-18 PROCEDURE — 99214 OFFICE O/P EST MOD 30 MIN: CPT | Performed by: INTERNAL MEDICINE

## 2023-01-18 PROCEDURE — G0463 HOSPITAL OUTPT CLINIC VISIT: HCPCS | Performed by: INTERNAL MEDICINE

## 2023-01-18 PROCEDURE — 1123F ACP DISCUSS/DSCN MKR DOCD: CPT | Performed by: INTERNAL MEDICINE

## 2023-01-18 PROCEDURE — 85025 COMPLETE CBC W/AUTO DIFF WBC: CPT

## 2023-01-18 PROCEDURE — 85007 BL SMEAR W/DIFF WBC COUNT: CPT

## 2023-01-18 PROCEDURE — 1126F AMNT PAIN NOTED NONE PRSNT: CPT | Performed by: INTERNAL MEDICINE

## 2023-02-07 ENCOUNTER — SPECIALTY PHARMACY (OUTPATIENT)
Dept: ONCOLOGY | Facility: CLINIC | Age: 73
End: 2023-02-07
Payer: MEDICARE

## 2023-02-07 NOTE — PROGRESS NOTES
Specialty Pharmacy Refill Coordination Note     Roe is a 72 y.o. male contacted today regarding refills of  calquence  specialty medication(s).    Reviewed and verified with patient:       Specialty medication(s) and dose(s) confirmed: yes    Refill Questions    Flowsheet Row Most Recent Value   Changes to allergies? No   Changes to medications? No   New conditions since last clinic visit No   Unplanned office visit, urgent care, ED, or hospital admission in the last 4 weeks  No   How does patient/caregiver feel medication is working? Good   Financial problems or insurance changes  No   Since the previous refill, were any specialty medication doses or scheduled injections missed or delayed?  No   Does this patient require a clinical escalation to a pharmacist? No          pt has plenty of medication at this time a would like to  at the pharmacy on 02/17/23           Follow-up: 10 day(s)     Jocy Beckwith, Pharmacy Technician  Specialty Pharmacy Technician

## 2023-02-15 ENCOUNTER — TRANSCRIBE ORDERS (OUTPATIENT)
Dept: LAB | Facility: HOSPITAL | Age: 73
End: 2023-02-15
Payer: MEDICARE

## 2023-02-15 ENCOUNTER — HOSPITAL ENCOUNTER (OUTPATIENT)
Dept: CT IMAGING | Facility: HOSPITAL | Age: 73
Discharge: HOME | End: 2023-02-15
Payer: MEDICARE

## 2023-02-15 ENCOUNTER — LAB (OUTPATIENT)
Dept: LAB | Facility: HOSPITAL | Age: 73
End: 2023-02-15
Payer: MEDICARE

## 2023-02-15 DIAGNOSIS — Z86.73 PERSONAL HISTORY OF TRANSIENT CEREBRAL ISCHEMIA: ICD-10-CM

## 2023-02-15 DIAGNOSIS — D72.820 LYMPHOCYTOSIS: ICD-10-CM

## 2023-02-15 DIAGNOSIS — C91.90 SUBACUTE LYMPHOCYTIC LEUKEMIA: ICD-10-CM

## 2023-02-15 DIAGNOSIS — C91.10 CLL (CHRONIC LYMPHOCYTIC LEUKEMIA): ICD-10-CM

## 2023-02-15 DIAGNOSIS — D69.6 THROMBOCYTOPENIA: Chronic | ICD-10-CM

## 2023-02-15 DIAGNOSIS — N18.31 CHRONIC KIDNEY DISEASE (CKD) STAGE G3A/A1, MODERATELY DECREASED GLOMERULAR FILTRATION RATE (GFR) BETWEEN 45-59 ML/MIN/1.73 SQUARE METER AND ALBUMINURIA CREATININE RATIO LESS THAN 30 MG/G (CMS/H*: ICD-10-CM

## 2023-02-15 DIAGNOSIS — C91.90 SUBACUTE LYMPHOCYTIC LEUKEMIA: Primary | ICD-10-CM

## 2023-02-15 DIAGNOSIS — I12.9 HYPERTENSIVE NEPHROPATHY: ICD-10-CM

## 2023-02-15 DIAGNOSIS — D69.6 THROMBOCYTOPENIA: ICD-10-CM

## 2023-02-15 DIAGNOSIS — C91.10 CLL (CHRONIC LYMPHOCYTIC LEUKEMIA): Chronic | ICD-10-CM

## 2023-02-15 DIAGNOSIS — E87.1 HYPOSMOLALITY SYNDROME: ICD-10-CM

## 2023-02-15 DIAGNOSIS — D64.9 ANEMIA, UNSPECIFIED TYPE: ICD-10-CM

## 2023-02-15 DIAGNOSIS — D72.820 LYMPHOCYTOSIS: Chronic | ICD-10-CM

## 2023-02-15 DIAGNOSIS — D64.9 ANEMIA, UNSPECIFIED TYPE: Chronic | ICD-10-CM

## 2023-02-15 LAB
25(OH)D3 SERPL-MCNC: 54.7 NG/ML (ref 30–100)
ALBUMIN SERPL-MCNC: 4.6 G/DL (ref 3.5–5.2)
ALBUMIN/GLOB SERPL: 2.2 G/DL
ALP SERPL-CCNC: 67 U/L (ref 39–117)
ALT SERPL W P-5'-P-CCNC: 12 U/L (ref 1–41)
ANION GAP SERPL CALCULATED.3IONS-SCNC: 10 MMOL/L (ref 5–15)
ANISOCYTOSIS BLD QL: ABNORMAL
AST SERPL-CCNC: 15 U/L (ref 1–40)
BILIRUB SERPL-MCNC: 0.5 MG/DL (ref 0–1.2)
BUN SERPL-MCNC: 13 MG/DL (ref 8–23)
BUN/CREAT SERPL: 11.5 (ref 7–25)
CALCIUM SPEC-SCNC: 8.8 MG/DL (ref 8.6–10.5)
CHLORIDE SERPL-SCNC: 104 MMOL/L (ref 98–107)
CO2 SERPL-SCNC: 24 MMOL/L (ref 22–29)
CREAT SERPL-MCNC: 1.13 MG/DL (ref 0.76–1.27)
CREAT UR-MCNC: 114.6 MG/DL
DEPRECATED RDW RBC AUTO: 42.1 FL (ref 37–54)
EGFRCR SERPLBLD CKD-EPI 2021: 69.1 ML/MIN/1.73
ERYTHROCYTE [DISTWIDTH] IN BLOOD BY AUTOMATED COUNT: 13.9 % (ref 12.3–15.4)
FERRITIN SERPL-MCNC: 46.04 NG/ML (ref 30–400)
FOLATE SERPL-MCNC: >20 NG/ML (ref 4.78–24.2)
GLOBULIN UR ELPH-MCNC: 2.1 GM/DL
GLUCOSE SERPL-MCNC: 85 MG/DL (ref 65–99)
HCT VFR BLD AUTO: 41.8 % (ref 37.5–51)
HGB BLD-MCNC: 13.7 G/DL (ref 13–17.7)
IRON 24H UR-MRATE: 57 MCG/DL (ref 59–158)
IRON SATN MFR SERPL: 17 % (ref 20–50)
LYMPHOCYTES # BLD MANUAL: 14.97 10*3/MM3 (ref 0.7–3.1)
LYMPHOCYTES NFR BLD MANUAL: 1 % (ref 5–12)
MCH RBC QN AUTO: 27.3 PG (ref 26.6–33)
MCHC RBC AUTO-ENTMCNC: 32.8 G/DL (ref 31.5–35.7)
MCV RBC AUTO: 83.3 FL (ref 79–97)
MONOCYTES # BLD: 0.19 10*3/MM3 (ref 0.1–0.9)
NEUTROPHILS # BLD AUTO: 3.55 10*3/MM3 (ref 1.7–7)
NEUTROPHILS NFR BLD MANUAL: 18 % (ref 42.7–76)
NEUTS BAND NFR BLD MANUAL: 1 % (ref 0–5)
OVALOCYTES BLD QL SMEAR: ABNORMAL
PHOSPHATE SERPL-MCNC: 2.8 MG/DL (ref 2.5–4.5)
PLATELET # BLD AUTO: 95 10*3/MM3 (ref 140–450)
PMV BLD AUTO: 10.8 FL (ref 6–12)
POTASSIUM SERPL-SCNC: 4.4 MMOL/L (ref 3.5–5.2)
PROT ?TM UR-MCNC: 9.4 MG/DL
PROT SERPL-MCNC: 6.7 G/DL (ref 6–8.5)
PROT/CREAT UR: 82 MG/G CREA (ref 0–200)
PTH-INTACT SERPL-MCNC: 48 PG/ML (ref 15–65)
RBC # BLD AUTO: 5.02 10*6/MM3 (ref 4.14–5.8)
SMALL PLATELETS BLD QL SMEAR: ABNORMAL
SODIUM SERPL-SCNC: 138 MMOL/L (ref 136–145)
TIBC SERPL-MCNC: 334 MCG/DL (ref 298–536)
TRANSFERRIN SERPL-MCNC: 224 MG/DL (ref 200–360)
URATE SERPL-MCNC: 5.7 MG/DL (ref 3.4–7)
VARIANT LYMPHS NFR BLD MANUAL: 80 % (ref 19.6–45.3)
VIT B12 BLD-MCNC: 686 PG/ML (ref 211–946)
WBC MORPH BLD: NORMAL
WBC NRBC COR # BLD: 18.71 10*3/MM3 (ref 3.4–10.8)

## 2023-02-15 PROCEDURE — 83540 ASSAY OF IRON: CPT

## 2023-02-15 PROCEDURE — 84100 ASSAY OF PHOSPHORUS: CPT

## 2023-02-15 PROCEDURE — 82607 VITAMIN B-12: CPT

## 2023-02-15 PROCEDURE — 80053 COMPREHEN METABOLIC PANEL: CPT

## 2023-02-15 PROCEDURE — 84550 ASSAY OF BLOOD/URIC ACID: CPT

## 2023-02-15 PROCEDURE — 74176 CT ABD & PELVIS W/O CONTRAST: CPT

## 2023-02-15 PROCEDURE — 71250 CT THORAX DX C-: CPT

## 2023-02-15 PROCEDURE — 85025 COMPLETE CBC W/AUTO DIFF WBC: CPT

## 2023-02-15 PROCEDURE — 82746 ASSAY OF FOLIC ACID SERUM: CPT

## 2023-02-15 PROCEDURE — 85007 BL SMEAR W/DIFF WBC COUNT: CPT

## 2023-02-15 PROCEDURE — 84466 ASSAY OF TRANSFERRIN: CPT

## 2023-02-15 PROCEDURE — 36415 COLL VENOUS BLD VENIPUNCTURE: CPT

## 2023-02-15 PROCEDURE — 84156 ASSAY OF PROTEIN URINE: CPT

## 2023-02-15 PROCEDURE — 82728 ASSAY OF FERRITIN: CPT

## 2023-02-15 PROCEDURE — 83970 ASSAY OF PARATHORMONE: CPT

## 2023-02-15 PROCEDURE — 82570 ASSAY OF URINE CREATININE: CPT

## 2023-02-15 PROCEDURE — 82306 VITAMIN D 25 HYDROXY: CPT

## 2023-02-17 ENCOUNTER — OFFICE VISIT (OUTPATIENT)
Dept: ONCOLOGY | Facility: CLINIC | Age: 73
End: 2023-02-17
Payer: MEDICARE

## 2023-02-17 ENCOUNTER — SPECIALTY PHARMACY (OUTPATIENT)
Dept: ONCOLOGY | Facility: CLINIC | Age: 73
End: 2023-02-17
Payer: MEDICARE

## 2023-02-17 VITALS
DIASTOLIC BLOOD PRESSURE: 66 MMHG | TEMPERATURE: 97.4 F | WEIGHT: 175.3 LBS | BODY MASS INDEX: 27.46 KG/M2 | SYSTOLIC BLOOD PRESSURE: 134 MMHG | OXYGEN SATURATION: 98 % | HEART RATE: 61 BPM

## 2023-02-17 DIAGNOSIS — C91.10 CLL (CHRONIC LYMPHOCYTIC LEUKEMIA): Primary | Chronic | ICD-10-CM

## 2023-02-17 DIAGNOSIS — D69.6 THROMBOCYTOPENIA: Chronic | ICD-10-CM

## 2023-02-17 DIAGNOSIS — D64.9 ANEMIA, UNSPECIFIED TYPE: Chronic | ICD-10-CM

## 2023-02-17 DIAGNOSIS — D72.820 LYMPHOCYTOSIS: Chronic | ICD-10-CM

## 2023-02-17 PROBLEM — K90.9 IRON MALABSORPTION: Status: ACTIVE | Noted: 2023-02-17

## 2023-02-17 PROCEDURE — 1123F ACP DISCUSS/DSCN MKR DOCD: CPT | Performed by: INTERNAL MEDICINE

## 2023-02-17 PROCEDURE — 1126F AMNT PAIN NOTED NONE PRSNT: CPT | Performed by: INTERNAL MEDICINE

## 2023-02-17 PROCEDURE — G0463 HOSPITAL OUTPT CLINIC VISIT: HCPCS | Performed by: INTERNAL MEDICINE

## 2023-02-17 PROCEDURE — 99214 OFFICE O/P EST MOD 30 MIN: CPT | Performed by: INTERNAL MEDICINE

## 2023-02-17 RX ORDER — DIPHENHYDRAMINE HCL 25 MG
25 CAPSULE ORAL ONCE
Status: CANCELLED | OUTPATIENT
Start: 2023-02-24

## 2023-02-17 RX ORDER — FAMOTIDINE 20 MG/1
20 TABLET, FILM COATED ORAL ONCE
Status: CANCELLED | OUTPATIENT
Start: 2023-02-24

## 2023-02-17 RX ORDER — FOLIC ACID 1 MG/1
1 TABLET ORAL DAILY
Qty: 90 TABLET | Refills: 1 | Status: SHIPPED | OUTPATIENT
Start: 2023-02-17

## 2023-02-17 RX ORDER — DIPHENHYDRAMINE HYDROCHLORIDE 50 MG/ML
50 INJECTION INTRAMUSCULAR; INTRAVENOUS AS NEEDED
Status: CANCELLED | OUTPATIENT
Start: 2023-02-24

## 2023-02-17 RX ORDER — SODIUM CHLORIDE 9 MG/ML
250 INJECTION, SOLUTION INTRAVENOUS ONCE
Status: CANCELLED | OUTPATIENT
Start: 2023-02-24

## 2023-02-17 RX ORDER — ACETAMINOPHEN 325 MG/1
650 TABLET ORAL ONCE
Status: CANCELLED | OUTPATIENT
Start: 2023-02-24

## 2023-02-17 RX ORDER — FAMOTIDINE 10 MG/ML
20 INJECTION, SOLUTION INTRAVENOUS AS NEEDED
Status: CANCELLED | OUTPATIENT
Start: 2023-02-24

## 2023-02-17 NOTE — PROGRESS NOTES
DATE OF VISIT: 2/17/2023      REASON FOR VISIT: Chronic lymphocytic leukemia, lymphocytosis, night sweats, fatigue      HISTORY OF PRESENT ILLNESS:   72-year-old male with medical problems significant for coronary artery disease s/p CABG, dyslipidemia, chronic lymphocytic leukemia diagnosed in September 2016 currently on Acalabrutinib since November 22, 2022 is here for follow-up appointment today to discuss recently done blood work as well as CT scan.  States he has night sweats, abdominal discomfort and appetite is improved since starting Acalabrutinib.  Complains of fatigue.  Denies any recent worsening of abdominal pain.  Denies any excessive nausea or vomiting or diarrhea with Acalabrutinib.  Denies any fevers.  Denies any bleeding.              Past Medical History, Past Surgical History, Social History, Family History have been reviewed and are without significant changes except as mentioned.    Review of Systems   A comprehensive 14 point review of systems was performed and was negative except as mentioned in HPI.    Medications:  The current medication list was reviewed in the EMR    ALLERGIES:  No Known Allergies    Objective      Vitals:    02/17/23 0907   BP: 134/66   Pulse: 61   Temp: 97.4 °F (36.3 °C)   TempSrc: Temporal   SpO2: 98%   Weight: 79.5 kg (175 lb 4.8 oz)   PainSc: 0-No pain     Current Status 3/9/2021   ECOG score 0       Physical Exam  Pulmonary:      Breath sounds: Normal breath sounds.   Lymphadenopathy:      Cervical: No cervical adenopathy.   Neurological:      Mental Status: He is alert and oriented to person, place, and time.           RECENT LABS:  Glucose   Date Value Ref Range Status   02/15/2023 85 65 - 99 mg/dL Final   10/04/2021 89 70 - 110 mg/dL Final     Sodium   Date Value Ref Range Status   02/15/2023 138 136 - 145 mmol/L Final   10/12/2022 140 136 - 145 mmol/L Final     Potassium   Date Value Ref Range Status   02/15/2023 4.4 3.5 - 5.2 mmol/L Final   10/12/2022 4.4 3.5 -  5.1 mmol/L Final     CO2   Date Value Ref Range Status   02/15/2023 24.0 22.0 - 29.0 mmol/L Final     Total CO2   Date Value Ref Range Status   10/12/2022 28 21 - 31 mmol/L Final     Chloride   Date Value Ref Range Status   02/15/2023 104 98 - 107 mmol/L Final   10/12/2022 103 98 - 107 mmol/L Final     Anion Gap   Date Value Ref Range Status   02/15/2023 10.0 5.0 - 15.0 mmol/L Final   10/12/2022 9 4 - 12 mmol/L Final     Creatinine   Date Value Ref Range Status   02/15/2023 1.13 0.76 - 1.27 mg/dL Final   10/12/2022 1.4 (H) 0.7 - 1.3 mg/dL Final     BUN   Date Value Ref Range Status   02/15/2023 13 8 - 23 mg/dL Final   10/12/2022 20 7 - 25 mg/dL Final     BUN/Creatinine Ratio   Date Value Ref Range Status   02/15/2023 11.5 7.0 - 25.0 Final     Calcium   Date Value Ref Range Status   02/15/2023 8.8 8.6 - 10.5 mg/dL Final   10/12/2022 9.4 8.6 - 10.3 mg/dL Final     eGFR Non  Amer   Date Value Ref Range Status   02/14/2022 50 (L) >60 mL/min/1.73 Final     Alkaline Phosphatase   Date Value Ref Range Status   02/15/2023 67 39 - 117 U/L Final   09/22/2022 95 34 - 104 U/L Final     Total Protein   Date Value Ref Range Status   02/15/2023 6.7 6.0 - 8.5 g/dL Final     ALT (SGPT)   Date Value Ref Range Status   02/15/2023 12 1 - 41 U/L Final   09/22/2022 13 7 - 52 U/L Final     AST (SGOT)   Date Value Ref Range Status   02/15/2023 15 1 - 40 U/L Final   09/22/2022 18 13 - 39 U/L Final     Total Bilirubin   Date Value Ref Range Status   02/15/2023 0.5 0.0 - 1.2 mg/dL Final   09/22/2022 0.62 0.3 - 1.0 mg/dL Final     Albumin   Date Value Ref Range Status   02/15/2023 4.6 3.5 - 5.2 g/dL Final   10/12/2022 4.7 3.5 - 5.7 g/dL Final     Globulin   Date Value Ref Range Status   02/15/2023 2.1 gm/dL Final     Lab Results   Component Value Date    WBC 18.71 (H) 02/15/2023    HGB 13.7 02/15/2023    HCT 41.8 02/15/2023    MCV 83.3 02/15/2023    PLT 95 (L) 02/15/2023     Lab Results   Component Value Date    NEUTROABS 3.55  02/15/2023    IRON 57 (L) 02/15/2023    IRON 60 11/18/2022    IRON 67 08/15/2022    TIBC 334 02/15/2023    TIBC 341 11/18/2022    TIBC 338 08/15/2022    LABIRON 17 (L) 02/15/2023    LABIRON 18 (L) 11/18/2022    LABIRON 20 08/15/2022    FERRITIN 46.04 02/15/2023    FERRITIN 93.44 11/18/2022    FERRITIN 93.15 08/15/2022    [id number] 686 02/15/2023    [id number] 854 11/18/2022    [id number] 1,032 (H) 08/15/2022    FOLATE >20.00 02/15/2023    FOLATE 17.60 11/18/2022    FOLATE >20.00 08/15/2022     Lab Results   Component Value Date    REFLABREPO SEE NOTE: 10/19/2016         PATHOLOGY:  * Cannot find OR log *         RADIOLOGY DATA :  CT Abdomen Pelvis Without Contrast    Result Date: 2/17/2023  Favorable change since prior examination. Decrease in size of axillary lymph nodes Decrease in size of mesenteric and retroperitoneal lymph nodes since prior exam. The spleen is also smaller than on prior examination. Electronically signed by:  Olayinka Giron MD  2/17/2023 8:53 AM CST Workstation: [id number]    CT Chest Without Contrast Diagnostic    Result Date: 2/17/2023  Favorable change since prior examination. Decrease in size of axillary lymph nodes Decrease in size of mesenteric and retroperitoneal lymph nodes since prior exam. The spleen is also smaller than on prior examination. Electronically signed by:  Olayinka Giron MD  2/17/2023 8:53 AM CST Workstation: [id number]          Assessment & Plan     1.  Chronic lymphocytic leukemia:  - Patient was diagnosed with chronic lymphocytic leukemia based on peripheral blood flow cytometry in September 2016  - Received 2 cycles of chemotherapy with Bendamustine and rituximab until July 10, 2017.  - On day 2 of cycle 2 of chemotherapy patient developed allergic reaction with swelling of face and erythema affecting face.  - Due to worsening splenomegaly, abdominal pain patient was started on acalabrutinib on November 22, 2022.  - CT of chest, abdomen and pelvis without contrast on  February 15, 2023 showed improvement in adenopathy as well as splenomegaly which was discussed with patient and his family.  - Recent CBC shows white blood cell count is 18,000, hemoglobin is 13.7, platelet count is 95,000.  - We will have patient return to clinic in 6 weeks with repeat CBC CMP on that day.  - We will continue with close monitoring for chemotherapy related side effect.    2.  Anemia:  - Hemoglobin is 13.7.  - Recent anemia work-up does not show any improvement in iron level.  Iron saturation is 17% with ferritin of 42.  Iron level was 18% with ferritin of 68 in November 2022 consistent with iron malabsorption.  Recommend stopping ferrous sulfate by mouth.  - Recommend starting intravenous Venofer starting next week.  Side effect of Venofer including allergic reaction were discussed with patient today.  - Recommend continuing with folic acid 3 times a week.    3.  Thrombocytopenia:  - Secondary to CLL plus splenomegaly.  - Platelet count is 95,000.  - We will monitor with CBC    4.  Lymphocytosis due to CLL    5.  Health maintenance: Patient does not smoke    6. Advance Care Planning:   -CODE STATUS and resuscitation were discussed with patient and his wife.  Patient wants to be full code including CPR if required                 PHQ-9 Total Score: 0   -Patient is not homicidal or suicidal.  No acute intervention required.    Roe Gu Sr. reports a pain score of 0.  Given his pain assessment as noted, treatment options were discussed and the following options were decided upon as a follow-up plan to address the patient's pain: continuation of current treatment plan for pain.         Andres Lubin MD  2/17/2023  09:13 CST        Part of this note may be an electronic transcription/translation of spoken language to printed text using the Dragon Dictation System.          CC:

## 2023-02-17 NOTE — TELEPHONE ENCOUNTER
Rx Refill Note  Requested Prescriptions     Pending Prescriptions Disp Refills   • acalabrutinib (CALQUENCE) 100 MG capsule capsule 60 capsule 1     Sig: Take 1 capsule by mouth 2 (Two) Times a Day.      Last office visit with prescribing clinician: 2/17/2023   Last telemedicine visit with prescribing clinician: Visit date not found   Next office visit with prescribing clinician: Visit date not found                         Would you like a call back once the refill request has been completed: [] Yes [] No    If the office needs to give you a call back, can they leave a voicemail: [] Yes [] No    Judy Perez  02/17/23, 09:11 CST

## 2023-02-21 ENCOUNTER — SPECIALTY PHARMACY (OUTPATIENT)
Dept: ONCOLOGY | Facility: CLINIC | Age: 73
End: 2023-02-21
Payer: MEDICARE

## 2023-02-21 NOTE — PROGRESS NOTES
Specialty Pharmacy Refill Coordination Note     Roe is a 72 y.o. male   Medication was delivered by john due to pharmacy being out of stock on the day it was due. Pt would've been out of medication if it went fatimah Beckwith, Pharmacy Technician  Specialty Pharmacy Technician

## 2023-02-24 ENCOUNTER — INFUSION (OUTPATIENT)
Dept: ONCOLOGY | Facility: HOSPITAL | Age: 73
End: 2023-02-24
Payer: MEDICARE

## 2023-02-24 VITALS
SYSTOLIC BLOOD PRESSURE: 141 MMHG | TEMPERATURE: 97.1 F | DIASTOLIC BLOOD PRESSURE: 67 MMHG | RESPIRATION RATE: 18 BRPM | HEART RATE: 65 BPM

## 2023-02-24 DIAGNOSIS — K90.9 IRON MALABSORPTION: Primary | ICD-10-CM

## 2023-02-24 DIAGNOSIS — D50.8 OTHER IRON DEFICIENCY ANEMIA: ICD-10-CM

## 2023-02-24 PROCEDURE — 25010000002 IRON SUCROSE PER 1 MG: Performed by: INTERNAL MEDICINE

## 2023-02-24 PROCEDURE — A9270 NON-COVERED ITEM OR SERVICE: HCPCS | Performed by: INTERNAL MEDICINE

## 2023-02-24 PROCEDURE — 63710000001 ACETAMINOPHEN 325 MG TABLET: Performed by: INTERNAL MEDICINE

## 2023-02-24 PROCEDURE — 96365 THER/PROPH/DIAG IV INF INIT: CPT | Performed by: INTERNAL MEDICINE

## 2023-02-24 PROCEDURE — 63710000001 FAMOTIDINE 20 MG TABLET: Performed by: INTERNAL MEDICINE

## 2023-02-24 PROCEDURE — 63710000001 DIPHENHYDRAMINE PER 50 MG: Performed by: INTERNAL MEDICINE

## 2023-02-24 RX ORDER — FAMOTIDINE 10 MG/ML
20 INJECTION, SOLUTION INTRAVENOUS AS NEEDED
Status: CANCELLED | OUTPATIENT
Start: 2023-02-26

## 2023-02-24 RX ORDER — FAMOTIDINE 10 MG/ML
20 INJECTION, SOLUTION INTRAVENOUS AS NEEDED
Status: DISCONTINUED | OUTPATIENT
Start: 2023-02-24 | End: 2023-02-24 | Stop reason: HOSPADM

## 2023-02-24 RX ORDER — DIPHENHYDRAMINE HCL 25 MG
25 CAPSULE ORAL ONCE
Status: COMPLETED | OUTPATIENT
Start: 2023-02-24 | End: 2023-02-24

## 2023-02-24 RX ORDER — FAMOTIDINE 20 MG/1
20 TABLET, FILM COATED ORAL ONCE
Status: CANCELLED | OUTPATIENT
Start: 2023-02-26

## 2023-02-24 RX ORDER — DIPHENHYDRAMINE HYDROCHLORIDE 50 MG/ML
50 INJECTION INTRAMUSCULAR; INTRAVENOUS AS NEEDED
Status: DISCONTINUED | OUTPATIENT
Start: 2023-02-24 | End: 2023-02-24 | Stop reason: HOSPADM

## 2023-02-24 RX ORDER — SODIUM CHLORIDE 9 MG/ML
250 INJECTION, SOLUTION INTRAVENOUS ONCE
Status: CANCELLED | OUTPATIENT
Start: 2023-02-26

## 2023-02-24 RX ORDER — DIPHENHYDRAMINE HCL 25 MG
25 CAPSULE ORAL ONCE
Status: CANCELLED | OUTPATIENT
Start: 2023-02-26

## 2023-02-24 RX ORDER — SODIUM CHLORIDE 9 MG/ML
250 INJECTION, SOLUTION INTRAVENOUS ONCE
Status: COMPLETED | OUTPATIENT
Start: 2023-02-24 | End: 2023-02-24

## 2023-02-24 RX ORDER — ACETAMINOPHEN 325 MG/1
650 TABLET ORAL ONCE
Status: CANCELLED | OUTPATIENT
Start: 2023-02-26

## 2023-02-24 RX ORDER — FAMOTIDINE 20 MG/1
20 TABLET, FILM COATED ORAL ONCE
Status: COMPLETED | OUTPATIENT
Start: 2023-02-24 | End: 2023-02-24

## 2023-02-24 RX ORDER — DIPHENHYDRAMINE HYDROCHLORIDE 50 MG/ML
50 INJECTION INTRAMUSCULAR; INTRAVENOUS AS NEEDED
Status: CANCELLED | OUTPATIENT
Start: 2023-02-26

## 2023-02-24 RX ORDER — ACETAMINOPHEN 325 MG/1
650 TABLET ORAL ONCE
Status: COMPLETED | OUTPATIENT
Start: 2023-02-24 | End: 2023-02-24

## 2023-02-24 RX ADMIN — IRON SUCROSE 200 MG: 20 INJECTION, SOLUTION INTRAVENOUS at 09:22

## 2023-02-24 RX ADMIN — FAMOTIDINE 20 MG: 20 TABLET, FILM COATED ORAL at 08:48

## 2023-02-24 RX ADMIN — ACETAMINOPHEN 650 MG: 325 TABLET ORAL at 08:48

## 2023-02-24 RX ADMIN — DIPHENHYDRAMINE HYDROCHLORIDE 25 MG: 25 CAPSULE ORAL at 08:48

## 2023-02-24 RX ADMIN — SODIUM CHLORIDE 250 ML: 9 INJECTION, SOLUTION INTRAVENOUS at 08:48

## 2023-02-27 ENCOUNTER — INFUSION (OUTPATIENT)
Dept: ONCOLOGY | Facility: HOSPITAL | Age: 73
End: 2023-02-27
Payer: MEDICARE

## 2023-02-27 VITALS
SYSTOLIC BLOOD PRESSURE: 134 MMHG | OXYGEN SATURATION: 97 % | DIASTOLIC BLOOD PRESSURE: 71 MMHG | HEART RATE: 97 BPM | TEMPERATURE: 97.1 F | RESPIRATION RATE: 18 BRPM

## 2023-02-27 DIAGNOSIS — K90.9 IRON MALABSORPTION: Primary | ICD-10-CM

## 2023-02-27 DIAGNOSIS — D50.8 OTHER IRON DEFICIENCY ANEMIA: ICD-10-CM

## 2023-02-27 PROCEDURE — 96365 THER/PROPH/DIAG IV INF INIT: CPT | Performed by: NURSE PRACTITIONER

## 2023-02-27 PROCEDURE — 25010000002 IRON SUCROSE PER 1 MG: Performed by: INTERNAL MEDICINE

## 2023-02-27 PROCEDURE — A9270 NON-COVERED ITEM OR SERVICE: HCPCS | Performed by: INTERNAL MEDICINE

## 2023-02-27 PROCEDURE — 63710000001 DIPHENHYDRAMINE PER 50 MG: Performed by: INTERNAL MEDICINE

## 2023-02-27 PROCEDURE — 63710000001 FAMOTIDINE 20 MG TABLET: Performed by: INTERNAL MEDICINE

## 2023-02-27 PROCEDURE — 63710000001 ACETAMINOPHEN 325 MG TABLET: Performed by: INTERNAL MEDICINE

## 2023-02-27 RX ORDER — FAMOTIDINE 10 MG/ML
20 INJECTION, SOLUTION INTRAVENOUS AS NEEDED
Status: CANCELLED | OUTPATIENT
Start: 2023-02-28

## 2023-02-27 RX ORDER — FAMOTIDINE 10 MG/ML
20 INJECTION, SOLUTION INTRAVENOUS AS NEEDED
Status: DISCONTINUED | OUTPATIENT
Start: 2023-02-27 | End: 2023-02-27 | Stop reason: HOSPADM

## 2023-02-27 RX ORDER — FAMOTIDINE 20 MG/1
20 TABLET, FILM COATED ORAL ONCE
Status: CANCELLED | OUTPATIENT
Start: 2023-02-28

## 2023-02-27 RX ORDER — DIPHENHYDRAMINE HYDROCHLORIDE 50 MG/ML
50 INJECTION INTRAMUSCULAR; INTRAVENOUS AS NEEDED
Status: CANCELLED | OUTPATIENT
Start: 2023-02-28

## 2023-02-27 RX ORDER — ACETAMINOPHEN 325 MG/1
650 TABLET ORAL ONCE
Status: CANCELLED | OUTPATIENT
Start: 2023-02-28

## 2023-02-27 RX ORDER — DIPHENHYDRAMINE HYDROCHLORIDE 50 MG/ML
50 INJECTION INTRAMUSCULAR; INTRAVENOUS AS NEEDED
Status: DISCONTINUED | OUTPATIENT
Start: 2023-02-27 | End: 2023-02-27 | Stop reason: HOSPADM

## 2023-02-27 RX ORDER — DIPHENHYDRAMINE HCL 25 MG
25 CAPSULE ORAL ONCE
Status: CANCELLED | OUTPATIENT
Start: 2023-02-28

## 2023-02-27 RX ORDER — FAMOTIDINE 20 MG/1
20 TABLET, FILM COATED ORAL ONCE
Status: COMPLETED | OUTPATIENT
Start: 2023-02-27 | End: 2023-02-27

## 2023-02-27 RX ORDER — SODIUM CHLORIDE 9 MG/ML
250 INJECTION, SOLUTION INTRAVENOUS ONCE
Status: COMPLETED | OUTPATIENT
Start: 2023-02-27 | End: 2023-02-27

## 2023-02-27 RX ORDER — SODIUM CHLORIDE 9 MG/ML
250 INJECTION, SOLUTION INTRAVENOUS ONCE
Status: CANCELLED | OUTPATIENT
Start: 2023-02-28

## 2023-02-27 RX ORDER — ACETAMINOPHEN 325 MG/1
650 TABLET ORAL ONCE
Status: COMPLETED | OUTPATIENT
Start: 2023-02-27 | End: 2023-02-27

## 2023-02-27 RX ORDER — DIPHENHYDRAMINE HCL 25 MG
25 CAPSULE ORAL ONCE
Status: COMPLETED | OUTPATIENT
Start: 2023-02-27 | End: 2023-02-27

## 2023-02-27 RX ADMIN — SODIUM CHLORIDE 250 ML: 9 INJECTION, SOLUTION INTRAVENOUS at 10:44

## 2023-02-27 RX ADMIN — IRON SUCROSE 200 MG: 20 INJECTION, SOLUTION INTRAVENOUS at 11:15

## 2023-02-27 RX ADMIN — ACETAMINOPHEN 650 MG: 325 TABLET ORAL at 10:45

## 2023-02-27 RX ADMIN — DIPHENHYDRAMINE HYDROCHLORIDE 25 MG: 25 CAPSULE ORAL at 10:45

## 2023-02-27 RX ADMIN — FAMOTIDINE 20 MG: 20 TABLET ORAL at 10:45

## 2023-03-01 ENCOUNTER — INFUSION (OUTPATIENT)
Dept: ONCOLOGY | Facility: HOSPITAL | Age: 73
End: 2023-03-01
Payer: MEDICARE

## 2023-03-01 VITALS
DIASTOLIC BLOOD PRESSURE: 65 MMHG | HEART RATE: 78 BPM | RESPIRATION RATE: 18 BRPM | SYSTOLIC BLOOD PRESSURE: 119 MMHG | TEMPERATURE: 97.5 F

## 2023-03-01 DIAGNOSIS — K90.9 IRON MALABSORPTION: Primary | ICD-10-CM

## 2023-03-01 DIAGNOSIS — D50.8 OTHER IRON DEFICIENCY ANEMIA: ICD-10-CM

## 2023-03-01 PROCEDURE — 63710000001 FAMOTIDINE 20 MG TABLET: Performed by: INTERNAL MEDICINE

## 2023-03-01 PROCEDURE — A9270 NON-COVERED ITEM OR SERVICE: HCPCS | Performed by: INTERNAL MEDICINE

## 2023-03-01 PROCEDURE — 63710000001 ACETAMINOPHEN 325 MG TABLET: Performed by: INTERNAL MEDICINE

## 2023-03-01 PROCEDURE — 96365 THER/PROPH/DIAG IV INF INIT: CPT | Performed by: NURSE PRACTITIONER

## 2023-03-01 PROCEDURE — 63710000001 DIPHENHYDRAMINE PER 50 MG: Performed by: INTERNAL MEDICINE

## 2023-03-01 PROCEDURE — 25010000002 IRON SUCROSE PER 1 MG: Performed by: INTERNAL MEDICINE

## 2023-03-01 RX ORDER — DIPHENHYDRAMINE HYDROCHLORIDE 50 MG/ML
50 INJECTION INTRAMUSCULAR; INTRAVENOUS AS NEEDED
Status: DISCONTINUED | OUTPATIENT
Start: 2023-03-01 | End: 2023-03-01 | Stop reason: HOSPADM

## 2023-03-01 RX ORDER — DIPHENHYDRAMINE HCL 25 MG
25 CAPSULE ORAL ONCE
Status: COMPLETED | OUTPATIENT
Start: 2023-03-01 | End: 2023-03-01

## 2023-03-01 RX ORDER — FAMOTIDINE 20 MG/1
20 TABLET, FILM COATED ORAL ONCE
Status: COMPLETED | OUTPATIENT
Start: 2023-03-01 | End: 2023-03-01

## 2023-03-01 RX ORDER — ACETAMINOPHEN 325 MG/1
650 TABLET ORAL ONCE
Status: CANCELLED | OUTPATIENT
Start: 2023-03-03

## 2023-03-01 RX ORDER — DIPHENHYDRAMINE HYDROCHLORIDE 50 MG/ML
50 INJECTION INTRAMUSCULAR; INTRAVENOUS AS NEEDED
Status: CANCELLED | OUTPATIENT
Start: 2023-03-03

## 2023-03-01 RX ORDER — ACETAMINOPHEN 325 MG/1
650 TABLET ORAL ONCE
Status: COMPLETED | OUTPATIENT
Start: 2023-03-01 | End: 2023-03-01

## 2023-03-01 RX ORDER — FAMOTIDINE 10 MG/ML
20 INJECTION, SOLUTION INTRAVENOUS AS NEEDED
Status: CANCELLED | OUTPATIENT
Start: 2023-03-03

## 2023-03-01 RX ORDER — DIPHENHYDRAMINE HCL 25 MG
25 CAPSULE ORAL ONCE
Status: CANCELLED | OUTPATIENT
Start: 2023-03-03

## 2023-03-01 RX ORDER — SODIUM CHLORIDE 9 MG/ML
250 INJECTION, SOLUTION INTRAVENOUS ONCE
Status: CANCELLED | OUTPATIENT
Start: 2023-03-03

## 2023-03-01 RX ORDER — SODIUM CHLORIDE 9 MG/ML
250 INJECTION, SOLUTION INTRAVENOUS ONCE
Status: COMPLETED | OUTPATIENT
Start: 2023-03-01 | End: 2023-03-01

## 2023-03-01 RX ORDER — FAMOTIDINE 20 MG/1
20 TABLET, FILM COATED ORAL ONCE
Status: CANCELLED | OUTPATIENT
Start: 2023-03-03

## 2023-03-01 RX ORDER — FAMOTIDINE 10 MG/ML
20 INJECTION, SOLUTION INTRAVENOUS AS NEEDED
Status: DISCONTINUED | OUTPATIENT
Start: 2023-03-01 | End: 2023-03-01 | Stop reason: HOSPADM

## 2023-03-01 RX ADMIN — IRON SUCROSE 200 MG: 20 INJECTION, SOLUTION INTRAVENOUS at 13:22

## 2023-03-01 RX ADMIN — ACETAMINOPHEN 650 MG: 325 TABLET ORAL at 12:51

## 2023-03-01 RX ADMIN — FAMOTIDINE 20 MG: 20 TABLET ORAL at 12:51

## 2023-03-01 RX ADMIN — DIPHENHYDRAMINE HYDROCHLORIDE 25 MG: 25 CAPSULE ORAL at 12:51

## 2023-03-01 RX ADMIN — SODIUM CHLORIDE 250 ML: 9 INJECTION, SOLUTION INTRAVENOUS at 12:51

## 2023-03-03 ENCOUNTER — INFUSION (OUTPATIENT)
Dept: ONCOLOGY | Facility: HOSPITAL | Age: 73
End: 2023-03-03
Payer: MEDICARE

## 2023-03-03 VITALS
SYSTOLIC BLOOD PRESSURE: 132 MMHG | DIASTOLIC BLOOD PRESSURE: 64 MMHG | HEART RATE: 77 BPM | TEMPERATURE: 97.4 F | RESPIRATION RATE: 20 BRPM

## 2023-03-03 DIAGNOSIS — D50.8 OTHER IRON DEFICIENCY ANEMIA: ICD-10-CM

## 2023-03-03 DIAGNOSIS — K90.9 IRON MALABSORPTION: Primary | ICD-10-CM

## 2023-03-03 PROCEDURE — 63710000001 FAMOTIDINE 20 MG TABLET: Performed by: INTERNAL MEDICINE

## 2023-03-03 PROCEDURE — A9270 NON-COVERED ITEM OR SERVICE: HCPCS | Performed by: INTERNAL MEDICINE

## 2023-03-03 PROCEDURE — 63710000001 DIPHENHYDRAMINE PER 50 MG: Performed by: INTERNAL MEDICINE

## 2023-03-03 PROCEDURE — 25010000002 IRON SUCROSE PER 1 MG: Performed by: INTERNAL MEDICINE

## 2023-03-03 PROCEDURE — 96365 THER/PROPH/DIAG IV INF INIT: CPT | Performed by: INTERNAL MEDICINE

## 2023-03-03 PROCEDURE — 63710000001 ACETAMINOPHEN 325 MG TABLET: Performed by: INTERNAL MEDICINE

## 2023-03-03 RX ORDER — FAMOTIDINE 20 MG/1
20 TABLET, FILM COATED ORAL ONCE
Status: COMPLETED | OUTPATIENT
Start: 2023-03-03 | End: 2023-03-03

## 2023-03-03 RX ORDER — SODIUM CHLORIDE 9 MG/ML
250 INJECTION, SOLUTION INTRAVENOUS ONCE
Status: CANCELLED | OUTPATIENT
Start: 2023-03-05

## 2023-03-03 RX ORDER — SODIUM CHLORIDE 9 MG/ML
250 INJECTION, SOLUTION INTRAVENOUS ONCE
Status: COMPLETED | OUTPATIENT
Start: 2023-03-03 | End: 2023-03-03

## 2023-03-03 RX ORDER — DIPHENHYDRAMINE HCL 25 MG
25 CAPSULE ORAL ONCE
Status: COMPLETED | OUTPATIENT
Start: 2023-03-03 | End: 2023-03-03

## 2023-03-03 RX ORDER — ACETAMINOPHEN 325 MG/1
650 TABLET ORAL ONCE
Status: CANCELLED | OUTPATIENT
Start: 2023-03-05

## 2023-03-03 RX ORDER — FAMOTIDINE 10 MG/ML
20 INJECTION, SOLUTION INTRAVENOUS AS NEEDED
Status: DISCONTINUED | OUTPATIENT
Start: 2023-03-03 | End: 2023-03-03 | Stop reason: HOSPADM

## 2023-03-03 RX ORDER — DIPHENHYDRAMINE HCL 25 MG
25 CAPSULE ORAL ONCE
Status: CANCELLED | OUTPATIENT
Start: 2023-03-05

## 2023-03-03 RX ORDER — FAMOTIDINE 10 MG/ML
20 INJECTION, SOLUTION INTRAVENOUS AS NEEDED
Status: CANCELLED | OUTPATIENT
Start: 2023-03-05

## 2023-03-03 RX ORDER — DIPHENHYDRAMINE HYDROCHLORIDE 50 MG/ML
50 INJECTION INTRAMUSCULAR; INTRAVENOUS AS NEEDED
Status: CANCELLED | OUTPATIENT
Start: 2023-03-05

## 2023-03-03 RX ORDER — FAMOTIDINE 20 MG/1
20 TABLET, FILM COATED ORAL ONCE
Status: CANCELLED | OUTPATIENT
Start: 2023-03-05

## 2023-03-03 RX ORDER — DIPHENHYDRAMINE HYDROCHLORIDE 50 MG/ML
50 INJECTION INTRAMUSCULAR; INTRAVENOUS AS NEEDED
Status: DISCONTINUED | OUTPATIENT
Start: 2023-03-03 | End: 2023-03-03 | Stop reason: HOSPADM

## 2023-03-03 RX ORDER — ACETAMINOPHEN 325 MG/1
650 TABLET ORAL ONCE
Status: COMPLETED | OUTPATIENT
Start: 2023-03-03 | End: 2023-03-03

## 2023-03-03 RX ADMIN — FAMOTIDINE 20 MG: 20 TABLET ORAL at 13:50

## 2023-03-03 RX ADMIN — SODIUM CHLORIDE 250 ML: 9 INJECTION, SOLUTION INTRAVENOUS at 13:50

## 2023-03-03 RX ADMIN — ACETAMINOPHEN 650 MG: 325 TABLET ORAL at 13:50

## 2023-03-03 RX ADMIN — IRON SUCROSE 200 MG: 20 INJECTION, SOLUTION INTRAVENOUS at 14:22

## 2023-03-03 RX ADMIN — DIPHENHYDRAMINE HYDROCHLORIDE 25 MG: 25 CAPSULE ORAL at 13:50

## 2023-03-06 ENCOUNTER — INFUSION (OUTPATIENT)
Dept: ONCOLOGY | Facility: HOSPITAL | Age: 73
End: 2023-03-06
Payer: MEDICARE

## 2023-03-06 VITALS — SYSTOLIC BLOOD PRESSURE: 120 MMHG | DIASTOLIC BLOOD PRESSURE: 60 MMHG | HEART RATE: 66 BPM | TEMPERATURE: 97.2 F

## 2023-03-06 DIAGNOSIS — D50.8 OTHER IRON DEFICIENCY ANEMIA: ICD-10-CM

## 2023-03-06 DIAGNOSIS — K90.9 IRON MALABSORPTION: Primary | ICD-10-CM

## 2023-03-06 PROCEDURE — A9270 NON-COVERED ITEM OR SERVICE: HCPCS | Performed by: INTERNAL MEDICINE

## 2023-03-06 PROCEDURE — 63710000001 ACETAMINOPHEN 325 MG TABLET: Performed by: INTERNAL MEDICINE

## 2023-03-06 PROCEDURE — 96365 THER/PROPH/DIAG IV INF INIT: CPT | Performed by: NURSE PRACTITIONER

## 2023-03-06 PROCEDURE — 25010000002 IRON SUCROSE PER 1 MG: Performed by: INTERNAL MEDICINE

## 2023-03-06 PROCEDURE — 63710000001 FAMOTIDINE 20 MG TABLET: Performed by: INTERNAL MEDICINE

## 2023-03-06 PROCEDURE — 63710000001 DIPHENHYDRAMINE PER 50 MG: Performed by: INTERNAL MEDICINE

## 2023-03-06 RX ORDER — FAMOTIDINE 10 MG/ML
20 INJECTION, SOLUTION INTRAVENOUS AS NEEDED
Status: DISCONTINUED | OUTPATIENT
Start: 2023-03-06 | End: 2023-03-06 | Stop reason: HOSPADM

## 2023-03-06 RX ORDER — FAMOTIDINE 20 MG/1
20 TABLET, FILM COATED ORAL ONCE
Status: COMPLETED | OUTPATIENT
Start: 2023-03-06 | End: 2023-03-06

## 2023-03-06 RX ORDER — DIPHENHYDRAMINE HYDROCHLORIDE 50 MG/ML
50 INJECTION INTRAMUSCULAR; INTRAVENOUS AS NEEDED
Status: DISCONTINUED | OUTPATIENT
Start: 2023-03-06 | End: 2023-03-06 | Stop reason: HOSPADM

## 2023-03-06 RX ORDER — DIPHENHYDRAMINE HCL 25 MG
25 CAPSULE ORAL ONCE
Status: CANCELLED | OUTPATIENT
Start: 2023-03-07

## 2023-03-06 RX ORDER — FAMOTIDINE 20 MG/1
20 TABLET, FILM COATED ORAL ONCE
Status: CANCELLED | OUTPATIENT
Start: 2023-03-07

## 2023-03-06 RX ORDER — DIPHENHYDRAMINE HYDROCHLORIDE 50 MG/ML
50 INJECTION INTRAMUSCULAR; INTRAVENOUS AS NEEDED
Status: CANCELLED | OUTPATIENT
Start: 2023-03-07

## 2023-03-06 RX ORDER — SODIUM CHLORIDE 9 MG/ML
250 INJECTION, SOLUTION INTRAVENOUS ONCE
Status: CANCELLED | OUTPATIENT
Start: 2023-03-07

## 2023-03-06 RX ORDER — DIPHENHYDRAMINE HCL 25 MG
25 CAPSULE ORAL ONCE
Status: COMPLETED | OUTPATIENT
Start: 2023-03-06 | End: 2023-03-06

## 2023-03-06 RX ORDER — SODIUM CHLORIDE 9 MG/ML
250 INJECTION, SOLUTION INTRAVENOUS ONCE
Status: COMPLETED | OUTPATIENT
Start: 2023-03-06 | End: 2023-03-06

## 2023-03-06 RX ORDER — FAMOTIDINE 10 MG/ML
20 INJECTION, SOLUTION INTRAVENOUS AS NEEDED
Status: CANCELLED | OUTPATIENT
Start: 2023-03-07

## 2023-03-06 RX ORDER — ACETAMINOPHEN 325 MG/1
650 TABLET ORAL ONCE
Status: COMPLETED | OUTPATIENT
Start: 2023-03-06 | End: 2023-03-06

## 2023-03-06 RX ORDER — ACETAMINOPHEN 325 MG/1
650 TABLET ORAL ONCE
Status: CANCELLED | OUTPATIENT
Start: 2023-03-07

## 2023-03-06 RX ADMIN — DIPHENHYDRAMINE HYDROCHLORIDE 25 MG: 25 CAPSULE ORAL at 08:26

## 2023-03-06 RX ADMIN — SODIUM CHLORIDE 250 ML: 9 INJECTION, SOLUTION INTRAVENOUS at 08:26

## 2023-03-06 RX ADMIN — FAMOTIDINE 20 MG: 20 TABLET ORAL at 08:24

## 2023-03-06 RX ADMIN — ACETAMINOPHEN 650 MG: 325 TABLET ORAL at 08:26

## 2023-03-06 RX ADMIN — IRON SUCROSE 200 MG: 20 INJECTION, SOLUTION INTRAVENOUS at 08:58

## 2023-03-15 ENCOUNTER — SPECIALTY PHARMACY (OUTPATIENT)
Dept: ONCOLOGY | Facility: CLINIC | Age: 73
End: 2023-03-15
Payer: MEDICARE

## 2023-03-15 NOTE — PROGRESS NOTES
Added note to refill coordination outreach about possible switch from calquence capsules to tablets due to  discontinuation.

## 2023-03-16 ENCOUNTER — SPECIALTY PHARMACY (OUTPATIENT)
Dept: ONCOLOGY | Facility: CLINIC | Age: 73
End: 2023-03-16
Payer: MEDICARE

## 2023-03-16 NOTE — PROGRESS NOTES
Specialty Pharmacy Refill Coordination Note     Roe is a 72 y.o. male contacted today regarding refills of  calquence  specialty medication(s).    Reviewed and verified with patient:       Specialty medication(s) and dose(s) confirmed: yes        pt called the pharmacy today for refill on calquence to be picked up.           Follow-up: 30 day(s)     Jocy Beckwith, Pharmacy Technician  Specialty Pharmacy Technician

## 2023-03-22 ENCOUNTER — SPECIALTY PHARMACY (OUTPATIENT)
Dept: ONCOLOGY | Facility: CLINIC | Age: 73
End: 2023-03-22
Payer: MEDICARE

## 2023-03-22 NOTE — PROGRESS NOTES
Specialty Pharmacy Patient Management Program  Oncology Initial Assessment       Roe Gu Sr. is a 72 y.o. male with Chronic Lymphocytic Leukemia seen by an Oncology provider and enrolled in the Oncology Patient Management program offered by [facility].  An initial outreach was conducted, including assessment of therapy appropriateness and specialty medication education for Calquence (acalabrutinib). The patient was introduced to services offered by [facility], including: regular assessments, refill coordination, curbside pick-up or mail order delivery options, prior authorization maintenance, and financial assistance programs as applicable. The patient was also provided with contact information for the pharmacy team.     Regimen: Calquence 100 mg Take 1 capsule by mouth 2 times a day.     Start date of oral specialty medication: 11/22/22    Relevant Past Medical History, Comorbidities, and Vaccines  Relevant medical history and concomitant health conditions were discussed with the patient. The patient's chart has been reviewed for relevant past medical history and comorbid health conditions and updated as necessary.  Vaccines are coordinated by the patient's oncologist and primary care provider.  Past Medical History:   Diagnosis Date   • Arthritis    • CLL (chronic lymphocytic leukemia) ([facility])    • CVA (cerebral vascular accident) ([facility]) 2011   • Dyslipidemia    • Gastritis    • GERD (gastroesophageal reflux disease)    • Night sweats      Social History     Socioeconomic History   • Marital status:    Tobacco Use   • Smoking status: Never     Passive exposure: Never   • Smokeless tobacco: Never   • Tobacco comments:     never smoked cigarettes   Vaping Use   • Vaping Use: Never used   Substance and Sexual Activity   • Alcohol use: No   • Drug use: No   • Sexual activity: Defer       Allergies  Known allergies and reactions were discussed with the  patient. The patient's chart has been reviewed for allergy information and updated as necessary.   No Known Allergies     Current Medication List  This medication list has been reviewed with the patient and evaluated for any interactions or necessary modifications/recommendations, and updated to include all prescription medications, OTC medications, and supplements the patient is currently taking.  This list reflects what is contained in the patient's profile, which has also been marked as reviewed to communicate to other providers it is the most up to date version of the patient's current medication therapy.   Prior to Admission medications    Medication Sig Start Date End Date Taking? Authorizing Provider   acalabrutinib (CALQUENCE) 100 MG capsule capsule Take 1 capsule by mouth 2 (Two) Times a Day. 2/17/23   Andres Lubin MD   aspirin 81 MG EC tablet Take 81 mg by mouth Daily. 3/30/22   Reyes Sherwood MD   dicyclomine (BENTYL) 10 MG capsule Take 10 mg by mouth 3 (Three) Times a Day. 4/21/22   Reyes Sherwood MD   docusate sodium (COLACE) 100 MG capsule Take 1 capsule by mouth 2 (Two) Times a Day As Needed for Constipation. 5/20/22   Andres Lubin MD   folic acid (FOLVITE) 1 MG tablet Take 1 tablet by mouth Daily. 2/17/23   Andres Lubin MD   ondansetron (ZOFRAN) 4 MG tablet Take 1 tablet by mouth 4 (Four) Times a Day As Needed for Nausea or Vomiting. 11/21/22   Andres Lubin MD   PARoxetine (PAXIL) 40 MG tablet Take 1 tablet by mouth Daily. 8/5/19   Reyes Sherwood MD   polyethylene glycol (MIRALAX) 17 GM/SCOOP powder Take 17 g by mouth As Needed.    Reyes Sherwood MD   rosuvastatin (CRESTOR) 10 MG tablet Take 40 mg by mouth Every Night. 5/8/19   Reyes Sherwood MD   tamsulosin (FLOMAX) 0.4 MG capsule 24 hr capsule Take 2 capsules by mouth Daily.    Reyes Sherwood MD   vitamin B-12 (CYANOCOBALAMIN) 500 MCG tablet Take 500 mcg by mouth Daily.    Reyes Sherwood MD    vitamin D3 125 MCG (5000 UT) capsule capsule Take 5,000 Units by mouth Daily.    Provider, MD Reyes       Drug Interactions  • Reviewed concomitant medications, allergies, labs, comorbidities/medical history, and immunization history.   • Drug-drug interactions noted and discussed during education: no significant drug interactions noted. . Reminded the patient to let us know before making any changes or starting any new prescription or OTC medications so we can first assess drug interactions.    Relevant Laboratory Values  Lab Results   Component Value Date    GLUCOSE 85 02/15/2023    CALCIUM 8.8 02/15/2023     02/15/2023    K 4.4 02/15/2023    CO2 24.0 02/15/2023     02/15/2023    BUN 13 02/15/2023    CREATININE 1.13 02/15/2023    EGFRIFAFRI 61 10/04/2021    EGFRIFNONA 50 (L) 02/14/2022    BCR 11.5 02/15/2023    ANIONGAP 10.0 02/15/2023     Lab Results   Component Value Date    WBC 18.71 (H) 02/15/2023    RBC 5.02 02/15/2023    HGB 13.7 02/15/2023    HCT 41.8 02/15/2023    MCV 83.3 02/15/2023    MCH 27.3 02/15/2023    MCHC 32.8 02/15/2023    RDW 13.9 02/15/2023    RDWSD 42.1 02/15/2023    MPV 10.8 02/15/2023    PLT 95 (L) 02/15/2023    NEUTRORELPCT 3.7 (L) 11/18/2022    LYMPHORELPCT 87.5 (H) 11/18/2022    MONORELPCT 8.2 11/18/2022    EOSRELPCT 0.3 11/18/2022    BASORELPCT 0.1 11/18/2022    AUTOIGPER 0.2 11/18/2022    NEUTROABS 3.55 02/15/2023    LYMPHSABS 97.50 (H) 11/18/2022    MONOSABS 9.16 (H) 11/18/2022    EOSABS 0.30 11/18/2022    BASOSABS 0.35 (H) 01/18/2023    AUTOIGNUM 0.19 (H) 11/18/2022    NRBC 0.0 11/18/2022       The above labs have been reviewed.    Initial Education Provided for Specialty Medication  The patient has been provided with the following education. All questions and concerns have been addressed prior to the patient receiving the medication, and the patient has verbalized understanding of the education and any materials provided.  Additional patient education shall be  provided and documented upon request by the patient, provider or payer.      Completed at an earlier appointment.     Adherence and Self-Administration  • Barriers to Patient Adherence and/or Self-Administration: None   • Expected duration of therapy: Until disease progression or intolerable toxicity    Goals of Therapy  • Patient Goals of Therapy:   o Consistently take medications as prescribed  o Patient will adhere to medication regimen  o Patient will report any medication side effects to healthcare provider  • Clinical Goals:    Goals    None       o Support patient understanding of medication regimen  o Ensure patient knows the pharmacy contact information  o Schedule regular follow-up to monitor the treatment serious adverse events  o Schedule regular follow-up to confirm medication adherence  o Schedule regular follow-up to monitor disease progression or stability    Reassessment Plan & Follow-Up  1. Pharmacist to perform regular reassessments no more than (6) months from the previous assessment.  2. Welcome information and patient satisfaction survey to be sent by retail team with patient's initial fill.  3. Care Coordinator to set up future refill outreaches, coordinate prescription delivery, and escalate clinical questions to pharmacist.     Additional Plans, Therapy Recommendations or Therapy Problems to Be Addressed: None    Attestation  I attest that the initiated specialty medication(s) are appropriate for the patient based on my assessment.  If the prescribed therapy is at any point deemed not appropriate based on the current or future assessments, a consultation will be initiated with the patient's specialty care provider to determine the best course of action. The revised plan of therapy will be documented along with any additional patient education provided.     Apurva Burleson, PharmD    Date and Time: 3/22/2023  08:30 CDT

## 2023-03-31 ENCOUNTER — LAB (OUTPATIENT)
Dept: ONCOLOGY | Facility: HOSPITAL | Age: 73
End: 2023-03-31
Payer: MEDICARE

## 2023-03-31 ENCOUNTER — SPECIALTY PHARMACY (OUTPATIENT)
Dept: ONCOLOGY | Facility: CLINIC | Age: 73
End: 2023-03-31
Payer: MEDICARE

## 2023-03-31 ENCOUNTER — OFFICE VISIT (OUTPATIENT)
Dept: ONCOLOGY | Facility: CLINIC | Age: 73
End: 2023-03-31
Payer: MEDICARE

## 2023-03-31 VITALS
BODY MASS INDEX: 27.53 KG/M2 | WEIGHT: 175.8 LBS | SYSTOLIC BLOOD PRESSURE: 120 MMHG | OXYGEN SATURATION: 96 % | HEART RATE: 80 BPM | TEMPERATURE: 97.2 F | DIASTOLIC BLOOD PRESSURE: 73 MMHG

## 2023-03-31 DIAGNOSIS — D69.6 THROMBOCYTOPENIA: ICD-10-CM

## 2023-03-31 DIAGNOSIS — D64.9 ANEMIA, UNSPECIFIED TYPE: ICD-10-CM

## 2023-03-31 DIAGNOSIS — C91.10 CLL (CHRONIC LYMPHOCYTIC LEUKEMIA): ICD-10-CM

## 2023-03-31 DIAGNOSIS — C91.10 CLL (CHRONIC LYMPHOCYTIC LEUKEMIA): Primary | ICD-10-CM

## 2023-03-31 DIAGNOSIS — D72.820 LYMPHOCYTOSIS: ICD-10-CM

## 2023-03-31 DIAGNOSIS — K90.9 IRON MALABSORPTION: ICD-10-CM

## 2023-03-31 LAB
ALBUMIN SERPL-MCNC: 4.8 G/DL (ref 3.5–5.2)
ALBUMIN/GLOB SERPL: 2.2 G/DL
ALP SERPL-CCNC: 77 U/L (ref 39–117)
ALT SERPL W P-5'-P-CCNC: 20 U/L (ref 1–41)
ANION GAP SERPL CALCULATED.3IONS-SCNC: 7 MMOL/L (ref 5–15)
ANISOCYTOSIS BLD QL: ABNORMAL
AST SERPL-CCNC: 16 U/L (ref 1–40)
BASOPHILS # BLD MANUAL: 0.15 10*3/MM3 (ref 0–0.2)
BASOPHILS NFR BLD MANUAL: 1 % (ref 0–1.5)
BILIRUB SERPL-MCNC: 0.6 MG/DL (ref 0–1.2)
BUN SERPL-MCNC: 11 MG/DL (ref 8–23)
BUN/CREAT SERPL: 10.1 (ref 7–25)
CALCIUM SPEC-SCNC: 9.4 MG/DL (ref 8.6–10.5)
CHLORIDE SERPL-SCNC: 100 MMOL/L (ref 98–107)
CO2 SERPL-SCNC: 26 MMOL/L (ref 22–29)
CREAT SERPL-MCNC: 1.09 MG/DL (ref 0.76–1.27)
DEPRECATED RDW RBC AUTO: 45.9 FL (ref 37–54)
EGFRCR SERPLBLD CKD-EPI 2021: 72.1 ML/MIN/1.73
ERYTHROCYTE [DISTWIDTH] IN BLOOD BY AUTOMATED COUNT: 14.6 % (ref 12.3–15.4)
GLOBULIN UR ELPH-MCNC: 2.2 GM/DL
GLUCOSE SERPL-MCNC: 86 MG/DL (ref 65–99)
HCT VFR BLD AUTO: 45.6 % (ref 37.5–51)
HGB BLD-MCNC: 14.7 G/DL (ref 13–17.7)
HOLD SPECIMEN: NORMAL
LYMPHOCYTES # BLD MANUAL: 12.3 10*3/MM3 (ref 0.7–3.1)
LYMPHOCYTES NFR BLD MANUAL: 2 % (ref 5–12)
MCH RBC QN AUTO: 27.9 PG (ref 26.6–33)
MCHC RBC AUTO-ENTMCNC: 32.2 G/DL (ref 31.5–35.7)
MCV RBC AUTO: 86.5 FL (ref 79–97)
MONOCYTES # BLD: 0.3 10*3/MM3 (ref 0.1–0.9)
NEUTROPHILS # BLD AUTO: 2.07 10*3/MM3 (ref 1.7–7)
NEUTROPHILS NFR BLD MANUAL: 14 % (ref 42.7–76)
PLATELET # BLD AUTO: 103 10*3/MM3 (ref 140–450)
PMV BLD AUTO: 10.2 FL (ref 6–12)
POTASSIUM SERPL-SCNC: 4.4 MMOL/L (ref 3.5–5.2)
PROT SERPL-MCNC: 7 G/DL (ref 6–8.5)
RBC # BLD AUTO: 5.27 10*6/MM3 (ref 4.14–5.8)
SMALL PLATELETS BLD QL SMEAR: ABNORMAL
SODIUM SERPL-SCNC: 133 MMOL/L (ref 136–145)
VARIANT LYMPHS NFR BLD MANUAL: 3 % (ref 0–5)
VARIANT LYMPHS NFR BLD MANUAL: 80 % (ref 19.6–45.3)
WBC MORPH BLD: NORMAL
WBC NRBC COR # BLD: 14.82 10*3/MM3 (ref 3.4–10.8)

## 2023-03-31 NOTE — PROGRESS NOTES
DATE OF VISIT: 3/31/2023      REASON FOR VISIT: Chronic lymphocytic leukemia, lymphocytosis, night sweats, fatigue      HISTORY OF PRESENT ILLNESS:   72-year-old male with medical problems significant for coronary artery disease s/p CABG, dyslipidemia, chronic lymphocytic leukemia diagnosed in September 2016 currently on Acalabrutinib since November 22, 2022 is here for follow-up appointment today.  Complains of neck stiffness.  Complains of skin rash affecting the right shoulder as well as bilateral inner thighs.  Denies any recent worsening of night sweats or abdominal pain.  Denies any new lymph node enlargement.  Complains of fatigue.  Denies any excessive nausea or vomiting or diarrhea with Acalabrutinib.  Denies any fevers.  Denies any bleeding.            Oncology history:    1.  Chronic lymphocytic leukemia:  - Patient was diagnosed with chronic lymphocytic leukemia based on peripheral blood flow cytometry in September 2016  - Received 2 cycles of chemotherapy with Bendamustine and rituximab until July 10, 2017.  - On day 2 of cycle 2 of chemotherapy patient developed allergic reaction with swelling of face and erythema affecting face.  - Due to worsening splenomegaly, abdominal pain patient was started on acalabrutinib on November 22, 2022.            Past Medical History, Past Surgical History, Social History, Family History have been reviewed and are without significant changes except as mentioned.    Review of Systems   A comprehensive 14 point review of systems was performed and was negative except as mentioned in HPI.    Medications:  The current medication list was reviewed in the EMR    ALLERGIES:  No Known Allergies    Objective      Vitals:    03/31/23 0929   BP: 120/73   Pulse: 80   Temp: 97.2 °F (36.2 °C)   TempSrc: Temporal   SpO2: 96%   Weight: 79.7 kg (175 lb 12.8 oz)   PainSc:   6   PainLoc: Neck  Comment: hard to turn his neck to the sides     Current Status 3/3/2023   ECOG score 0        Physical Exam  Pulmonary:      Breath sounds: Normal breath sounds.   Lymphadenopathy:      Cervical: No cervical adenopathy.   Skin:     Comments: Skin rash affecting right shoulder as well as bilateral inner thigh consistent with skin aberration.  There is no evidence of any skin rash on chest or abdomen.   Neurological:      Mental Status: He is alert and oriented to person, place, and time.           RECENT LABS:  Glucose   Date Value Ref Range Status   03/31/2023 86 65 - 99 mg/dL Final   10/04/2021 89 70 - 110 mg/dL Final     Sodium   Date Value Ref Range Status   03/31/2023 133 (L) 136 - 145 mmol/L Final   03/22/2023 139 136 - 145 mmol/L Final     Potassium   Date Value Ref Range Status   03/31/2023 4.4 3.5 - 5.2 mmol/L Final   03/22/2023 4.4 3.5 - 5.1 mmol/L Final     CO2   Date Value Ref Range Status   03/31/2023 26.0 22.0 - 29.0 mmol/L Final     Total CO2   Date Value Ref Range Status   03/22/2023 29 21 - 31 mmol/L Final     Chloride   Date Value Ref Range Status   03/31/2023 100 98 - 107 mmol/L Final   03/22/2023 102 98 - 107 mmol/L Final     Anion Gap   Date Value Ref Range Status   03/31/2023 7.0 5.0 - 15.0 mmol/L Final   03/22/2023 8 4 - 12 mmol/L Final     Creatinine   Date Value Ref Range Status   03/31/2023 1.09 0.76 - 1.27 mg/dL Final   03/22/2023 1.1 0.7 - 1.3 mg/dL Final     BUN   Date Value Ref Range Status   03/31/2023 11 8 - 23 mg/dL Final   03/22/2023 16 7 - 25 mg/dL Final     BUN/Creatinine Ratio   Date Value Ref Range Status   03/31/2023 10.1 7.0 - 25.0 Final     Calcium   Date Value Ref Range Status   03/31/2023 9.4 8.6 - 10.5 mg/dL Final   03/22/2023 9.5 8.6 - 10.3 mg/dL Final     eGFR Non  Amer   Date Value Ref Range Status   02/14/2022 50 (L) >60 mL/min/1.73 Final     Alkaline Phosphatase   Date Value Ref Range Status   03/31/2023 77 39 - 117 U/L Final   03/22/2023 62 34 - 104 U/L Final     Total Protein   Date Value Ref Range Status   03/31/2023 7.0 6.0 - 8.5 g/dL Final      ALT (SGPT)   Date Value Ref Range Status   03/31/2023 20 1 - 41 U/L Final   03/22/2023 18 7 - 52 U/L Final     AST (SGOT)   Date Value Ref Range Status   03/31/2023 16 1 - 40 U/L Final   03/22/2023 14 13 - 39 U/L Final     Total Bilirubin   Date Value Ref Range Status   03/31/2023 0.6 0.0 - 1.2 mg/dL Final   03/22/2023 0.69 0.3 - 1.0 mg/dL Final     Albumin   Date Value Ref Range Status   03/31/2023 4.8 3.5 - 5.2 g/dL Final   03/22/2023 4.7 3.5 - 5.7 g/dL Final     Globulin   Date Value Ref Range Status   03/31/2023 2.2 gm/dL Final     Lab Results   Component Value Date    WBC 14.82 (H) 03/31/2023    HGB 14.7 03/31/2023    HCT 45.6 03/31/2023    MCV 86.5 03/31/2023     (L) 03/31/2023     Lab Results   Component Value Date    NEUTROABS 3.55 02/15/2023    IRON 57 (L) 02/15/2023    IRON 60 11/18/2022    IRON 67 08/15/2022    TIBC 334 02/15/2023    TIBC 341 11/18/2022    TIBC 338 08/15/2022    LABIRON 17 (L) 02/15/2023    LABIRON 18 (L) 11/18/2022    LABIRON 20 08/15/2022    FERRITIN 46.04 02/15/2023    FERRITIN 93.44 11/18/2022    FERRITIN 93.15 08/15/2022    TVXFLEXH32 686 02/15/2023    RMUGUUZY16 854 11/18/2022    XUMPMNBZ65 1,032 (H) 08/15/2022    FOLATE >20.00 02/15/2023    FOLATE 17.60 11/18/2022    FOLATE >20.00 08/15/2022     Lab Results   Component Value Date    REFLABREPO SEE NOTE: 10/19/2016         PATHOLOGY:  * Cannot find OR log *         RADIOLOGY DATA :  No radiology results for the last 7 days        Assessment & Plan     1.  Chronic lymphocytic leukemia:  - Review oncology history for prior treatment details  - Patient is currently on Acalabrutinib since November 22, 2022.  - Patient is tolerating acalabrutinib well.  - We will have patient return to clinic in 6 weeks with repeat CBC, CMP, iron studies, ferritin, B12, folate and CT of chest abdomen and pelvis with contrast to be done prior to that.    2.  Anemia:  - Hemoglobin is 14.7  - Due to iron malabsorption patient received intravenous  Venofer in March 2023.  - Currently on folic acid 3 times a week    3.  Thrombocytopenia:  - Secondary to CLL plus splenomegaly  - Platelet count is 103,000  - We will monitor with CBC    4.  Lymphocytosis due to CLL    5.  Health maintenance: Patient does not smoke    6.  Advance care planning:  - Patient remains full code.    7.  Prescriptions: Prescription for Acalabrutinib has been sent to patient's pharmacy today             PHQ-9 Total Score:       Roe Gu Sr. reports a pain score of 6.  Given his pain assessment as noted, treatment options were discussed and the following options were decided upon as a follow-up plan to address the patient's pain: continuation of current treatment plan for pain.         Andres Lubin MD  3/31/2023  09:53 CDT        Part of this note may be an electronic transcription/translation of spoken language to printed text using the Dragon Dictation System.          CC:

## 2023-03-31 NOTE — PROGRESS NOTES
Specialty Pharmacy Refill Coordination Note     Roe is a 72 y.o. male contacted today regarding refills of  calquence  specialty medication(s).    Reviewed and verified with patient:       Specialty medication(s) and dose(s) confirmed: yes      calquence is no longer made in capsules. Pt wife is aware the next refill will be in tablet form. Pt would like a call back in a week to set up refill.              Follow-up: 7 day(s)     Jocy Beckwith, Pharmacy Technician  Specialty Pharmacy Technician

## 2023-04-12 ENCOUNTER — SPECIALTY PHARMACY (OUTPATIENT)
Dept: ONCOLOGY | Facility: CLINIC | Age: 73
End: 2023-04-12
Payer: MEDICARE

## 2023-04-12 NOTE — PROGRESS NOTES
Specialty Pharmacy Refill Coordination Note     Roe is a 72 y.o. male contacted today regarding refills of  calquence  specialty medication(s).    Reviewed and verified with patient:       Specialty medication(s) and dose(s) confirmed: yes    Refill Questions    Flowsheet Row Most Recent Value   Changes to allergies? No   Changes to medications? No   Financial problems or insurance changes  No   Since the previous refill, were any specialty medication doses or scheduled injections missed or delayed?  No          Delivery Questions    Flowsheet Row Most Recent Value   Delivery method [employer]   Delivery address correct? Yes   Delivery phone number [phone]   Number of medications in delivery 1   Medication being filled and delivered calquence   Questions or concerns for the pharmacist? No                 Follow-up: 30 day(s)     Jocy Beckwith, Pharmacy Technician  Specialty Pharmacy Technician

## 2023-04-17 ENCOUNTER — TELEPHONE (OUTPATIENT)
Dept: ONCOLOGY | Facility: CLINIC | Age: 73
End: 2023-04-17
Payer: MEDICARE

## 2023-04-17 ENCOUNTER — APPOINTMENT (OUTPATIENT)
Dept: CT IMAGING | Facility: HOSPITAL | Age: 73
End: 2023-04-17
Payer: MEDICARE

## 2023-04-17 ENCOUNTER — APPOINTMENT (OUTPATIENT)
Dept: GENERAL RADIOLOGY | Facility: HOSPITAL | Age: 73
End: 2023-04-17
Payer: MEDICARE

## 2023-04-17 ENCOUNTER — HOSPITAL ENCOUNTER (EMERGENCY)
Facility: HOSPITAL | Age: 73
Discharge: HOME | End: 2023-04-17
Attending: EMERGENCY MEDICINE | Admitting: EMERGENCY MEDICINE
Payer: MEDICARE

## 2023-04-17 VITALS
DIASTOLIC BLOOD PRESSURE: 56 MMHG | SYSTOLIC BLOOD PRESSURE: 114 MMHG | HEIGHT: 66 IN | RESPIRATION RATE: 16 BRPM | BODY MASS INDEX: 27.32 KG/M2 | TEMPERATURE: 98 F | OXYGEN SATURATION: 95 % | WEIGHT: 170 LBS | HEART RATE: 66 BPM

## 2023-04-17 DIAGNOSIS — R31.21 ASYMPTOMATIC MICROSCOPIC HEMATURIA: ICD-10-CM

## 2023-04-17 DIAGNOSIS — R10.31 RLQ ABDOMINAL PAIN: Primary | ICD-10-CM

## 2023-04-17 LAB
ALBUMIN SERPL-MCNC: 4.6 G/DL (ref 3.5–5.2)
ALBUMIN/GLOB SERPL: 2.1 G/DL
ALP SERPL-CCNC: 73 U/L (ref 39–117)
ALT SERPL W P-5'-P-CCNC: 13 U/L (ref 1–41)
ANION GAP SERPL CALCULATED.3IONS-SCNC: 8 MMOL/L (ref 5–15)
AST SERPL-CCNC: 13 U/L (ref 1–40)
BACTERIA UR QL AUTO: ABNORMAL /HPF
BASOPHILS # BLD AUTO: 0.03 10*3/MM3 (ref 0–0.2)
BASOPHILS NFR BLD AUTO: 0.2 % (ref 0–1.5)
BILIRUB SERPL-MCNC: 0.5 MG/DL (ref 0–1.2)
BILIRUB UR QL STRIP: NEGATIVE
BUN SERPL-MCNC: 13 MG/DL (ref 8–23)
BUN/CREAT SERPL: 9.6 (ref 7–25)
CALCIUM SPEC-SCNC: 9.4 MG/DL (ref 8.6–10.5)
CHLORIDE SERPL-SCNC: 101 MMOL/L (ref 98–107)
CLARITY UR: CLEAR
CO2 SERPL-SCNC: 25 MMOL/L (ref 22–29)
COLOR UR: YELLOW
CREAT SERPL-MCNC: 1.35 MG/DL (ref 0.76–1.27)
D-LACTATE SERPL-SCNC: 1.9 MMOL/L (ref 0.5–2)
DEPRECATED RDW RBC AUTO: 44.3 FL (ref 37–54)
EGFRCR SERPLBLD CKD-EPI 2021: 55.8 ML/MIN/1.73
EOSINOPHIL # BLD AUTO: 0.08 10*3/MM3 (ref 0–0.4)
EOSINOPHIL NFR BLD AUTO: 0.7 % (ref 0.3–6.2)
ERYTHROCYTE [DISTWIDTH] IN BLOOD BY AUTOMATED COUNT: 14.3 % (ref 12.3–15.4)
GLOBULIN UR ELPH-MCNC: 2.2 GM/DL
GLUCOSE SERPL-MCNC: 111 MG/DL (ref 65–99)
GLUCOSE UR STRIP-MCNC: NEGATIVE MG/DL
HCT VFR BLD AUTO: 42.7 % (ref 37.5–51)
HGB BLD-MCNC: 14.5 G/DL (ref 13–17.7)
HGB UR QL STRIP.AUTO: ABNORMAL
HOLD SPECIMEN: NORMAL
HOLD SPECIMEN: NORMAL
HYALINE CASTS UR QL AUTO: ABNORMAL /LPF
IMM GRANULOCYTES # BLD AUTO: 0.04 10*3/MM3 (ref 0–0.05)
IMM GRANULOCYTES NFR BLD AUTO: 0.3 % (ref 0–0.5)
KETONES UR QL STRIP: ABNORMAL
LEUKOCYTE ESTERASE UR QL STRIP.AUTO: NEGATIVE
LIPASE SERPL-CCNC: 16 U/L (ref 13–60)
LYMPHOCYTES # BLD AUTO: 6.19 10*3/MM3 (ref 0.7–3.1)
LYMPHOCYTES NFR BLD AUTO: 51.3 % (ref 19.6–45.3)
MCH RBC QN AUTO: 28.8 PG (ref 26.6–33)
MCHC RBC AUTO-ENTMCNC: 34 G/DL (ref 31.5–35.7)
MCV RBC AUTO: 84.9 FL (ref 79–97)
MONOCYTES # BLD AUTO: 0.64 10*3/MM3 (ref 0.1–0.9)
MONOCYTES NFR BLD AUTO: 5.3 % (ref 5–12)
NEUTROPHILS NFR BLD AUTO: 42.2 % (ref 42.7–76)
NEUTROPHILS NFR BLD AUTO: 5.09 10*3/MM3 (ref 1.7–7)
NITRITE UR QL STRIP: NEGATIVE
NRBC BLD AUTO-RTO: 0 /100 WBC (ref 0–0.2)
PH UR STRIP.AUTO: 6.5 [PH] (ref 5–9)
PLATELET # BLD AUTO: 108 10*3/MM3 (ref 140–450)
PMV BLD AUTO: 10 FL (ref 6–12)
POTASSIUM SERPL-SCNC: 4.3 MMOL/L (ref 3.5–5.2)
PROT SERPL-MCNC: 6.8 G/DL (ref 6–8.5)
PROT UR QL STRIP: NEGATIVE
QT INTERVAL: 404 MS
QTC INTERVAL: 429 MS
RBC # BLD AUTO: 5.03 10*6/MM3 (ref 4.14–5.8)
RBC # UR STRIP: ABNORMAL /HPF
REF LAB TEST METHOD: ABNORMAL
SODIUM SERPL-SCNC: 134 MMOL/L (ref 136–145)
SP GR UR STRIP: 1.02 (ref 1–1.03)
SQUAMOUS #/AREA URNS HPF: ABNORMAL /HPF
UROBILINOGEN UR QL STRIP: ABNORMAL
WBC # UR STRIP: ABNORMAL /HPF
WBC NRBC COR # BLD: 12.07 10*3/MM3 (ref 3.4–10.8)
WHOLE BLOOD HOLD COAG: NORMAL
WHOLE BLOOD HOLD SPECIMEN: NORMAL

## 2023-04-17 PROCEDURE — 85025 COMPLETE CBC W/AUTO DIFF WBC: CPT | Performed by: EMERGENCY MEDICINE

## 2023-04-17 PROCEDURE — 74177 CT ABD & PELVIS W/CONTRAST: CPT

## 2023-04-17 PROCEDURE — 96375 TX/PRO/DX INJ NEW DRUG ADDON: CPT

## 2023-04-17 PROCEDURE — 71045 X-RAY EXAM CHEST 1 VIEW: CPT

## 2023-04-17 PROCEDURE — 99284 EMERGENCY DEPT VISIT MOD MDM: CPT

## 2023-04-17 PROCEDURE — 25010000002 MORPHINE PER 10 MG: Performed by: EMERGENCY MEDICINE

## 2023-04-17 PROCEDURE — 80053 COMPREHEN METABOLIC PANEL: CPT | Performed by: EMERGENCY MEDICINE

## 2023-04-17 PROCEDURE — 81001 URINALYSIS AUTO W/SCOPE: CPT | Performed by: EMERGENCY MEDICINE

## 2023-04-17 PROCEDURE — 25510000001 IOPAMIDOL 61 % SOLUTION: Performed by: EMERGENCY MEDICINE

## 2023-04-17 PROCEDURE — 83605 ASSAY OF LACTIC ACID: CPT | Performed by: EMERGENCY MEDICINE

## 2023-04-17 PROCEDURE — 83690 ASSAY OF LIPASE: CPT | Performed by: EMERGENCY MEDICINE

## 2023-04-17 PROCEDURE — 25010000002 ONDANSETRON PER 1 MG: Performed by: EMERGENCY MEDICINE

## 2023-04-17 PROCEDURE — 93005 ELECTROCARDIOGRAM TRACING: CPT | Performed by: EMERGENCY MEDICINE

## 2023-04-17 PROCEDURE — 0 DIATRIZOATE MEGLUMINE & SODIUM PER 1 ML: Performed by: EMERGENCY MEDICINE

## 2023-04-17 PROCEDURE — 96361 HYDRATE IV INFUSION ADD-ON: CPT

## 2023-04-17 PROCEDURE — 96374 THER/PROPH/DIAG INJ IV PUSH: CPT

## 2023-04-17 RX ORDER — ONDANSETRON 2 MG/ML
4 INJECTION INTRAMUSCULAR; INTRAVENOUS ONCE
Status: COMPLETED | OUTPATIENT
Start: 2023-04-17 | End: 2023-04-17

## 2023-04-17 RX ORDER — SODIUM CHLORIDE 0.9 % (FLUSH) 0.9 %
10 SYRINGE (ML) INJECTION AS NEEDED
Status: DISCONTINUED | OUTPATIENT
Start: 2023-04-17 | End: 2023-04-17 | Stop reason: HOSPADM

## 2023-04-17 RX ORDER — HYDROCODONE BITARTRATE AND ACETAMINOPHEN 5; 325 MG/1; MG/1
1 TABLET ORAL ONCE
Status: COMPLETED | OUTPATIENT
Start: 2023-04-17 | End: 2023-04-17

## 2023-04-17 RX ORDER — SODIUM CHLORIDE 9 MG/ML
125 INJECTION, SOLUTION INTRAVENOUS CONTINUOUS
Status: DISCONTINUED | OUTPATIENT
Start: 2023-04-17 | End: 2023-04-17 | Stop reason: HOSPADM

## 2023-04-17 RX ORDER — MORPHINE SULFATE 2 MG/ML
2 INJECTION, SOLUTION INTRAMUSCULAR; INTRAVENOUS ONCE
Status: COMPLETED | OUTPATIENT
Start: 2023-04-17 | End: 2023-04-17

## 2023-04-17 RX ADMIN — IOPAMIDOL 90 ML: 612 INJECTION, SOLUTION INTRAVENOUS at 16:49

## 2023-04-17 RX ADMIN — MORPHINE SULFATE 2 MG: 2 INJECTION, SOLUTION INTRAMUSCULAR; INTRAVENOUS at 14:20

## 2023-04-17 RX ADMIN — HYDROCODONE BITARTRATE AND ACETAMINOPHEN 1 TABLET: 5; 325 TABLET ORAL at 17:50

## 2023-04-17 RX ADMIN — SODIUM CHLORIDE 125 ML/HR: 9 INJECTION, SOLUTION INTRAVENOUS at 14:19

## 2023-04-17 RX ADMIN — ONDANSETRON 4 MG: 2 INJECTION INTRAMUSCULAR; INTRAVENOUS at 14:20

## 2023-04-17 RX ADMIN — DIATRIZOATE MEGLUMINE AND DIATRIZOATE SODIUM 120 ML: 660; 100 LIQUID ORAL; RECTAL at 16:50

## 2023-04-17 NOTE — DISCHARGE INSTRUCTIONS
Clear liquid diet x24hrs  Return ED fever, abdominal pain, vomiting, bleeding, worse condition, any other concerns

## 2023-04-17 NOTE — ED PROVIDER NOTES
"Subjective   History of Present Illness  71yo male pmh significant hyperlipidemia/cad//CLL/ckd/cva/appendectomy/herniorraphy, presents ED c/o 2d hx \"sharp\" right lower abdominal pain/exacerbated movement/relieved rest/neg associated symptoms/radiating thru to back.  ROS neg fever/chills/n/v/d/chest pain/cough/soa/dysuria/hematuria/melena/hematochoezia.    History provided by:  Patient  Abdominal Pain  Pain location:  RLQ  Pain quality: sharp    Associated symptoms: no diarrhea, no dysuria, no nausea and no vomiting        Review of Systems   Constitutional: Negative.    HENT: Negative.    Eyes: Negative for redness.   Respiratory: Negative.    Cardiovascular: Negative.    Gastrointestinal: Positive for abdominal pain. Negative for diarrhea, nausea and vomiting.   Genitourinary: Negative for dysuria and flank pain.   Musculoskeletal: Negative.    Skin: Negative.    Allergic/Immunologic: Positive for immunocompromised state.   All other systems reviewed and are negative.      Past Medical History:   Diagnosis Date   • Arthritis    • CLL (chronic lymphocytic leukemia)    • CVA (cerebral vascular accident) 2011   • Dyslipidemia    • Gastritis    • GERD (gastroesophageal reflux disease)    • Night sweats        No Known Allergies    Past Surgical History:   Procedure Laterality Date   • APPENDECTOMY     • COLONOSCOPY  03/26/2014   • COLONOSCOPY N/A 12/16/2019    Procedure: COLONOSCOPY;  Surgeon: Gregg Martinez MD;  Location: [facility] ENDOSCOPY;  Service: Gastroenterology   • ENDOSCOPY N/A 12/16/2019    Procedure: ESOPHAGOGASTRODUODENOSCOPY--with dilation;  Surgeon: Gregg Martinez MD;  Location: [facility] ENDOSCOPY;  Service: Gastroenterology   • HERNIA REPAIR     • KNEE SURGERY     • LEG SURGERY     • MANDIBLE FRACTURE SURGERY     • AK INSJ TUNNELED CVC W/O SUBQ PORT/ AGE 5 YR/> Right 7/7/2017    Procedure: MEDIPORT PLACEMENT WITH ULTRASOUND GUIDANCE       ;  Surgeon: Lucien Mcneal MD;  Location: [facility] OR;  Service: " General   • PROSTATE SURGERY     • UPPER GASTROINTESTINAL ENDOSCOPY  03/26/2014   • UPPER GASTROINTESTINAL ENDOSCOPY  12/16/2019       Family History   Problem Relation Age of Onset   • Prostate cancer Father    • Stomach cancer Father    • Throat cancer Father    • Leukemia Brother    • Stomach cancer Brother    • Breast cancer Paternal Aunt    • Colon cancer Paternal Uncle    • Kidney cancer Brother    • Prostate cancer Paternal Uncle        Social History     Socioeconomic History   • Marital status:    Tobacco Use   • Smoking status: Never     Passive exposure: Never   • Smokeless tobacco: Never   • Tobacco comments:     never smoked cigarettes   Vaping Use   • Vaping Use: Never used   Substance and Sexual Activity   • Alcohol use: No   • Drug use: No   • Sexual activity: Defer           Objective   Physical Exam  Vitals and nursing note reviewed.   Constitutional:       Appearance: Normal appearance.   HENT:      Head: Normocephalic and atraumatic.      Right Ear: External ear normal.      Left Ear: External ear normal.      Nose: Nose normal.      Mouth/Throat:      Mouth: Mucous membranes are moist.      Pharynx: No oropharyngeal exudate or posterior oropharyngeal erythema.   Eyes:      Conjunctiva/sclera: Conjunctivae normal.      Pupils: Pupils are equal, round, and reactive to light.   Cardiovascular:      Rate and Rhythm: Normal rate and regular rhythm.      Pulses: Normal pulses.      Heart sounds: Normal heart sounds. No murmur heard.    No friction rub. No gallop.   Pulmonary:      Effort: Pulmonary effort is normal. No respiratory distress.      Breath sounds: Normal breath sounds. No wheezing, rhonchi or rales.   Abdominal:      General: Abdomen is flat. Bowel sounds are normal.      Tenderness: There is abdominal tenderness in the right upper quadrant and right lower quadrant. There is no right CVA tenderness, left CVA tenderness or guarding. Negative signs include McBurney's sign.       Hernia: There is no hernia in the umbilical area, ventral area, right femoral area or left femoral area.       Musculoskeletal:      Cervical back: Normal range of motion and neck supple. No rigidity.      Right lower leg: No edema.      Left lower leg: No edema.   Lymphadenopathy:      Cervical: No cervical adenopathy.   Skin:     General: Skin is warm and dry.   Neurological:      General: No focal deficit present.      Mental Status: He is alert and oriented to person, place, and time.      GCS: GCS eye subscore is 4. GCS verbal subscore is 5. GCS motor subscore is 6.         ECG 12 Lead      Date/Time: 4/17/2023 2:24 PM  Performed by: Lawrence Granados MD  Authorized by: Lawrence Granados MD   Interpreted by physician  Rhythm: sinus rhythm  Rate: normal  BPM: 68  QRS axis: normal  Conduction: conduction normal  ST Segments: ST segments normal  T Waves: T waves normal  Other: no other findings  Clinical impression: normal ECG                 ED Course      Labs Reviewed   COMPREHENSIVE METABOLIC PANEL - Abnormal; Notable for the following components:       Result Value    Glucose 111 (*)     Creatinine 1.35 (*)     Sodium 134 (*)     eGFR 55.8 (*)     All other components within normal limits    Narrative:     GFR Normal >60  Chronic Kidney Disease <60  Kidney Failure <15    The GFR formula is only valid for adults with stable renal function between ages 18 and 70.   CBC WITH AUTO DIFFERENTIAL - Abnormal; Notable for the following components:    WBC 12.07 (*)     Platelets 108 (*)     Neutrophil % 42.2 (*)     Lymphocyte % 51.3 (*)     Lymphocytes, Absolute 6.19 (*)     All other components within normal limits   URINALYSIS W/ MICROSCOPIC IF INDICATED (NO CULTURE) - Abnormal; Notable for the following components:    Ketones, UA Trace (*)     Blood, UA Trace (*)     All other components within normal limits   URINALYSIS, MICROSCOPIC ONLY - Abnormal; Notable for the following components:    RBC, UA 6-12 (*)     All  other components within normal limits   LIPASE - Normal   LACTIC ACID, PLASMA - Normal   RAINBOW DRAW    Narrative:     The following orders were created for panel order [place] Draw.  Procedure                               Abnormality         Status                     ---------                               -----------         ------                     Green Top (Gel)[[id number]]                                  Final result               Lavender Top[[id number]]                                     Final result               Gold Top - SST[[id number]]                                   Final result               Light Blue Top[[id number]]                                   Final result                 Please view results for these tests on the individual orders.   CBC AND DIFFERENTIAL    Narrative:     The following orders were created for panel order CBC & Differential.  Procedure                               Abnormality         Status                     ---------                               -----------         ------                     CBC Auto Differential[[id number]]        Abnormal            Final result               Scan Slide[[id number]]                                                                    Please view results for these tests on the individual orders.   GREEN TOP   LAVENDER TOP   GOLD TOP - SST   LIGHT BLUE TOP     CT Abdomen Pelvis With Contrast    Result Date: 4/17/2023  Narrative: INDICATION: Abdominal pain. TECHNIQUE: Intravenous contrast was administered and axial images from the level of the diaphragms through the pelvis were performed followed by 2D multiplanar reformats. FINDINGS: The lung bases are clear.  Liver and spleen are normal.  No adrenal mass. Kidneys function symmetrically. SUMMARY: No abnormalities demonstrated.    XR Chest 1 View    Result Date: 4/17/2023  Narrative: FINDINGS: No infiltrate, effusion or pneumothorax is seen.  Heart size and pulmonary vascularity are  normal.  The lungs are clear.  The mediastinum, radha and visualized osseous structures are unremarkable.     Impression: No significant abnormality of the chest.                                         Medical Decision Making  Labs/radiographic studies reviewed. Cxr: no active disease.  CT abd/pelvis: negative acute abnormality.  CBC/CMP/lipase: notable for mild chronic leukocytosis/thrombocytopenia secondary to CLL.  UA: significant for asymptomatic microscopic hematuria w/o evidence infection.  Lactate: normal.  Repeat exam at time of discharge: abd soft nontender. Neg r/r/g/hsm/masses/nelson/mcburney tenderness. Stable discharge with pmd followup.  Dc precautions provided.    Asymptomatic microscopic hematuria: acute illness or injury  RLQ abdominal pain: acute illness or injury  Amount and/or Complexity of Data Reviewed  Labs: ordered.  Radiology: ordered.  ECG/medicine tests: ordered and independent interpretation performed.      Risk  Prescription drug management.          Final diagnoses:   RLQ abdominal pain   Asymptomatic microscopic hematuria       ED Disposition  ED Disposition     ED Disposition   Discharge    Condition   Good    Comment   --             Jose Garner MD  [address] [facility] [id number] [phone]    In 1 day           Medication List      New Prescriptions    docusate sodium 100 MG capsule  Commonly known as: COLACE  Take 1 capsule by mouth 2 (Two) Times a Day As Needed for Constipation.        ASK your doctor about these medications    polyethylene glycol 17 GM/SCOOP powder  Commonly known as: MIRALAX             Lawrence Granados MD  04/17/23 3142

## 2023-05-08 ENCOUNTER — SPECIALTY PHARMACY (OUTPATIENT)
Dept: ONCOLOGY | Facility: CLINIC | Age: 73
End: 2023-05-08
Payer: MEDICARE

## 2023-05-08 NOTE — PROGRESS NOTES
Specialty Pharmacy Refill Coordination Note     Roe is a 72 y.o. male contacted today regarding refills of  calquence  specialty medication(s).    Reviewed and verified with patient:       Specialty medication(s) and dose(s) confirmed: yes    Refill Questions    Flowsheet Row Most Recent Value   Changes to allergies? No   Changes to medications? No   Financial problems or insurance changes  No   Since the previous refill, were any specialty medication doses or scheduled injections missed or delayed?  No   Does this patient require a clinical escalation to a pharmacist? No          Delivery Questions    Flowsheet Row Most Recent Value   Delivery method [employer]   Delivery address correct? Yes   Delivery phone number [phone]   Number of medications in delivery 1   Medication being filled and delivered [facility]   Questions or concerns for the pharmacist? No        Pt wife stated he has 2 weeks of medication left at this time. We will set shipment for 05/15         Follow-up: 30 day(s)     Jocy Beckwith, Pharmacy Technician  Specialty Pharmacy Technician

## 2023-05-10 ENCOUNTER — HOSPITAL ENCOUNTER (OUTPATIENT)
Dept: CT IMAGING | Facility: HOSPITAL | Age: 73
Discharge: HOME | End: 2023-05-10
Payer: MEDICARE

## 2023-05-10 ENCOUNTER — LAB (OUTPATIENT)
Dept: LAB | Facility: HOSPITAL | Age: 73
End: 2023-05-10
Payer: MEDICARE

## 2023-05-10 DIAGNOSIS — C91.10 CLL (CHRONIC LYMPHOCYTIC LEUKEMIA): ICD-10-CM

## 2023-05-10 DIAGNOSIS — D69.6 THROMBOCYTOPENIA: ICD-10-CM

## 2023-05-10 DIAGNOSIS — D64.9 ANEMIA, UNSPECIFIED TYPE: ICD-10-CM

## 2023-05-10 DIAGNOSIS — K90.9 IRON MALABSORPTION: ICD-10-CM

## 2023-05-10 LAB
ALBUMIN SERPL-MCNC: 4.7 G/DL (ref 3.5–5.2)
ALBUMIN/GLOB SERPL: 2 G/DL
ALP SERPL-CCNC: 77 U/L (ref 39–117)
ALT SERPL W P-5'-P-CCNC: 17 U/L (ref 1–41)
ANION GAP SERPL CALCULATED.3IONS-SCNC: 10 MMOL/L (ref 5–15)
AST SERPL-CCNC: 15 U/L (ref 1–40)
BASOPHILS # BLD MANUAL: 0.13 10*3/MM3 (ref 0–0.2)
BASOPHILS NFR BLD MANUAL: 1 % (ref 0–1.5)
BILIRUB SERPL-MCNC: 0.4 MG/DL (ref 0–1.2)
BUN SERPL-MCNC: 16 MG/DL (ref 8–23)
BUN/CREAT SERPL: 13.2 (ref 7–25)
CALCIUM SPEC-SCNC: 9.8 MG/DL (ref 8.6–10.5)
CHLORIDE SERPL-SCNC: 104 MMOL/L (ref 98–107)
CO2 SERPL-SCNC: 26 MMOL/L (ref 22–29)
CREAT SERPL-MCNC: 1.21 MG/DL (ref 0.76–1.27)
DEPRECATED RDW RBC AUTO: 44.9 FL (ref 37–54)
EGFRCR SERPLBLD CKD-EPI 2021: 63.6 ML/MIN/1.73
EOSINOPHIL # BLD MANUAL: 0.13 10*3/MM3 (ref 0–0.4)
EOSINOPHIL NFR BLD MANUAL: 1 % (ref 0.3–6.2)
ERYTHROCYTE [DISTWIDTH] IN BLOOD BY AUTOMATED COUNT: 14.3 % (ref 12.3–15.4)
FERRITIN SERPL-MCNC: 305.2 NG/ML (ref 30–400)
FOLATE SERPL-MCNC: 18.5 NG/ML (ref 4.78–24.2)
GLOBULIN UR ELPH-MCNC: 2.3 GM/DL
GLUCOSE SERPL-MCNC: 93 MG/DL (ref 65–99)
HCT VFR BLD AUTO: 43.8 % (ref 37.5–51)
HGB BLD-MCNC: 14.6 G/DL (ref 13–17.7)
IRON 24H UR-MRATE: 61 MCG/DL (ref 59–158)
IRON SATN MFR SERPL: 22 % (ref 20–50)
LYMPHOCYTES # BLD MANUAL: 9.78 10*3/MM3 (ref 0.7–3.1)
LYMPHOCYTES NFR BLD MANUAL: 4 % (ref 5–12)
MCH RBC QN AUTO: 28.6 PG (ref 26.6–33)
MCHC RBC AUTO-ENTMCNC: 33.3 G/DL (ref 31.5–35.7)
MCV RBC AUTO: 85.9 FL (ref 79–97)
MONOCYTES # BLD: 0.52 10*3/MM3 (ref 0.1–0.9)
NEUTROPHILS # BLD AUTO: 2.48 10*3/MM3 (ref 1.7–7)
NEUTROPHILS NFR BLD MANUAL: 18 % (ref 42.7–76)
NEUTS BAND NFR BLD MANUAL: 1 % (ref 0–5)
PLATELET # BLD AUTO: 112 10*3/MM3 (ref 140–450)
PMV BLD AUTO: 10.2 FL (ref 6–12)
POTASSIUM SERPL-SCNC: 4.6 MMOL/L (ref 3.5–5.2)
PROT SERPL-MCNC: 7 G/DL (ref 6–8.5)
RBC # BLD AUTO: 5.1 10*6/MM3 (ref 4.14–5.8)
RBC MORPH BLD: NORMAL
SMALL PLATELETS BLD QL SMEAR: ABNORMAL
SODIUM SERPL-SCNC: 140 MMOL/L (ref 136–145)
TIBC SERPL-MCNC: 277 MCG/DL (ref 298–536)
TRANSFERRIN SERPL-MCNC: 186 MG/DL (ref 200–360)
VARIANT LYMPHS NFR BLD MANUAL: 75 % (ref 19.6–45.3)
VIT B12 BLD-MCNC: 679 PG/ML (ref 211–946)
WBC MORPH BLD: NORMAL
WBC NRBC COR # BLD: 13.04 10*3/MM3 (ref 3.4–10.8)

## 2023-05-10 PROCEDURE — 74177 CT ABD & PELVIS W/CONTRAST: CPT

## 2023-05-10 PROCEDURE — 85007 BL SMEAR W/DIFF WBC COUNT: CPT

## 2023-05-10 PROCEDURE — 25510000001 IOPAMIDOL 61 % SOLUTION: Performed by: INTERNAL MEDICINE

## 2023-05-10 PROCEDURE — 80053 COMPREHEN METABOLIC PANEL: CPT

## 2023-05-10 PROCEDURE — 82607 VITAMIN B-12: CPT

## 2023-05-10 PROCEDURE — 82728 ASSAY OF FERRITIN: CPT

## 2023-05-10 PROCEDURE — 85025 COMPLETE CBC W/AUTO DIFF WBC: CPT

## 2023-05-10 PROCEDURE — 82746 ASSAY OF FOLIC ACID SERUM: CPT

## 2023-05-10 PROCEDURE — 83540 ASSAY OF IRON: CPT

## 2023-05-10 PROCEDURE — 71260 CT THORAX DX C+: CPT

## 2023-05-10 PROCEDURE — 84466 ASSAY OF TRANSFERRIN: CPT

## 2023-05-10 RX ADMIN — IOPAMIDOL 90 ML: 612 INJECTION, SOLUTION INTRAVENOUS at 15:03

## 2023-05-11 NOTE — PROGRESS NOTES
DATE OF VISIT: 5/12/2023      REASON FOR VISIT: Chronic lymphocytic leukemia, lymphocytosis, night sweats, fatigue      HISTORY OF PRESENT ILLNESS:   72-year-old male with medical problems significant for coronary artery disease s/p CABG, dyslipidemia, chronic lymphocytic leukemia currently on Acalabrutinib since November 22, 2022 is here for follow-up appointment today.  Complains of abdominal pain affecting right lower quadrant for which she was seen in emergency room on April 17, 2023 with negative CT scan for any acute etiology.  Still complains of intermittent abdominal pain radiating to back.  States his appetite has been decreasing.  Denies any bleeding.  Denies any new lymph node enlargement.  Complains of fatigue.  Denies any excessive nausea or vomiting or diarrhea with Acalabrutinib.  Denies any fevers.            Oncology history:    1.  Chronic lymphocytic leukemia:  - Patient was diagnosed with chronic lymphocytic leukemia based on peripheral blood flow cytometry in September 2016  - Received 2 cycles of chemotherapy with Bendamustine and rituximab until July 10, 2017.  - On day 2 of cycle 2 of chemotherapy patient developed allergic reaction with swelling of face and erythema affecting face.  - Due to worsening splenomegaly, abdominal pain patient was started on acalabrutinib on November 22, 2022.              Past Medical History, Past Surgical History, Social History, Family History have been reviewed and are without significant changes except as mentioned.    Review of Systems   A comprehensive 14 point review of systems was performed and was negative except as mentioned in HPI.    Medications:  The current medication list was reviewed in the EMR    ALLERGIES:  No Known Allergies    Objective      Vitals:    05/12/23 0834   BP: 120/65   Pulse: 70   Resp: 16   Temp: 97.9 °F (36.6 °C)   SpO2: 96%   PainSc:   3   PainLoc: Hip         3/3/2023     1:52 PM   Current Status   ECOG score 0       Physical  Exam  Pulmonary:      Breath sounds: Normal breath sounds.   Lymphadenopathy:      Cervical: No cervical adenopathy.   Neurological:      Mental Status: He is alert and oriented to person, place, and time.           RECENT LABS:  Glucose   Date Value Ref Range Status   05/10/2023 93 65 - 99 mg/dL Final   10/04/2021 89 70 - 110 mg/dL Final     Sodium   Date Value Ref Range Status   05/10/2023 140 136 - 145 mmol/L Final   03/22/2023 139 136 - 145 mmol/L Final     Potassium   Date Value Ref Range Status   05/10/2023 4.6 3.5 - 5.2 mmol/L Final   03/22/2023 4.4 3.5 - 5.1 mmol/L Final     CO2   Date Value Ref Range Status   05/10/2023 26.0 22.0 - 29.0 mmol/L Final     Total CO2   Date Value Ref Range Status   03/22/2023 29 21 - 31 mmol/L Final     Chloride   Date Value Ref Range Status   05/10/2023 104 98 - 107 mmol/L Final   03/22/2023 102 98 - 107 mmol/L Final     Anion Gap   Date Value Ref Range Status   05/10/2023 10.0 5.0 - 15.0 mmol/L Final   03/22/2023 8 4 - 12 mmol/L Final     Creatinine   Date Value Ref Range Status   05/10/2023 1.21 0.76 - 1.27 mg/dL Final   03/22/2023 1.1 0.7 - 1.3 mg/dL Final     BUN   Date Value Ref Range Status   05/10/2023 16 8 - 23 mg/dL Final   03/22/2023 16 7 - 25 mg/dL Final     BUN/Creatinine Ratio   Date Value Ref Range Status   05/10/2023 13.2 7.0 - 25.0 Final     Calcium   Date Value Ref Range Status   05/10/2023 9.8 8.6 - 10.5 mg/dL Final   03/22/2023 9.5 8.6 - 10.3 mg/dL Final     eGFR Non  Amer   Date Value Ref Range Status   02/14/2022 50 (L) >60 mL/min/1.73 Final     Alkaline Phosphatase   Date Value Ref Range Status   05/10/2023 77 39 - 117 U/L Final   03/22/2023 62 34 - 104 U/L Final     Total Protein   Date Value Ref Range Status   05/10/2023 7.0 6.0 - 8.5 g/dL Final     ALT (SGPT)   Date Value Ref Range Status   05/10/2023 17 1 - 41 U/L Final   03/22/2023 18 7 - 52 U/L Final     AST (SGOT)   Date Value Ref Range Status   05/10/2023 15 1 - 40 U/L Final   03/22/2023 14  13 - 39 U/L Final     Total Bilirubin   Date Value Ref Range Status   05/10/2023 0.4 0.0 - 1.2 mg/dL Final   03/22/2023 0.69 0.3 - 1.0 mg/dL Final     Albumin   Date Value Ref Range Status   05/10/2023 4.7 3.5 - 5.2 g/dL Final   03/22/2023 4.7 3.5 - 5.7 g/dL Final     Globulin   Date Value Ref Range Status   05/10/2023 2.3 gm/dL Final     Lab Results   Component Value Date    WBC 13.04 (H) 05/10/2023    HGB 14.6 05/10/2023    HCT 43.8 05/10/2023    MCV 85.9 05/10/2023     (L) 05/10/2023     Lab Results   Component Value Date    NEUTROABS 2.48 05/10/2023    IRON 61 05/10/2023    IRON 57 (L) 02/15/2023    IRON 60 11/18/2022    TIBC 277 (L) 05/10/2023    TIBC 334 02/15/2023    TIBC 341 11/18/2022    LABIRON 22 05/10/2023    LABIRON 17 (L) 02/15/2023    LABIRON 18 (L) 11/18/2022    FERRITIN 305.20 05/10/2023    FERRITIN 46.04 02/15/2023    FERRITIN 93.44 11/18/2022    YPZKYTSZ90 679 05/10/2023    APZXYRPF97 686 02/15/2023    XAAQTSEA58 854 11/18/2022    FOLATE 18.50 05/10/2023    FOLATE >20.00 02/15/2023    FOLATE 17.60 11/18/2022     Lab Results   Component Value Date    REFLABREPO SEE NOTE: 10/19/2016         PATHOLOGY:  * Cannot find OR log *         RADIOLOGY DATA :  CT Chest With Contrast Diagnostic    Result Date: 5/10/2023  Impression: 1.  Stable exam.  Several borderline enlarged lymph nodes in the left lower paratracheal and subcarinal mg station of the chest are unchanged.  Stable splenomegaly. 2.  No new or enlarging sites of disease identified in the chest, abdomen or pelvis.    CT Abdomen Pelvis With Contrast    Result Date: 5/10/2023  Impression: 1.  Stable exam.  Several borderline enlarged lymph nodes in the left lower paratracheal and subcarinal mg station of the chest are unchanged.  Stable splenomegaly. 2.  No new or enlarging sites of disease identified in the chest, abdomen or pelvis.          Assessment & Plan     1.  Chronic lymphocytic leukemia:  - Review oncology history for prior  treatment details  - Patient is currently on Acalabrutinib since November 22, 2022.  - Patient is tolerating Acalabrutinib well  - Recent CT of chest abdomen and pelvis with contrast on May 10, 2023 shows stable lymph node enlargement involving paratracheal and subcarinal nodes without any worsening.  Splenomegaly is also stable.  - Recommend continuing with Acalabrutinib for now  - We will have patient return to clinic in 8 weeks with repeat CBC, CMP to be done on that day.  - We will repeat CT of chest, abdomen and pelvis with contrast around September 2023.    2.  Anemia:  - Hemoglobin is 14.6.  Iron studies are adequate no need for any intravenous Venofer at present  - Due to iron malabsorption patient did receive intravenous Venofer in March 2023  - Recommend continuing with folic acid 3 times a week  -We will repeat anemia work-up around September 2023    3.  Thrombocytopenia:  - Secondary to CLL plus splenomegaly  - Platelet count is 112,000.  - We will monitor with CBC    4.  Lymphocytosis:  - Secondary to CLL plus Acalabrutinib  - White blood cell count is 13,000 which is predominantly increase in lymphocyte.  Will monitor with CBC for now    5.  Abdominal pain:  - Patient has been having right lower quadrant abdominal pain with no etiology that could be identified on CT scan.  Again discussed with patient and family if pain gets worse recommend returning to emergency room for evaluation.    6.  Health maintenance: Patient does not smoke    7.  Advance care planning:  - Patient remains full code.    8.  Prescription: Prescription for Acalabrutinib has been sent to patient's pharmacy today.               PHQ-9 Total Score: 0   -Patient is not homicidal or suicidal.  No acute intervention required.    Roe uG Sr. reports a pain score of 3.  Given his pain assessment as noted, treatment options were discussed and the following options were decided upon as a follow-up plan to address the patient's  pain: continuation of current treatment plan for pain.         Andres Lubin MD  5/12/2023  08:37 CDT        Part of this note may be an electronic transcription/translation of spoken language to printed text using the Dragon Dictation System.          CC:

## 2023-05-12 ENCOUNTER — OFFICE VISIT (OUTPATIENT)
Dept: ONCOLOGY | Facility: CLINIC | Age: 73
End: 2023-05-12
Payer: MEDICARE

## 2023-05-12 VITALS
HEART RATE: 70 BPM | DIASTOLIC BLOOD PRESSURE: 65 MMHG | TEMPERATURE: 97.9 F | SYSTOLIC BLOOD PRESSURE: 120 MMHG | RESPIRATION RATE: 16 BRPM | OXYGEN SATURATION: 96 %

## 2023-05-12 DIAGNOSIS — D69.6 THROMBOCYTOPENIA: Chronic | ICD-10-CM

## 2023-05-12 DIAGNOSIS — D72.820 LYMPHOCYTOSIS: Chronic | ICD-10-CM

## 2023-05-12 DIAGNOSIS — D50.8 OTHER IRON DEFICIENCY ANEMIA: Chronic | ICD-10-CM

## 2023-05-12 DIAGNOSIS — C91.10 CLL (CHRONIC LYMPHOCYTIC LEUKEMIA): Primary | Chronic | ICD-10-CM

## 2023-06-05 ENCOUNTER — OFFICE VISIT (OUTPATIENT)
Dept: GASTROENTEROLOGY | Facility: CLINIC | Age: 73
End: 2023-06-05
Payer: MEDICARE

## 2023-06-05 VITALS
SYSTOLIC BLOOD PRESSURE: 114 MMHG | HEIGHT: 66 IN | WEIGHT: 176 LBS | HEART RATE: 75 BPM | DIASTOLIC BLOOD PRESSURE: 67 MMHG | BODY MASS INDEX: 28.28 KG/M2

## 2023-06-05 DIAGNOSIS — K59.09 OTHER CONSTIPATION: ICD-10-CM

## 2023-06-05 DIAGNOSIS — K21.00 GASTROESOPHAGEAL REFLUX DISEASE WITH ESOPHAGITIS WITHOUT HEMORRHAGE: ICD-10-CM

## 2023-06-05 DIAGNOSIS — R10.84 GENERALIZED ABDOMINAL PAIN: Primary | ICD-10-CM

## 2023-06-05 PROCEDURE — 99213 OFFICE O/P EST LOW 20 MIN: CPT | Performed by: PHYSICIAN ASSISTANT

## 2023-06-05 PROCEDURE — 1160F RVW MEDS BY RX/DR IN RCRD: CPT | Performed by: PHYSICIAN ASSISTANT

## 2023-06-05 PROCEDURE — 1159F MED LIST DOCD IN RCRD: CPT | Performed by: PHYSICIAN ASSISTANT

## 2023-06-05 RX ORDER — DICYCLOMINE HYDROCHLORIDE 10 MG/1
10 CAPSULE ORAL 3 TIMES DAILY
Qty: 90 CAPSULE | Refills: 3 | Status: SHIPPED | OUTPATIENT
Start: 2023-06-05

## 2023-06-05 NOTE — PROGRESS NOTES
Chief Complaint   Patient presents with    Constipation    Abdominal Pain    Heartburn     6 month f/u       ENDO PROCEDURE ORDERED:    Subjective    oRe Gu Sr. is a 72 y.o. male. he is here today for follow-up.    History of Present Illness    Patient is seen on a recheck of his GERD, abdominal pain, constipation. Last seen on 12/05/2022. The patient currently denies abdominal pain, heartburn, nausea, vomiting or dysphagia. He is not currently taking anything for his reflux. He states he is doing well. Denied dysphagia. He is on the Bentyl for IBS symptoms. Weight is up 7 pounds since last visit. Last EGD with dilatation and hemorrhoids on colonoscopy on 12/16/2019.     Patient had a chest x-ray that was read as negative on 04/17/2023. He had extensive follow-up studies for his CLL on 05/10/2023. B12 was normal. CBC showed a white count of 13.04, platelets 112,000. CMP showed a creatinine of 1.21, GFR 63.6. He does see Galo BROTHERS, in Nephrology. Iron 61, ferritin 305.6, folate 18.5. CT of chest, abdomen and pelvis with contrast showed a small hiatal hernia, borderline lymph nodes, splenomegaly at 15.5 cm.     ASSESSMENT/PLAN:  Patient appears stable on current regimen. I refilled his Bentyl. He is being followed by Hematology, Nephology closely. Last LFTs were normal. We will plan follow-up in 6 months, sooner if needed. Recommend repeat dilatation as needed.         The following portions of the patient's history were reviewed and updated as appropriate:   Past Medical History:   Diagnosis Date    Arthritis     CLL (chronic lymphocytic leukemia)     CVA (cerebral vascular accident) 2011    Dyslipidemia     Gastritis     GERD (gastroesophageal reflux disease)     Night sweats      Past Surgical History:   Procedure Laterality Date    APPENDECTOMY      COLONOSCOPY  03/26/2014    COLONOSCOPY N/A 12/16/2019    Procedure: COLONOSCOPY;  Surgeon: Gregg Martinez MD;  Location: [facility] ENDOSCOPY;   Service: Gastroenterology    ENDOSCOPY N/A 12/16/2019    Procedure: ESOPHAGOGASTRODUODENOSCOPY--with dilation;  Surgeon: Gregg Martinez MD;  Location: [facility] ENDOSCOPY;  Service: Gastroenterology    HERNIA REPAIR      KNEE SURGERY      LEG SURGERY      MANDIBLE FRACTURE SURGERY      NH INSJ TUNNELED CVC W/O SUBQ PORT/ AGE 5 YR/> Right 7/7/2017    Procedure: MEDIPORT PLACEMENT WITH ULTRASOUND GUIDANCE       ;  Surgeon: Lucien Mcneal MD;  Location: [facility] OR;  Service: General    PROSTATE SURGERY      UPPER GASTROINTESTINAL ENDOSCOPY  03/26/2014    UPPER GASTROINTESTINAL ENDOSCOPY  12/16/2019     Family History   Problem Relation Age of Onset    Prostate cancer Father     Stomach cancer Father     Throat cancer Father     Leukemia Brother     Stomach cancer Brother     Breast cancer Paternal Aunt     Colon cancer Paternal Uncle     Kidney cancer Brother     Prostate cancer Paternal Uncle        No Known Allergies  Social History     Socioeconomic History    Marital status:    Tobacco Use    Smoking status: Never     Passive exposure: Never    Smokeless tobacco: Never    Tobacco comments:     never smoked cigarettes   Vaping Use    Vaping Use: Never used   Substance and Sexual Activity    Alcohol use: No    Drug use: No    Sexual activity: Defer     Current Medications:  Prior to Admission medications    Medication Sig Start Date End Date Taking? Authorizing Provider   Acalabrutinib Maleate (CALQUENCE) 100 MG tablet Take 1 tablet by mouth 2 (Two) Times a Day. 5/12/23  Yes Andres Lubin MD   aspirin 81 MG EC tablet Take 1 tablet by mouth Daily. 3/30/22  Yes Reyes Sherwood MD   dicyclomine (BENTYL) 10 MG capsule Take 1 capsule by mouth 3 (Three) Times a Day. 4/21/22  Yes Reyes Sherwood MD   folic acid (FOLVITE) 1 MG tablet Take 1 tablet by mouth Daily. 2/17/23  Yes Andres Lubin MD   ondansetron (ZOFRAN) 4 MG tablet Take 1 tablet by mouth 4 (Four) Times a Day As Needed for Nausea or Vomiting.  "11/21/22  Yes Andres Lubin MD   PARoxetine (PAXIL) 40 MG tablet Take 1 tablet by mouth Daily. 8/5/19  Yes Reyes Sherwood MD   rosuvastatin (CRESTOR) 10 MG tablet Take 4 tablets by mouth Every Night. 5/8/19  Yes Reyes Sherwood MD   tamsulosin (FLOMAX) 0.4 MG capsule 24 hr capsule Take 2 capsules by mouth Daily.   Yes Reyes Sherwood MD   vitamin B-12 (CYANOCOBALAMIN) 500 MCG tablet Take 1 tablet by mouth Daily.   Yes Reyes Sherwood MD   vitamin D3 125 MCG (5000 UT) capsule capsule Take 1 capsule by mouth Daily.   Yes Reyes Sherwood MD   docusate sodium (COLACE) 100 MG capsule Take 1 capsule by mouth 2 (Two) Times a Day As Needed for Constipation.  Patient not taking: Reported on 6/5/2023 5/20/22   Andres Lubin MD   polyethylene glycol (MIRALAX) 17 GM/SCOOP powder Take 17 g by mouth As Needed.  Patient not taking: Reported on 6/5/2023    Reyes Sherwood MD     Review of Systems  Review of Systems       Objective    /67 (BP Location: Right arm)   Pulse 75   Ht 167.6 cm (66\")   Wt 79.8 kg (176 lb)   BMI 28.41 kg/m²   Physical Exam  Vitals and nursing note reviewed.   Constitutional:       General: He is not in acute distress.     Appearance: He is well-developed.   HENT:      Head: Normocephalic and atraumatic.   Eyes:      Pupils: Pupils are equal, round, and reactive to light.   Cardiovascular:      Rate and Rhythm: Normal rate and regular rhythm.      Heart sounds: Normal heart sounds.   Pulmonary:      Effort: Pulmonary effort is normal.      Breath sounds: Normal breath sounds.   Abdominal:      General: Bowel sounds are normal. There is no distension or abdominal bruit.      Palpations: Abdomen is soft. Abdomen is not rigid. There is no shifting dullness or mass.      Tenderness: There is abdominal tenderness. There is no guarding or rebound.      Hernia: No hernia is present. There is no hernia in the ventral area.   Musculoskeletal:         General: Normal " range of motion.      Cervical back: Normal range of motion.   Skin:     General: Skin is warm and dry.   Neurological:      Mental Status: He is alert and oriented to person, place, and time.   Psychiatric:         Behavior: Behavior normal.         Thought Content: Thought content normal.         Judgment: Judgment normal.     Assessment & Plan      1. Generalized abdominal pain    2. Gastroesophageal reflux disease with esophagitis without hemorrhage    3. Other constipation    .   Diagnoses and all orders for this visit:    1. Generalized abdominal pain (Primary)    2. Gastroesophageal reflux disease with esophagitis without hemorrhage    3. Other constipation    Other orders  -     dicyclomine (BENTYL) 10 MG capsule; Take 1 capsule by mouth 3 (Three) Times a Day.  Dispense: 90 capsule; Refill: 3        Orders placed during this encounter include:  No orders of the defined types were placed in this encounter.      Medications prescribed:  New Medications Ordered This Visit   Medications    dicyclomine (BENTYL) 10 MG capsule     Sig: Take 1 capsule by mouth 3 (Three) Times a Day.     Dispense:  90 capsule     Refill:  3       Requested Prescriptions     Signed Prescriptions Disp Refills    dicyclomine (BENTYL) 10 MG capsule 90 capsule 3     Sig: Take 1 capsule by mouth 3 (Three) Times a Day.       Review and/or summary of lab tests, radiology, procedures, medications. Review and summary of old records and obtaining of history. The risks and benefits of my recommendations, as well as other treatment options were discussed with the patient today. Questions were answered.    Follow-up: Return in about 6 months (around 12/5/2023), or if symptoms worsen or fail to improve.     * Surgery not found *      This document has been electronically signed by Jackson Choudhary PA-C on June 19, 2023 19:17 CDT      Results for orders placed or performed in visit on 06/14/23   Hemoglobin & Hematocrit, Blood    Specimen: Blood    Result Value Ref Range    Hemoglobin 15.1 13.0 - 17.7 g/dL    Hematocrit 45.3 37.5 - 51.0 %   Protein / Creatinine Ratio, Urine - Urine, Clean Catch    Specimen: Urine, Clean Catch   Result Value Ref Range    Protein/Creatinine Ratio, Urine 120.9 0.0 - 200.0 mg/G Crea    Creatinine, Urine 95.1 mg/dL    Total Protein, Urine 11.5 mg/dL   Vitamin D,25-Hydroxy    Specimen: Blood   Result Value Ref Range    25 Hydroxy, Vitamin D 49.3 30.0 - 100.0 ng/ml   Uric Acid    Specimen: Blood   Result Value Ref Range    Uric Acid 6.3 3.5 - 8.5 mg/dL   PTH, Intact    Specimen: Blood   Result Value Ref Range    PTH, Intact 30.1 15.0 - 65.0 pg/mL   Renal Function Panel    Specimen: Blood   Result Value Ref Range    Glucose 88 70 - 99 mg/dL    BUN 12 7 - 23 mg/dL    Creatinine 1.16 0.70 - 1.30 mg/dL    Sodium 139 137 - 145 mmol/L    Potassium 4.5 3.4 - 5.0 mmol/L    Chloride 101 101 - 112 mmol/L    CO2 29.0 22.0 - 30.0 mmol/L    Calcium 9.2 8.4 - 10.2 mg/dL    Albumin 4.5 3.5 - 5.0 g/dL    Phosphorus 2.9 2.5 - 4.5 mg/dL    Anion Gap 9.0 5.0 - 15.0 mmol/L    BUN/Creatinine Ratio 10.3 7.0 - 25.0    eGFR 66.9 >60.0 mL/min/1.73   Results for orders placed or performed in visit on 05/10/23   CBC Auto Differential    Specimen: Blood   Result Value Ref Range    WBC 13.04 (H) 3.40 - 10.80 10*3/mm3    RBC 5.10 4.14 - 5.80 10*6/mm3    Hemoglobin 14.6 13.0 - 17.7 g/dL    Hematocrit 43.8 37.5 - 51.0 %    MCV 85.9 79.0 - 97.0 fL    MCH 28.6 26.6 - 33.0 pg    MCHC 33.3 31.5 - 35.7 g/dL    RDW 14.3 12.3 - 15.4 %    RDW-SD 44.9 37.0 - 54.0 fl    MPV 10.2 6.0 - 12.0 fL    Platelets 112 (L) 140 - 450 10*3/mm3   Iron and TIBC    Specimen: Blood   Result Value Ref Range    Iron 61 59 - 158 mcg/dL    Iron Saturation (TSAT) 22 20 - 50 %    Transferrin 186 (L) 200 - 360 mg/dL    TIBC 277 (L) 298 - 536 mcg/dL   Manual Differential    Specimen: Blood   Result Value Ref Range    Neutrophil % 18.0 (L) 42.7 - 76.0 %    Lymphocyte % 75.0 (H) 19.6 - 45.3 %     Monocyte % 4.0 (L) 5.0 - 12.0 %    Eosinophil % 1.0 0.3 - 6.2 %    Basophil % 1.0 0.0 - 1.5 %    Bands %  1.0 0.0 - 5.0 %    Neutrophils Absolute 2.48 1.70 - 7.00 10*3/mm3    Lymphocytes Absolute 9.78 (H) 0.70 - 3.10 10*3/mm3    Monocytes Absolute 0.52 0.10 - 0.90 10*3/mm3    Eosinophils Absolute 0.13 0.00 - 0.40 10*3/mm3    Basophils Absolute 0.13 0.00 - 0.20 10*3/mm3    RBC Morphology Normal Normal    WBC Morphology Normal Normal    Platelet Estimate Decreased Normal   Folate    Specimen: Blood   Result Value Ref Range    Folate 18.50 4.78 - 24.20 ng/mL   Ferritin    Specimen: Blood   Result Value Ref Range    Ferritin 305.20 30.00 - 400.00 ng/mL   Vitamin B12    Specimen: Blood   Result Value Ref Range    Vitamin B-12 679 211 - 946 pg/mL   Comprehensive Metabolic Panel    Specimen: Blood   Result Value Ref Range    Glucose 93 65 - 99 mg/dL    BUN 16 8 - 23 mg/dL    Creatinine 1.21 0.76 - 1.27 mg/dL    Sodium 140 136 - 145 mmol/L    Potassium 4.6 3.5 - 5.2 mmol/L    Chloride 104 98 - 107 mmol/L    CO2 26.0 22.0 - 29.0 mmol/L    Calcium 9.8 8.6 - 10.5 mg/dL    Total Protein 7.0 6.0 - 8.5 g/dL    Albumin 4.7 3.5 - 5.2 g/dL    ALT (SGPT) 17 1 - 41 U/L    AST (SGOT) 15 1 - 40 U/L    Alkaline Phosphatase 77 39 - 117 U/L    Total Bilirubin 0.4 0.0 - 1.2 mg/dL    Globulin 2.3 gm/dL    A/G Ratio 2.0 g/dL    BUN/Creatinine Ratio 13.2 7.0 - 25.0    Anion Gap 10.0 5.0 - 15.0 mmol/L    eGFR 63.6 >60.0 mL/min/1.73   Results for orders placed or performed during the hospital encounter of 04/17/23   Gold Top - SST   Result Value Ref Range    Extra Tube Hold for add-ons.    Green Top (Gel)   Result Value Ref Range    Extra Tube Hold for add-ons.    Urinalysis, Microscopic Only - Urine, Clean Catch    Specimen: Urine, Clean Catch   Result Value Ref Range    RBC, UA 6-12 (A) None Seen /HPF    WBC, UA 0-2 None Seen, 0-2, 3-5 /HPF    Bacteria, UA None Seen None Seen /HPF    Squamous Epithelial Cells, UA 0-2 None Seen, 0-2 /HPF     Hyaline Casts, UA 0-2 None Seen /LPF    Methodology Automated Microscopy    Urinalysis With Microscopic If Indicated (No Culture) - Urine, Clean Catch    Specimen: Urine, Clean Catch   Result Value Ref Range    Color, UA Yellow Yellow, Straw, Dark Yellow, Lakesha    Appearance, UA Clear Clear    pH, UA 6.5 5.0 - 9.0    Specific Gravity, UA 1.016 1.003 - 1.030    Glucose, UA Negative Negative    Ketones, UA Trace (A) Negative    Bilirubin, UA Negative Negative    Blood, UA Trace (A) Negative    Protein, UA Negative Negative    Leuk Esterase, UA Negative Negative    Nitrite, UA Negative Negative    Urobilinogen, UA 1.0 E.U./dL 0.2 - 1.0 E.U./dL   CBC Auto Differential    Specimen: Blood   Result Value Ref Range    WBC 12.07 (H) 3.40 - 10.80 10*3/mm3    RBC 5.03 4.14 - 5.80 10*6/mm3    Hemoglobin 14.5 13.0 - 17.7 g/dL    Hematocrit 42.7 37.5 - 51.0 %    MCV 84.9 79.0 - 97.0 fL    MCH 28.8 26.6 - 33.0 pg    MCHC 34.0 31.5 - 35.7 g/dL    RDW 14.3 12.3 - 15.4 %    RDW-SD 44.3 37.0 - 54.0 fl    MPV 10.0 6.0 - 12.0 fL    Platelets 108 (L) 140 - 450 10*3/mm3    Neutrophil % 42.2 (L) 42.7 - 76.0 %    Lymphocyte % 51.3 (H) 19.6 - 45.3 %    Monocyte % 5.3 5.0 - 12.0 %    Eosinophil % 0.7 0.3 - 6.2 %    Basophil % 0.2 0.0 - 1.5 %    Immature Grans % 0.3 0.0 - 0.5 %    Neutrophils, Absolute 5.09 1.70 - 7.00 10*3/mm3    Lymphocytes, Absolute 6.19 (H) 0.70 - 3.10 10*3/mm3    Monocytes, Absolute 0.64 0.10 - 0.90 10*3/mm3    Eosinophils, Absolute 0.08 0.00 - 0.40 10*3/mm3    Basophils, Absolute 0.03 0.00 - 0.20 10*3/mm3    Immature Grans, Absolute 0.04 0.00 - 0.05 10*3/mm3    nRBC 0.0 0.0 - 0.2 /100 WBC     *Note: Due to a large number of results and/or encounters for the requested time period, some results have not been displayed. A complete set of results can be found in Results Review.

## 2023-06-08 ENCOUNTER — SPECIALTY PHARMACY (OUTPATIENT)
Dept: ONCOLOGY | Facility: CLINIC | Age: 73
End: 2023-06-08
Payer: MEDICARE

## 2023-06-08 NOTE — PROGRESS NOTES
Specialty Pharmacy Refill Coordination Note     Roe is a 72 y.o. male contacted today regarding refills of  calquence  specialty medication(s).    Reviewed and verified with patient:       Specialty medication(s) and dose(s) confirmed: yes    Refill Questions      Flowsheet Row Most Recent Value   Changes to allergies? No   Changes to medications? No   Financial problems or insurance changes  No   Since the previous refill, were any specialty medication doses or scheduled injections missed or delayed?  No   Does this patient require a clinical escalation to a pharmacist? No            Delivery Questions      Flowsheet Row Most Recent Value   Delivery method [employer]   Delivery address correct? Yes   Delivery phone number [phone]   Number of medications in delivery 1   Medication being filled and delivered calquence   Questions or concerns for the pharmacist? No                   Follow-up: 30 day(s)     Jocy Beckwith, Pharmacy Technician  Specialty Pharmacy Technician

## 2023-06-14 ENCOUNTER — LAB (OUTPATIENT)
Dept: LAB | Facility: OTHER | Age: 73
End: 2023-06-14
Payer: MEDICARE

## 2023-06-14 ENCOUNTER — TRANSCRIBE ORDERS (OUTPATIENT)
Dept: LAB | Facility: OTHER | Age: 73
End: 2023-06-14
Payer: MEDICARE

## 2023-06-14 DIAGNOSIS — N18.31 CHRONIC KIDNEY DISEASE (CKD) STAGE G3A/A1, MODERATELY DECREASED GLOMERULAR FILTRATION RATE (GFR) BETWEEN 45-59 ML/MIN/1.73 SQUARE METER AND ALBUMINURIA CREATININE RATIO LESS THAN 30 MG/G (CMS/H*: ICD-10-CM

## 2023-06-14 DIAGNOSIS — E87.1 HYPOSMOLALITY SYNDROME: ICD-10-CM

## 2023-06-14 DIAGNOSIS — C91.90 SUBACUTE LYMPHOCYTIC LEUKEMIA: Primary | ICD-10-CM

## 2023-06-14 DIAGNOSIS — I12.9 HYPERTENSIVE NEPHROPATHY: ICD-10-CM

## 2023-06-14 DIAGNOSIS — Z86.73 PERSONAL HISTORY OF TRANSIENT CEREBRAL ISCHEMIA: ICD-10-CM

## 2023-06-14 DIAGNOSIS — C91.90 SUBACUTE LYMPHOCYTIC LEUKEMIA: ICD-10-CM

## 2023-06-14 LAB
25(OH)D3 SERPL-MCNC: 49.3 NG/ML (ref 30–100)
ALBUMIN SERPL-MCNC: 4.5 G/DL (ref 3.5–5)
ANION GAP SERPL CALCULATED.3IONS-SCNC: 9 MMOL/L (ref 5–15)
BUN SERPL-MCNC: 12 MG/DL (ref 7–23)
BUN/CREAT SERPL: 10.3 (ref 7–25)
CALCIUM SPEC-SCNC: 9.2 MG/DL (ref 8.4–10.2)
CHLORIDE SERPL-SCNC: 101 MMOL/L (ref 101–112)
CO2 SERPL-SCNC: 29 MMOL/L (ref 22–30)
CREAT SERPL-MCNC: 1.16 MG/DL (ref 0.7–1.3)
CREAT UR-MCNC: 95.1 MG/DL
EGFRCR SERPLBLD CKD-EPI 2021: 66.9 ML/MIN/1.73
GLUCOSE SERPL-MCNC: 88 MG/DL (ref 70–99)
HCT VFR BLD AUTO: 45.3 % (ref 37.5–51)
HGB BLD-MCNC: 15.1 G/DL (ref 13–17.7)
PHOSPHATE SERPL-MCNC: 2.9 MG/DL (ref 2.5–4.5)
POTASSIUM SERPL-SCNC: 4.5 MMOL/L (ref 3.4–5)
PROT ?TM UR-MCNC: 11.5 MG/DL
PROT/CREAT UR: 120.9 MG/G CREA (ref 0–200)
PTH-INTACT SERPL-MCNC: 30.1 PG/ML (ref 15–65)
SODIUM SERPL-SCNC: 139 MMOL/L (ref 137–145)
URATE SERPL-MCNC: 6.3 MG/DL (ref 3.5–8.5)

## 2023-06-14 PROCEDURE — 84156 ASSAY OF PROTEIN URINE: CPT | Performed by: NURSE PRACTITIONER

## 2023-06-14 PROCEDURE — 85014 HEMATOCRIT: CPT | Performed by: INTERNAL MEDICINE

## 2023-06-14 PROCEDURE — 36415 COLL VENOUS BLD VENIPUNCTURE: CPT | Performed by: INTERNAL MEDICINE

## 2023-06-14 PROCEDURE — 82570 ASSAY OF URINE CREATININE: CPT | Performed by: NURSE PRACTITIONER

## 2023-06-14 PROCEDURE — 85018 HEMOGLOBIN: CPT | Performed by: INTERNAL MEDICINE

## 2023-06-14 PROCEDURE — 84550 ASSAY OF BLOOD/URIC ACID: CPT | Performed by: INTERNAL MEDICINE

## 2023-06-14 PROCEDURE — 82306 VITAMIN D 25 HYDROXY: CPT | Performed by: NURSE PRACTITIONER

## 2023-06-14 PROCEDURE — 80069 RENAL FUNCTION PANEL: CPT | Performed by: INTERNAL MEDICINE

## 2023-06-14 PROCEDURE — 83970 ASSAY OF PARATHORMONE: CPT | Performed by: NURSE PRACTITIONER

## 2023-08-10 ENCOUNTER — SPECIALTY PHARMACY (OUTPATIENT)
Dept: ONCOLOGY | Facility: CLINIC | Age: 73
End: 2023-08-10
Payer: MEDICARE

## 2023-08-10 NOTE — PROGRESS NOTES
Specialty Pharmacy Refill Coordination Note     Roe is a 72 y.o. male contacted today regarding refills of  calquence  specialty medication(s).    Reviewed and verified with patient:       Specialty medication(s) and dose(s) confirmed: yes    Refill Questions      Flowsheet Row Most Recent Value   Changes to allergies? No   Changes to medications? No   Financial problems or insurance changes  No   Since the previous refill, were any specialty medication doses or scheduled injections missed or delayed?  No   Does this patient require a clinical escalation to a pharmacist? No            Delivery Questions      Flowsheet Row Most Recent Value   Delivery method [employer]   Delivery address correct? Yes   Delivery phone number [phone]   Number of medications in delivery 1   Medication being filled and delivered calquence   Questions or concerns for the pharmacist? No                   Follow-up: 30 day(s)     Jocy Beckwith, Pharmacy Technician  Specialty Pharmacy Technician

## 2023-08-31 RX ORDER — DICYCLOMINE HYDROCHLORIDE 10 MG/1
CAPSULE ORAL
Qty: 270 CAPSULE | Refills: 0 | Status: SHIPPED | OUTPATIENT
Start: 2023-08-31

## 2023-09-05 ENCOUNTER — HOSPITAL ENCOUNTER (OUTPATIENT)
Dept: CT IMAGING | Facility: HOSPITAL | Age: 73
Discharge: HOME | End: 2023-09-05
Payer: MEDICARE

## 2023-09-05 ENCOUNTER — LAB (OUTPATIENT)
Dept: LAB | Facility: HOSPITAL | Age: 73
End: 2023-09-05
Payer: MEDICARE

## 2023-09-05 DIAGNOSIS — C91.10 CLL (CHRONIC LYMPHOCYTIC LEUKEMIA): Chronic | ICD-10-CM

## 2023-09-05 DIAGNOSIS — C91.10 CLL (CHRONIC LYMPHOCYTIC LEUKEMIA): ICD-10-CM

## 2023-09-05 LAB
ALBUMIN SERPL-MCNC: 4.6 G/DL (ref 3.5–5.2)
ALBUMIN/GLOB SERPL: 1.5 G/DL
ALP SERPL-CCNC: 81 U/L (ref 39–117)
ALT SERPL W P-5'-P-CCNC: 21 U/L (ref 1–41)
ANION GAP SERPL CALCULATED.3IONS-SCNC: 10 MMOL/L (ref 5–15)
AST SERPL-CCNC: 16 U/L (ref 1–40)
BASOPHILS # BLD AUTO: 0.09 10*3/MM3 (ref 0–0.2)
BASOPHILS NFR BLD AUTO: 0.9 % (ref 0–1.5)
BILIRUB SERPL-MCNC: 0.6 MG/DL (ref 0–1.2)
BUN SERPL-MCNC: 19 MG/DL (ref 8–23)
BUN/CREAT SERPL: 16.4 (ref 7–25)
CALCIUM SPEC-SCNC: 9.4 MG/DL (ref 8.6–10.5)
CHLORIDE SERPL-SCNC: 104 MMOL/L (ref 98–107)
CO2 SERPL-SCNC: 26 MMOL/L (ref 22–29)
CREAT SERPL-MCNC: 1.16 MG/DL (ref 0.76–1.27)
DEPRECATED RDW RBC AUTO: 40.3 FL (ref 37–54)
EGFRCR SERPLBLD CKD-EPI 2021: 66.9 ML/MIN/1.73
EOSINOPHIL # BLD AUTO: 0.09 10*3/MM3 (ref 0–0.4)
EOSINOPHIL NFR BLD AUTO: 0.9 % (ref 0.3–6.2)
ERYTHROCYTE [DISTWIDTH] IN BLOOD BY AUTOMATED COUNT: 12.9 % (ref 12.3–15.4)
GLOBULIN UR ELPH-MCNC: 3 GM/DL
GLUCOSE SERPL-MCNC: 91 MG/DL (ref 65–99)
HCT VFR BLD AUTO: 43.8 % (ref 37.5–51)
HGB BLD-MCNC: 15 G/DL (ref 13–17.7)
IMM GRANULOCYTES # BLD AUTO: 0.05 10*3/MM3 (ref 0–0.05)
IMM GRANULOCYTES NFR BLD AUTO: 0.5 % (ref 0–0.5)
LYMPHOCYTES # BLD AUTO: 4.64 10*3/MM3 (ref 0.7–3.1)
LYMPHOCYTES NFR BLD AUTO: 45.9 % (ref 19.6–45.3)
MCH RBC QN AUTO: 29.7 PG (ref 26.6–33)
MCHC RBC AUTO-ENTMCNC: 34.2 G/DL (ref 31.5–35.7)
MCV RBC AUTO: 86.7 FL (ref 79–97)
MONOCYTES # BLD AUTO: 0.62 10*3/MM3 (ref 0.1–0.9)
MONOCYTES NFR BLD AUTO: 6.1 % (ref 5–12)
NEUTROPHILS NFR BLD AUTO: 4.62 10*3/MM3 (ref 1.7–7)
NEUTROPHILS NFR BLD AUTO: 45.7 % (ref 42.7–76)
NRBC BLD AUTO-RTO: 0 /100 WBC (ref 0–0.2)
PLATELET # BLD AUTO: 176 10*3/MM3 (ref 140–450)
PMV BLD AUTO: 10.6 FL (ref 6–12)
POTASSIUM SERPL-SCNC: 4.5 MMOL/L (ref 3.5–5.2)
PROT SERPL-MCNC: 7.6 G/DL (ref 6–8.5)
RBC # BLD AUTO: 5.05 10*6/MM3 (ref 4.14–5.8)
SODIUM SERPL-SCNC: 140 MMOL/L (ref 136–145)
WBC NRBC COR # BLD: 10.11 10*3/MM3 (ref 3.4–10.8)

## 2023-09-05 PROCEDURE — 85025 COMPLETE CBC W/AUTO DIFF WBC: CPT

## 2023-09-05 PROCEDURE — 36415 COLL VENOUS BLD VENIPUNCTURE: CPT

## 2023-09-05 PROCEDURE — 80053 COMPREHEN METABOLIC PANEL: CPT

## 2023-09-05 PROCEDURE — 25510000001 IOPAMIDOL 61 % SOLUTION: Performed by: NURSE PRACTITIONER

## 2023-09-05 PROCEDURE — 71260 CT THORAX DX C+: CPT

## 2023-09-05 PROCEDURE — 74177 CT ABD & PELVIS W/CONTRAST: CPT

## 2023-09-05 RX ADMIN — IOPAMIDOL 90 ML: 612 INJECTION, SOLUTION INTRAVENOUS at 11:43

## 2023-09-08 ENCOUNTER — OFFICE VISIT (OUTPATIENT)
Dept: ONCOLOGY | Facility: CLINIC | Age: 73
End: 2023-09-08
Payer: MEDICARE

## 2023-09-08 ENCOUNTER — SPECIALTY PHARMACY (OUTPATIENT)
Dept: ONCOLOGY | Facility: CLINIC | Age: 73
End: 2023-09-08
Payer: MEDICARE

## 2023-09-08 VITALS
OXYGEN SATURATION: 95 % | DIASTOLIC BLOOD PRESSURE: 72 MMHG | TEMPERATURE: 97.2 F | WEIGHT: 177.4 LBS | BODY MASS INDEX: 28.51 KG/M2 | HEART RATE: 75 BPM | SYSTOLIC BLOOD PRESSURE: 113 MMHG | HEIGHT: 66 IN

## 2023-09-08 DIAGNOSIS — C91.10 CLL (CHRONIC LYMPHOCYTIC LEUKEMIA): Primary | Chronic | ICD-10-CM

## 2023-09-08 DIAGNOSIS — D50.8 OTHER IRON DEFICIENCY ANEMIA: Chronic | ICD-10-CM

## 2023-09-08 DIAGNOSIS — D69.6 THROMBOCYTOPENIA: Chronic | ICD-10-CM

## 2023-09-08 DIAGNOSIS — D72.820 LYMPHOCYTOSIS: Chronic | ICD-10-CM

## 2023-09-08 NOTE — PROGRESS NOTES
Care Coordination General Call Note    Contacted patient regarding refill of Calquence.     Spoke with patients wife regarding setting up shipment of medication.   Will set up shipment for Tuesday 9/12 with delivery to the patient on 9/13. Patient has no other concerns at this time regarding the medication.       Apurva Burleson [facility]  9/8/2023  08:56 CDT

## 2023-09-08 NOTE — PROGRESS NOTES
"DATE OF VISIT: 9/8/2023      REASON FOR VISIT: Chronic lymphocytic leukemia, lymphocytosis, anemia, night sweats      HISTORY OF PRESENT ILLNESS:   72-year-old male with medical problems significant for coronary artery disease s/p CABG, dyslipidemia, chronic lymphocytic leukemia currently on Acalabrutinib since November 22, 2022 is here for follow-up appointment to discuss recently done blood work as well as CT scan.  Continues to have night sweats.  Complains of intermittent abdominal discomfort.  Denies any bleeding.  Denies any new lymph node enlargement.  Denies any fevers.  Complains of sinus congestion.                Oncology history:    1.  Chronic lymphocytic leukemia:  - Patient was diagnosed with chronic lymphocytic leukemia based on peripheral blood flow cytometry in September 2016  - Received 2 cycles of chemotherapy with Bendamustine and rituximab until July 10, 2017.  - On day 2 of cycle 2 of chemotherapy patient developed allergic reaction with swelling of face and erythema affecting face.  - Due to worsening splenomegaly, abdominal pain patient was started on acalabrutinib on November 22, 2022.                 Past Medical History, Past Surgical History, Social History, Family History have been reviewed and are without significant changes except as mentioned.    Review of Systems   A comprehensive 14 point review of systems was performed and was negative except as mentioned in HPI.    Medications:  The current medication list was reviewed in the EMR    ALLERGIES:  No Known Allergies    Objective      Vitals:    09/08/23 0814   Weight: 80.5 kg (177 lb 6.4 oz)   Height: 167.6 cm (66\")   PainSc: 0-No pain         3/3/2023     1:52 PM   Current Status   ECOG score 0       Physical Exam  Pulmonary:      Breath sounds: Normal breath sounds.   Neurological:      Mental Status: He is alert and oriented to person, place, and time.         RECENT LABS:  Glucose   Date Value Ref Range Status   09/05/2023 91 65 " - 99 mg/dL Final   10/04/2021 89 70 - 110 mg/dL Final     Sodium   Date Value Ref Range Status   09/05/2023 140 136 - 145 mmol/L Final   03/22/2023 139 136 - 145 mmol/L Final     Potassium   Date Value Ref Range Status   09/05/2023 4.5 3.5 - 5.2 mmol/L Final   03/22/2023 4.4 3.5 - 5.1 mmol/L Final     CO2   Date Value Ref Range Status   09/05/2023 26.0 22.0 - 29.0 mmol/L Final     Total CO2   Date Value Ref Range Status   03/22/2023 29 21 - 31 mmol/L Final     Chloride   Date Value Ref Range Status   09/05/2023 104 98 - 107 mmol/L Final   03/22/2023 102 98 - 107 mmol/L Final     Anion Gap   Date Value Ref Range Status   09/05/2023 10.0 5.0 - 15.0 mmol/L Final   03/22/2023 8 4 - 12 mmol/L Final     Creatinine   Date Value Ref Range Status   09/05/2023 1.16 0.76 - 1.27 mg/dL Final   03/22/2023 1.1 0.7 - 1.3 mg/dL Final     BUN   Date Value Ref Range Status   09/05/2023 19 8 - 23 mg/dL Final   03/22/2023 16 7 - 25 mg/dL Final     BUN/Creatinine Ratio   Date Value Ref Range Status   09/05/2023 16.4 7.0 - 25.0 Final     Calcium   Date Value Ref Range Status   09/05/2023 9.4 8.6 - 10.5 mg/dL Final   03/22/2023 9.5 8.6 - 10.3 mg/dL Final     eGFR Non  Amer   Date Value Ref Range Status   02/14/2022 50 (L) >60 mL/min/1.73 Final     Alkaline Phosphatase   Date Value Ref Range Status   09/05/2023 81 39 - 117 U/L Final   03/22/2023 62 34 - 104 U/L Final     Total Protein   Date Value Ref Range Status   09/05/2023 7.6 6.0 - 8.5 g/dL Final     ALT (SGPT)   Date Value Ref Range Status   09/05/2023 21 1 - 41 U/L Final   03/22/2023 18 7 - 52 U/L Final     AST (SGOT)   Date Value Ref Range Status   09/05/2023 16 1 - 40 U/L Final   03/22/2023 14 13 - 39 U/L Final     Total Bilirubin   Date Value Ref Range Status   09/05/2023 0.6 0.0 - 1.2 mg/dL Final   03/22/2023 0.69 0.3 - 1.0 mg/dL Final     Albumin   Date Value Ref Range Status   09/05/2023 4.6 3.5 - 5.2 g/dL Final   03/22/2023 4.7 3.5 - 5.7 g/dL Final     Globulin   Date  Value Ref Range Status   09/05/2023 3.0 gm/dL Final     Lab Results   Component Value Date    WBC 10.11 09/05/2023    HGB 15.0 09/05/2023    HCT 43.8 09/05/2023    MCV 86.7 09/05/2023     09/05/2023     Lab Results   Component Value Date    NEUTROABS 4.62 09/05/2023    IRON 61 05/10/2023    IRON 57 (L) 02/15/2023    IRON 60 11/18/2022    TIBC 277 (L) 05/10/2023    TIBC 334 02/15/2023    TIBC 341 11/18/2022    LABIRON 22 05/10/2023    LABIRON 17 (L) 02/15/2023    LABIRON 18 (L) 11/18/2022    FERRITIN 305.20 05/10/2023    FERRITIN 46.04 02/15/2023    FERRITIN 93.44 11/18/2022    FFFGTBJC28 679 05/10/2023    IXYLEENT20 686 02/15/2023    FPOPCEGW48 854 11/18/2022    FOLATE 18.50 05/10/2023    FOLATE >20.00 02/15/2023    FOLATE 17.60 11/18/2022     Lab Results   Component Value Date    REFLABREPO SEE NOTE: 10/19/2016         PATHOLOGY:  * Cannot find OR log *         RADIOLOGY DATA :  CT Chest With Contrast Diagnostic    Result Date: 9/5/2023  1. Stable borderline mediastinal lymph nodes when compared 5/10/2023. Repeat CT 6 months     CT Abdomen Pelvis With Contrast    Result Date: 9/5/2023  1.  There is mild splenomegaly which has slightly improved compared to prior examination. 2.  There is mild hepatic steatosis which is similar to the patient's prior study. 3.   No new suspicious lymphadenopathy is identified.  No suspicious mass lesions identified. 4.  There is hypertrophy of the prostate which is similar to the patient's prior examination.         Assessment & Plan     1.  Chronic lymphocytic leukemia:  - Review oncology history for prior treatment details  - Patient is currently on Acalabrutinib since November 22, 2022.  - Patient is tolerating Acalabrutinib well  - CT of chest abdomen and pelvis with contrast on September 5, 2022 shows stable lymph node enlargement involving mediastinum.  Splenomegaly showing improvement.  Spleen size was 13.9 cm as compared to 19.4 cm in November of last year.  Results  were discussed with patient  - We will continue with acalabrutinib 100 mg twice daily for now.  - We will have patient return to clinic in 8 weeks with repeat CBC, CMP, iron studies, ferritin, B12 and folate to be done on that day.  - Plan will be to repeat CT chest abdomen and pelvis with contrast around January 2024.    2.  Anemia:  - Hemoglobin is 15.1  - Due to iron malabsorption patient has received intravenous Venofer in March 2023  - Recommend continue with folic acid 3 times a week  - We will repeat anemia work-up upon next clinic visit in 8 weeks.    3.  Thrombocytopenia:  - Secondary to CLL plus splenomegaly  - Platelet count is 176,000.  - We will monitor with CBC    4.  Lymphocytosis:  - Secondary to CLL plus Acalabrutinib  - White blood cell count is improved to 10,000.  Will monitor with CBC for now    5.  Health maintenance: Patient does not smoke    6.  Advance care planning:  - Patient remains full code    7.  Prescriptions: prescription for Acalabrutinib has been sent to patient's pharmacy today.                     PHQ-9 Total Score:       Roe Gu Sr. reports a pain score of 0.  Given his pain assessment as noted, treatment options were discussed and the following options were decided upon as a follow-up plan to address the patient's pain: continuation of current treatment plan for pain.         Andres Lubin MD  9/8/2023  08:16 CDT        Part of this note may be an electronic transcription/translation of spoken language to printed text using the Dragon Dictation System.          CC:

## 2023-09-12 ENCOUNTER — SPECIALTY PHARMACY (OUTPATIENT)
Dept: ONCOLOGY | Facility: CLINIC | Age: 73
End: 2023-09-12
Payer: MEDICARE

## (undated) DEVICE — TOWEL,OR,DSP,ST,BLUE,DLX,4/PK,20PK/CS: Brand: MEDLINE

## (undated) DEVICE — GLV SURG TRIUMPH LT PF LTX 7.5 STRL

## (undated) DEVICE — SKIN AFFIX SURG ADHESIVE 72/CS 0.55ML: Brand: MEDLINE

## (undated) DEVICE — GLV SURG SENSICARE GREEN W/ALOE PF LF 6.5 STRL

## (undated) DEVICE — GLV SURG SENSICARE GREEN W/ALOE PF LF 8.5 STRL

## (undated) DEVICE — SINGLE-USE BIOPSY FORCEPS: Brand: RADIAL JAW 4

## (undated) DEVICE — PK MAJ PROC LF 60

## (undated) DEVICE — SYR LL TP 10ML STRL

## (undated) DEVICE — CANN SMPL SOFTECH BIFLO ETCO2 A/M 7FT

## (undated) DEVICE — GLV SURG TRIUMPH NATURAL W/ALOE PF LTX 8 STRL

## (undated) DEVICE — GLV SURG TRIUMPH LT PF LTX 8 STRL

## (undated) DEVICE — SUT VIC 3/0 SH 27IN J416H

## (undated) DEVICE — GLV SURG TRIUMPH NATURAL W/ALOE PF LTX 6 STRL

## (undated) DEVICE — BITEBLOCK ENDO W/STRAP 60F A/ LF DISP

## (undated) DEVICE — GOWN,PREVENTION PLUS,XLNG/XXLARGE,STRL: Brand: MEDLINE

## (undated) DEVICE — GLV SURG TRIUMPH NATURAL W/ALOE PF LTX 7.5 STRL

## (undated) DEVICE — SOL IRR NACL 0.9PCT BT 1000ML

## (undated) DEVICE — INTENDED FOR TISSUE SEPARATION, AND OTHER PROCEDURES THAT REQUIRE A SHARP SURGICAL BLADE TO PUNCTURE OR CUT.: Brand: BARD-PARKER ® CARBON RIB-BACK BLADES

## (undated) DEVICE — PAD GRND REM POLYHESIVE A/ DISP

## (undated) DEVICE — ST CVR PROB PULLUP ULTRASND 5X48IN

## (undated) DEVICE — GLV SURG SENSICARE GREEN W/ALOE PF LF 6 STRL

## (undated) DEVICE — ANTIBACTERIAL UNDYED BRAIDED (POLYGLACTIN 910), SYNTHETIC ABSORBABLE SUTURE: Brand: COATED VICRYL

## (undated) DEVICE — GLV SURG SENSICARE GREEN W/ALOE PF LF 7 STRL

## (undated) DEVICE — CVR FLUOROSCOPE C/ARM W/TP 36X28IN